# Patient Record
Sex: FEMALE | Race: BLACK OR AFRICAN AMERICAN | NOT HISPANIC OR LATINO | URBAN - METROPOLITAN AREA
[De-identification: names, ages, dates, MRNs, and addresses within clinical notes are randomized per-mention and may not be internally consistent; named-entity substitution may affect disease eponyms.]

---

## 2019-09-14 ENCOUNTER — INPATIENT (INPATIENT)
Facility: HOSPITAL | Age: 59
LOS: 1 days | Discharge: HOME | DRG: 872 | End: 2019-09-16
Attending: HOSPITALIST | Admitting: INTERNAL MEDICINE
Payer: COMMERCIAL

## 2019-09-14 VITALS
WEIGHT: 274.92 LBS | HEIGHT: 71 IN | SYSTOLIC BLOOD PRESSURE: 130 MMHG | DIASTOLIC BLOOD PRESSURE: 77 MMHG | RESPIRATION RATE: 18 BRPM | OXYGEN SATURATION: 97 % | HEART RATE: 110 BPM | TEMPERATURE: 98 F

## 2019-09-14 DIAGNOSIS — L91.8 OTHER HYPERTROPHIC DISORDERS OF THE SKIN: Chronic | ICD-10-CM

## 2019-09-14 DIAGNOSIS — K52.9 NONINFECTIVE GASTROENTERITIS AND COLITIS, UNSPECIFIED: ICD-10-CM

## 2019-09-14 DIAGNOSIS — Z98.891 HISTORY OF UTERINE SCAR FROM PREVIOUS SURGERY: Chronic | ICD-10-CM

## 2019-09-14 LAB
ALBUMIN SERPL ELPH-MCNC: 2.7 G/DL — LOW (ref 3.3–5)
ALP SERPL-CCNC: 93 U/L — SIGNIFICANT CHANGE UP (ref 40–120)
ALT FLD-CCNC: 11 U/L — SIGNIFICANT CHANGE UP (ref 10–45)
ANION GAP SERPL CALC-SCNC: 9 MMOL/L — SIGNIFICANT CHANGE UP (ref 5–17)
APPEARANCE UR: CLEAR — SIGNIFICANT CHANGE UP
APTT BLD: 22.6 SEC — LOW (ref 27.5–36.3)
AST SERPL-CCNC: 12 U/L — SIGNIFICANT CHANGE UP (ref 10–40)
BACTERIA # UR AUTO: ABNORMAL /HPF
BASOPHILS # BLD AUTO: 0.04 K/UL — SIGNIFICANT CHANGE UP (ref 0–0.2)
BASOPHILS NFR BLD AUTO: 0.3 % — SIGNIFICANT CHANGE UP (ref 0–2)
BILIRUB SERPL-MCNC: 0.4 MG/DL — SIGNIFICANT CHANGE UP (ref 0.2–1.2)
BILIRUB UR-MCNC: NEGATIVE — SIGNIFICANT CHANGE UP
BLD GP AB SCN SERPL QL: SIGNIFICANT CHANGE UP
BUN SERPL-MCNC: 13 MG/DL — SIGNIFICANT CHANGE UP (ref 7–23)
CALCIUM SERPL-MCNC: 9 MG/DL — SIGNIFICANT CHANGE UP (ref 8.4–10.5)
CHLORIDE SERPL-SCNC: 99 MMOL/L — SIGNIFICANT CHANGE UP (ref 96–108)
CO2 SERPL-SCNC: 27 MMOL/L — SIGNIFICANT CHANGE UP (ref 22–31)
COLOR SPEC: YELLOW — SIGNIFICANT CHANGE UP
CREAT SERPL-MCNC: 1.42 MG/DL — HIGH (ref 0.5–1.3)
DIFF PNL FLD: ABNORMAL
EOSINOPHIL # BLD AUTO: 0.04 K/UL — SIGNIFICANT CHANGE UP (ref 0–0.5)
EOSINOPHIL NFR BLD AUTO: 0.3 % — SIGNIFICANT CHANGE UP (ref 0–6)
EPI CELLS # UR: ABNORMAL
GLUCOSE BLDC GLUCOMTR-MCNC: 251 MG/DL — HIGH (ref 70–99)
GLUCOSE BLDC GLUCOMTR-MCNC: 318 MG/DL — HIGH (ref 70–99)
GLUCOSE BLDC GLUCOMTR-MCNC: 379 MG/DL — HIGH (ref 70–99)
GLUCOSE SERPL-MCNC: 403 MG/DL — HIGH (ref 70–99)
GLUCOSE UR QL: 1000 MG/DL
GRAN CASTS # UR COMP ASSIST: ABNORMAL
HCT VFR BLD CALC: 35.7 % — SIGNIFICANT CHANGE UP (ref 34.5–45)
HGB BLD-MCNC: 11.5 G/DL — SIGNIFICANT CHANGE UP (ref 11.5–15.5)
HYALINE CASTS # UR AUTO: ABNORMAL (ref 0–7)
IMM GRANULOCYTES NFR BLD AUTO: 0.6 % — SIGNIFICANT CHANGE UP (ref 0–1.5)
INR BLD: 1.04 RATIO — SIGNIFICANT CHANGE UP (ref 0.88–1.16)
KETONES UR-MCNC: NEGATIVE — SIGNIFICANT CHANGE UP
LACTATE SERPL-SCNC: 1.7 MMOL/L — SIGNIFICANT CHANGE UP (ref 0.7–2)
LACTATE SERPL-SCNC: 3.1 MMOL/L — HIGH (ref 0.7–2)
LEUKOCYTE ESTERASE UR-ACNC: ABNORMAL
LIDOCAIN IGE QN: 174 U/L — SIGNIFICANT CHANGE UP (ref 73–393)
LYMPHOCYTES # BLD AUTO: 1.7 K/UL — SIGNIFICANT CHANGE UP (ref 1–3.3)
LYMPHOCYTES # BLD AUTO: 13.8 % — SIGNIFICANT CHANGE UP (ref 13–44)
MCHC RBC-ENTMCNC: 26.1 PG — LOW (ref 27–34)
MCHC RBC-ENTMCNC: 32.2 GM/DL — SIGNIFICANT CHANGE UP (ref 32–36)
MCV RBC AUTO: 81.1 FL — SIGNIFICANT CHANGE UP (ref 80–100)
MONOCYTES # BLD AUTO: 0.47 K/UL — SIGNIFICANT CHANGE UP (ref 0–0.9)
MONOCYTES NFR BLD AUTO: 3.8 % — SIGNIFICANT CHANGE UP (ref 2–14)
NEUTROPHILS # BLD AUTO: 10 K/UL — HIGH (ref 1.8–7.4)
NEUTROPHILS NFR BLD AUTO: 81.2 % — HIGH (ref 43–77)
NITRITE UR-MCNC: NEGATIVE — SIGNIFICANT CHANGE UP
NRBC # BLD: 0 /100 WBCS — SIGNIFICANT CHANGE UP (ref 0–0)
PH UR: 5 — SIGNIFICANT CHANGE UP (ref 5–8)
PLATELET # BLD AUTO: 213 K/UL — SIGNIFICANT CHANGE UP (ref 150–400)
POTASSIUM SERPL-MCNC: 3.7 MMOL/L — SIGNIFICANT CHANGE UP (ref 3.5–5.3)
POTASSIUM SERPL-SCNC: 3.7 MMOL/L — SIGNIFICANT CHANGE UP (ref 3.5–5.3)
PROT SERPL-MCNC: 8.3 G/DL — SIGNIFICANT CHANGE UP (ref 6–8.3)
PROT UR-MCNC: 100
PROTHROM AB SERPL-ACNC: 11.7 SEC — SIGNIFICANT CHANGE UP (ref 10–12.9)
RBC # BLD: 4.4 M/UL — SIGNIFICANT CHANGE UP (ref 3.8–5.2)
RBC # FLD: 13.4 % — SIGNIFICANT CHANGE UP (ref 10.3–14.5)
RBC CASTS # UR COMP ASSIST: ABNORMAL /HPF (ref 0–4)
SODIUM SERPL-SCNC: 135 MMOL/L — SIGNIFICANT CHANGE UP (ref 135–145)
SP GR SPEC: 1.01 — SIGNIFICANT CHANGE UP (ref 1.01–1.02)
UROBILINOGEN FLD QL: NEGATIVE — SIGNIFICANT CHANGE UP
WBC # BLD: 12.33 K/UL — HIGH (ref 3.8–10.5)
WBC # FLD AUTO: 12.33 K/UL — HIGH (ref 3.8–10.5)
WBC UR QL: ABNORMAL /HPF (ref 0–5)

## 2019-09-14 PROCEDURE — 93010 ELECTROCARDIOGRAM REPORT: CPT

## 2019-09-14 PROCEDURE — 99285 EMERGENCY DEPT VISIT HI MDM: CPT

## 2019-09-14 PROCEDURE — 99284 EMERGENCY DEPT VISIT MOD MDM: CPT

## 2019-09-14 PROCEDURE — 99223 1ST HOSP IP/OBS HIGH 75: CPT

## 2019-09-14 RX ORDER — ONDANSETRON 8 MG/1
4 TABLET, FILM COATED ORAL ONCE
Refills: 0 | Status: COMPLETED | OUTPATIENT
Start: 2019-09-14 | End: 2019-09-14

## 2019-09-14 RX ORDER — INSULIN GLARGINE 100 [IU]/ML
30 INJECTION, SOLUTION SUBCUTANEOUS AT BEDTIME
Refills: 0 | Status: DISCONTINUED | OUTPATIENT
Start: 2019-09-14 | End: 2019-09-15

## 2019-09-14 RX ORDER — INSULIN LISPRO 100/ML
VIAL (ML) SUBCUTANEOUS
Refills: 0 | Status: DISCONTINUED | OUTPATIENT
Start: 2019-09-14 | End: 2019-09-16

## 2019-09-14 RX ORDER — ENOXAPARIN SODIUM 100 MG/ML
40 INJECTION SUBCUTANEOUS DAILY
Refills: 0 | Status: DISCONTINUED | OUTPATIENT
Start: 2019-09-14 | End: 2019-09-16

## 2019-09-14 RX ORDER — CIPROFLOXACIN LACTATE 400MG/40ML
400 VIAL (ML) INTRAVENOUS EVERY 12 HOURS
Refills: 0 | Status: DISCONTINUED | OUTPATIENT
Start: 2019-09-14 | End: 2019-09-16

## 2019-09-14 RX ORDER — ONDANSETRON 8 MG/1
4 TABLET, FILM COATED ORAL EVERY 6 HOURS
Refills: 0 | Status: DISCONTINUED | OUTPATIENT
Start: 2019-09-14 | End: 2019-09-16

## 2019-09-14 RX ORDER — LOSARTAN POTASSIUM 100 MG/1
50 TABLET, FILM COATED ORAL DAILY
Refills: 0 | Status: DISCONTINUED | OUTPATIENT
Start: 2019-09-14 | End: 2019-09-16

## 2019-09-14 RX ORDER — MYCOPHENOLATE MOFETIL 250 MG/1
1000 CAPSULE ORAL
Refills: 0 | Status: DISCONTINUED | OUTPATIENT
Start: 2019-09-14 | End: 2019-09-16

## 2019-09-14 RX ORDER — INFLUENZA VIRUS VACCINE 15; 15; 15; 15 UG/.5ML; UG/.5ML; UG/.5ML; UG/.5ML
0.5 SUSPENSION INTRAMUSCULAR ONCE
Refills: 0 | Status: COMPLETED | OUTPATIENT
Start: 2019-09-14 | End: 2019-09-14

## 2019-09-14 RX ORDER — GLUCAGON INJECTION, SOLUTION 0.5 MG/.1ML
1 INJECTION, SOLUTION SUBCUTANEOUS ONCE
Refills: 0 | Status: DISCONTINUED | OUTPATIENT
Start: 2019-09-14 | End: 2019-09-16

## 2019-09-14 RX ORDER — DEXTROSE 50 % IN WATER 50 %
15 SYRINGE (ML) INTRAVENOUS ONCE
Refills: 0 | Status: DISCONTINUED | OUTPATIENT
Start: 2019-09-14 | End: 2019-09-16

## 2019-09-14 RX ORDER — DEXTROSE 50 % IN WATER 50 %
25 SYRINGE (ML) INTRAVENOUS ONCE
Refills: 0 | Status: DISCONTINUED | OUTPATIENT
Start: 2019-09-14 | End: 2019-09-16

## 2019-09-14 RX ORDER — CIPROFLOXACIN LACTATE 400MG/40ML
400 VIAL (ML) INTRAVENOUS ONCE
Refills: 0 | Status: COMPLETED | OUTPATIENT
Start: 2019-09-14 | End: 2019-09-14

## 2019-09-14 RX ORDER — SODIUM CHLORIDE 9 MG/ML
2200 INJECTION INTRAMUSCULAR; INTRAVENOUS; SUBCUTANEOUS ONCE
Refills: 0 | Status: COMPLETED | OUTPATIENT
Start: 2019-09-14 | End: 2019-09-14

## 2019-09-14 RX ORDER — METRONIDAZOLE 500 MG
500 TABLET ORAL ONCE
Refills: 0 | Status: COMPLETED | OUTPATIENT
Start: 2019-09-14 | End: 2019-09-14

## 2019-09-14 RX ORDER — INSULIN LISPRO 100/ML
10 VIAL (ML) SUBCUTANEOUS
Refills: 0 | Status: DISCONTINUED | OUTPATIENT
Start: 2019-09-14 | End: 2019-09-15

## 2019-09-14 RX ORDER — ACETAMINOPHEN 500 MG
650 TABLET ORAL EVERY 6 HOURS
Refills: 0 | Status: DISCONTINUED | OUTPATIENT
Start: 2019-09-14 | End: 2019-09-16

## 2019-09-14 RX ORDER — HYDROXYCHLOROQUINE SULFATE 200 MG
200 TABLET ORAL
Refills: 0 | Status: DISCONTINUED | OUTPATIENT
Start: 2019-09-14 | End: 2019-09-16

## 2019-09-14 RX ORDER — SODIUM CHLORIDE 9 MG/ML
1000 INJECTION, SOLUTION INTRAVENOUS
Refills: 0 | Status: DISCONTINUED | OUTPATIENT
Start: 2019-09-14 | End: 2019-09-16

## 2019-09-14 RX ORDER — IOHEXOL 300 MG/ML
30 INJECTION, SOLUTION INTRAVENOUS ONCE
Refills: 0 | Status: COMPLETED | OUTPATIENT
Start: 2019-09-14 | End: 2019-09-14

## 2019-09-14 RX ORDER — METRONIDAZOLE 500 MG
500 TABLET ORAL EVERY 8 HOURS
Refills: 0 | Status: DISCONTINUED | OUTPATIENT
Start: 2019-09-14 | End: 2019-09-16

## 2019-09-14 RX ORDER — MORPHINE SULFATE 50 MG/1
4 CAPSULE, EXTENDED RELEASE ORAL ONCE
Refills: 0 | Status: DISCONTINUED | OUTPATIENT
Start: 2019-09-14 | End: 2019-09-14

## 2019-09-14 RX ORDER — MYCOPHENOLATE MOFETIL 250 MG/1
2 CAPSULE ORAL
Qty: 0 | Refills: 0 | DISCHARGE

## 2019-09-14 RX ORDER — ACETAMINOPHEN 500 MG
650 TABLET ORAL ONCE
Refills: 0 | Status: COMPLETED | OUTPATIENT
Start: 2019-09-14 | End: 2019-09-14

## 2019-09-14 RX ORDER — DEXTROSE 50 % IN WATER 50 %
12.5 SYRINGE (ML) INTRAVENOUS ONCE
Refills: 0 | Status: DISCONTINUED | OUTPATIENT
Start: 2019-09-14 | End: 2019-09-16

## 2019-09-14 RX ORDER — LOSARTAN POTASSIUM 100 MG/1
0 TABLET, FILM COATED ORAL
Qty: 0 | Refills: 0 | DISCHARGE

## 2019-09-14 RX ORDER — SODIUM CHLORIDE 9 MG/ML
1000 INJECTION INTRAMUSCULAR; INTRAVENOUS; SUBCUTANEOUS ONCE
Refills: 0 | Status: DISCONTINUED | OUTPATIENT
Start: 2019-09-14 | End: 2019-09-14

## 2019-09-14 RX ADMIN — Medication 200 MILLIGRAM(S): at 19:03

## 2019-09-14 RX ADMIN — Medication 200 MILLIGRAM(S): at 19:04

## 2019-09-14 RX ADMIN — MORPHINE SULFATE 4 MILLIGRAM(S): 50 CAPSULE, EXTENDED RELEASE ORAL at 12:12

## 2019-09-14 RX ADMIN — Medication 10: at 17:42

## 2019-09-14 RX ADMIN — Medication 100 MILLIGRAM(S): at 22:14

## 2019-09-14 RX ADMIN — Medication 500 MILLIGRAM(S): at 14:17

## 2019-09-14 RX ADMIN — Medication 100 MILLIGRAM(S): at 13:17

## 2019-09-14 RX ADMIN — INSULIN GLARGINE 30 UNIT(S): 100 INJECTION, SOLUTION SUBCUTANEOUS at 22:14

## 2019-09-14 RX ADMIN — SODIUM CHLORIDE 2200 MILLILITER(S): 9 INJECTION INTRAMUSCULAR; INTRAVENOUS; SUBCUTANEOUS at 11:54

## 2019-09-14 RX ADMIN — MORPHINE SULFATE 4 MILLIGRAM(S): 50 CAPSULE, EXTENDED RELEASE ORAL at 11:42

## 2019-09-14 RX ADMIN — Medication 200 MILLIGRAM(S): at 11:54

## 2019-09-14 RX ADMIN — Medication 650 MILLIGRAM(S): at 11:53

## 2019-09-14 RX ADMIN — SODIUM CHLORIDE 2200 MILLILITER(S): 9 INJECTION INTRAMUSCULAR; INTRAVENOUS; SUBCUTANEOUS at 12:54

## 2019-09-14 RX ADMIN — IOHEXOL 30 MILLILITER(S): 300 INJECTION, SOLUTION INTRAVENOUS at 12:01

## 2019-09-14 RX ADMIN — MYCOPHENOLATE MOFETIL 1000 MILLIGRAM(S): 250 CAPSULE ORAL at 19:03

## 2019-09-14 RX ADMIN — Medication 10 UNIT(S): at 17:42

## 2019-09-14 RX ADMIN — Medication 650 MILLIGRAM(S): at 12:23

## 2019-09-14 RX ADMIN — ONDANSETRON 4 MILLIGRAM(S): 8 TABLET, FILM COATED ORAL at 11:42

## 2019-09-14 RX ADMIN — Medication 400 MILLIGRAM(S): at 12:54

## 2019-09-14 NOTE — ED PROVIDER NOTE - OBJECTIVE STATEMENT
59 yr old female with hx of lupus (on prednisone), HTN, DM presents c/o with abdominal pain. Pt reports right sided pain started on wednesday night, went away, now pain returned at 2 am. Denies any n/v/d, known fever or chills. Denies any cough, chest pain, sob or any other symptoms. Pt was seen at urgent care and sent to ED for further eval.

## 2019-09-14 NOTE — ED ADULT NURSE NOTE - OBJECTIVE STATEMENT
Patient presents complaining of diffuse epigastric pain radiating to her right-sided abdomen. Patient states pain has been occurring for approx. 2-3 days, she denies fevers, denies vomiting however verbalizes nausea. Patient states last BM was this A.M.

## 2019-09-14 NOTE — H&P ADULT - NSHPLABSRESULTS_GEN_ALL_CORE
T(C): 36.6 (19 @ 13:49), Max: 38.2 (19 @ 11:34)  T(F): 97.9 (19 @ 13:49), Max: 100.7 (19 @ 11:34)  HR: 68 (19 @ 16:30) (68 - 110)  BP: 138/69 (19 @ 16:30) (123/60 - 138/69)  RR: 15 (19 @ 16:30) (12 - 18)  SpO2: 97% (19 @ 16:30) (97% - 98%)  Labs:                         11.5   12.33<H>   )-----------(   213      ( 14 Sep 2019 11:44 )              35.7     Neutro%  81.2<H>   Lympho%  13.8    Mono%    3.8     Bands    x            135  |  99  |  13  ----------------------------<  403<H>  3.7   |  27  |  1.42<H>    Ca    9.0      14 Sep 2019 11:44    TPro  8.3  /  Alb  2.7<L>  /  TBili  0.4  /  DBili  x   /  AST  12  /  ALT  11  /  AlkPhos  93      eGFR if Non African American: 41 mL/min/1.73M2 (19 @ 11:44)  eGFR if : 48 mL/min/1.73M2 (19 @ 11:44)      ( 14 Sep 2019 11:44 )   PT: 11.7 sec;   INR: 1.04 ratio;       PTT:22.6 sec                Urinalysis Basic - ( 14 Sep 2019 13:00 )    Color: Yellow / Appearance: Clear / S.015 / pH: x  Gluc: x / Ketone: Negative  / Bili: Negative / Urobili: Negative   Blood: x / Protein: 100 / Nitrite: Negative   Leuk Esterase: Trace / RBC: 11-25 /HPF / WBC 6-10 /HPF   Sq Epi: x / Non Sq Epi: Moderate / Bacteria: Few /HPF              RVP:          Tox:

## 2019-09-14 NOTE — H&P ADULT - NSICDXPASTSURGICALHX_GEN_ALL_CORE_FT
PAST SURGICAL HISTORY:   delivery delivered  section x 3    H/O:      Skin tag Skin tag removal, right shoulder

## 2019-09-14 NOTE — CONSULT NOTE ADULT - ASSESSMENT
fair story for appendicitis and conving rlq tenderness but need CT scan for confirmation. Not a good story for appendicitis although hhas rlq tenderness.      I personally reviewed CT scan images and findings are c/w colitis. Appendix is normal.      I discussed with ER to admit to medicine for IV antibioitics.    Would keep NPO today and start clear liquid diet in am.    Thank you.    Dr. Fer Soriano  cell#[phone]

## 2019-09-14 NOTE — ED PROVIDER NOTE - ATTENDING CONTRIBUTION TO CARE
59 yr old female with hx of lupus (on prednisone), HTN, DM presents c/o with abdominal pain. Pt reports right sided pain started on wednesday night, went away, now pain returned at 2 am. Denies any n/v/d, known fever or chills. Denies any cough, chest pain, sob or any other symptoms. Pt was seen at urgent care and sent to ED for further eval.  abdomen tenderness to RLQ, high suspicion for appendicitis- Dr. Soriano called prior to ct results, sepsis fluids, iv abx done, ct showing colitis, normal appendix, admit to medicine for iv abx  Dr. Adamson:  I have reviewed and discussed with the PA/ resident the case specifics, including the history, physical assessment, evaluation, conclusion, laboratory results, and medical plan. I agree with the contents, and conclusions. I have personally examined, and interviewed the patient.

## 2019-09-14 NOTE — H&P ADULT - HISTORY OF PRESENT ILLNESS
60 yo F with pmhx of 58 yo F with pmhx of lupus, DM, HTN here for worsening RUQ abdominal pain for 3 days, pain acutely worsening since 2am last night. Pt described pain in RUQ, pressure likes, comes and goes, 9/10, radiating across n is associated with at least three episodes of non bloody diarrhea per day. Pt also has nauseas and a few episodes of nbnb vomiting. Pt also admits to fever and chills at home.     In ED, T max of 100.7 HR 94 /60 RR 12 O2 sat: 98% on RA 60 yo F with pmhx of lupus, DM, HTN here for worsening RUQ abdominal pain for 3 days, pain acutely worsening since 2am last night. Pt described pain in RUQ, pressure likes, comes and goes, 9/10, radiating across the abdomen. Pt reports at least three episodes of non bloody diarrhea per day the past three days. Pt also has nauseas and a few episodes of nbnb vomiting. Pt also admits to fever and chills at home.     In ED, T max of 100.7 HR 94 /60 RR 12 O2 sat: 98% on RA 58 yo F with pmhx of lupus, DM, HTN here for worsening RUQ abdominal pain for 3 days, pain acutely worsening since 2am last night. Pt described pain in RUQ, pressure likes, comes and goes, 9/10, radiating across the abdomen. Pt reports at least three episodes of non bloody diarrhea per day the past three days. Pt also has nauseas and a few episodes of nbnb vomiting. Pt also admits to fever and chills at home. Pt denies cough, sob, cp.     In ED, T max of 100.7 HR 94 /60 RR 12 O2 sat: 98% on RA    CT A/P: Bibasilar patchy lung airspace opacities. Cecum is markedly thickened with inflammatory changes in surrounding fat consistent with colitis.

## 2019-09-14 NOTE — ED PROVIDER NOTE - CLINICAL SUMMARY MEDICAL DECISION MAKING FREE TEXT BOX
59 yr old female with hx of lupus (on prednisone), HTN, DM presents c/o with abdominal pain. Pt reports right sided pain started on wednesday night, went away, now pain returned at 2 am. Denies any n/v/d, known fever or chills. Denies any cough, chest pain, sob or any other symptoms. Pt was seen at urgent care and sent to ED for further eval.  abdomen tenderness to RLQ, high suspicion for appendicitis- Dr. Soriano called prior to ct results, sepsis fluids, iv abx done, ct showing colitis, normal appendix, admit to medicine for iv abx

## 2019-09-14 NOTE — ED ADULT TRIAGE NOTE - CHIEF COMPLAINT QUOTE
Pt c/o right lower abd pain x2 days with nausea. Pt denies Any fever. Pt states PMH includes lupus, type 2 diabetes, and HTN.

## 2019-09-14 NOTE — CONSULT NOTE ADULT - SUBJECTIVE AND OBJECTIVE BOX
ED provider note:  Pt c/o right lower abd pain x2 days with nausea. Pt denies Any fever. Pt states PMH includes lupus, type 2 diabetes, and HTN.	  abdominal pain	  Extended Triage:   Vital Signs:  · BP Systolic	130 mm Hg	  · BP Diastolic	77 mm Hg	  · Heart Rate	 110 /min	  · Respiration Rate (breaths/min)	18 /min	  · Temp (F)	98 Degrees F	  · Temp (C)	36.7 Degrees C	  · Temp site	oral	  · SpO2 (%)	97 %	  · O2 delivery	room air	      patient interviewed and examined in ER  only past abdominal surgery is "C" section    c/o middle abdominal pain since wed eve now localized to rlq    afebrile, hr 130(recived fluid)      Heent-sclera anicteric    abdomen-lower midline scar, non distended, 2-3+/4+ tenderness in RLQ over McBurney's point    labs:    Complete Blood Count + Automated Diff (09.14.19 @ 11:44)    WBC Count: 12.33 K/uL    RBC Count: 4.40 M/uL    Hemoglobin: 11.5 g/dL    Hematocrit: 35.7 %    Mean Cell Volume: 81.1 fl    Mean Cell Hemoglobin: 26.1 pg    Mean Cell Hemoglobin Conc: 32.2 gm/dL    Red Cell Distrib Width: 13.4 %    Platelet Count - Automated: 213 K/uL    Auto Neutrophil #: 10.00 K/uL    Auto Lymphocyte #: 1.70 K/uL    Auto Monocyte #: 0.47 K/uL    Auto Eosinophil #: 0.04 K/uL    Auto Basophil #: 0.04 K/uL    Auto Neutrophil %: 81.2: Differential percentages must be correlated with absolute numbers for  clinical significance. %    Auto Lymphocyte %: 13.8 %    Auto Monocyte %: 3.8 %    Auto Eosinophil %: 0.3 %    Auto Basophil %: 0.3 %    Auto Immature Granulocyte %: 0.6 %    Nucleated RBC: 0 /100 WBCs        Comprehensive Metabolic Panel (09.14.19 @ 11:44)    Sodium, Serum: 135 mmol/L    Potassium, Serum: 3.7 mmol/L    Chloride, Serum: 99 mmol/L    Carbon Dioxide, Serum: 27 mmol/L    Anion Gap, Serum: 9 mmol/L    Blood Urea Nitrogen, Serum: 13 mg/dL    Creatinine, Serum: 1.42 mg/dL    Glucose, Serum: 403 mg/dL    Calcium, Total Serum: 9.0 mg/dL    Protein Total, Serum: 8.3 g/dL    Albumin, Serum: 2.7 g/dL    Bilirubin Total, Serum: 0.4 mg/dL    Alkaline Phosphatase, Serum: 93 U/L    Aspartate Aminotransferase (AST/SGOT): 12 U/L    Alanine Aminotransferase (ALT/SGPT): 11 U/L       urine pregnnancy(I told PA to get ot)    CT scan(going into scanner now) ED provider note:  Pt c/o right lower abd pain x2 days with nausea. Pt denies Any fever. Pt states PMH includes lupus, type 2 diabetes, and HTN.	  abdominal pain	  Extended Triage:   Vital Signs:  · BP Systolic	130 mm Hg	  · BP Diastolic	77 mm Hg	  · Heart Rate	 110 /min	  · Respiration Rate (breaths/min)	18 /min	  · Temp (F)	98 Degrees F	  · Temp (C)	36.7 Degrees C	  · Temp site	oral	  · SpO2 (%)	97 %	  · O2 delivery	room air	      patient interviewed and examined in ER  only past abdominal surgery is "C" section    c/o middle abdominal pain/lower epigastric since Wednesday. She does not localize her pain symptoms now to RLQ but rather points to mid and lower abdomen.    afebrile, hr 130(recived fluid)      Heent-sclera anicteric    abdomen-lower midline scar, non distended, 2-3+/4+ tenderness in RLQ over McBurney's point    labs:    Complete Blood Count + Automated Diff (09.14.19 @ 11:44)    WBC Count: 12.33 K/uL    RBC Count: 4.40 M/uL    Hemoglobin: 11.5 g/dL    Hematocrit: 35.7 %    Mean Cell Volume: 81.1 fl    Mean Cell Hemoglobin: 26.1 pg    Mean Cell Hemoglobin Conc: 32.2 gm/dL    Red Cell Distrib Width: 13.4 %    Platelet Count - Automated: 213 K/uL    Auto Neutrophil #: 10.00 K/uL    Auto Lymphocyte #: 1.70 K/uL    Auto Monocyte #: 0.47 K/uL    Auto Eosinophil #: 0.04 K/uL    Auto Basophil #: 0.04 K/uL    Auto Neutrophil %: 81.2: Differential percentages must be correlated with absolute numbers for  clinical significance. %    Auto Lymphocyte %: 13.8 %    Auto Monocyte %: 3.8 %    Auto Eosinophil %: 0.3 %    Auto Basophil %: 0.3 %    Auto Immature Granulocyte %: 0.6 %    Nucleated RBC: 0 /100 WBCs        Comprehensive Metabolic Panel (09.14.19 @ 11:44)    Sodium, Serum: 135 mmol/L    Potassium, Serum: 3.7 mmol/L    Chloride, Serum: 99 mmol/L    Carbon Dioxide, Serum: 27 mmol/L    Anion Gap, Serum: 9 mmol/L    Blood Urea Nitrogen, Serum: 13 mg/dL    Creatinine, Serum: 1.42 mg/dL    Glucose, Serum: 403 mg/dL    Calcium, Total Serum: 9.0 mg/dL    Protein Total, Serum: 8.3 g/dL    Albumin, Serum: 2.7 g/dL    Bilirubin Total, Serum: 0.4 mg/dL    Alkaline Phosphatase, Serum: 93 U/L    Aspartate Aminotransferase (AST/SGOT): 12 U/L    Alanine Aminotransferase (ALT/SGPT): 11 U/L       urine pregnnancy(I told PA to get ot)    CT scan:  < from: CT Abdomen and Pelvis w/ Oral Cont and w/ IV Cont (09.14.19 @ 13:03) >  IMPRESSION:     Bibasilar patchy lung airspace opacities suspicious for infection.   Correlate clinically and follow to resolution.    The cecum is markedly thickened and there is inflammatory change in the   surrounding fat consistent with some form of colitis. The ileocecal valve   is normal as is the appendix. The differential for this includes   inflammatory, infectious, or ischemic colitis. Correlate clinically.   Follow to resolution. Consider colonoscopy for further evaluation when   patient is able.    < end of copied text >

## 2019-09-14 NOTE — H&P ADULT - NSICDXPASTMEDICALHX_GEN_ALL_CORE_FT
PAST MEDICAL HISTORY:  Diabetes     Diabetes mellitus     HTN (hypertension)     Hyperlipidemia     Hypertension     Lupus     Lupus     Obesity     Pericarditis

## 2019-09-14 NOTE — H&P ADULT - ASSESSMENT
60 yo F with pmhx of lupus, DM, HTN here for worsening RUQ abdominal pain for 3 days, pain acutely worsening since 2am last night.      # severe sepsis 2/2 cecum colitis  - Cont with Cipro 400 mg q12 and Flagyl 500mg q8  - follow up GI  - advance diet as tolerated  - per pt, had colonoscopy 3 years ago that was negative  - follow up GI outpatient in 6 weeks for repeat colonoscopy    # lactic acidosis   - resolved with IVF    # INDER on CKD2 likely pre-renal  - baseline Cr 0.97  - s/p IVF bolus in ED  - monitor BMP    # Lupus  - c/w prednisone 10mg qd, cellcept 1000mg q12 and Plaquenil 200mg q12    # T2DM on insulin   - cont with lantus 30unit qHS, lispro 10 unit qAC and ISS  - monitor FS    # HTN   - c/w losartan 50mg q24    # DVT ppx  - lovenox subq    IMPROVE VTE Individual Risk Assessment    RISK                                                                Points    [  ] Previous VTE                                                  3    [  ] Thrombophilia                                               2    [  ] Lower limb paralysis                                      2        (unable to hold up >15 seconds)      [  ] Current Cancer                                              2         (within 6 months)    [  ] Immobilization > 24 hrs                                1    [  ] ICU/CCU stay > 24 hours                              1    [  ] Age > 60                                                      1    IMPROVE VTE Score _____0____    IMPROVE Score 0-1: Low Risk, No VTE prophylaxis required for most patients, encourage ambulation.   IMPROVE Score 2-3: At risk, pharmacologic VTE prophylaxis is indicated for most patients (in the absence of a contraindication)  IMPROVE Score > or = 4: High Risk, pharmacologic VTE prophylaxis is indicated for most patients (in the absence of a contraindication) 60 yo F with pmhx of lupus, DM, HTN here for worsening RUQ abdominal pain for 3 days, pain acutely worsening since 2am last night.      # severe sepsis 2/2 cecum colitis  - Cont with Cipro 400 mg q12 and Flagyl 500mg q8  - follow up GI  - advance diet as tolerated  - per pt, had colonoscopy 3 years ago that was negative  - follow up GI outpatient in 6 weeks for repeat colonoscopy    # lactic acidosis   - resolved with IVF    # Incidental bibasilar airspace opacities on CT   - no signs of pna. pt denies coughing or SOB.   - will hold off PNA tx    # INDER on CKD2 likely pre-renal  - baseline Cr 0.97  - s/p IVF bolus in ED  - monitor BMP    # Lupus  - c/w prednisone 10mg qd, cellcept 1000mg q12 and Plaquenil 200mg q12    # T2DM on insulin   - cont with lantus 30unit qHS, lispro 10 unit qAC and ISS  - monitor FS    # HTN   - c/w losartan 50mg q24    # DVT ppx  - lovenox subq    IMPROVE VTE Individual Risk Assessment    RISK                                                                Points    [  ] Previous VTE                                                  3    [  ] Thrombophilia                                               2    [  ] Lower limb paralysis                                      2        (unable to hold up >15 seconds)      [  ] Current Cancer                                              2         (within 6 months)    [  ] Immobilization > 24 hrs                                1    [  ] ICU/CCU stay > 24 hours                              1    [  ] Age > 60                                                      1    IMPROVE VTE Score _____0____    IMPROVE Score 0-1: Low Risk, No VTE prophylaxis required for most patients, encourage ambulation.   IMPROVE Score 2-3: At risk, pharmacologic VTE prophylaxis is indicated for most patients (in the absence of a contraindication)  IMPROVE Score > or = 4: High Risk, pharmacologic VTE prophylaxis is indicated for most patients (in the absence of a contraindication)

## 2019-09-15 ENCOUNTER — TRANSCRIPTION ENCOUNTER (OUTPATIENT)
Age: 59
End: 2019-09-15

## 2019-09-15 LAB
ANION GAP SERPL CALC-SCNC: 6 MMOL/L — SIGNIFICANT CHANGE UP (ref 5–17)
BUN SERPL-MCNC: 6 MG/DL — LOW (ref 7–23)
CALCIUM SERPL-MCNC: 7.9 MG/DL — LOW (ref 8.4–10.5)
CHLORIDE SERPL-SCNC: 102 MMOL/L — SIGNIFICANT CHANGE UP (ref 96–108)
CO2 SERPL-SCNC: 27 MMOL/L — SIGNIFICANT CHANGE UP (ref 22–31)
CREAT SERPL-MCNC: 1.26 MG/DL — SIGNIFICANT CHANGE UP (ref 0.5–1.3)
CULTURE RESULTS: SIGNIFICANT CHANGE UP
GLUCOSE BLDC GLUCOMTR-MCNC: 180 MG/DL — HIGH (ref 70–99)
GLUCOSE BLDC GLUCOMTR-MCNC: 212 MG/DL — HIGH (ref 70–99)
GLUCOSE BLDC GLUCOMTR-MCNC: 315 MG/DL — HIGH (ref 70–99)
GLUCOSE BLDC GLUCOMTR-MCNC: 99 MG/DL — SIGNIFICANT CHANGE UP (ref 70–99)
GLUCOSE SERPL-MCNC: 294 MG/DL — HIGH (ref 70–99)
HBA1C BLD-MCNC: 13.3 % — HIGH (ref 4–5.6)
HCT VFR BLD CALC: 32.3 % — LOW (ref 34.5–45)
HCV AB S/CO SERPL IA: 0.15 S/CO — SIGNIFICANT CHANGE UP (ref 0–0.99)
HCV AB SERPL-IMP: SIGNIFICANT CHANGE UP
HGB BLD-MCNC: 10.3 G/DL — LOW (ref 11.5–15.5)
MCHC RBC-ENTMCNC: 26.1 PG — LOW (ref 27–34)
MCHC RBC-ENTMCNC: 31.9 GM/DL — LOW (ref 32–36)
MCV RBC AUTO: 81.8 FL — SIGNIFICANT CHANGE UP (ref 80–100)
NRBC # BLD: 0 /100 WBCS — SIGNIFICANT CHANGE UP (ref 0–0)
PLATELET # BLD AUTO: 192 K/UL — SIGNIFICANT CHANGE UP (ref 150–400)
POTASSIUM SERPL-MCNC: 3.7 MMOL/L — SIGNIFICANT CHANGE UP (ref 3.5–5.3)
POTASSIUM SERPL-SCNC: 3.7 MMOL/L — SIGNIFICANT CHANGE UP (ref 3.5–5.3)
RBC # BLD: 3.95 M/UL — SIGNIFICANT CHANGE UP (ref 3.8–5.2)
RBC # FLD: 13.5 % — SIGNIFICANT CHANGE UP (ref 10.3–14.5)
SODIUM SERPL-SCNC: 135 MMOL/L — SIGNIFICANT CHANGE UP (ref 135–145)
SPECIMEN SOURCE: SIGNIFICANT CHANGE UP
WBC # BLD: 9.18 K/UL — SIGNIFICANT CHANGE UP (ref 3.8–10.5)
WBC # FLD AUTO: 9.18 K/UL — SIGNIFICANT CHANGE UP (ref 3.8–10.5)

## 2019-09-15 PROCEDURE — 99233 SBSQ HOSP IP/OBS HIGH 50: CPT | Mod: GC

## 2019-09-15 RX ORDER — INSULIN GLARGINE 100 [IU]/ML
33 INJECTION, SOLUTION SUBCUTANEOUS AT BEDTIME
Refills: 0 | Status: DISCONTINUED | OUTPATIENT
Start: 2019-09-15 | End: 2019-09-16

## 2019-09-15 RX ORDER — INSULIN LISPRO 100/ML
12 VIAL (ML) SUBCUTANEOUS
Refills: 0 | Status: DISCONTINUED | OUTPATIENT
Start: 2019-09-15 | End: 2019-09-16

## 2019-09-15 RX ADMIN — ENOXAPARIN SODIUM 40 MILLIGRAM(S): 100 INJECTION SUBCUTANEOUS at 11:47

## 2019-09-15 RX ADMIN — Medication 12 UNIT(S): at 17:19

## 2019-09-15 RX ADMIN — Medication 8: at 11:45

## 2019-09-15 RX ADMIN — Medication 200 MILLIGRAM(S): at 17:23

## 2019-09-15 RX ADMIN — Medication 4: at 07:54

## 2019-09-15 RX ADMIN — LOSARTAN POTASSIUM 50 MILLIGRAM(S): 100 TABLET, FILM COATED ORAL at 15:22

## 2019-09-15 RX ADMIN — Medication 200 MILLIGRAM(S): at 05:45

## 2019-09-15 RX ADMIN — MYCOPHENOLATE MOFETIL 1000 MILLIGRAM(S): 250 CAPSULE ORAL at 05:43

## 2019-09-15 RX ADMIN — Medication 100 MILLIGRAM(S): at 21:57

## 2019-09-15 RX ADMIN — Medication 200 MILLIGRAM(S): at 05:43

## 2019-09-15 RX ADMIN — Medication 10 UNIT(S): at 11:45

## 2019-09-15 RX ADMIN — Medication 100 MILLIGRAM(S): at 05:42

## 2019-09-15 RX ADMIN — MYCOPHENOLATE MOFETIL 1000 MILLIGRAM(S): 250 CAPSULE ORAL at 17:22

## 2019-09-15 RX ADMIN — Medication 2: at 17:19

## 2019-09-15 RX ADMIN — Medication 200 MILLIGRAM(S): at 17:22

## 2019-09-15 RX ADMIN — Medication 10 UNIT(S): at 07:53

## 2019-09-15 RX ADMIN — Medication 100 MILLIGRAM(S): at 15:22

## 2019-09-15 RX ADMIN — Medication 10 MILLIGRAM(S): at 05:43

## 2019-09-15 NOTE — PROGRESS NOTE ADULT - SUBJECTIVE AND OBJECTIVE BOX
HPI  Pt is a 58yo F admitted to [facility] for colitis  Pt was  seen and examined at bedside with daughter. Pt had no abd pain after clear liquid, advanced diet to soft for lunch and lower abd pain restarted with 5 episodes of diarrhea. Denies of any nausea or vomiting.     Vital Signs Last 24 Hrs  T(C): 37.4 (15 Sep 2019 10:00), Max: 37.6 (15 Sep 2019 05:37)  T(F): 99.4 (15 Sep 2019 10:00), Max: 99.7 (15 Sep 2019 05:37)  HR: 92 (15 Sep 2019 10:00) (68 - 94)  BP: 112/59 (15 Sep 2019 10:00) (112/59 - 141/74)  BP(mean): --  RR: 16 (15 Sep 2019 10:00) (15 - 16)  SpO2: 100% (15 Sep 2019 10:00) (95% - 100%)    I&O's Summary    14 Sep 2019 07:01  -  15 Sep 2019 07:00  --------------------------------------------------------  IN: 400 mL / OUT: 0 mL / NET: 400 mL        CAPILLARY BLOOD GLUCOSE      POCT Blood Glucose.: 315 mg/dL (15 Sep 2019 11:42)  POCT Blood Glucose.: 212 mg/dL (15 Sep 2019 07:52)  POCT Blood Glucose.: 251 mg/dL (14 Sep 2019 22:10)  POCT Blood Glucose.: 379 mg/dL (14 Sep 2019 17:27)      PHYSICAL EXAM:    Constitutional: NAD,   HEENT: PERR, EOMI,    Neck: Soft and supple, No LAD, No JVD  Respiratory: Breath sounds are clear bilaterally, No wheezing, rales    Cardiovascular: S1 and S2,    Gastrointestinal: hyperactive bowel Sounds present, soft, nondistended, pain on palpation right upper and lower quadrant   Extremities: No peripheral edema  Vascular: 2+ peripheral pulses  Neurological: A/O x 3, no focal deficits  Musculoskeletal: 5/5 strength b/l upper and lower extremities  Skin: No rashes    MEDICATIONS:  MEDICATIONS  (STANDING):  ciprofloxacin   IVPB 400 milliGRAM(s) IV Intermittent every 12 hours  dextrose 5%. 1000 milliLiter(s) (50 mL/Hr) IV Continuous <Continuous>  dextrose 50% Injectable 12.5 Gram(s) IV Push once  dextrose 50% Injectable 25 Gram(s) IV Push once  dextrose 50% Injectable 25 Gram(s) IV Push once  enoxaparin Injectable 40 milliGRAM(s) SubCutaneous daily  hydroxychloroquine 200 milliGRAM(s) Oral two times a day  influenza   Vaccine 0.5 milliLiter(s) IntraMuscular once  insulin glargine Injectable (LANTUS) 30 Unit(s) SubCutaneous at bedtime  insulin lispro (HumaLOG) corrective regimen sliding scale   SubCutaneous three times a day before meals  insulin lispro Injectable (HumaLOG) 10 Unit(s) SubCutaneous three times a day before meals  losartan 50 milliGRAM(s) Oral daily  metroNIDAZOLE  IVPB 500 milliGRAM(s) IV Intermittent every 8 hours  mycophenolate mofetil 1000 milliGRAM(s) Oral two times a day  predniSONE   Tablet 10 milliGRAM(s) Oral daily      LABS: All Labs Reviewed:                        10.3   9.18  )-----------( 192      ( 15 Sep 2019 08:37 )             32.3     09-15    135  |  102  |  6<L>  ----------------------------<  294<H>  3.7   |  27  |  1.26    Ca    7.9<L>      15 Sep 2019 08:37    TPro  8.3  /  Alb  2.7<L>  /  TBili  0.4  /  DBili  x   /  AST  12  /  ALT  11  /  AlkPhos  93  09-14    PT/INR - ( 14 Sep 2019 11:44 )   PT: 11.7 sec;   INR: 1.04 ratio         PTT - ( 14 Sep 2019 11:44 )  PTT:22.6 sec      Blood Culture:     RADIOLOGY/EKG:    DVT PPX:    ADVANCED DIRECTIVE:    DISPOSITION:

## 2019-09-15 NOTE — PROGRESS NOTE ADULT - ASSESSMENT
Pt is a 60yo F admitted to [facility] for colitis    *colitis   Cont cipro and flagyl   not able to tolerate soft diet   return to full liquid diet  advance diet as tolerated   follow up with GI in 6 w for repeat colposcopy     *DM   Not controlled  changed to DM diet today   Increase lantus to 33u, preameal to 12u   cont ISS   close monitor     *lactic acidosis   liekly due to dehydration  resolved after hydration     *INDER   improving   cont encourage PO fluid     *Lupus   cont prednisone 10mg   cont home meds     *opacities on CT  no coughing, afebrile  no abx for now     *DVT ppx   cont Lovenox

## 2019-09-16 ENCOUNTER — TRANSCRIPTION ENCOUNTER (OUTPATIENT)
Age: 59
End: 2019-09-16

## 2019-09-16 VITALS
RESPIRATION RATE: 15 BRPM | DIASTOLIC BLOOD PRESSURE: 59 MMHG | SYSTOLIC BLOOD PRESSURE: 116 MMHG | TEMPERATURE: 99 F | HEART RATE: 97 BPM | OXYGEN SATURATION: 97 %

## 2019-09-16 DIAGNOSIS — K52.9 NONINFECTIVE GASTROENTERITIS AND COLITIS, UNSPECIFIED: ICD-10-CM

## 2019-09-16 LAB
ANION GAP SERPL CALC-SCNC: 8 MMOL/L — SIGNIFICANT CHANGE UP (ref 5–17)
BUN SERPL-MCNC: 5 MG/DL — LOW (ref 7–23)
CALCIUM SERPL-MCNC: 8.5 MG/DL — SIGNIFICANT CHANGE UP (ref 8.4–10.5)
CHLORIDE SERPL-SCNC: 106 MMOL/L — SIGNIFICANT CHANGE UP (ref 96–108)
CO2 SERPL-SCNC: 25 MMOL/L — SIGNIFICANT CHANGE UP (ref 22–31)
CREAT SERPL-MCNC: 1.02 MG/DL — SIGNIFICANT CHANGE UP (ref 0.5–1.3)
GLUCOSE BLDC GLUCOMTR-MCNC: 181 MG/DL — HIGH (ref 70–99)
GLUCOSE BLDC GLUCOMTR-MCNC: 208 MG/DL — HIGH (ref 70–99)
GLUCOSE SERPL-MCNC: 157 MG/DL — HIGH (ref 70–99)
HCT VFR BLD CALC: 33.8 % — LOW (ref 34.5–45)
HGB BLD-MCNC: 10.7 G/DL — LOW (ref 11.5–15.5)
MAGNESIUM SERPL-MCNC: 1.6 MG/DL — SIGNIFICANT CHANGE UP (ref 1.6–2.6)
MCHC RBC-ENTMCNC: 25.7 PG — LOW (ref 27–34)
MCHC RBC-ENTMCNC: 31.7 GM/DL — LOW (ref 32–36)
MCV RBC AUTO: 81.1 FL — SIGNIFICANT CHANGE UP (ref 80–100)
NRBC # BLD: 0 /100 WBCS — SIGNIFICANT CHANGE UP (ref 0–0)
PHOSPHATE SERPL-MCNC: 3.6 MG/DL — SIGNIFICANT CHANGE UP (ref 2.5–4.5)
PLATELET # BLD AUTO: 163 K/UL — SIGNIFICANT CHANGE UP (ref 150–400)
POTASSIUM SERPL-MCNC: 4.1 MMOL/L — SIGNIFICANT CHANGE UP (ref 3.5–5.3)
POTASSIUM SERPL-SCNC: 4.1 MMOL/L — SIGNIFICANT CHANGE UP (ref 3.5–5.3)
RBC # BLD: 4.17 M/UL — SIGNIFICANT CHANGE UP (ref 3.8–5.2)
RBC # FLD: 13.6 % — SIGNIFICANT CHANGE UP (ref 10.3–14.5)
SODIUM SERPL-SCNC: 139 MMOL/L — SIGNIFICANT CHANGE UP (ref 135–145)
WBC # BLD: 6.02 K/UL — SIGNIFICANT CHANGE UP (ref 3.8–10.5)
WBC # FLD AUTO: 6.02 K/UL — SIGNIFICANT CHANGE UP (ref 3.8–10.5)

## 2019-09-16 PROCEDURE — 83690 ASSAY OF LIPASE: CPT

## 2019-09-16 PROCEDURE — 86803 HEPATITIS C AB TEST: CPT

## 2019-09-16 PROCEDURE — 85610 PROTHROMBIN TIME: CPT

## 2019-09-16 PROCEDURE — G0378: CPT

## 2019-09-16 PROCEDURE — 86850 RBC ANTIBODY SCREEN: CPT

## 2019-09-16 PROCEDURE — 85730 THROMBOPLASTIN TIME PARTIAL: CPT

## 2019-09-16 PROCEDURE — 96375 TX/PRO/DX INJ NEW DRUG ADDON: CPT

## 2019-09-16 PROCEDURE — 81001 URINALYSIS AUTO W/SCOPE: CPT

## 2019-09-16 PROCEDURE — 84100 ASSAY OF PHOSPHORUS: CPT

## 2019-09-16 PROCEDURE — 87086 URINE CULTURE/COLONY COUNT: CPT

## 2019-09-16 PROCEDURE — 74177 CT ABD & PELVIS W/CONTRAST: CPT

## 2019-09-16 PROCEDURE — 83605 ASSAY OF LACTIC ACID: CPT

## 2019-09-16 PROCEDURE — 99239 HOSP IP/OBS DSCHRG MGMT >30: CPT

## 2019-09-16 PROCEDURE — 99285 EMERGENCY DEPT VISIT HI MDM: CPT | Mod: 25

## 2019-09-16 PROCEDURE — 80048 BASIC METABOLIC PNL TOTAL CA: CPT

## 2019-09-16 PROCEDURE — 71045 X-RAY EXAM CHEST 1 VIEW: CPT

## 2019-09-16 PROCEDURE — 83036 HEMOGLOBIN GLYCOSYLATED A1C: CPT

## 2019-09-16 PROCEDURE — 86900 BLOOD TYPING SEROLOGIC ABO: CPT

## 2019-09-16 PROCEDURE — 96365 THER/PROPH/DIAG IV INF INIT: CPT | Mod: XU

## 2019-09-16 PROCEDURE — 36415 COLL VENOUS BLD VENIPUNCTURE: CPT

## 2019-09-16 PROCEDURE — 86901 BLOOD TYPING SEROLOGIC RH(D): CPT

## 2019-09-16 PROCEDURE — 82962 GLUCOSE BLOOD TEST: CPT

## 2019-09-16 PROCEDURE — 85027 COMPLETE CBC AUTOMATED: CPT

## 2019-09-16 PROCEDURE — 87040 BLOOD CULTURE FOR BACTERIA: CPT

## 2019-09-16 PROCEDURE — 80053 COMPREHEN METABOLIC PANEL: CPT

## 2019-09-16 PROCEDURE — 83735 ASSAY OF MAGNESIUM: CPT

## 2019-09-16 PROCEDURE — 93005 ELECTROCARDIOGRAM TRACING: CPT

## 2019-09-16 RX ORDER — INSULIN ASPART 100 [IU]/ML
0 INJECTION, SOLUTION SUBCUTANEOUS
Qty: 0 | Refills: 0 | DISCHARGE

## 2019-09-16 RX ORDER — ACETAMINOPHEN 500 MG
2 TABLET ORAL
Qty: 0 | Refills: 0 | DISCHARGE
Start: 2019-09-16

## 2019-09-16 RX ORDER — MOXIFLOXACIN HYDROCHLORIDE TABLETS, 400 MG 400 MG/1
1 TABLET, FILM COATED ORAL
Qty: 14 | Refills: 0
Start: 2019-09-16 | End: 2019-09-22

## 2019-09-16 RX ORDER — SITAGLIPTIN AND METFORMIN HYDROCHLORIDE 500; 50 MG/1; MG/1
1 TABLET, FILM COATED ORAL
Qty: 0 | Refills: 0 | DISCHARGE

## 2019-09-16 RX ORDER — INSULIN GLARGINE 100 [IU]/ML
28 INJECTION, SOLUTION SUBCUTANEOUS
Qty: 0 | Refills: 0 | DISCHARGE

## 2019-09-16 RX ORDER — METRONIDAZOLE 500 MG
1 TABLET ORAL
Qty: 21 | Refills: 0
Start: 2019-09-16 | End: 2019-09-22

## 2019-09-16 RX ADMIN — LOSARTAN POTASSIUM 50 MILLIGRAM(S): 100 TABLET, FILM COATED ORAL at 06:16

## 2019-09-16 RX ADMIN — Medication 12 UNIT(S): at 08:17

## 2019-09-16 RX ADMIN — Medication 4: at 11:57

## 2019-09-16 RX ADMIN — Medication 200 MILLIGRAM(S): at 06:16

## 2019-09-16 RX ADMIN — Medication 12 UNIT(S): at 11:56

## 2019-09-16 RX ADMIN — MYCOPHENOLATE MOFETIL 1000 MILLIGRAM(S): 250 CAPSULE ORAL at 06:17

## 2019-09-16 RX ADMIN — Medication 10 MILLIGRAM(S): at 06:17

## 2019-09-16 RX ADMIN — Medication 100 MILLIGRAM(S): at 06:17

## 2019-09-16 RX ADMIN — Medication 2: at 08:17

## 2019-09-16 NOTE — DISCHARGE NOTE PROVIDER - HOSPITAL COURSE
59F with DM2 on insulin who presented with abdominal pain and diagnosed with acute colitis, improved with antibiotics, and stable for d/c. Will need outpatient colonoscopy in 6 weeks.        *Please refer to progress note from same date for an extended detailed summary of all of the patient's medical diagnoses during the course of this hospital stay.

## 2019-09-16 NOTE — ADVANCED PRACTICE NURSE CONSULT - ASSESSMENT
Current A1C:  13.3                    Previous A1C: 11 in July 2019 as per pt.  Type and Duration of Diabetes: DM 2 x 4 years  Diabetes Complications: HTN  History of DKA: none  Eye exam within past year: Yes, in July 2019  Current Home Therapy: Lantus 28 units daily via pen, Novalog 7 units w meals via pen & Janumet 50/1000 mg twice daily. Pt has Contour home glucose meter & tests 2x/day    Outpatient Endocrinologist: Yes, pt can't recall his name but she has appt next week with him.  POCT Blood Glucose: 181, 99, 180, 315, 212 past 24 hours  Current Endocrine Medications: Lantus 33 units daily, Lispro 7 units w meals, Lispro correction

## 2019-09-16 NOTE — DISCHARGE NOTE PROVIDER - NSDCCPCAREPLAN_GEN_ALL_CORE_FT
PRINCIPAL DISCHARGE DIAGNOSIS  Diagnosis: Colitis  Assessment and Plan of Treatment: Complete antibiotics  Colonoscopy in 6 weeks

## 2019-09-16 NOTE — PROGRESS NOTE ADULT - SUBJECTIVE AND OBJECTIVE BOX
Patient is a 59y old  Female who presents with a chief complaint of abd pain (15 Sep 2019 16:09)    Patient seen and examined at bedside. No overnight events reported.     ALLERGIES:  No Known Allergies    MEDICATIONS  (STANDING):  ciprofloxacin   IVPB 400 milliGRAM(s) IV Intermittent every 12 hours  dextrose 5%. 1000 milliLiter(s) (50 mL/Hr) IV Continuous <Continuous>  dextrose 50% Injectable 12.5 Gram(s) IV Push once  dextrose 50% Injectable 25 Gram(s) IV Push once  dextrose 50% Injectable 25 Gram(s) IV Push once  enoxaparin Injectable 40 milliGRAM(s) SubCutaneous daily  hydroxychloroquine 200 milliGRAM(s) Oral two times a day  influenza   Vaccine 0.5 milliLiter(s) IntraMuscular once  insulin glargine Injectable (LANTUS) 33 Unit(s) SubCutaneous at bedtime  insulin lispro (HumaLOG) corrective regimen sliding scale   SubCutaneous three times a day before meals  insulin lispro Injectable (HumaLOG) 12 Unit(s) SubCutaneous three times a day before meals  losartan 50 milliGRAM(s) Oral daily  metroNIDAZOLE  IVPB 500 milliGRAM(s) IV Intermittent every 8 hours  mycophenolate mofetil 1000 milliGRAM(s) Oral two times a day  predniSONE   Tablet 10 milliGRAM(s) Oral daily    MEDICATIONS  (PRN):  acetaminophen   Tablet .. 650 milliGRAM(s) Oral every 6 hours PRN Temp greater or equal to 38C (100.4F)  dextrose 40% Gel 15 Gram(s) Oral once PRN Blood Glucose LESS THAN 70 milliGRAM(s)/deciliter  glucagon  Injectable 1 milliGRAM(s) IntraMuscular once PRN Glucose LESS THAN 70 milligrams/deciliter  ondansetron Injectable 4 milliGRAM(s) IV Push every 6 hours PRN Nausea and/or Vomiting    Vital Signs Last 24 Hrs  T(F): 98.4 (16 Sep 2019 05:20), Max: 98.8 (15 Sep 2019 21:54)  HR: 96 (16 Sep 2019 05:20) (73 - 96)  BP: 122/70 (16 Sep 2019 05:20) (122/70 - 141/70)  RR: 15 (16 Sep 2019 05:20) (15 - 15)  SpO2: 98% (16 Sep 2019 05:20) (98% - 100%)  I&O's Summary    15 Sep 2019 07:01  -  16 Sep 2019 07:00  --------------------------------------------------------  IN: 1300 mL / OUT: 0 mL / NET: 1300 mL    16 Sep 2019 07:  -  16 Sep 2019 11:48  --------------------------------------------------------  IN: 700 mL / OUT: 0 mL / NET: 700 mL      PHYSICAL EXAM:  General: NAD, A/O x 3  ENT: MMM, no thrush  Neck: Supple, No JVD  Lungs: Clear to auscultation bilaterally, good air entry, non-labored breathing  Cardio: RRR, S1/S2, No murmur  Abdomen: Soft, Nontender, Nondistended; Bowel sounds present  Extremities: No calf tenderness, No pitting edema    LABS:                        10.7   6.02  )-----------( 163      ( 16 Sep 2019 05:50 )             33.8         139  |  106  |  5   ----------------------------<  157  4.1   |  25  |  1.02    Ca    8.5      16 Sep 2019 05:50  Phos  3.6       Mg     1.6         TPro  8.3  /  Alb  2.7  /  TBili  0.4  /  DBili  x   /  AST  12  /  ALT  11  /  AlkPhos  93        Lipase, Serum: 174 U/L (19 @ 11:44)    eGFR if Non African American: 61 mL/min/1.73M2 (19 @ 05:50)  eGFR if : 71 mL/min/1.73M2 (19 @ 05:50)    PT/INR - ( 14 Sep 2019 11:44 )   PT: 11.7 sec;   INR: 1.04 ratio         PTT - ( 14 Sep 2019 11:44 )  PTT:22.6 sec  Lactate, Blood: 1.7 mmol/L ( @ 13:45)  Lactate, Blood: 3.1 mmol/L ( @ 11:44)        POCT Blood Glucose.: 181 mg/dL (16 Sep 2019 08:15)  POCT Blood Glucose.: 99 mg/dL (15 Sep 2019 21:42)  POCT Blood Glucose.: 180 mg/dL (15 Sep 2019 16:50)    09-15 [id number] 13.3    Urinalysis Basic - ( 14 Sep 2019 13:00 )    Color: Yellow / Appearance: Clear / S.015 / pH: x  Gluc: x / Ketone: Negative  / Bili: Negative / Urobili: Negative   Blood: x / Protein: 100 / Nitrite: Negative   Leuk Esterase: Trace / RBC: 11-25 /HPF / WBC 6-10 /HPF   Sq Epi: x / Non Sq Epi: Moderate / Bacteria: Few /HPF        Culture - Urine (collected 14 Sep 2019 13:00)  Source: .Urine Clean Catch (Midstream)  Final Report (15 Sep 2019 16:26):    <10,000 CFU/mL Normal Urogenital Jeannine    Culture - Blood (collected 14 Sep 2019 11:44)  Source: .Blood Blood-Peripheral  Preliminary Report (15 Sep 2019 21:01):    No growth to date.    Culture - Blood (collected 14 Sep 2019 11:44)  Source: .Blood Blood-Peripheral  Preliminary Report (15 Sep 2019 21:01):    No growth to date.      RADIOLOGY & ADDITIONAL TESTS:  < from: CT Abdomen and Pelvis w/ Oral Cont and w/ IV Cont (19 @ 13:03) >  Bibasilar patchy lung airspace opacities suspicious for infection.   Correlate clinically and follow to resolution.    The cecum is markedly thickened and there is inflammatory change in the   surrounding fat consistent with some form of colitis. The ileocecal valve   is normal as is the appendix. The differential for this includes   inflammatory, infectious, or ischemic colitis. Correlate clinically.   Follow to resolution. Consider colonoscopy for further evaluation when   patient is able.    < end of copied text >

## 2019-09-16 NOTE — PROGRESS NOTE ADULT - ASSESSMENT
Pt is a 60yo F admitted to [facility] for colitis    #Acute colitis  Cont cipro and flagyl   tolerated full liquids - advance to soft today   if tolerates soft diet for lunch, will d/c home  follow up with GI in 6 w for repeat colposcopy     #DM2 with hyperglycemia  09-15 [id number] 13.3  POCT Blood Glucose.: 181 mg/dL (16 Sep 2019 08:15)  POCT Blood Glucose.: 99 mg/dL (15 Sep 2019 21:42)  POCT Blood Glucose.: 180 mg/dL (15 Sep 2019 16:50)  Improved glycemic control with Lantus 33u, preameal to 12u     #lactic acidosis   resolved after hydration     #INDER   Resolved with fluids     #SLE  cont prednisone 10mg   cont home meds     #opacities on CT  no coughing, afebrile  not consistent with PNA    #DVT ppx   cont Lovenox Pt is a 58yo F admitted to [facility] for colitis    #Acute colitis  Cont cipro and flagyl   tolerated full liquids - advance to soft today   if tolerates soft diet for lunch, will d/c home  follow up with GI in 6 w for repeat colposcopy     #DM2 with hyperglycemia  09-15 [id number] 13.3  POCT Blood Glucose.: 181 mg/dL (16 Sep 2019 08:15)  POCT Blood Glucose.: 99 mg/dL (15 Sep 2019 21:42)  POCT Blood Glucose.: 180 mg/dL (15 Sep 2019 16:50)  Improved glycemic control with Lantus 33u, preameal to 12u     #lactic acidosis   resolved after hydration     #INDER   Resolved with fluids     #Morbid obesity  BMI (kg/m2): 38.8 (09-16-19 @ 05:20)  Outpatient exercise regimen    #SLE  cont prednisone 10mg   cont home meds     #opacities on CT  no coughing, afebrile  not consistent with PNA    #DVT ppx   cont Lovenox

## 2019-09-16 NOTE — ADVANCED PRACTICE NURSE CONSULT - REASON FOR CONSULT
Consult Note: Certified Diabetes Educator-Diabetes Education  Reason for Consult: Uncontrolled DM 2, A1C 13.3    HPI:  58 yo female                Admitted from: ED for n/v, abd pain

## 2019-09-16 NOTE — CONSULT NOTE ADULT - PROBLEM SELECTOR RECOMMENDATION 9
Advance to low residue diet, if she tolerates she may be d/c home  Continue PO antibiotics x 8 more days  F/U Dr. Brito in 2 weeks- discussed with patient

## 2019-09-16 NOTE — CONSULT NOTE ADULT - SUBJECTIVE AND OBJECTIVE BOX
INTERVAL HPI/OVERNIGHT EVENTS:  HPI:  58 y/o female pmh DM, lupus, HTN who is admitted with colitis. Patient states she started having abdominal discomfort and vomiting on Wednesday. 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 Her pain got progressively worse so she went to urgent care on Saturday and they sent her to ER. She states she feels much better today. + normal brown BM. No further episodes of diarrhea, nausea, vomiting, fever/chills. She is tolerating liquids. Last colonoscopy was done 3 years ago at a clinic in [place], and she was recommended to have repeat in 10 years. Denies BRBPR, melena, yellow/grey stools.       MEDICATIONS  (STANDING):  ciprofloxacin   IVPB 400 milliGRAM(s) IV Intermittent every 12 hours  dextrose 5%. 1000 milliLiter(s) (50 mL/Hr) IV Continuous <Continuous>  dextrose 50% Injectable 12.5 Gram(s) IV Push once  dextrose 50% Injectable 25 Gram(s) IV Push once  dextrose 50% Injectable 25 Gram(s) IV Push once  enoxaparin Injectable 40 milliGRAM(s) SubCutaneous daily  hydroxychloroquine 200 milliGRAM(s) Oral two times a day  influenza   Vaccine 0.5 milliLiter(s) IntraMuscular once  insulin glargine Injectable (LANTUS) 33 Unit(s) SubCutaneous at bedtime  insulin lispro (HumaLOG) corrective regimen sliding scale   SubCutaneous three times a day before meals  insulin lispro Injectable (HumaLOG) 12 Unit(s) SubCutaneous three times a day before meals  losartan 50 milliGRAM(s) Oral daily  metroNIDAZOLE  IVPB 500 milliGRAM(s) IV Intermittent every 8 hours  mycophenolate mofetil 1000 milliGRAM(s) Oral two times a day  predniSONE   Tablet 10 milliGRAM(s) Oral daily    MEDICATIONS  (PRN):  acetaminophen   Tablet .. 650 milliGRAM(s) Oral every 6 hours PRN Temp greater or equal to 38C (100.4F)  dextrose 40% Gel 15 Gram(s) Oral once PRN Blood Glucose LESS THAN 70 milliGRAM(s)/deciliter  glucagon  Injectable 1 milliGRAM(s) IntraMuscular once PRN Glucose LESS THAN 70 milligrams/deciliter  ondansetron Injectable 4 milliGRAM(s) IV Push every 6 hours PRN Nausea and/or Vomiting      Allergies    No Known Allergies    Intolerances        PHYSICAL EXAM:   Vital Signs:  Vital Signs Last 24 Hrs  T(C): 36.9 (16 Sep 2019 05:20), Max: 37.1 (15 Sep 2019 17:53)  T(F): 98.4 (16 Sep 2019 05:20), Max: 98.8 (15 Sep 2019 21:54)  HR: 96 (16 Sep 2019 05:20) (73 - 96)  BP: 122/70 (16 Sep 2019 05:20) (122/70 - 141/70)  BP(mean): --  RR: 15 (16 Sep 2019 05:20) (15 - 15)  SpO2: 98% (16 Sep 2019 05:20) (98% - 100%)  Daily     Daily I&O's Summary    15 Sep 2019 07:01  -  16 Sep 2019 07:00  --------------------------------------------------------  IN: 1300 mL / OUT: 0 mL / NET: 1300 mL    16 Sep 2019 07:01  -  16 Sep 2019 15:38  --------------------------------------------------------  IN: 1300 mL / OUT: 0 mL / NET: 1300 mL        GENERAL:  Appears stated age, well-groomed, well-nourished, no distress  HEENT:  NC/AT,  conjunctivae clear and pink  CHEST:  Full & symmetric excursion, no increased effort, breath sounds clear  HEART:  Regular rhythm, S1, S2, no murmur  ABDOMEN:  Soft, non-tender, non-distended, normoactive bowel sounds  EXTEREMITIES:  no edema  SKIN:  No rash, warm/dry  NEURO:  Alert, oriented,      LABS:                        10.7   6.02  )-----------( 163      ( 16 Sep 2019 05:50 )             33.8     09-16    139  |  106  |  5<L>  ----------------------------<  157<H>  4.1   |  25  |  1.02    Ca    8.5      16 Sep 2019 05:50  Phos  3.6     09-16  Mg     1.6     09-16          amylase   lipaseLipase, Serum: 174 U/L (09-14 @ 11:44)    RADIOLOGY & ADDITIONAL TESTS:

## 2019-09-16 NOTE — ADVANCED PRACTICE NURSE CONSULT - RECOMMEDATIONS
Education Provided: Pt given Diabetes Ed booklet & discussed. Discussed A1C 13.3, glucose wands used for teaching. Pt states that A1C was 11 in July. Pt asked about exercise & we discussed walking, increasing distance each week. We discussed eating habits:  pt likes rice & beans at lunch and sometimes with dinner, suggested smaller portions  as well as pair testing w meals. Pt open to trying both. Pt denies needing education regarding monitoring her glucose at home or insulin therapy. All questions answered.     Recommendations: Continue POCT & adjust insulin as needed.    Discharge Recommendations: Possible discharge today. Suggest putting pt back on Lantus/Novalog at current dosage. Pt to follow up w [facility]D Endo next week    Post-discharge, patient can follow up with: [facility] Endo next week.

## 2019-09-19 LAB
CULTURE RESULTS: SIGNIFICANT CHANGE UP
CULTURE RESULTS: SIGNIFICANT CHANGE UP
SPECIMEN SOURCE: SIGNIFICANT CHANGE UP
SPECIMEN SOURCE: SIGNIFICANT CHANGE UP

## 2019-11-15 NOTE — ED PROVIDER NOTE - NS HIV RISK FACTOR YES
----- Message from Lisa King sent at 11/15/2019  3:54 PM CST -----  Contact: [phone]  Patient is returning nurse's phone call.  Please call patient back at [phone].  
duplicate  
Declined

## 2019-11-19 NOTE — DISCHARGE NOTE NURSING/CASE MANAGEMENT/SOCIAL WORK - PATIENT PORTAL LINK FT
Strong peripheral pulses
You can access the FollowMyHealth Patient Portal offered by [facility] by registering at the following website: [email or web address]. By joining [facility] [facility] [employer]’s FollowMyHealth portal, you will also be able to view your health information using other applications (apps) compatible with our system.

## 2021-02-01 NOTE — ED PROVIDER NOTE - RESPIRATORY, MLM
[FreeTextEntry1] : 71 yo female \par \par # Stage 1 breast cancer 8 mm tumor, invasive ductal carcinoma, ER/ MN positive, HER2 not amplified with Oncotype Dx 11. diagnosed November 2018\par -continue with Anastrazole\par - left breast pain - c/w fat necrosis on mammogram \par \par #Adenocarcinoma of lung- stage 4 diagnosed  April 2019\par -Repeated CEA -plateau at 11.5 (started out at 26.6)\par -CT scan showed mass 1.8 x 1.4 x 1.7 cm lesion.\par -Biopsy of lung nodule c/w adeno ca of the lung, EGFR mutated, T190 mutated, started on Tagrisso.  Side effects, -toxicities and benefits reviewed with pt. L4 vertebral body biopsy confirmed met adenoca of the lung.\par - PETCT  2/3/2020- decreased FDG uptake in primary lesion, sclerotic lesions in bones, thyroid nodule activity- under surveillance, duodenal uptake \par - plan for PETCT JUly- August 2020- stable, uptake in right breast - c/w fat necrosis \par - repeat CEA\par \par #benign thyroid nodule - followed by Dr. Park. plan for biopsy\par # constipation- daily miralax\par \par Plan:\par - continue Tagrisso 11 pm, nausea in am \par - Nausea is controlled with  Sancuso and Dronabinol 2.5m bid for nausea- refill today Istop checked\par - continue Xgeva monthly (last 7/15/2020, 8/12/2020, 9/12/, 10/7/2020, 11/4/2020, 12/2, 12/30/2020, 1/27/2020\par -neoplasm pain - continue with Vicodin prn rarely taking \par -s/p cryoablation on 11/12 to left femur for osseous mets/pain mgmt with Dr. Carvajal, decreased pain\par -mammo Dec 2020- stable- no evidence of disease \par -due for PET CT- will do full body due to femur involvement \par -CBC stable \par . \par - CBC,Chem,CEA 2 1m, CA27-29  @ 6 months at next visit
Breath sounds clear and equal bilaterally.

## 2021-08-15 ENCOUNTER — EMERGENCY (EMERGENCY)
Facility: HOSPITAL | Age: 61
LOS: 0 days | Discharge: HOME | End: 2021-08-15
Attending: EMERGENCY MEDICINE
Payer: COMMERCIAL

## 2021-08-15 VITALS
HEART RATE: 89 BPM | WEIGHT: 274.92 LBS | SYSTOLIC BLOOD PRESSURE: 130 MMHG | TEMPERATURE: 98 F | OXYGEN SATURATION: 97 % | RESPIRATION RATE: 17 BRPM | HEIGHT: 71 IN | DIASTOLIC BLOOD PRESSURE: 84 MMHG

## 2021-08-15 VITALS
SYSTOLIC BLOOD PRESSURE: 141 MMHG | TEMPERATURE: 99 F | RESPIRATION RATE: 17 BRPM | HEART RATE: 79 BPM | DIASTOLIC BLOOD PRESSURE: 85 MMHG | OXYGEN SATURATION: 96 %

## 2021-08-15 DIAGNOSIS — L91.8 OTHER HYPERTROPHIC DISORDERS OF THE SKIN: Chronic | ICD-10-CM

## 2021-08-15 DIAGNOSIS — Z98.891 HISTORY OF UTERINE SCAR FROM PREVIOUS SURGERY: Chronic | ICD-10-CM

## 2021-08-15 DIAGNOSIS — E11.9 TYPE 2 DIABETES MELLITUS WITHOUT COMPLICATIONS: ICD-10-CM

## 2021-08-15 DIAGNOSIS — R06.02 SHORTNESS OF BREATH: ICD-10-CM

## 2021-08-15 DIAGNOSIS — R42 DIZZINESS AND GIDDINESS: ICD-10-CM

## 2021-08-15 DIAGNOSIS — M32.9 SYSTEMIC LUPUS ERYTHEMATOSUS, UNSPECIFIED: ICD-10-CM

## 2021-08-15 DIAGNOSIS — I10 ESSENTIAL (PRIMARY) HYPERTENSION: ICD-10-CM

## 2021-08-15 LAB
ALBUMIN SERPL ELPH-MCNC: 2.7 G/DL — LOW (ref 3.3–5)
ALP SERPL-CCNC: 101 U/L — SIGNIFICANT CHANGE UP (ref 40–120)
ALT FLD-CCNC: 19 U/L — SIGNIFICANT CHANGE UP (ref 12–78)
ANION GAP SERPL CALC-SCNC: 5 MMOL/L — SIGNIFICANT CHANGE UP (ref 5–17)
APTT BLD: 26.8 SEC — LOW (ref 27.5–35.5)
AST SERPL-CCNC: 8 U/L — LOW (ref 15–37)
BASOPHILS # BLD AUTO: 0.02 K/UL — SIGNIFICANT CHANGE UP (ref 0–0.2)
BASOPHILS NFR BLD AUTO: 0.3 % — SIGNIFICANT CHANGE UP (ref 0–2)
BILIRUB SERPL-MCNC: 0.3 MG/DL — SIGNIFICANT CHANGE UP (ref 0.2–1.2)
BUN SERPL-MCNC: 13 MG/DL — SIGNIFICANT CHANGE UP (ref 7–23)
CALCIUM SERPL-MCNC: 8.9 MG/DL — SIGNIFICANT CHANGE UP (ref 8.5–10.1)
CHLORIDE SERPL-SCNC: 109 MMOL/L — HIGH (ref 96–108)
CO2 SERPL-SCNC: 29 MMOL/L — SIGNIFICANT CHANGE UP (ref 22–31)
CREAT SERPL-MCNC: 1.07 MG/DL — SIGNIFICANT CHANGE UP (ref 0.5–1.3)
EOSINOPHIL # BLD AUTO: 0.04 K/UL — SIGNIFICANT CHANGE UP (ref 0–0.5)
EOSINOPHIL NFR BLD AUTO: 0.6 % — SIGNIFICANT CHANGE UP (ref 0–6)
GLUCOSE SERPL-MCNC: 183 MG/DL — HIGH (ref 70–99)
HCT VFR BLD CALC: 35.7 % — SIGNIFICANT CHANGE UP (ref 34.5–45)
HGB BLD-MCNC: 11.6 G/DL — SIGNIFICANT CHANGE UP (ref 11.5–15.5)
IMM GRANULOCYTES NFR BLD AUTO: 1 % — SIGNIFICANT CHANGE UP (ref 0–1.5)
INR BLD: 0.91 RATIO — SIGNIFICANT CHANGE UP (ref 0.88–1.16)
LYMPHOCYTES # BLD AUTO: 1.8 K/UL — SIGNIFICANT CHANGE UP (ref 1–3.3)
LYMPHOCYTES # BLD AUTO: 24.9 % — SIGNIFICANT CHANGE UP (ref 13–44)
MAGNESIUM SERPL-MCNC: 1.8 MG/DL — SIGNIFICANT CHANGE UP (ref 1.6–2.6)
MCHC RBC-ENTMCNC: 26.2 PG — LOW (ref 27–34)
MCHC RBC-ENTMCNC: 32.5 GM/DL — SIGNIFICANT CHANGE UP (ref 32–36)
MCV RBC AUTO: 80.6 FL — SIGNIFICANT CHANGE UP (ref 80–100)
MONOCYTES # BLD AUTO: 0.42 K/UL — SIGNIFICANT CHANGE UP (ref 0–0.9)
MONOCYTES NFR BLD AUTO: 5.8 % — SIGNIFICANT CHANGE UP (ref 2–14)
NEUTROPHILS # BLD AUTO: 4.89 K/UL — SIGNIFICANT CHANGE UP (ref 1.8–7.4)
NEUTROPHILS NFR BLD AUTO: 67.4 % — SIGNIFICANT CHANGE UP (ref 43–77)
NRBC # BLD: 0 /100 WBCS — SIGNIFICANT CHANGE UP (ref 0–0)
PLATELET # BLD AUTO: 187 K/UL — SIGNIFICANT CHANGE UP (ref 150–400)
POTASSIUM SERPL-MCNC: 4 MMOL/L — SIGNIFICANT CHANGE UP (ref 3.5–5.3)
POTASSIUM SERPL-SCNC: 4 MMOL/L — SIGNIFICANT CHANGE UP (ref 3.5–5.3)
PROT SERPL-MCNC: 7.2 GM/DL — SIGNIFICANT CHANGE UP (ref 6–8.3)
PROTHROM AB SERPL-ACNC: 10.6 SEC — SIGNIFICANT CHANGE UP (ref 10.6–13.6)
RBC # BLD: 4.43 M/UL — SIGNIFICANT CHANGE UP (ref 3.8–5.2)
RBC # FLD: 13.9 % — SIGNIFICANT CHANGE UP (ref 10.3–14.5)
SODIUM SERPL-SCNC: 143 MMOL/L — SIGNIFICANT CHANGE UP (ref 135–145)
TROPONIN I SERPL-MCNC: <.015 NG/ML — SIGNIFICANT CHANGE UP (ref 0.01–0.04)
WBC # BLD: 7.24 K/UL — SIGNIFICANT CHANGE UP (ref 3.8–10.5)
WBC # FLD AUTO: 7.24 K/UL — SIGNIFICANT CHANGE UP (ref 3.8–10.5)

## 2021-08-15 PROCEDURE — 93010 ELECTROCARDIOGRAM REPORT: CPT

## 2021-08-15 PROCEDURE — 71045 X-RAY EXAM CHEST 1 VIEW: CPT | Mod: 26

## 2021-08-15 PROCEDURE — 70496 CT ANGIOGRAPHY HEAD: CPT | Mod: 26,MA

## 2021-08-15 PROCEDURE — 99285 EMERGENCY DEPT VISIT HI MDM: CPT

## 2021-08-15 PROCEDURE — 70498 CT ANGIOGRAPHY NECK: CPT | Mod: 26,MA

## 2021-08-15 RX ORDER — MECLIZINE HCL 12.5 MG
25 TABLET ORAL ONCE
Refills: 0 | Status: COMPLETED | OUTPATIENT
Start: 2021-08-15 | End: 2021-08-15

## 2021-08-15 RX ORDER — MECLIZINE HCL 12.5 MG
1 TABLET ORAL
Qty: 15 | Refills: 0
Start: 2021-08-15 | End: 2021-08-19

## 2021-08-15 RX ORDER — SODIUM CHLORIDE 9 MG/ML
1000 INJECTION INTRAMUSCULAR; INTRAVENOUS; SUBCUTANEOUS ONCE
Refills: 0 | Status: COMPLETED | OUTPATIENT
Start: 2021-08-15 | End: 2021-08-15

## 2021-08-15 RX ADMIN — Medication 25 MILLIGRAM(S): at 01:56

## 2021-08-15 RX ADMIN — SODIUM CHLORIDE 1000 MILLILITER(S): 9 INJECTION INTRAMUSCULAR; INTRAVENOUS; SUBCUTANEOUS at 05:27

## 2021-08-15 RX ADMIN — SODIUM CHLORIDE 1000 MILLILITER(S): 9 INJECTION INTRAMUSCULAR; INTRAVENOUS; SUBCUTANEOUS at 01:56

## 2021-08-15 NOTE — ED PROVIDER NOTE - CLINICAL SUMMARY MEDICAL DECISION MAKING FREE TEXT BOX
Patient p/w dizziness on sitting up, describes symptoms consistent with vertigo.  VSS.  No hypoxia or dyspnea noted in ER.  NIHSS 0, neuro exam normal.  Lab values reviewed, there are no values which require acute intervention.  Patient treated with Epleys Maneuvers and meclizine with improvement and is able to ambulate in ER without support or dizziness.  Feel patient is ok to dc on meclizine, recommend neurology follow up, advised to return if she develops any progression or worsening of symptoms. Discussed results and outcome of today's visit with the patient.  Patient advised to please follow up with another healthcare provider within the next 24 hours and return to the Emergency Department for worsening symptoms or any other concerns.  Patient advised that their doctor may call  to follow up on the specific results of the tests performed today in the emergency department.   Patient appears well on discharge. Patient p/w dizziness on sitting up, describes symptoms consistent with vertigo.  VSS.  No hypoxia or dyspnea noted in ER.  NIHSS 0, neuro exam normal.  Lab values reviewed, there are no values which require acute intervention.  Patient treated with Epleys Maneuvers and meclizine with improvement and is able to ambulate in ER without support or dizziness.  Feel patient is ok to dc on meclizine, recommend neurology follow up, advised to return if she develops any progression or worsening of symptoms.  CT imaging negative for any occlusions or dissections, upper chest findings consistent with prior COVID which patient had, she has since been vaccinated.  Discussed results and outcome of today's visit with the patient.  Patient advised to please follow up with another healthcare provider within the next 24 hours and return to the Emergency Department for worsening symptoms or any other concerns.  Patient advised that their doctor may call  to follow up on the specific results of the tests performed today in the emergency department.   Patient appears well on discharge. Patient p/w dizziness on sitting up, describes symptoms consistent with vertigo.  VSS.  Orthostatics normal.  No hypoxia or dyspnea noted in ER.  NIHSS 0, neuro exam normal.  Lab values reviewed, there are no values which require acute intervention.  Patient treated with Epleys Maneuvers and meclizine with improvement and is able to ambulate in ER without support or dizziness.  Feel patient is ok to dc on meclizine, recommend neurology follow up, advised to return if she develops any progression or worsening of symptoms.  CT imaging negative for any occlusions or dissections, upper chest findings consistent with prior COVID which patient had, she has since been vaccinated.  Discussed results and outcome of today's visit with the patient.  Patient advised to please follow up with another healthcare provider within the next 24 hours and return to the Emergency Department for worsening symptoms or any other concerns.  Patient advised that their doctor may call  to follow up on the specific results of the tests performed today in the emergency department.   Patient appears well on discharge.

## 2021-08-15 NOTE — ED PROVIDER NOTE - PATIENT PORTAL LINK FT
You can access the FollowMyHealth Patient Portal offered by [facility] by registering at the following website: [email or web address]. By joining [employer]’s FollowMyHealth portal, you will also be able to view your health information using other applications (apps) compatible with our system.

## 2021-08-15 NOTE — ED ADULT NURSE NOTE - OBJECTIVE STATEMENT
pt complain of dizziness started yesterday morning, weakness, sob, headache 6/10. denies fever, chest pain.

## 2021-08-15 NOTE — ED PROVIDER NOTE - OBJECTIVE STATEMENT
Pertinent PMH/PSH/FHx/SHx and Review of Systems contained within:  Patient presents to the ED for dizziness, not SOB.  Patient reports that since this morning, she's had multiple episodes of dizziness, predominantly when she sits up from laying down, feels everything spinning and moving around her, says that she feels nauseated and SOB with her symptoms, otherwise not SOB, denies chest pain, palpitations, numbness, or tingling.  Had headache earlier which resolved.  Her chief issue is the dizziness, not SOB as stated in triage note.  She denies ear pain, ringing, or changes in bowel or bladder.      Relevant PMHx/SHx/SOCHx/FAMH:  HTN, HLD, DM, lupus  Patient denies EtOH/tobacco/illicit substance use.    ROS: No fever/chills, No headache/photophobia/eye pain/changes in vision, No ear pain/sore throat/dysphagia, No chest pain/palpitations, no cough/wheeze/stridor, No abdominal pain, No N/V/D/melena, no dysuria/frequency/discharge, No neck/back pain, no rash, no changes in neurological status/function. Pertinent PMH/PSH/FHx/SHx and Review of Systems contained within:  Patient presents to the ED for dizziness, not SOB.  Patient reports that since this morning, she's had multiple episodes of dizziness, predominantly when she sits up from laying down, feels everything spinning and moving around her, says that she feels nauseated and SOB with her symptoms, otherwise not SOB, denies chest pain, palpitations, numbness, or tingling.  Had headache earlier which resolved.  Her chief issue is the dizziness, not SOB as stated in triage note, says that her breathing is at baseline and always has mild khanna.  Patient is saturating 98% on RA.  She denies ear pain, ringing, or changes in bowel or bladder.      Relevant PMHx/SHx/SOCHx/FAMH:  HTN, HLD, DM, lupus  Patient denies EtOH/tobacco/illicit substance use.    ROS: No fever/chills, No headache/photophobia/eye pain/changes in vision, No ear pain/sore throat/dysphagia, No chest pain/palpitations, no cough/wheeze/stridor, No abdominal pain, No N/V/D/melena, no dysuria/frequency/discharge, No neck/back pain, no rash, no changes in neurological status/function.

## 2021-08-15 NOTE — ED PROVIDER NOTE - PHYSICAL EXAMINATION
Gen: Alert, mild distress with movement, well appearing  Head: NC, AT, EOMI, normal lids/conjunctiva  ENT: normal hearing, patent oropharynx without erythema/exudate, uvula midline  Neck: +supple, no tenderness/meningismus/JVD, +Trachea midline  Pulm: Bilateral BS, normal resp effort, no wheeze/stridor/retractions  CV: RRR, no M/R/G, +dist pulses  Abd: soft, NT/ND, Negative [place] signs, +BS, no palpable masses  Mskel: no edema/erythema/cyanosis  Skin: no rash, warm/dry  Neuro: AAOx3, no apparent sensory/motor deficits, coordination intact Gen: Alert, mild distress with movement, well appearing, obese  Head: NC, AT, EOMI, normal lids/conjunctiva, no vertical or horizontal nystagmus  ENT: normal hearing, patent oropharynx without erythema/exudate, uvula midline  Neck: +supple, no tenderness/meningismus/JVD, +Trachea midline  Pulm: Bilateral BS, normal resp effort, no wheeze/stridor/retractions  CV: RRR, no M/R/G, +dist pulses  Abd: soft, NT/ND, Negative [place] signs, +BS, no palpable masses  Mskel: no edema/erythema/cyanosis  Skin: no rash, warm/dry  Neuro: AAOx3, no apparent sensory/motor deficits, coordination intact

## 2021-08-15 NOTE — ED ADULT NURSE REASSESSMENT NOTE - NS ED NURSE REASSESS COMMENT FT1
dr mohr aware of vitals and orthostatics, patient is able to ambulate and get ready with no assistance, feels slightly dizzy upon getting up from bed but when ambulating feels better, dc instructions given, dc meds discussed, verbalized understanding, patient is waiting for uber ride

## 2021-08-15 NOTE — ED ADULT NURSE NOTE - NSIMPLEMENTINTERV_GEN_ALL_ED
Implemented All Universal Safety Interventions:  [place] to call system. Call bell, personal items and telephone within reach. Instruct patient to call for assistance. Room bathroom lighting operational. Non-slip footwear when patient is off stretcher. Physically safe environment: no spills, clutter or unnecessary equipment. Stretcher in lowest position, wheels locked, appropriate side rails in place.

## 2021-10-14 ENCOUNTER — INPATIENT (INPATIENT)
Facility: HOSPITAL | Age: 61
LOS: 9 days | Discharge: HOME HEALTH | End: 2021-10-24
Attending: HOSPITALIST | Admitting: HOSPITALIST
Payer: MEDICAID

## 2021-10-14 VITALS
HEIGHT: 71 IN | TEMPERATURE: 98 F | DIASTOLIC BLOOD PRESSURE: 84 MMHG | HEART RATE: 101 BPM | WEIGHT: 281.09 LBS | OXYGEN SATURATION: 98 % | SYSTOLIC BLOOD PRESSURE: 127 MMHG | RESPIRATION RATE: 19 BRPM

## 2021-10-14 DIAGNOSIS — L91.8 OTHER HYPERTROPHIC DISORDERS OF THE SKIN: Chronic | ICD-10-CM

## 2021-10-14 DIAGNOSIS — E66.01 MORBID (SEVERE) OBESITY DUE TO EXCESS CALORIES: ICD-10-CM

## 2021-10-14 DIAGNOSIS — E11.628 TYPE 2 DIABETES MELLITUS WITH OTHER SKIN COMPLICATIONS: ICD-10-CM

## 2021-10-14 DIAGNOSIS — Z98.891 HISTORY OF UTERINE SCAR FROM PREVIOUS SURGERY: Chronic | ICD-10-CM

## 2021-10-14 DIAGNOSIS — M32.9 SYSTEMIC LUPUS ERYTHEMATOSUS, UNSPECIFIED: ICD-10-CM

## 2021-10-14 DIAGNOSIS — E11.8 TYPE 2 DIABETES MELLITUS WITH UNSPECIFIED COMPLICATIONS: ICD-10-CM

## 2021-10-14 DIAGNOSIS — R79.89 OTHER SPECIFIED ABNORMAL FINDINGS OF BLOOD CHEMISTRY: ICD-10-CM

## 2021-10-14 LAB
ALBUMIN SERPL ELPH-MCNC: 2.6 G/DL — LOW (ref 3.3–5)
ALP SERPL-CCNC: 88 U/L — SIGNIFICANT CHANGE UP (ref 40–120)
ALT FLD-CCNC: 17 U/L — SIGNIFICANT CHANGE UP (ref 12–78)
ANION GAP SERPL CALC-SCNC: 5 MMOL/L — SIGNIFICANT CHANGE UP (ref 5–17)
APPEARANCE UR: CLEAR — SIGNIFICANT CHANGE UP
APTT BLD: 29.4 SEC — SIGNIFICANT CHANGE UP (ref 27.5–35.5)
AST SERPL-CCNC: 13 U/L — LOW (ref 15–37)
BACTERIA # UR AUTO: ABNORMAL
BASOPHILS # BLD AUTO: 0.02 K/UL — SIGNIFICANT CHANGE UP (ref 0–0.2)
BASOPHILS NFR BLD AUTO: 0.3 % — SIGNIFICANT CHANGE UP (ref 0–2)
BILIRUB SERPL-MCNC: 0.3 MG/DL — SIGNIFICANT CHANGE UP (ref 0.2–1.2)
BILIRUB UR-MCNC: NEGATIVE — SIGNIFICANT CHANGE UP
BUN SERPL-MCNC: 15 MG/DL — SIGNIFICANT CHANGE UP (ref 7–23)
CALCIUM SERPL-MCNC: 9.1 MG/DL — SIGNIFICANT CHANGE UP (ref 8.5–10.1)
CHLORIDE SERPL-SCNC: 111 MMOL/L — HIGH (ref 96–108)
CO2 SERPL-SCNC: 27 MMOL/L — SIGNIFICANT CHANGE UP (ref 22–31)
COLOR SPEC: YELLOW — SIGNIFICANT CHANGE UP
CREAT SERPL-MCNC: 1.05 MG/DL — SIGNIFICANT CHANGE UP (ref 0.5–1.3)
CRP SERPL-MCNC: 6 MG/L — HIGH
DIFF PNL FLD: ABNORMAL
EOSINOPHIL # BLD AUTO: 0.11 K/UL — SIGNIFICANT CHANGE UP (ref 0–0.5)
EOSINOPHIL NFR BLD AUTO: 1.7 % — SIGNIFICANT CHANGE UP (ref 0–6)
EPI CELLS # UR: SIGNIFICANT CHANGE UP
ERYTHROCYTE [SEDIMENTATION RATE] IN BLOOD: 72 MM/HR — HIGH (ref 0–20)
FLUAV AG NPH QL: SIGNIFICANT CHANGE UP
FLUBV AG NPH QL: SIGNIFICANT CHANGE UP
GLUCOSE BLDC GLUCOMTR-MCNC: 159 MG/DL — HIGH (ref 70–99)
GLUCOSE BLDC GLUCOMTR-MCNC: 214 MG/DL — HIGH (ref 70–99)
GLUCOSE BLDC GLUCOMTR-MCNC: 215 MG/DL — HIGH (ref 70–99)
GLUCOSE SERPL-MCNC: 219 MG/DL — HIGH (ref 70–99)
GLUCOSE UR QL: NEGATIVE MG/DL — SIGNIFICANT CHANGE UP
HCT VFR BLD CALC: 34.4 % — LOW (ref 34.5–45)
HGB BLD-MCNC: 11 G/DL — LOW (ref 11.5–15.5)
HYALINE CASTS # UR AUTO: ABNORMAL /LPF
IMM GRANULOCYTES NFR BLD AUTO: 0.6 % — SIGNIFICANT CHANGE UP (ref 0–1.5)
INR BLD: 0.95 RATIO — SIGNIFICANT CHANGE UP (ref 0.88–1.16)
KETONES UR-MCNC: NEGATIVE — SIGNIFICANT CHANGE UP
LACTATE SERPL-SCNC: 1.1 MMOL/L — SIGNIFICANT CHANGE UP (ref 0.7–2)
LACTATE SERPL-SCNC: 3.1 MMOL/L — HIGH (ref 0.7–2)
LEUKOCYTE ESTERASE UR-ACNC: ABNORMAL
LYMPHOCYTES # BLD AUTO: 1.73 K/UL — SIGNIFICANT CHANGE UP (ref 1–3.3)
LYMPHOCYTES # BLD AUTO: 26.9 % — SIGNIFICANT CHANGE UP (ref 13–44)
MCHC RBC-ENTMCNC: 25.9 PG — LOW (ref 27–34)
MCHC RBC-ENTMCNC: 32 GM/DL — SIGNIFICANT CHANGE UP (ref 32–36)
MCV RBC AUTO: 80.9 FL — SIGNIFICANT CHANGE UP (ref 80–100)
MONOCYTES # BLD AUTO: 0.46 K/UL — SIGNIFICANT CHANGE UP (ref 0–0.9)
MONOCYTES NFR BLD AUTO: 7.2 % — SIGNIFICANT CHANGE UP (ref 2–14)
NEUTROPHILS # BLD AUTO: 4.07 K/UL — SIGNIFICANT CHANGE UP (ref 1.8–7.4)
NEUTROPHILS NFR BLD AUTO: 63.3 % — SIGNIFICANT CHANGE UP (ref 43–77)
NITRITE UR-MCNC: NEGATIVE — SIGNIFICANT CHANGE UP
NRBC # BLD: 0 /100 WBCS — SIGNIFICANT CHANGE UP (ref 0–0)
PH UR: 5 — SIGNIFICANT CHANGE UP (ref 5–8)
PLATELET # BLD AUTO: 242 K/UL — SIGNIFICANT CHANGE UP (ref 150–400)
POTASSIUM SERPL-MCNC: 3.9 MMOL/L — SIGNIFICANT CHANGE UP (ref 3.5–5.3)
POTASSIUM SERPL-SCNC: 3.9 MMOL/L — SIGNIFICANT CHANGE UP (ref 3.5–5.3)
PROT SERPL-MCNC: 8.1 GM/DL — SIGNIFICANT CHANGE UP (ref 6–8.3)
PROT UR-MCNC: 30 MG/DL
PROTHROM AB SERPL-ACNC: 11.1 SEC — SIGNIFICANT CHANGE UP (ref 10.6–13.6)
RBC # BLD: 4.25 M/UL — SIGNIFICANT CHANGE UP (ref 3.8–5.2)
RBC # FLD: 13.9 % — SIGNIFICANT CHANGE UP (ref 10.3–14.5)
RBC CASTS # UR COMP ASSIST: SIGNIFICANT CHANGE UP /HPF (ref 0–4)
SARS-COV-2 RNA SPEC QL NAA+PROBE: SIGNIFICANT CHANGE UP
SODIUM SERPL-SCNC: 143 MMOL/L — SIGNIFICANT CHANGE UP (ref 135–145)
SP GR SPEC: 1.01 — SIGNIFICANT CHANGE UP (ref 1.01–1.02)
UROBILINOGEN FLD QL: NEGATIVE MG/DL — SIGNIFICANT CHANGE UP
WBC # BLD: 6.43 K/UL — SIGNIFICANT CHANGE UP (ref 3.8–10.5)
WBC # FLD AUTO: 6.43 K/UL — SIGNIFICANT CHANGE UP (ref 3.8–10.5)
WBC UR QL: SIGNIFICANT CHANGE UP

## 2021-10-14 PROCEDURE — 73620 X-RAY EXAM OF FOOT: CPT | Mod: 26,LT

## 2021-10-14 PROCEDURE — 99223 1ST HOSP IP/OBS HIGH 75: CPT

## 2021-10-14 PROCEDURE — 71045 X-RAY EXAM CHEST 1 VIEW: CPT | Mod: 26

## 2021-10-14 PROCEDURE — 99285 EMERGENCY DEPT VISIT HI MDM: CPT

## 2021-10-14 PROCEDURE — 73720 MRI LWR EXTREMITY W/O&W/DYE: CPT | Mod: 26,LT

## 2021-10-14 RX ORDER — DEXTROSE 50 % IN WATER 50 %
25 SYRINGE (ML) INTRAVENOUS ONCE
Refills: 0 | Status: DISCONTINUED | OUTPATIENT
Start: 2021-10-14 | End: 2021-10-18

## 2021-10-14 RX ORDER — DEXTROSE 50 % IN WATER 50 %
15 SYRINGE (ML) INTRAVENOUS ONCE
Refills: 0 | Status: DISCONTINUED | OUTPATIENT
Start: 2021-10-14 | End: 2021-10-18

## 2021-10-14 RX ORDER — INSULIN LISPRO 100/ML
VIAL (ML) SUBCUTANEOUS AT BEDTIME
Refills: 0 | Status: DISCONTINUED | OUTPATIENT
Start: 2021-10-14 | End: 2021-10-18

## 2021-10-14 RX ORDER — ACETAMINOPHEN 500 MG
650 TABLET ORAL EVERY 6 HOURS
Refills: 0 | Status: DISCONTINUED | OUTPATIENT
Start: 2021-10-14 | End: 2021-10-18

## 2021-10-14 RX ORDER — HYDROXYCHLOROQUINE SULFATE 200 MG
200 TABLET ORAL
Refills: 0 | Status: DISCONTINUED | OUTPATIENT
Start: 2021-10-14 | End: 2021-10-18

## 2021-10-14 RX ORDER — DEXTROSE 50 % IN WATER 50 %
12.5 SYRINGE (ML) INTRAVENOUS ONCE
Refills: 0 | Status: DISCONTINUED | OUTPATIENT
Start: 2021-10-14 | End: 2021-10-18

## 2021-10-14 RX ORDER — INSULIN LISPRO 100/ML
VIAL (ML) SUBCUTANEOUS
Refills: 0 | Status: DISCONTINUED | OUTPATIENT
Start: 2021-10-14 | End: 2021-10-18

## 2021-10-14 RX ORDER — VANCOMYCIN HCL 1 G
2000 VIAL (EA) INTRAVENOUS EVERY 12 HOURS
Refills: 0 | Status: DISCONTINUED | OUTPATIENT
Start: 2021-10-14 | End: 2021-10-14

## 2021-10-14 RX ORDER — PIPERACILLIN AND TAZOBACTAM 4; .5 G/20ML; G/20ML
3.38 INJECTION, POWDER, LYOPHILIZED, FOR SOLUTION INTRAVENOUS ONCE
Refills: 0 | Status: COMPLETED | OUTPATIENT
Start: 2021-10-14 | End: 2021-10-14

## 2021-10-14 RX ORDER — INSULIN GLARGINE 100 [IU]/ML
20 INJECTION, SOLUTION SUBCUTANEOUS AT BEDTIME
Refills: 0 | Status: DISCONTINUED | OUTPATIENT
Start: 2021-10-14 | End: 2021-10-18

## 2021-10-14 RX ORDER — ENOXAPARIN SODIUM 100 MG/ML
40 INJECTION SUBCUTANEOUS EVERY 12 HOURS
Refills: 0 | Status: DISCONTINUED | OUTPATIENT
Start: 2021-10-14 | End: 2021-10-18

## 2021-10-14 RX ORDER — GLUCAGON INJECTION, SOLUTION 0.5 MG/.1ML
1 INJECTION, SOLUTION SUBCUTANEOUS ONCE
Refills: 0 | Status: DISCONTINUED | OUTPATIENT
Start: 2021-10-14 | End: 2021-10-18

## 2021-10-14 RX ORDER — VANCOMYCIN HCL 1 G
1000 VIAL (EA) INTRAVENOUS ONCE
Refills: 0 | Status: COMPLETED | OUTPATIENT
Start: 2021-10-14 | End: 2021-10-14

## 2021-10-14 RX ORDER — SODIUM CHLORIDE 9 MG/ML
1000 INJECTION, SOLUTION INTRAVENOUS
Refills: 0 | Status: DISCONTINUED | OUTPATIENT
Start: 2021-10-14 | End: 2021-10-18

## 2021-10-14 RX ORDER — INSULIN LISPRO 100/ML
6 VIAL (ML) SUBCUTANEOUS
Refills: 0 | Status: DISCONTINUED | OUTPATIENT
Start: 2021-10-14 | End: 2021-10-18

## 2021-10-14 RX ORDER — SODIUM CHLORIDE 9 MG/ML
1000 INJECTION INTRAMUSCULAR; INTRAVENOUS; SUBCUTANEOUS ONCE
Refills: 0 | Status: COMPLETED | OUTPATIENT
Start: 2021-10-14 | End: 2021-10-14

## 2021-10-14 RX ORDER — INSULIN GLARGINE 100 [IU]/ML
33 INJECTION, SOLUTION SUBCUTANEOUS
Qty: 0 | Refills: 0 | DISCHARGE

## 2021-10-14 RX ORDER — VANCOMYCIN HCL 1 G
1250 VIAL (EA) INTRAVENOUS EVERY 12 HOURS
Refills: 0 | Status: DISCONTINUED | OUTPATIENT
Start: 2021-10-14 | End: 2021-10-18

## 2021-10-14 RX ORDER — LANOLIN ALCOHOL/MO/W.PET/CERES
3 CREAM (GRAM) TOPICAL AT BEDTIME
Refills: 0 | Status: DISCONTINUED | OUTPATIENT
Start: 2021-10-14 | End: 2021-10-18

## 2021-10-14 RX ORDER — PIPERACILLIN AND TAZOBACTAM 4; .5 G/20ML; G/20ML
3.38 INJECTION, POWDER, LYOPHILIZED, FOR SOLUTION INTRAVENOUS EVERY 8 HOURS
Refills: 0 | Status: DISCONTINUED | OUTPATIENT
Start: 2021-10-14 | End: 2021-10-18

## 2021-10-14 RX ORDER — LOSARTAN POTASSIUM 100 MG/1
50 TABLET, FILM COATED ORAL DAILY
Refills: 0 | Status: DISCONTINUED | OUTPATIENT
Start: 2021-10-15 | End: 2021-10-18

## 2021-10-14 RX ADMIN — INSULIN GLARGINE 20 UNIT(S): 100 INJECTION, SOLUTION SUBCUTANEOUS at 22:11

## 2021-10-14 RX ADMIN — Medication 4: at 17:12

## 2021-10-14 RX ADMIN — Medication 166.67 MILLIGRAM(S): at 22:12

## 2021-10-14 RX ADMIN — SODIUM CHLORIDE 1000 MILLILITER(S): 9 INJECTION INTRAMUSCULAR; INTRAVENOUS; SUBCUTANEOUS at 11:06

## 2021-10-14 RX ADMIN — Medication 200 MILLIGRAM(S): at 18:41

## 2021-10-14 RX ADMIN — PIPERACILLIN AND TAZOBACTAM 200 GRAM(S): 4; .5 INJECTION, POWDER, LYOPHILIZED, FOR SOLUTION INTRAVENOUS at 11:01

## 2021-10-14 RX ADMIN — Medication 3 MILLIGRAM(S): at 22:12

## 2021-10-14 RX ADMIN — Medication 650 MILLIGRAM(S): at 17:14

## 2021-10-14 RX ADMIN — PIPERACILLIN AND TAZOBACTAM 25 GRAM(S): 4; .5 INJECTION, POWDER, LYOPHILIZED, FOR SOLUTION INTRAVENOUS at 18:32

## 2021-10-14 RX ADMIN — PIPERACILLIN AND TAZOBACTAM 3.38 GRAM(S): 4; .5 INJECTION, POWDER, LYOPHILIZED, FOR SOLUTION INTRAVENOUS at 11:48

## 2021-10-14 RX ADMIN — Medication 250 MILLIGRAM(S): at 12:18

## 2021-10-14 RX ADMIN — ENOXAPARIN SODIUM 40 MILLIGRAM(S): 100 INJECTION SUBCUTANEOUS at 18:32

## 2021-10-14 RX ADMIN — Medication 6 UNIT(S): at 17:12

## 2021-10-14 RX ADMIN — Medication 650 MILLIGRAM(S): at 16:05

## 2021-10-14 NOTE — ED ADULT NURSE NOTE - IS THE PATIENT ABLE TO BE SCREENED?
BRIEF OPERATIVE NOTE Date of Procedure: 1/11/2019 Preoperative Diagnosis: Lumbar stenosis with neurogenic claudication [M48.062] Lumbar radiculopathy [M54.16] Postoperative Diagnosis: Lumbar stenosis with neurogenic claudication [M48.062] Lumbar radiculopathy [M54.16] Procedure(s): RIGHT L4 5 LAMINECTOMY AND FACECECTOMY ;REPAIR OF MYELOGRAM RELATED CSF LEAK Surgeon(s) and Role: 
   * Iantha Habermann, MD - Primary Surgical Assistant: NONE Surgical Staff: 
Circ-1: Danika Ceja RN 
Circ-Relief: Lino Nino RN Radiology Technician: Garret Echevarria, RT, R, CT Scrub Tech-1: Charly Gomez Scrub Private/Assistant: Leonardo Faria 
Nurse Practitioner: Shanika Abreu NP Event Time In Time Out Incision Start 8650 Incision Close Anesthesia: General  
Estimated Blood Loss: MINIMAL Specimens: * No specimens in log * Findings: STENOSIS Complications: NONE Implants: * No implants in log * Yes

## 2021-10-14 NOTE — ED PROVIDER NOTE - PHYSICAL EXAMINATION
General: Awake, alert and oriented. No acute distress. Well developed, hydrated and nourished. Appears stated age.   Skin: Skin in warm, dry and intact without rashes or lesions. Appropriate color for ethnicity  HENMT: head normocephalic and atraumatic; bilateral external ears without swelling. no nasal discharge. moist oral mucosa. supple neck, trachea midline  EYES: Conjunctiva clear. nonicteric sclera. EOM intact, Eyelids are normal in appearance without swelling or lesions.  Cardiac: well perfused  Respiratory: breathing comfortably on room air. no audible wheezing or stridor  Abdominal: nondistended  MSK: Neck and back are without deformity, visible external skin changes, or signs of trauma. Curvature of the cervical, thoracic, and lumbar spine are within normal limits. no external signs of trauma. no apparent deficits in ROM of any extremity, left 3rd toe with swelling, malodorous. discharge from bottom. partial necrosis.   Neurological: The patient is awake, alert and oriented to person, place, and time with normal speech. CN 2-12 grossly intact. no apparent deficits. Memory is normal and thought process is intact. No gait abnormalities are appreciated.   Psychiatric: Appropriate mood and affect. Good judgement and insight. No visual or auditory hallucinations.

## 2021-10-14 NOTE — ED ADULT NURSE NOTE - NSIMPLEMENTINTERV_GEN_ALL_ED
Implemented All Fall Risk Interventions:  [place] to call system. Call bell, personal items and telephone within reach. Instruct patient to call for assistance. Room bathroom lighting operational. Non-slip footwear when patient is off stretcher. Physically safe environment: no spills, clutter or unnecessary equipment. Stretcher in lowest position, wheels locked, appropriate side rails in place. Provide visual cue, wrist band, yellow gown, etc. Monitor gait and stability. Monitor for mental status changes and reorient to person, place, and time. Review medications for side effects contributing to fall risk. Reinforce activity limits and safety measures with patient and family.

## 2021-10-14 NOTE — CONSULT NOTE ADULT - SUBJECTIVE AND OBJECTIVE BOX
Patient is a 61y old  Female who presents with a chief complaint of Infection of left 3rd toe in DM patient, concern for osteomyelitis (14 Oct 2021 13:09)      HPI:  61 years old female with h/o HTN, DM2, SLE, obesity (BMI 39.2) present to ED with complain of worsening infection in her left 3rd toe. Patient reported some ulcer which started 1 months ago after getting her nail clipped. She was seen in [facility] 2 weeks ago and was given a course of oral Augmentin but patient only took it for 5 days as she has some itchiness/rash in her private area. Since then, her toe swelling/ulcer progressively worsen. Denied any fever or chills.   No leukocytosis. Hb 11, Plt 242, lactate 3.1, Cr 1.05, glucose 219. Patient was given vanco/zosyn in ED. ED consulted podiatry. CXR image reviewed, no focal consolidation    SH: no toxic habits  FH: mother-DM  PSH:  C section  Allergy: NKDA (14 Oct 2021 13:09)      PAST MEDICAL & SURGICAL HISTORY:  Lupus    Diabetes mellitus    Hyperlipidemia    Pericarditis    Obesity    HTN (hypertension)    Lupus    Diabetes    Hypertension     delivery delivered   section x 3    Skin tag  Skin tag removal, right shoulder    H/O:         MEDICATIONS  (STANDING):  dextrose 40% Gel 15 Gram(s) Oral once  dextrose 5%. 1000 milliLiter(s) (50 mL/Hr) IV Continuous <Continuous>  dextrose 5%. 1000 milliLiter(s) (100 mL/Hr) IV Continuous <Continuous>  dextrose 50% Injectable 25 Gram(s) IV Push once  dextrose 50% Injectable 12.5 Gram(s) IV Push once  dextrose 50% Injectable 25 Gram(s) IV Push once  enoxaparin Injectable 40 milliGRAM(s) SubCutaneous every 12 hours  glucagon  Injectable 1 milliGRAM(s) IntraMuscular once  hydroxychloroquine 200 milliGRAM(s) Oral two times a day  insulin glargine Injectable (LANTUS) 20 Unit(s) SubCutaneous at bedtime  insulin lispro (ADMELOG) corrective regimen sliding scale   SubCutaneous three times a day before meals  insulin lispro (ADMELOG) corrective regimen sliding scale   SubCutaneous at bedtime  insulin lispro Injectable (ADMELOG) 6 Unit(s) SubCutaneous three times a day before meals  piperacillin/tazobactam IVPB.. 3.375 Gram(s) IV Intermittent every 8 hours  vancomycin  IVPB 1250 milliGRAM(s) IV Intermittent every 12 hours    MEDICATIONS  (PRN):  acetaminophen   Tablet .. 650 milliGRAM(s) Oral every 6 hours PRN Temp greater or equal to 38C (100.4F), Mild Pain (1 - 3), Moderate Pain (4 - 6)  melatonin 3 milliGRAM(s) Oral at bedtime PRN Insomnia      Allergies    No Known Allergies    Intolerances        VITALS:    Vital Signs Last 24 Hrs  T(C): 36.7 (14 Oct 2021 17:08), Max: 37.1 (14 Oct 2021 14:15)  T(F): 98 (14 Oct 2021 17:08), Max: 98.8 (14 Oct 2021 14:15)  HR: 74 (14 Oct 2021 17:08) (74 - 101)  BP: 102/66 (14 Oct 2021 17:08) (102/66 - 127/84)  BP(mean): --  RR: 18 (14 Oct 2021 17:08) (18 - 19)  SpO2: 98% (14 Oct 2021 17:08) (98% - 100%)    LABS:                          11.0   6.43  )-----------( 242      ( 14 Oct 2021 11:09 )             34.4       10-14    143  |  111<H>  |  15  ----------------------------<  219<H>  3.9   |  27  |  1.05    Ca    9.1      14 Oct 2021 11:09    TPro  8.1  /  Alb  2.6<L>  /  TBili  0.3  /  DBili  x   /  AST  13<L>  /  ALT  17  /  AlkPhos  88  10-14      CAPILLARY BLOOD GLUCOSE      POCT Blood Glucose.: 214 mg/dL (14 Oct 2021 17:11)  POCT Blood Glucose.: 215 mg/dL (14 Oct 2021 10:15)      PT/INR - ( 14 Oct 2021 11:09 )   PT: 11.1 sec;   INR: 0.95 ratio         PTT - ( 14 Oct 2021 11:09 )  PTT:29.4 sec    LOWER EXTREMITY PHYSICAL EXAM:    Vascular: DP/PT 2/4, B/L, CFT <3 seconds B/L, Temperature gradient warm to warm B/L.   Neuro: Epicritic sensation intact to the level of digits, B/L.  Musculoskeletal/Ortho: unremarkable  Skin: left foot 3rd distal tip necrosis with malodor, serous drainage, probe to bone, no undermining or tracking noted, fibronecrotic wound base.      RADIOLOGY & ADDITIONAL STUDIES:  left foot xray resident read: possible OM of 3rd DP of left foot, no gas

## 2021-10-14 NOTE — ED PROVIDER NOTE - CLINICAL SUMMARY MEDICAL DECISION MAKING FREE TEXT BOX
patinet with diabetic foot infection. lactate elevated. broad spectrum abx ordered. d/w podiatry. will admit for further management.

## 2021-10-14 NOTE — ED PROVIDER NOTE - OBJECTIVE STATEMENT
61f pmhx dm presenting for worening swelling and malodorous discharge from 3rd left toe. started several weeks ago after getting her nails clipped. was started on po amoxicillin but didn't finisher her Rx.

## 2021-10-14 NOTE — CONSULT NOTE ADULT - SUBJECTIVE AND OBJECTIVE BOX
HISTORY OF PRESENT ILLNESS:    Patient is a 61y Female    HPI:  61 years old female with h/o HTN, DM2, SLE, obesity (BMI 39.2) present to ED with complain of worsening infection in her left 3rd toe. Patient reported some ulcer which started 1 months ago after getting her nail clipped. She was seen in [facility] 2 weeks ago and was given a course of oral Augmentin but patient only took it for 5 days as she has some itchiness/rash in her private area. Since then, her toe swelling/ulcer progressively worsen. Denied any fever or chills.   No leukocytosis. Hb 11, Plt 242, lactate 3.1, Cr 1.05, glucose 219. Patient was given vanco/zosyn in ED. ED consulted podiatry. CXR image reviewed, no focal consolidation  Of note, pt w/ hx pf SLE, diagnosed about 8 years ago.  She says that typical flares consist of body aches and joint pains.  She also reports that it caused "lung problems" in the past.  She has been stable recently on Plaquenil, Cellcept, and prednisone 5mg daily.    SH: no toxic habits  FH: mother-DM  PSH:  C section  Allergy: NKDA (14 Oct 2021 13:09)    PAST MEDICAL & SURGICAL HISTORY:  Lupus    Diabetes mellitus    Hyperlipidemia    Pericarditis    Obesity    HTN (hypertension)    Lupus    Diabetes    Hypertension     delivery delivered   section x 3    Skin tag  Skin tag removal, right shoulder    H/O:         ROS:  (+)occasional oral ulcers, (+)dry mouth.  No fever/chills, wt loss, night sweats, chest pain/dyspnea/cough, rashes, current joint pain, alopecia, photosensitivity, dry eye, Raynaud's, dysphagia, focal weakness, or eye symptoms.    MEDICATIONS  (STANDING):  dextrose 40% Gel 15 Gram(s) Oral once  dextrose 5%. 1000 milliLiter(s) (50 mL/Hr) IV Continuous <Continuous>  dextrose 5%. 1000 milliLiter(s) (100 mL/Hr) IV Continuous <Continuous>  dextrose 50% Injectable 25 Gram(s) IV Push once  dextrose 50% Injectable 12.5 Gram(s) IV Push once  dextrose 50% Injectable 25 Gram(s) IV Push once  enoxaparin Injectable 40 milliGRAM(s) SubCutaneous every 12 hours  glucagon  Injectable 1 milliGRAM(s) IntraMuscular once  hydroxychloroquine 200 milliGRAM(s) Oral two times a day  insulin glargine Injectable (LANTUS) 20 Unit(s) SubCutaneous at bedtime  insulin lispro (ADMELOG) corrective regimen sliding scale   SubCutaneous three times a day before meals  insulin lispro (ADMELOG) corrective regimen sliding scale   SubCutaneous at bedtime  insulin lispro Injectable (ADMELOG) 6 Unit(s) SubCutaneous three times a day before meals  piperacillin/tazobactam IVPB.. 3.375 Gram(s) IV Intermittent every 8 hours  vancomycin  IVPB 1250 milliGRAM(s) IV Intermittent every 12 hours    MEDICATIONS  (PRN):  acetaminophen   Tablet .. 650 milliGRAM(s) Oral every 6 hours PRN Temp greater or equal to 38C (100.4F), Mild Pain (1 - 3), Moderate Pain (4 - 6)  melatonin 3 milliGRAM(s) Oral at bedtime PRN Insomnia      Allergies    No Known Allergies    Intolerances        FAMILY HISTORY:  Family history of coronary artery disease        SOCIAL HISTORY:  Tobacco--   none            Vital Signs Last 24 Hrs  T(C): 36.7 (14 Oct 2021 17:08), Max: 37.1 (14 Oct 2021 14:15)  T(F): 98 (14 Oct 2021 17:08), Max: 98.8 (14 Oct 2021 14:15)  HR: 74 (14 Oct 2021 17:08) (74 - 101)  BP: 102/66 (14 Oct 2021 17:08) (102/66 - 127/84)  BP(mean): --  RR: 18 (14 Oct 2021 17:08) (18 - 19)  SpO2: 98% (14 Oct 2021 17:08) (98% - 100%)    PHYSICAL EXAM:  General :  NAD  HEENT--  no oral ulcers  Nodes--   LAD  Lungs--  CTA B/L  Heart--  RRR, nlS1 &S2 normal;   Abdomen--  soft, NT, ND +BS  Skin:  no rashes  Musculoskeletal exam:  No synovitis, normal ROM in all joints;  left foot w/ dressing in place    LABS:                        11.0   6.43  )-----------( 242      ( 14 Oct 2021 11:09 )             34.4     10-14    143  |  111<H>  |  15  ----------------------------<  219<H>  3.9   |  27  |  1.05    Ca    9.1      14 Oct 2021 11:09    TPro  8.1  /  Alb  2.6<L>  /  TBili  0.3  /  DBili  x   /  AST  13<L>  /  ALT  17  /  AlkPhos  88  10-14    PT/INR - ( 14 Oct 2021 11:09 )   PT: 11.1 sec;   INR: 0.95 ratio         PTT - ( 14 Oct 2021 11:09 )  PTT:29.4 sec  Urinalysis Basic - ( 14 Oct 2021 12:19 )    Color: Yellow / Appearance: Clear / S.015 / pH: x  Gluc: x / Ketone: Negative  / Bili: Negative / Urobili: Negative mg/dL   Blood: x / Protein: 30 mg/dL / Nitrite: Negative   Leuk Esterase: Small / RBC: 0-2 /HPF / WBC 3-5   Sq Epi: x / Non Sq Epi: Few / Bacteria: Few    Sedimentation Rate, Erythrocyte (10.21 @ 11:09)    Sedimentation Rate, Erythrocyte: 72 mm/hr        RADIOLOGY & ADDITIONAL STUDIES:

## 2021-10-14 NOTE — ED ADULT NURSE NOTE - CHIEF COMPLAINT QUOTE
Left middle toe infection X1 month.   Toe noted black, malodorous with purulent discharge in triage  Seen at [facility], 2 weeks ago and finished ATB given  PMD sent to urgent care who then sent to ED  HX HTN, DM, Lupus

## 2021-10-14 NOTE — H&P ADULT - NSHPREVIEWOFSYSTEMS_GEN_ALL_CORE
CONSTITUTIONAL: No fever, chills or weight loss.  EYES: No eye pain. No vision changes  ENT:  No hearing changes or pain, No nasal congestion.   Cardiovascular:  No Chest Pain, palpitation, SOB  Respiratory:  No cough, SOB or wheezing. No hemoptysis.   Gastrointestinal:  No nausea, vomiting, diarrhea or abdominal pain.   Genitourinary: No dysuria, frequency or hematuria  Musculoskeletal:  Left third toe infection with ulcer  Skin:  No skin lesion, rash or pruritis  Neuro:  No weakness, numbness, loss of consciousness  Psych:  No hallucination. No recent changes in mood  Heme/Lymph:  No easy bruising or bleeding.  Endocrine:  No heat or cold intolerance  ALLERGIC/IMMUNOlOGIC: No seasonal allergy, hives, hay fever symptoms

## 2021-10-14 NOTE — H&P ADULT - ASSESSMENT
61 years old female with h/o HTN, DM2, SLE, obesity (BMI 39.2) present to ED with complain of worsening infection in her left 3rd toe. Patient reported some ulcer which started 1 months ago after getting her nail clipped. She was seen in [facility] 2 weeks ago and was given a course of oral Augmentin but patient only took it for 5 days as she has some itchiness/rash in her private area. Since then, her toe swelling/ulcer progressively worsen  No leukocytosis. Hb 11, Plt 242, lactate 3.1, Cr 1.05, glucose 219. Patient was given vanco/zosyn in ED. ED consulted podiatry. CXR image reviewed, no focal consolidation.     Admitted with left 3rd toe ulcer/infection-concerning for osteomyelitis

## 2021-10-14 NOTE — H&P ADULT - NSHPPHYSICALEXAM_GEN_ALL_CORE
CONSTITUTIONAL: Well developed, well nourished, alert and cooperative, no acute distress. BP-127/84 , HR-101 , RR- 19  EYES: PERRL, EOMI, no scleral icterus  ENT: Mucosa moist, tongue normal  NECK: Neck supple, trachea midline, non-tender, no masses or thyromegaly.  CARDIAC: Normal S1 and S2. Regular rate and rhythms. No Pedal edema.  LUNGS: Clear to auscultation, equal air entry both lungs. No rales, rhonchi, wheezing. Normal respiratory effort.   ABDOMEN: Soft, nondistended, nontender. No guarding or rebound tenderness. No hepatomegaly or splenomegaly.   MUSCULOSKELETAL: Normocephalic, atraumatic. Spine normal without deformity or tenderness. No clubbing or cyanosis. Right 3rd toe infection with swelling and ulcer noted in toe tip with some purulent discharge.  NEUROLOGICAL: No gross motor or sensory deficits  SKIN: no eruptions. Normal turgor  PSYCHIATRIC: A&O x 3, appropriate mood and affect. Normal insight and judgement.

## 2021-10-14 NOTE — H&P ADULT - PROBLEM SELECTOR PLAN 3
On 70/30 insulin 14 unit tid at home  Will use lantus 20 unit, lispro 6 unit premeal, ISS  Monitor glucose and titrate insulin accordingly  Check A1c  Will need good glycemic control given infection

## 2021-10-14 NOTE — ED ADULT NURSE NOTE - OBJECTIVE STATEMENT
pt 62 y/o female c/c of Left middle toe infection X1 month. PMH DB type 2, HTN, Lupus. L middle toe necrosis noted, malodorous w/ purulent discharge. Seen at [facility], 2 weeks ago and finished abx given. denies sob, pain, nausea, dizziness.

## 2021-10-14 NOTE — H&P ADULT - PROBLEM SELECTOR PLAN 5
On prednisone 5mg daily, Cellcept 500mg tid, Plaquenil 200mg bid at home  Continue prednisone 5mg daily, Plaquenil 200mg bid- discussed with rheumatology  Rheumatology consult- Dr Cleary

## 2021-10-14 NOTE — ED ADULT TRIAGE NOTE - CHIEF COMPLAINT QUOTE
Left middle toe infection X1 month.   Toe noted black, malodorous with purulent discharge in triage  Seen at [facility], 2 weeks ago and finished ATB given  PMD sent to urgent care who then sent to ED  HX HTN, DM, Lupus

## 2021-10-14 NOTE — H&P ADULT - HISTORY OF PRESENT ILLNESS
61 years old female with h/o HTN, DM2, SLE, obesity (BMI 39.2) present to ED with complain of worsening infection in her left 3rd toe. Patient reported some ulcer which started 1 months ago after getting her nail clipped. She was seen in [facility] 2 weeks ago and was given a course of oral Augmentin but patient only took it for 5 days as she has some itchiness/rash in her private area. Since then, her toe swelling/ulcer progressively worsen. Denied any fever or chills.   No leukocytosis. Hb 11, Plt 242, lactate 3.1, Cr 1.05, glucose 219. Patient was given vanco/zosyn in ED. ED consulted podiatry. CXR image reviewed, no focal consolidation    SH: no toxic habits  FH: mother-DM  PSH:  C section  Allergy: NKDA

## 2021-10-14 NOTE — H&P ADULT - PROBLEM SELECTOR PLAN 1
Worsening left 3rd toe infection for 1 month after getting her nail clipped  Was given augmentin 2 weeks ago but did not complete treatment  Afebrile, no leukocytosis  Xray left foot- awaiting official report. Noted some soft tissue swelling   Received Vanco/Zosyn in ED  ED consulted podiatry  MRI left foot to evaluate for osteomyelitis   Follow CRP/ESR  Continue vanco/Zosyn for now  Monitor vanco trough

## 2021-10-15 LAB
A1C WITH ESTIMATED AVERAGE GLUCOSE RESULT: 11.6 % — HIGH (ref 4–5.6)
ALBUMIN SERPL ELPH-MCNC: 2.2 G/DL — LOW (ref 3.3–5)
ALP SERPL-CCNC: 78 U/L — SIGNIFICANT CHANGE UP (ref 40–120)
ALT FLD-CCNC: 10 U/L — LOW (ref 12–78)
ANION GAP SERPL CALC-SCNC: 3 MMOL/L — LOW (ref 5–17)
AST SERPL-CCNC: 17 U/L — SIGNIFICANT CHANGE UP (ref 15–37)
BASOPHILS # BLD AUTO: 0.02 K/UL — SIGNIFICANT CHANGE UP (ref 0–0.2)
BASOPHILS NFR BLD AUTO: 0.3 % — SIGNIFICANT CHANGE UP (ref 0–2)
BILIRUB SERPL-MCNC: 0.4 MG/DL — SIGNIFICANT CHANGE UP (ref 0.2–1.2)
BUN SERPL-MCNC: 13 MG/DL — SIGNIFICANT CHANGE UP (ref 7–23)
CALCIUM SERPL-MCNC: 9 MG/DL — SIGNIFICANT CHANGE UP (ref 8.5–10.1)
CHLORIDE SERPL-SCNC: 110 MMOL/L — HIGH (ref 96–108)
CO2 SERPL-SCNC: 28 MMOL/L — SIGNIFICANT CHANGE UP (ref 22–31)
COVID-19 SPIKE DOMAIN AB INTERP: POSITIVE
COVID-19 SPIKE DOMAIN ANTIBODY RESULT: >250 U/ML — HIGH
CREAT SERPL-MCNC: 1.01 MG/DL — SIGNIFICANT CHANGE UP (ref 0.5–1.3)
CULTURE RESULTS: SIGNIFICANT CHANGE UP
EOSINOPHIL # BLD AUTO: 0.15 K/UL — SIGNIFICANT CHANGE UP (ref 0–0.5)
EOSINOPHIL NFR BLD AUTO: 2.6 % — SIGNIFICANT CHANGE UP (ref 0–6)
ESTIMATED AVERAGE GLUCOSE: 286 MG/DL — HIGH (ref 68–114)
GLUCOSE BLDC GLUCOMTR-MCNC: 141 MG/DL — HIGH (ref 70–99)
GLUCOSE SERPL-MCNC: 123 MG/DL — HIGH (ref 70–99)
HCT VFR BLD CALC: 32.9 % — LOW (ref 34.5–45)
HGB BLD-MCNC: 10.5 G/DL — LOW (ref 11.5–15.5)
IMM GRANULOCYTES NFR BLD AUTO: 0.5 % — SIGNIFICANT CHANGE UP (ref 0–1.5)
LYMPHOCYTES # BLD AUTO: 1.36 K/UL — SIGNIFICANT CHANGE UP (ref 1–3.3)
LYMPHOCYTES # BLD AUTO: 23.5 % — SIGNIFICANT CHANGE UP (ref 13–44)
MAGNESIUM SERPL-MCNC: 2 MG/DL — SIGNIFICANT CHANGE UP (ref 1.6–2.6)
MCHC RBC-ENTMCNC: 25.7 PG — LOW (ref 27–34)
MCHC RBC-ENTMCNC: 31.9 GM/DL — LOW (ref 32–36)
MCV RBC AUTO: 80.4 FL — SIGNIFICANT CHANGE UP (ref 80–100)
MONOCYTES # BLD AUTO: 0.37 K/UL — SIGNIFICANT CHANGE UP (ref 0–0.9)
MONOCYTES NFR BLD AUTO: 6.4 % — SIGNIFICANT CHANGE UP (ref 2–14)
NEUTROPHILS # BLD AUTO: 3.86 K/UL — SIGNIFICANT CHANGE UP (ref 1.8–7.4)
NEUTROPHILS NFR BLD AUTO: 66.7 % — SIGNIFICANT CHANGE UP (ref 43–77)
NRBC # BLD: 0 /100 WBCS — SIGNIFICANT CHANGE UP (ref 0–0)
PHOSPHATE SERPL-MCNC: 4.8 MG/DL — HIGH (ref 2.5–4.5)
PLATELET # BLD AUTO: 219 K/UL — SIGNIFICANT CHANGE UP (ref 150–400)
POTASSIUM SERPL-MCNC: 3.6 MMOL/L — SIGNIFICANT CHANGE UP (ref 3.5–5.3)
POTASSIUM SERPL-SCNC: 3.6 MMOL/L — SIGNIFICANT CHANGE UP (ref 3.5–5.3)
PROT SERPL-MCNC: 7.1 GM/DL — SIGNIFICANT CHANGE UP (ref 6–8.3)
RBC # BLD: 4.09 M/UL — SIGNIFICANT CHANGE UP (ref 3.8–5.2)
RBC # FLD: 13.8 % — SIGNIFICANT CHANGE UP (ref 10.3–14.5)
SARS-COV-2 IGG+IGM SERPL QL IA: >250 U/ML — HIGH
SARS-COV-2 IGG+IGM SERPL QL IA: POSITIVE
SODIUM SERPL-SCNC: 141 MMOL/L — SIGNIFICANT CHANGE UP (ref 135–145)
SPECIMEN SOURCE: SIGNIFICANT CHANGE UP
VANCOMYCIN TROUGH SERPL-MCNC: 16.7 UG/ML — SIGNIFICANT CHANGE UP (ref 10–20)
WBC # BLD: 5.79 K/UL — SIGNIFICANT CHANGE UP (ref 3.8–10.5)
WBC # FLD AUTO: 5.79 K/UL — SIGNIFICANT CHANGE UP (ref 3.8–10.5)

## 2021-10-15 PROCEDURE — 99233 SBSQ HOSP IP/OBS HIGH 50: CPT

## 2021-10-15 PROCEDURE — 93010 ELECTROCARDIOGRAM REPORT: CPT

## 2021-10-15 RX ORDER — NYSTATIN CREAM 100000 [USP'U]/G
1 CREAM TOPICAL
Refills: 0 | Status: DISCONTINUED | OUTPATIENT
Start: 2021-10-15 | End: 2021-10-18

## 2021-10-15 RX ORDER — NYSTATIN CREAM 100000 [USP'U]/G
1 CREAM TOPICAL
Refills: 0 | Status: DISCONTINUED | OUTPATIENT
Start: 2021-10-15 | End: 2021-10-15

## 2021-10-15 RX ADMIN — NYSTATIN CREAM 1 APPLICATION(S): 100000 CREAM TOPICAL at 17:13

## 2021-10-15 RX ADMIN — PIPERACILLIN AND TAZOBACTAM 25 GRAM(S): 4; .5 INJECTION, POWDER, LYOPHILIZED, FOR SOLUTION INTRAVENOUS at 21:32

## 2021-10-15 RX ADMIN — Medication 200 MILLIGRAM(S): at 05:04

## 2021-10-15 RX ADMIN — ENOXAPARIN SODIUM 40 MILLIGRAM(S): 100 INJECTION SUBCUTANEOUS at 17:13

## 2021-10-15 RX ADMIN — PIPERACILLIN AND TAZOBACTAM 25 GRAM(S): 4; .5 INJECTION, POWDER, LYOPHILIZED, FOR SOLUTION INTRAVENOUS at 13:14

## 2021-10-15 RX ADMIN — ENOXAPARIN SODIUM 40 MILLIGRAM(S): 100 INJECTION SUBCUTANEOUS at 05:04

## 2021-10-15 RX ADMIN — Medication 2: at 16:15

## 2021-10-15 RX ADMIN — Medication 166.67 MILLIGRAM(S): at 11:43

## 2021-10-15 RX ADMIN — Medication 4: at 11:35

## 2021-10-15 RX ADMIN — INSULIN GLARGINE 20 UNIT(S): 100 INJECTION, SOLUTION SUBCUTANEOUS at 21:10

## 2021-10-15 RX ADMIN — Medication 5 MILLIGRAM(S): at 05:05

## 2021-10-15 RX ADMIN — LOSARTAN POTASSIUM 50 MILLIGRAM(S): 100 TABLET, FILM COATED ORAL at 05:05

## 2021-10-15 RX ADMIN — Medication 6 UNIT(S): at 11:36

## 2021-10-15 RX ADMIN — Medication 166.67 MILLIGRAM(S): at 21:40

## 2021-10-15 RX ADMIN — PIPERACILLIN AND TAZOBACTAM 25 GRAM(S): 4; .5 INJECTION, POWDER, LYOPHILIZED, FOR SOLUTION INTRAVENOUS at 01:47

## 2021-10-15 RX ADMIN — Medication 3 MILLIGRAM(S): at 21:32

## 2021-10-15 RX ADMIN — Medication 200 MILLIGRAM(S): at 17:13

## 2021-10-15 RX ADMIN — NYSTATIN CREAM 1 APPLICATION(S): 100000 CREAM TOPICAL at 05:05

## 2021-10-15 RX ADMIN — Medication 6 UNIT(S): at 16:15

## 2021-10-15 RX ADMIN — Medication 6 UNIT(S): at 07:54

## 2021-10-15 NOTE — PROGRESS NOTE ADULT - SUBJECTIVE AND OBJECTIVE BOX
Patient is a 61y old  Female who presents with a chief complaint of Infection of left 3rd toe in DM patient, concern for osteomyelitis (15 Oct 2021 09:52)    INTERVAL HPI/OVERNIGHT EVENTS:    MEDICATIONS  (STANDING):  dextrose 40% Gel 15 Gram(s) Oral once  dextrose 5%. 1000 milliLiter(s) (50 mL/Hr) IV Continuous <Continuous>  dextrose 5%. 1000 milliLiter(s) (100 mL/Hr) IV Continuous <Continuous>  dextrose 50% Injectable 25 Gram(s) IV Push once  dextrose 50% Injectable 12.5 Gram(s) IV Push once  dextrose 50% Injectable 25 Gram(s) IV Push once  enoxaparin Injectable 40 milliGRAM(s) SubCutaneous every 12 hours  glucagon  Injectable 1 milliGRAM(s) IntraMuscular once  hydroxychloroquine 200 milliGRAM(s) Oral two times a day  insulin glargine Injectable (LANTUS) 20 Unit(s) SubCutaneous at bedtime  insulin lispro (ADMELOG) corrective regimen sliding scale   SubCutaneous three times a day before meals  insulin lispro (ADMELOG) corrective regimen sliding scale   SubCutaneous at bedtime  insulin lispro Injectable (ADMELOG) 6 Unit(s) SubCutaneous three times a day before meals  losartan 50 milliGRAM(s) Oral daily  nystatin Cream 1 Application(s) Topical two times a day  piperacillin/tazobactam IVPB.. 3.375 Gram(s) IV Intermittent every 8 hours  predniSONE   Tablet 5 milliGRAM(s) Oral daily  vancomycin  IVPB 1250 milliGRAM(s) IV Intermittent every 12 hours    MEDICATIONS  (PRN):  acetaminophen   Tablet .. 650 milliGRAM(s) Oral every 6 hours PRN Temp greater or equal to 38C (100.4F), Mild Pain (1 - 3), Moderate Pain (4 - 6)  melatonin 3 milliGRAM(s) Oral at bedtime PRN Insomnia    Allergies    No Known Allergies    Intolerances      REVIEW OF SYSTEMS:  All other systems reviewed and are negative    Vital Signs Last 24 Hrs  T(C): 36.4 (15 Oct 2021 05:16), Max: 37.1 (14 Oct 2021 14:15)  T(F): 97.5 (15 Oct 2021 05:16), Max: 98.8 (14 Oct 2021 14:15)  HR: 80 (15 Oct 2021 05:16) (71 - 81)  BP: 132/70 (15 Oct 2021 05:16) (102/66 - 132/70)  BP(mean): --  RR: 18 (15 Oct 2021 05:16) (18 - 18)  SpO2: 100% (15 Oct 2021 05:16) (98% - 100%)  Daily     Daily Weight in k.4 (15 Oct 2021 05:16)  I&O's Summary    14 Oct 2021 07:01  -  15 Oct 2021 07:00  --------------------------------------------------------  IN: 850 mL / OUT: 0 mL / NET: 850 mL      CAPILLARY BLOOD GLUCOSE      POCT Blood Glucose.: 141 mg/dL (15 Oct 2021 07:35)  POCT Blood Glucose.: 159 mg/dL (14 Oct 2021 21:40)  POCT Blood Glucose.: 214 mg/dL (14 Oct 2021 17:11)    PHYSICAL EXAM:  GENERAL: NAD,    HEAD:  Atraumatic, Normocephalic  EYES: EOMI, PERRLA, conjunctiva and sclera clear  ENMT: No tonsillar erythema, exudates, or enlargement; Moist mucous membranes, Good dentition, No lesions  NECK: Supple, No JVD, Normal thyroid  NERVOUS SYSTEM:  Alert & Oriented X3, Good concentration; Motor Strength 5/5 B/L upper and lower extremities; DTRs 2+ intact and symmetric  CHEST/LUNG: Clear to percussion bilaterally; No rales, rhonchi, wheezing, or rubs  HEART: Regular rate and rhythm; No murmurs, rubs, or gallops  ABDOMEN: Soft, Nontender, Nondistended; Bowel sounds present  EXTREMITIES:  2+ Peripheral Pulses, No clubbing, cyanosis, or edema, toe dressing cdi  LYMPH: No lymphadenopathy noted  SKIN: No rashes or lesions    Labs                          10.5   5.79  )-----------( 219      ( 15 Oct 2021 06:54 )             32.9     10-15    141  |  110<H>  |  13  ----------------------------<  123<H>  3.6   |  28  |  1.01    Ca    9.0      15 Oct 2021 06:54  Phos  4.8     10-15  Mg     2.0     10-15    TPro  7.1  /  Alb  2.2<L>  /  TBili  0.4  /  DBili  x   /  AST  17  /  ALT  10<L>  /  AlkPhos  78  10-15    PT/INR - ( 14 Oct 2021 11:09 )   PT: 11.1 sec;   INR: 0.95 ratio         PTT - ( 14 Oct 2021 11:09 )  PTT:29.4 sec      Urinalysis Basic - ( 14 Oct 2021 12:19 )    Color: Yellow / Appearance: Clear / S.015 / pH: x  Gluc: x / Ketone: Negative  / Bili: Negative / Urobili: Negative mg/dL   Blood: x / Protein: 30 mg/dL / Nitrite: Negative   Leuk Esterase: Small / RBC: 0-2 /HPF / WBC 3-5   Sq Epi: x / Non Sq Epi: Few / Bacteria: Few                  DVT prophylaxis: > Lovenox 40mg SQ daily  > Heparin   > SCD's

## 2021-10-15 NOTE — PROGRESS NOTE ADULT - ASSESSMENT
61 years old female with h/o HTN, DM2, SLE, obesity (BMI 39.2) present to ED with complain of worsening infection in her left 3rd toe. Patient reported some ulcer which started 1 months ago after getting her nail clipped. She was seen in [facility] 2 weeks ago and was given a course of oral Augmentin but patient only took it for 5 days as she has some itchiness/rash in her private area. Since then, her toe swelling/ulcer progressively worsen  No leukocytosis. Hb 11, Plt 242, lactate 3.1, Cr 1.05, glucose 219. Patient was given vanco/zosyn in ED. ED consulted podiatry. CXR image reviewed, no focal consolidation.     Admitted with left 3rd toe ulcer/infection-concerning for osteomyelitis

## 2021-10-15 NOTE — PROGRESS NOTE ADULT - SUBJECTIVE AND OBJECTIVE BOX
Podiatry pager #: [phone] ([place])/ [phone] ([facility])    Patient is a 61y old  Female who presents with a chief complaint of Infection of left 3rd toe in DM patient, concern for osteomyelitis (14 Oct 2021 21:10)       INTERVAL HPI/OVERNIGHT EVENTS:  Patient seen and evaluated at bedside.  Pt is resting comfortable in NAD. Denies N/V/F/C.     Allergies    No Known Allergies    Intolerances        Vital Signs Last 24 Hrs  T(C): 36.4 (15 Oct 2021 05:16), Max: 37.1 (14 Oct 2021 14:15)  T(F): 97.5 (15 Oct 2021 05:16), Max: 98.8 (14 Oct 2021 14:15)  HR: 80 (15 Oct 2021 05:16) (71 - 101)  BP: 132/70 (15 Oct 2021 05:16) (102/66 - 132/70)  BP(mean): --  RR: 18 (15 Oct 2021 05:16) (18 - 19)  SpO2: 100% (15 Oct 2021 05:16) (98% - 100%)    LABS:                        10.5   5.79  )-----------( 219      ( 15 Oct 2021 06:54 )             32.9     10-15    141  |  110<H>  |  13  ----------------------------<  123<H>  3.6   |  28  |  1.01    Ca    9.0      15 Oct 2021 06:54  Phos  4.8     10-15  Mg     2.0     10-15    TPro  7.1  /  Alb  2.2<L>  /  TBili  0.4  /  DBili  x   /  AST  17  /  ALT  10<L>  /  AlkPhos  78  10-15    PT/INR - ( 14 Oct 2021 11:09 )   PT: 11.1 sec;   INR: 0.95 ratio         PTT - ( 14 Oct 2021 11:09 )  PTT:29.4 sec  Urinalysis Basic - ( 14 Oct 2021 12:19 )    Color: Yellow / Appearance: Clear / S.015 / pH: x  Gluc: x / Ketone: Negative  / Bili: Negative / Urobili: Negative mg/dL   Blood: x / Protein: 30 mg/dL / Nitrite: Negative   Leuk Esterase: Small / RBC: 0-2 /HPF / WBC 3-5   Sq Epi: x / Non Sq Epi: Few / Bacteria: Few      CAPILLARY BLOOD GLUCOSE      POCT Blood Glucose.: 141 mg/dL (15 Oct 2021 07:35)  POCT Blood Glucose.: 159 mg/dL (14 Oct 2021 21:40)  POCT Blood Glucose.: 214 mg/dL (14 Oct 2021 17:11)  POCT Blood Glucose.: 215 mg/dL (14 Oct 2021 10:15)      Lower Extremity Physical Exam:  Vascular: DP/PT 2/4, B/L, CFT <3 seconds B/L, Temperature gradient warm to warm B/L.   Neuro: Epicritic sensation intact to the level of digits, B/L.  Musculoskeletal/Ortho: unremarkable  Skin: left foot 3rd distal tip necrosis with malodor, serous drainage, probe to bone, no undermining or tracking noted, fibronecrotic wound base.    RADIOLOGY & ADDITIONAL TESTS:

## 2021-10-15 NOTE — PATIENT PROFILE ADULT - NS PRO AD PATIENT TYPE
General Question     Subject: NEXT STEP ON R WRIST/ STATED THE INJ DID NOT HELP AS MUCH   Patient and /or Facility Request: PATIENT   Contact Number: [phone] Health Care Proxy (HCP)

## 2021-10-15 NOTE — PROGRESS NOTE ADULT - ASSESSMENT
61 F with left toe 3rd digit necrosis    - afebrile, no leukocytosis  - left foot xray: no gas, possible OM of 3rd DP   L foot MRI showing om distal phalanx 3rd toe  - pod plan for L foot partial 3rd ray resection Monday 10/18 to follow 1 pm case w/ Dr Zuniga  - please document medical clearance for surgery under local anesthesia and sedation  - discussed with attending 61 F with left toe 3rd digit necrosis  - pt seen and evaluated  - afebrile, no leukocytosis  - left foot xray: no gas, possible OM of 3rd DP   L foot MRI showing om distal phalanx 3rd toe  - pod plan for L foot partial 3rd ray resection Monday 10/18 to follow 1 pm case w/ Dr Zuniga  - please document medical clearance for surgery under local anesthesia and sedation  - discussed with attending

## 2021-10-16 DIAGNOSIS — I10 ESSENTIAL (PRIMARY) HYPERTENSION: ICD-10-CM

## 2021-10-16 LAB
GLUCOSE BLDC GLUCOMTR-MCNC: 142 MG/DL — HIGH (ref 70–99)
GLUCOSE BLDC GLUCOMTR-MCNC: 168 MG/DL — HIGH (ref 70–99)
GLUCOSE BLDC GLUCOMTR-MCNC: 241 MG/DL — HIGH (ref 70–99)

## 2021-10-16 PROCEDURE — 99233 SBSQ HOSP IP/OBS HIGH 50: CPT

## 2021-10-16 RX ADMIN — PIPERACILLIN AND TAZOBACTAM 25 GRAM(S): 4; .5 INJECTION, POWDER, LYOPHILIZED, FOR SOLUTION INTRAVENOUS at 05:19

## 2021-10-16 RX ADMIN — Medication 200 MILLIGRAM(S): at 17:26

## 2021-10-16 RX ADMIN — Medication 4: at 11:31

## 2021-10-16 RX ADMIN — Medication 6 UNIT(S): at 08:08

## 2021-10-16 RX ADMIN — Medication 166.67 MILLIGRAM(S): at 09:48

## 2021-10-16 RX ADMIN — NYSTATIN CREAM 1 APPLICATION(S): 100000 CREAM TOPICAL at 17:26

## 2021-10-16 RX ADMIN — ENOXAPARIN SODIUM 40 MILLIGRAM(S): 100 INJECTION SUBCUTANEOUS at 17:26

## 2021-10-16 RX ADMIN — PIPERACILLIN AND TAZOBACTAM 25 GRAM(S): 4; .5 INJECTION, POWDER, LYOPHILIZED, FOR SOLUTION INTRAVENOUS at 21:18

## 2021-10-16 RX ADMIN — PIPERACILLIN AND TAZOBACTAM 25 GRAM(S): 4; .5 INJECTION, POWDER, LYOPHILIZED, FOR SOLUTION INTRAVENOUS at 13:00

## 2021-10-16 RX ADMIN — ENOXAPARIN SODIUM 40 MILLIGRAM(S): 100 INJECTION SUBCUTANEOUS at 05:19

## 2021-10-16 RX ADMIN — Medication 200 MILLIGRAM(S): at 05:19

## 2021-10-16 RX ADMIN — INSULIN GLARGINE 20 UNIT(S): 100 INJECTION, SOLUTION SUBCUTANEOUS at 21:19

## 2021-10-16 RX ADMIN — Medication 6 UNIT(S): at 11:32

## 2021-10-16 RX ADMIN — Medication 2: at 08:08

## 2021-10-16 RX ADMIN — Medication 2: at 16:09

## 2021-10-16 RX ADMIN — Medication 5 MILLIGRAM(S): at 05:19

## 2021-10-16 RX ADMIN — Medication 6 UNIT(S): at 16:09

## 2021-10-16 RX ADMIN — Medication 166.67 MILLIGRAM(S): at 21:19

## 2021-10-16 RX ADMIN — LOSARTAN POTASSIUM 50 MILLIGRAM(S): 100 TABLET, FILM COATED ORAL at 05:19

## 2021-10-16 RX ADMIN — NYSTATIN CREAM 1 APPLICATION(S): 100000 CREAM TOPICAL at 05:21

## 2021-10-16 NOTE — PROGRESS NOTE ADULT - SUBJECTIVE AND OBJECTIVE BOX
Patient is a 61y old  Female who presents with a chief complaint of Infection of left 3rd toe in DM patient, concern for osteomyelitis (15 Oct 2021 11:11)    Today patient denies any foot pain, chest pain, sob or nausea / vomiting.    SUBJECTIVE & OBJECTIVE: Pt seen and examined at bedside.   PHYSICAL EXAM:  Vital Signs Last 24 Hrs  T(C): 36.8 (16 Oct 2021 17:00), Max: 36.8 (15 Oct 2021 18:00)  T(F): 98.2 (16 Oct 2021 17:00), Max: 98.3 (15 Oct 2021 18:00)  HR: 80 (16 Oct 2021 17:00) (68 - 81)  BP: 113/73 (16 Oct 2021 17:00) (110/64 - 151/75)  BP(mean): --  RR: 16 (16 Oct 2021 17:00) (15 - 18)  SpO2: 99% (16 Oct 2021 17:00) (95% - 100%)   Daily     Daily Weight in k.4 (16 Oct 2021 05:27)I&O's Detail    15 Oct 2021 07:01  -  16 Oct 2021 07:00  --------------------------------------------------------  IN:    IV PiggyBack: 350 mL    Oral Fluid: 500 mL  Total IN: 850 mL    OUT:  Total OUT: 0 mL    Total NET: 850 mL      Patient is in bed, calm, comfortable, pleasant, alert and oriented, watching videos on tablets.  GENERAL: NAD, well-groomed, well-developed  HEAD:  Atraumatic, Normocephalic  EYES: EOMI, PERRLA, conjunctiva and sclera clear  ENMT: Moist mucous membranes  NECK: Supple, No JVD  NERVOUS SYSTEM:  Alert & Oriented X3, Motor Strength 5/5 B/L upper and lower extremities; DTRs 2+ intact and symmetric  CHEST/LUNG: Clear to auscultation bilaterally; No rales, rhonchi, wheezing, or rubs  HEART: Regular rate and rhythm; No murmurs, rubs, or gallops  ABDOMEN: Soft, Nontender, Nondistended; Bowel sounds present  EXTREMITIES:  2+ Peripheral Pulses, No clubbing, cyanosis, or edema  LABS:                        10.5   5.79  )-----------( 219      ( 15 Oct 2021 06:54 )             32.9   CAPILLARY BLOOD GLUCOSE      POCT Blood Glucose.: 168 mg/dL (16 Oct 2021 16:04)  POCT Blood Glucose.: 241 mg/dL (16 Oct 2021 11:20)  POCT Blood Glucose.: 191 mg/dL (16 Oct 2021 08:01)  POCT Blood Glucose.: 169 mg/dL (15 Oct 2021 20:44)      Culture - Abscess with Gram Stain (collected 15 Oct 2021 09:18)  Source: .Abscess left foot  Preliminary Report (16 Oct 2021 11:22):    Moderate Escherichia coli    Numerous Morganella morganii    Few Streptococcus agalactiae (Group B) isolated    Group B streptococci are susceptible to ampicillin,    penicillin and cefazolin, but may be resistant to    erythromycin and clindamycin.    Recommendations for intrapartum prophylaxis for Group B    streptococci are penicillin or ampicillin.    Culture - Urine (collected 14 Oct 2021 15:18)  Source: Clean Catch Clean Catch (Midstream)  Final Report (15 Oct 2021 12:05):    <10,000 CFU/mL Normal Urogenital Jeannine    Culture - Blood (collected 14 Oct 2021 14:52)  Source: .Blood Blood-Peripheral  Preliminary Report (15 Oct 2021 15:01):    No growth to date.    Culture - Blood (collected 14 Oct 2021 14:52)  Source: .Blood Blood-Peripheral  Preliminary Report (15 Oct 2021 15:01):    No growth to date.  < from: MR Foot w/wo IV Cont, Left (10.14.21 @ 17:11) >  IMPRESSION:  1.  Osteomyelitis of the third digit distal phalanx with bony destruction and/or pathological fracture.  2.  No soft tissue abscess.    MEDICATIONS  (STANDING):  dextrose 40% Gel 15 Gram(s) Oral once  dextrose 5%. 1000 milliLiter(s) (50 mL/Hr) IV Continuous <Continuous>  dextrose 5%. 1000 milliLiter(s) (100 mL/Hr) IV Continuous <Continuous>  dextrose 50% Injectable 25 Gram(s) IV Push once  dextrose 50% Injectable 12.5 Gram(s) IV Push once  dextrose 50% Injectable 25 Gram(s) IV Push once  enoxaparin Injectable 40 milliGRAM(s) SubCutaneous every 12 hours  glucagon  Injectable 1 milliGRAM(s) IntraMuscular once  hydroxychloroquine 200 milliGRAM(s) Oral two times a day  insulin glargine Injectable (LANTUS) 20 Unit(s) SubCutaneous at bedtime  insulin lispro (ADMELOG) corrective regimen sliding scale   SubCutaneous three times a day before meals  insulin lispro (ADMELOG) corrective regimen sliding scale   SubCutaneous at bedtime  insulin lispro Injectable (ADMELOG) 6 Unit(s) SubCutaneous three times a day before meals  losartan 50 milliGRAM(s) Oral daily  nystatin Powder 1 Application(s) Topical two times a day  piperacillin/tazobactam IVPB.. 3.375 Gram(s) IV Intermittent every 8 hours  predniSONE   Tablet 5 milliGRAM(s) Oral daily  vancomycin  IVPB 1250 milliGRAM(s) IV Intermittent every 12 hours    MEDICATIONS  (PRN):  acetaminophen   Tablet .. 650 milliGRAM(s) Oral every 6 hours PRN Temp greater or equal to 38C (100.4F), Mild Pain (1 - 3), Moderate Pain (4 - 6)  melatonin 3 milliGRAM(s) Oral at bedtime PRN Insomnia

## 2021-10-17 ENCOUNTER — TRANSCRIPTION ENCOUNTER (OUTPATIENT)
Age: 61
End: 2021-10-17

## 2021-10-17 LAB
-  AMIKACIN: SIGNIFICANT CHANGE UP
-  AMIKACIN: SIGNIFICANT CHANGE UP
-  AMOXICILLIN/CLAVULANIC ACID: SIGNIFICANT CHANGE UP
-  AMOXICILLIN/CLAVULANIC ACID: SIGNIFICANT CHANGE UP
-  AMPICILLIN/SULBACTAM: SIGNIFICANT CHANGE UP
-  AMPICILLIN/SULBACTAM: SIGNIFICANT CHANGE UP
-  AMPICILLIN: SIGNIFICANT CHANGE UP
-  AMPICILLIN: SIGNIFICANT CHANGE UP
-  AZTREONAM: SIGNIFICANT CHANGE UP
-  AZTREONAM: SIGNIFICANT CHANGE UP
-  CEFAZOLIN: SIGNIFICANT CHANGE UP
-  CEFAZOLIN: SIGNIFICANT CHANGE UP
-  CEFEPIME: SIGNIFICANT CHANGE UP
-  CEFEPIME: SIGNIFICANT CHANGE UP
-  CEFOXITIN: SIGNIFICANT CHANGE UP
-  CEFOXITIN: SIGNIFICANT CHANGE UP
-  CEFTAZIDIME/AVIBACTAM: SIGNIFICANT CHANGE UP
-  CEFTOLOZANE/TAZOBACTAM: SIGNIFICANT CHANGE UP
-  CEFTRIAXONE: SIGNIFICANT CHANGE UP
-  CEFTRIAXONE: SIGNIFICANT CHANGE UP
-  CIPROFLOXACIN: SIGNIFICANT CHANGE UP
-  CIPROFLOXACIN: SIGNIFICANT CHANGE UP
-  ERTAPENEM: SIGNIFICANT CHANGE UP
-  ERTAPENEM: SIGNIFICANT CHANGE UP
-  GENTAMICIN: SIGNIFICANT CHANGE UP
-  GENTAMICIN: SIGNIFICANT CHANGE UP
-  IMIPENEM: SIGNIFICANT CHANGE UP
-  IMIPENEM: SIGNIFICANT CHANGE UP
-  LEVOFLOXACIN: SIGNIFICANT CHANGE UP
-  LEVOFLOXACIN: SIGNIFICANT CHANGE UP
-  MEROPENEM: SIGNIFICANT CHANGE UP
-  MEROPENEM: SIGNIFICANT CHANGE UP
-  PIPERACILLIN/TAZOBACTAM: SIGNIFICANT CHANGE UP
-  PIPERACILLIN/TAZOBACTAM: SIGNIFICANT CHANGE UP
-  TOBRAMYCIN: SIGNIFICANT CHANGE UP
-  TOBRAMYCIN: SIGNIFICANT CHANGE UP
-  TRIMETHOPRIM/SULFAMETHOXAZOLE: SIGNIFICANT CHANGE UP
-  TRIMETHOPRIM/SULFAMETHOXAZOLE: SIGNIFICANT CHANGE UP
FLUAV AG NPH QL: SIGNIFICANT CHANGE UP
FLUBV AG NPH QL: SIGNIFICANT CHANGE UP
GLUCOSE BLDC GLUCOMTR-MCNC: 100 MG/DL — HIGH (ref 70–99)
GLUCOSE BLDC GLUCOMTR-MCNC: 104 MG/DL — HIGH (ref 70–99)
GLUCOSE BLDC GLUCOMTR-MCNC: 164 MG/DL — HIGH (ref 70–99)
GLUCOSE BLDC GLUCOMTR-MCNC: 319 MG/DL — HIGH (ref 70–99)
INR BLD: 1.04 RATIO — SIGNIFICANT CHANGE UP (ref 0.88–1.16)
METHOD TYPE: SIGNIFICANT CHANGE UP
METHOD TYPE: SIGNIFICANT CHANGE UP
PROTHROM AB SERPL-ACNC: 12 SEC — SIGNIFICANT CHANGE UP (ref 10.6–13.6)
SARS-COV-2 RNA SPEC QL NAA+PROBE: SIGNIFICANT CHANGE UP

## 2021-10-17 PROCEDURE — 99233 SBSQ HOSP IP/OBS HIGH 50: CPT

## 2021-10-17 RX ORDER — SACCHAROMYCES BOULARDII 250 MG
250 POWDER IN PACKET (EA) ORAL
Refills: 0 | Status: DISCONTINUED | OUTPATIENT
Start: 2021-10-17 | End: 2021-10-18

## 2021-10-17 RX ADMIN — Medication 5 MILLIGRAM(S): at 05:22

## 2021-10-17 RX ADMIN — Medication 6 UNIT(S): at 11:37

## 2021-10-17 RX ADMIN — PIPERACILLIN AND TAZOBACTAM 25 GRAM(S): 4; .5 INJECTION, POWDER, LYOPHILIZED, FOR SOLUTION INTRAVENOUS at 05:18

## 2021-10-17 RX ADMIN — Medication 250 MILLIGRAM(S): at 21:34

## 2021-10-17 RX ADMIN — Medication 6 UNIT(S): at 07:33

## 2021-10-17 RX ADMIN — Medication 8: at 11:37

## 2021-10-17 RX ADMIN — Medication 6 UNIT(S): at 16:11

## 2021-10-17 RX ADMIN — Medication 166.67 MILLIGRAM(S): at 10:32

## 2021-10-17 RX ADMIN — PIPERACILLIN AND TAZOBACTAM 25 GRAM(S): 4; .5 INJECTION, POWDER, LYOPHILIZED, FOR SOLUTION INTRAVENOUS at 21:33

## 2021-10-17 RX ADMIN — Medication 200 MILLIGRAM(S): at 17:13

## 2021-10-17 RX ADMIN — NYSTATIN CREAM 1 APPLICATION(S): 100000 CREAM TOPICAL at 17:13

## 2021-10-17 RX ADMIN — PIPERACILLIN AND TAZOBACTAM 25 GRAM(S): 4; .5 INJECTION, POWDER, LYOPHILIZED, FOR SOLUTION INTRAVENOUS at 14:12

## 2021-10-17 RX ADMIN — LOSARTAN POTASSIUM 50 MILLIGRAM(S): 100 TABLET, FILM COATED ORAL at 05:22

## 2021-10-17 RX ADMIN — Medication 166.67 MILLIGRAM(S): at 21:33

## 2021-10-17 RX ADMIN — Medication 2: at 07:32

## 2021-10-17 RX ADMIN — Medication 200 MILLIGRAM(S): at 05:22

## 2021-10-17 RX ADMIN — NYSTATIN CREAM 1 APPLICATION(S): 100000 CREAM TOPICAL at 05:21

## 2021-10-17 RX ADMIN — Medication 250 MILLIGRAM(S): at 11:34

## 2021-10-17 RX ADMIN — ENOXAPARIN SODIUM 40 MILLIGRAM(S): 100 INJECTION SUBCUTANEOUS at 17:13

## 2021-10-17 RX ADMIN — ENOXAPARIN SODIUM 40 MILLIGRAM(S): 100 INJECTION SUBCUTANEOUS at 05:22

## 2021-10-17 NOTE — PROGRESS NOTE ADULT - ASSESSMENT
61 years old female with h/o HTN, DM2, SLE, obesity (BMI 39.2) present to ED with complain of worsening infection in her left 3rd toe, was admitted for osteomyelitis for the left third toe.  Patient states that she had updated her family member of her care and there was no need for me to  update anyone at home.

## 2021-10-17 NOTE — PROGRESS NOTE ADULT - ASSESSMENT
61 F with left toe 3rd digit necrosis  - pt seen and evaluated  - afebrile, no leukocytosis  - left foot xray: no gas, possible OM of 3rd DP   L foot MRI showing om distal phalanx 3rd toe  - pod plan for L foot partial 3rd ray resection Monday 10/18 to follow 1 pm case w/ Dr Zuinga  - medical clearance documented and appreciated  - Please repeat COVID swab prior to procedure   - discussed with attending

## 2021-10-17 NOTE — PROGRESS NOTE ADULT - SUBJECTIVE AND OBJECTIVE BOX
Podiatry pager #: [phone] ([place])/ [phone] ([facility])    Patient is a 61y old  Female who presents with a chief complaint of Infection of left 3rd toe in DM patient, concern for osteomyelitis (16 Oct 2021 17:26)       INTERVAL HPI/OVERNIGHT EVENTS:  Patient seen and evaluated at bedside.  Pt is resting comfortable in NAD. Denies N/V/F/C.     Allergies    No Known Allergies    Intolerances        Vital Signs Last 24 Hrs  T(C): 36.3 (17 Oct 2021 05:07), Max: 36.8 (16 Oct 2021 17:00)  T(F): 97.4 (17 Oct 2021 05:07), Max: 98.2 (16 Oct 2021 17:00)  HR: 87 (17 Oct 2021 05:07) (74 - 87)  BP: 117/76 (17 Oct 2021 05:07) (113/73 - 122/73)  BP(mean): --  RR: 18 (17 Oct 2021 05:07) (15 - 18)  SpO2: 98% (17 Oct 2021 05:07) (98% - 100%)    LABS:              CAPILLARY BLOOD GLUCOSE      POCT Blood Glucose.: 164 mg/dL (17 Oct 2021 07:22)  POCT Blood Glucose.: 142 mg/dL (16 Oct 2021 21:16)  POCT Blood Glucose.: 168 mg/dL (16 Oct 2021 16:04)  POCT Blood Glucose.: 241 mg/dL (16 Oct 2021 11:20)      Lower Extremity Physical Exam:  Vascular: DP/PT 2/4, B/L, CFT <3 seconds B/L, Temperature gradient warm to warm B/L.   Neuro: Epicritic sensation intact to the level of digits, B/L.  Musculoskeletal/Ortho: unremarkable  Skin: left foot 3rd distal tip necrosis with malodor, serous drainage, probe to bone, no undermining or tracking noted, fibronecrotic wound base.    RADIOLOGY & ADDITIONAL TESTS:

## 2021-10-17 NOTE — PROGRESS NOTE ADULT - SUBJECTIVE AND OBJECTIVE BOX
Patient is a 61y old  Female who presents with a chief complaint of Infection of left 3rd toe in DM patient, concern for osteomyelitis (17 Oct 2021 09:02)    Today patient denies any chest pain, sob, abdominal pain, nausea, vomiting, headache, blurry vision or dizziness.  Denies fever, chills, rigors. State that tolerating diet well and had 2 BM this am.     SUBJECTIVE & OBJECTIVE: Pt seen and examined at bedside.   PHYSICAL EXAM:  Vital Signs Last 24 Hrs  T(C): 36.3 (17 Oct 2021 05:07), Max: 36.8 (16 Oct 2021 17:00)  T(F): 97.4 (17 Oct 2021 05:07), Max: 98.2 (16 Oct 2021 17:00)  HR: 87 (17 Oct 2021 05:07) (74 - 87)  BP: 117/76 (17 Oct 2021 05:07) (113/73 - 122/73)  BP(mean): --  RR: 18 (17 Oct 2021 05:07) (15 - 18)  SpO2: 98% (17 Oct 2021 05:07) (98% - 100%)   Daily     Daily Weight in k.3 (17 Oct 2021 05:07)I&O's Detail    17 Oct 2021 07:01  -  17 Oct 2021 10:05  --------------------------------------------------------  IN:    IV PiggyBack: 450 mL    Oral Fluid: 120 mL  Total IN: 570 mL  OUT:  Total OUT: 0 mL  Total NET: 570 mL    Patient is lying in bed, speaking on the phone.  Patient is comfortable, calm and pleasant.  GENERAL: NAD, well-groomed, well-developed  HEAD:  Atraumatic, Normocephalic  EYES: EOMI, PERRLA, conjunctiva and sclera clear  ENMT: Moist mucous membranes  NECK: Supple, No JVD  NERVOUS SYSTEM:  Alert & Oriented X3, Motor Strength 5/5 B/L upper and lower extremities; DTRs 2+ intact and symmetric  CHEST/LUNG: Clear to auscultation bilaterally; No rales, rhonchi, wheezing, or rubs  HEART: Regular rate and rhythm; No murmurs, rubs, or gallops  ABDOMEN: Soft, Nontender, Nondistended; Bowel sounds present  EXTREMITIES:  2+ Peripheral Pulses, No clubbing, cyanosis, or edema  Left toe wrapped in dressing, clean, intact and dry.  LABS:  CAPILLARY BLOOD GLUCOSE  POCT Blood Glucose.: 164 mg/dL (17 Oct 2021 07:22)  POCT Blood Glucose.: 142 mg/dL (16 Oct 2021 21:16)  POCT Blood Glucose.: 168 mg/dL (16 Oct 2021 16:04)  POCT Blood Glucose.: 241 mg/dL (16 Oct 2021 11:20)      Culture - Abscess with Gram Stain (collected 15 Oct 2021 09:18)  Source: .Abscess left foot  Preliminary Report (16 Oct 2021 11:22):    Moderate Escherichia coli    Numerous Morganella morganii    Few Streptococcus agalactiae (Group B) isolated    Group B streptococci are susceptible to ampicillin,    penicillin and cefazolin, but may be resistant to    erythromycin and clindamycin.    Recommendations for intrapartum prophylaxis for Group B    streptococci are penicillin or ampicillin.    Culture - Urine (collected 14 Oct 2021 15:18)  Source: Clean Catch Clean Catch (Midstream)  Final Report (15 Oct 2021 12:05):    <10,000 CFU/mL Normal Urogenital Jeannine    Culture - Blood (collected 14 Oct 2021 14:52)  Source: .Blood Blood-Peripheral  Preliminary Report (15 Oct 2021 15:01):    No growth to date.    Culture - Blood (collected 14 Oct 2021 14:52)  Source: .Blood Blood-Peripheral  Preliminary Report (15 Oct 2021 15:01):    No growth to date.    RADIOLOGY & ADDITIONAL TESTS:  < from: MR Foot w/wo IV Cont, Left (10.14.21 @ 17:11) >  IMPRESSION:  1.  Osteomyelitis of the third digit distal phalanx with bony destruction and/or pathological fracture.  2.  No soft tissue abscess.    MEDICATIONS  (STANDING):  dextrose 40% Gel 15 Gram(s) Oral once  dextrose 5%. 1000 milliLiter(s) (50 mL/Hr) IV Continuous <Continuous>  dextrose 5%. 1000 milliLiter(s) (100 mL/Hr) IV Continuous <Continuous>  dextrose 50% Injectable 25 Gram(s) IV Push once  dextrose 50% Injectable 12.5 Gram(s) IV Push once  dextrose 50% Injectable 25 Gram(s) IV Push once  enoxaparin Injectable 40 milliGRAM(s) SubCutaneous every 12 hours  glucagon  Injectable 1 milliGRAM(s) IntraMuscular once  hydroxychloroquine 200 milliGRAM(s) Oral two times a day  insulin glargine Injectable (LANTUS) 20 Unit(s) SubCutaneous at bedtime  insulin lispro (ADMELOG) corrective regimen sliding scale   SubCutaneous three times a day before meals  insulin lispro (ADMELOG) corrective regimen sliding scale   SubCutaneous at bedtime  insulin lispro Injectable (ADMELOG) 6 Unit(s) SubCutaneous three times a day before meals  losartan 50 milliGRAM(s) Oral daily  nystatin Powder 1 Application(s) Topical two times a day  piperacillin/tazobactam IVPB.. 3.375 Gram(s) IV Intermittent every 8 hours  predniSONE   Tablet 5 milliGRAM(s) Oral daily  vancomycin  IVPB 1250 milliGRAM(s) IV Intermittent every 12 hours    MEDICATIONS  (PRN):  acetaminophen   Tablet .. 650 milliGRAM(s) Oral every 6 hours PRN Temp greater or equal to 38C (100.4F), Mild Pain (1 - 3), Moderate Pain (4 - 6)  melatonin 3 milliGRAM(s) Oral at bedtime PRN Insomnia

## 2021-10-18 ENCOUNTER — TRANSCRIPTION ENCOUNTER (OUTPATIENT)
Age: 61
End: 2021-10-18

## 2021-10-18 LAB
ANION GAP SERPL CALC-SCNC: 5 MMOL/L — SIGNIFICANT CHANGE UP (ref 5–17)
APTT BLD: 32.8 SEC — SIGNIFICANT CHANGE UP (ref 27.5–35.5)
BUN SERPL-MCNC: 12 MG/DL — SIGNIFICANT CHANGE UP (ref 7–23)
CALCIUM SERPL-MCNC: 9.4 MG/DL — SIGNIFICANT CHANGE UP (ref 8.5–10.1)
CHLORIDE SERPL-SCNC: 108 MMOL/L — SIGNIFICANT CHANGE UP (ref 96–108)
CO2 SERPL-SCNC: 28 MMOL/L — SIGNIFICANT CHANGE UP (ref 22–31)
CREAT SERPL-MCNC: 1.17 MG/DL — SIGNIFICANT CHANGE UP (ref 0.5–1.3)
GLUCOSE BLDC GLUCOMTR-MCNC: 185 MG/DL — HIGH (ref 70–99)
GLUCOSE BLDC GLUCOMTR-MCNC: 189 MG/DL — HIGH (ref 70–99)
GLUCOSE BLDC GLUCOMTR-MCNC: 193 MG/DL — HIGH (ref 70–99)
GLUCOSE BLDC GLUCOMTR-MCNC: 194 MG/DL — HIGH (ref 70–99)
GLUCOSE SERPL-MCNC: 185 MG/DL — HIGH (ref 70–99)
HCG SERPL-ACNC: 2 MIU/ML — SIGNIFICANT CHANGE UP
HCT VFR BLD CALC: 32.5 % — LOW (ref 34.5–45)
HGB BLD-MCNC: 10.4 G/DL — LOW (ref 11.5–15.5)
MCHC RBC-ENTMCNC: 25.9 PG — LOW (ref 27–34)
MCHC RBC-ENTMCNC: 32 GM/DL — SIGNIFICANT CHANGE UP (ref 32–36)
MCV RBC AUTO: 81 FL — SIGNIFICANT CHANGE UP (ref 80–100)
NRBC # BLD: 0 /100 WBCS — SIGNIFICANT CHANGE UP (ref 0–0)
PLATELET # BLD AUTO: 240 K/UL — SIGNIFICANT CHANGE UP (ref 150–400)
POTASSIUM SERPL-MCNC: 3.9 MMOL/L — SIGNIFICANT CHANGE UP (ref 3.5–5.3)
POTASSIUM SERPL-SCNC: 3.9 MMOL/L — SIGNIFICANT CHANGE UP (ref 3.5–5.3)
RBC # BLD: 4.01 M/UL — SIGNIFICANT CHANGE UP (ref 3.8–5.2)
RBC # FLD: 14.1 % — SIGNIFICANT CHANGE UP (ref 10.3–14.5)
SODIUM SERPL-SCNC: 141 MMOL/L — SIGNIFICANT CHANGE UP (ref 135–145)
WBC # BLD: 6.55 K/UL — SIGNIFICANT CHANGE UP (ref 3.8–10.5)
WBC # FLD AUTO: 6.55 K/UL — SIGNIFICANT CHANGE UP (ref 3.8–10.5)

## 2021-10-18 PROCEDURE — 99223 1ST HOSP IP/OBS HIGH 75: CPT

## 2021-10-18 PROCEDURE — 99233 SBSQ HOSP IP/OBS HIGH 50: CPT

## 2021-10-18 PROCEDURE — 88309 TISSUE EXAM BY PATHOLOGIST: CPT | Mod: 26

## 2021-10-18 PROCEDURE — 88304 TISSUE EXAM BY PATHOLOGIST: CPT | Mod: 26

## 2021-10-18 PROCEDURE — 88311 DECALCIFY TISSUE: CPT | Mod: 26

## 2021-10-18 RX ORDER — PIPERACILLIN AND TAZOBACTAM 4; .5 G/20ML; G/20ML
3.38 INJECTION, POWDER, LYOPHILIZED, FOR SOLUTION INTRAVENOUS EVERY 8 HOURS
Refills: 0 | Status: DISCONTINUED | OUTPATIENT
Start: 2021-10-18 | End: 2021-10-22

## 2021-10-18 RX ORDER — INSULIN LISPRO 100/ML
VIAL (ML) SUBCUTANEOUS AT BEDTIME
Refills: 0 | Status: DISCONTINUED | OUTPATIENT
Start: 2021-10-18 | End: 2021-10-18

## 2021-10-18 RX ORDER — INSULIN LISPRO 100/ML
6 VIAL (ML) SUBCUTANEOUS
Refills: 0 | Status: DISCONTINUED | OUTPATIENT
Start: 2021-10-18 | End: 2021-10-18

## 2021-10-18 RX ORDER — ENOXAPARIN SODIUM 100 MG/ML
40 INJECTION SUBCUTANEOUS EVERY 12 HOURS
Refills: 0 | Status: DISCONTINUED | OUTPATIENT
Start: 2021-10-18 | End: 2021-10-20

## 2021-10-18 RX ORDER — DEXTROSE 50 % IN WATER 50 %
15 SYRINGE (ML) INTRAVENOUS ONCE
Refills: 0 | Status: DISCONTINUED | OUTPATIENT
Start: 2021-10-18 | End: 2021-10-24

## 2021-10-18 RX ORDER — SACCHAROMYCES BOULARDII 250 MG
250 POWDER IN PACKET (EA) ORAL
Refills: 0 | Status: DISCONTINUED | OUTPATIENT
Start: 2021-10-18 | End: 2021-10-24

## 2021-10-18 RX ORDER — INSULIN GLARGINE 100 [IU]/ML
20 INJECTION, SOLUTION SUBCUTANEOUS AT BEDTIME
Refills: 0 | Status: DISCONTINUED | OUTPATIENT
Start: 2021-10-18 | End: 2021-10-24

## 2021-10-18 RX ORDER — ENOXAPARIN SODIUM 100 MG/ML
40 INJECTION SUBCUTANEOUS EVERY 12 HOURS
Refills: 0 | Status: DISCONTINUED | OUTPATIENT
Start: 2021-10-18 | End: 2021-10-18

## 2021-10-18 RX ORDER — DEXTROSE 50 % IN WATER 50 %
25 SYRINGE (ML) INTRAVENOUS ONCE
Refills: 0 | Status: DISCONTINUED | OUTPATIENT
Start: 2021-10-18 | End: 2021-10-24

## 2021-10-18 RX ORDER — INSULIN LISPRO 100/ML
6 VIAL (ML) SUBCUTANEOUS
Refills: 0 | Status: DISCONTINUED | OUTPATIENT
Start: 2021-10-18 | End: 2021-10-24

## 2021-10-18 RX ORDER — SODIUM CHLORIDE 9 MG/ML
1000 INJECTION, SOLUTION INTRAVENOUS
Refills: 0 | Status: DISCONTINUED | OUTPATIENT
Start: 2021-10-18 | End: 2021-10-18

## 2021-10-18 RX ORDER — GLUCAGON INJECTION, SOLUTION 0.5 MG/.1ML
1 INJECTION, SOLUTION SUBCUTANEOUS ONCE
Refills: 0 | Status: DISCONTINUED | OUTPATIENT
Start: 2021-10-18 | End: 2021-10-24

## 2021-10-18 RX ORDER — INSULIN LISPRO 100/ML
VIAL (ML) SUBCUTANEOUS
Refills: 0 | Status: DISCONTINUED | OUTPATIENT
Start: 2021-10-18 | End: 2021-10-24

## 2021-10-18 RX ORDER — SODIUM CHLORIDE 9 MG/ML
1000 INJECTION, SOLUTION INTRAVENOUS
Refills: 0 | Status: DISCONTINUED | OUTPATIENT
Start: 2021-10-18 | End: 2021-10-24

## 2021-10-18 RX ORDER — DEXTROSE 50 % IN WATER 50 %
12.5 SYRINGE (ML) INTRAVENOUS ONCE
Refills: 0 | Status: DISCONTINUED | OUTPATIENT
Start: 2021-10-18 | End: 2021-10-24

## 2021-10-18 RX ORDER — INSULIN GLARGINE 100 [IU]/ML
20 INJECTION, SOLUTION SUBCUTANEOUS AT BEDTIME
Refills: 0 | Status: DISCONTINUED | OUTPATIENT
Start: 2021-10-18 | End: 2021-10-18

## 2021-10-18 RX ORDER — INSULIN LISPRO 100/ML
VIAL (ML) SUBCUTANEOUS AT BEDTIME
Refills: 0 | Status: DISCONTINUED | OUTPATIENT
Start: 2021-10-18 | End: 2021-10-24

## 2021-10-18 RX ORDER — HYDROMORPHONE HYDROCHLORIDE 2 MG/ML
1 INJECTION INTRAMUSCULAR; INTRAVENOUS; SUBCUTANEOUS ONCE
Refills: 0 | Status: DISCONTINUED | OUTPATIENT
Start: 2021-10-18 | End: 2021-10-18

## 2021-10-18 RX ORDER — INSULIN LISPRO 100/ML
VIAL (ML) SUBCUTANEOUS
Refills: 0 | Status: DISCONTINUED | OUTPATIENT
Start: 2021-10-18 | End: 2021-10-18

## 2021-10-18 RX ORDER — LOSARTAN POTASSIUM 100 MG/1
50 TABLET, FILM COATED ORAL DAILY
Refills: 0 | Status: DISCONTINUED | OUTPATIENT
Start: 2021-10-18 | End: 2021-10-24

## 2021-10-18 RX ORDER — LANOLIN ALCOHOL/MO/W.PET/CERES
3 CREAM (GRAM) TOPICAL AT BEDTIME
Refills: 0 | Status: DISCONTINUED | OUTPATIENT
Start: 2021-10-18 | End: 2021-10-24

## 2021-10-18 RX ORDER — ACETAMINOPHEN 500 MG
1000 TABLET ORAL ONCE
Refills: 0 | Status: DISCONTINUED | OUTPATIENT
Start: 2021-10-18 | End: 2021-10-18

## 2021-10-18 RX ORDER — HYDROXYCHLOROQUINE SULFATE 200 MG
200 TABLET ORAL
Refills: 0 | Status: DISCONTINUED | OUTPATIENT
Start: 2021-10-18 | End: 2021-10-24

## 2021-10-18 RX ADMIN — Medication 5 MILLIGRAM(S): at 05:14

## 2021-10-18 RX ADMIN — ENOXAPARIN SODIUM 40 MILLIGRAM(S): 100 INJECTION SUBCUTANEOUS at 17:29

## 2021-10-18 RX ADMIN — LOSARTAN POTASSIUM 50 MILLIGRAM(S): 100 TABLET, FILM COATED ORAL at 17:29

## 2021-10-18 RX ADMIN — Medication 2: at 08:05

## 2021-10-18 RX ADMIN — Medication 200 MILLIGRAM(S): at 05:14

## 2021-10-18 RX ADMIN — Medication 6 UNIT(S): at 17:29

## 2021-10-18 RX ADMIN — ENOXAPARIN SODIUM 40 MILLIGRAM(S): 100 INJECTION SUBCUTANEOUS at 05:13

## 2021-10-18 RX ADMIN — Medication 2: at 17:29

## 2021-10-18 RX ADMIN — INSULIN GLARGINE 20 UNIT(S): 100 INJECTION, SOLUTION SUBCUTANEOUS at 21:51

## 2021-10-18 RX ADMIN — Medication 250 MILLIGRAM(S): at 05:14

## 2021-10-18 RX ADMIN — Medication 3 MILLIGRAM(S): at 21:52

## 2021-10-18 RX ADMIN — PIPERACILLIN AND TAZOBACTAM 25 GRAM(S): 4; .5 INJECTION, POWDER, LYOPHILIZED, FOR SOLUTION INTRAVENOUS at 05:13

## 2021-10-18 RX ADMIN — NYSTATIN CREAM 1 APPLICATION(S): 100000 CREAM TOPICAL at 05:18

## 2021-10-18 RX ADMIN — PIPERACILLIN AND TAZOBACTAM 25 GRAM(S): 4; .5 INJECTION, POWDER, LYOPHILIZED, FOR SOLUTION INTRAVENOUS at 17:29

## 2021-10-18 RX ADMIN — LOSARTAN POTASSIUM 50 MILLIGRAM(S): 100 TABLET, FILM COATED ORAL at 05:14

## 2021-10-18 RX ADMIN — Medication 166.67 MILLIGRAM(S): at 10:28

## 2021-10-18 RX ADMIN — Medication 2: at 11:16

## 2021-10-18 RX ADMIN — Medication 200 MILLIGRAM(S): at 17:29

## 2021-10-18 RX ADMIN — Medication 250 MILLIGRAM(S): at 17:29

## 2021-10-18 NOTE — DISCHARGE NOTE PROVIDER - CARE PROVIDER_API CALL
Vaughn Nava  INTERNAL MEDICINE  [address] [facility], [address] [place] [id number]  Phone: [phone]  Fax: [phone]  Follow Up Time: 1 week    Laurent Cleary  INTERNAL MEDICINE  [address] [place] [id number]  Phone: [phone]  Fax: [phone]  Follow Up Time: 1 week

## 2021-10-18 NOTE — PHYSICAL THERAPY INITIAL EVALUATION ADULT - BED MOBILITY TRAINING, PT EVAL
Pt will independently perform all aspects of bed mobility to help prevent pressure ulcers, by 1 week

## 2021-10-18 NOTE — PHYSICAL THERAPY INITIAL EVALUATION ADULT - CRITERIA FOR SKILLED THERAPEUTIC INTERVENTIONS
Pending stair negotiation to properly clarify DC plan and recommendation. DME needed at this time: rolling walker/impairments found/functional limitations in following categories/risk reduction/prevention/rehab potential/therapy frequency/predicted duration of therapy intervention/anticipated discharge recommendation

## 2021-10-18 NOTE — BRIEF OPERATIVE NOTE - NSICDXBRIEFPROCEDURE_GEN_ALL_CORE_FT
PROCEDURES:  Partial amputation of third ray of left foot by open approach 18-Oct-2021 16:15:52  Keesha Cobian

## 2021-10-18 NOTE — DIETITIAN INITIAL EVALUATION ADULT. - PERTINENT MEDS FT
MEDICATIONS  (STANDING):  dextrose 40% Gel 15 Gram(s) Oral once  dextrose 5%. 1000 milliLiter(s) (50 mL/Hr) IV Continuous <Continuous>  dextrose 5%. 1000 milliLiter(s) (100 mL/Hr) IV Continuous <Continuous>  dextrose 50% Injectable 25 Gram(s) IV Push once  dextrose 50% Injectable 12.5 Gram(s) IV Push once  dextrose 50% Injectable 25 Gram(s) IV Push once  enoxaparin Injectable 40 milliGRAM(s) SubCutaneous every 12 hours  glucagon  Injectable 1 milliGRAM(s) IntraMuscular once  hydroxychloroquine 200 milliGRAM(s) Oral two times a day  insulin glargine Injectable (LANTUS) 20 Unit(s) SubCutaneous at bedtime  insulin lispro (ADMELOG) corrective regimen sliding scale   SubCutaneous three times a day before meals  insulin lispro (ADMELOG) corrective regimen sliding scale   SubCutaneous at bedtime  insulin lispro Injectable (ADMELOG) 6 Unit(s) SubCutaneous three times a day before meals  losartan 50 milliGRAM(s) Oral daily  nystatin Powder 1 Application(s) Topical two times a day  piperacillin/tazobactam IVPB.. 3.375 Gram(s) IV Intermittent every 8 hours  predniSONE   Tablet 5 milliGRAM(s) Oral daily  saccharomyces boulardii 250 milliGRAM(s) Oral two times a day  vancomycin  IVPB 1250 milliGRAM(s) IV Intermittent every 12 hours    MEDICATIONS  (PRN):  acetaminophen   Tablet .. 650 milliGRAM(s) Oral every 6 hours PRN Temp greater or equal to 38C (100.4F), Mild Pain (1 - 3), Moderate Pain (4 - 6)  melatonin 3 milliGRAM(s) Oral at bedtime PRN Insomnia

## 2021-10-18 NOTE — PHYSICAL THERAPY INITIAL EVALUATION ADULT - ADDITIONAL COMMENTS
as per patient she lives with daughter on a PH with 5 stairs to enter with no HR's to access. has an entrance on the back with 2 stairs and L HR. she is planning to stay on the first floor. bedroom is usually on the second floor after 12 stairs with R HR. full bathroom was also upstairs. 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 pt's sister is planning to assist her if needed.

## 2021-10-18 NOTE — PROGRESS NOTE ADULT - SUBJECTIVE AND OBJECTIVE BOX
Patient is a 61y old  Female who presents with a chief complaint of Infection of left 3rd toe in DM patient, concern for osteomyelitis (17 Oct 2021 10:04)      INTERVAL HPI/OVERNIGHT EVENTS:   Pt is scheduled for let foot partial 3rd ray resection with Dr. Zuniga to follow 1pm case. Patient is aware of procedure and is NPO since midnight.    MEDICATIONS  (STANDING):  dextrose 40% Gel 15 Gram(s) Oral once  dextrose 5%. 1000 milliLiter(s) (50 mL/Hr) IV Continuous <Continuous>  dextrose 5%. 1000 milliLiter(s) (100 mL/Hr) IV Continuous <Continuous>  dextrose 50% Injectable 25 Gram(s) IV Push once  dextrose 50% Injectable 12.5 Gram(s) IV Push once  dextrose 50% Injectable 25 Gram(s) IV Push once  enoxaparin Injectable 40 milliGRAM(s) SubCutaneous every 12 hours  glucagon  Injectable 1 milliGRAM(s) IntraMuscular once  hydroxychloroquine 200 milliGRAM(s) Oral two times a day  insulin glargine Injectable (LANTUS) 20 Unit(s) SubCutaneous at bedtime  insulin lispro (ADMELOG) corrective regimen sliding scale   SubCutaneous three times a day before meals  insulin lispro (ADMELOG) corrective regimen sliding scale   SubCutaneous at bedtime  insulin lispro Injectable (ADMELOG) 6 Unit(s) SubCutaneous three times a day before meals  losartan 50 milliGRAM(s) Oral daily  nystatin Powder 1 Application(s) Topical two times a day  piperacillin/tazobactam IVPB.. 3.375 Gram(s) IV Intermittent every 8 hours  predniSONE   Tablet 5 milliGRAM(s) Oral daily  saccharomyces boulardii 250 milliGRAM(s) Oral two times a day  vancomycin  IVPB 1250 milliGRAM(s) IV Intermittent every 12 hours    MEDICATIONS  (PRN):  acetaminophen   Tablet .. 650 milliGRAM(s) Oral every 6 hours PRN Temp greater or equal to 38C (100.4F), Mild Pain (1 - 3), Moderate Pain (4 - 6)  melatonin 3 milliGRAM(s) Oral at bedtime PRN Insomnia      Allergies    No Known Allergies    Intolerances        Vital Signs Last 24 Hrs  T(C): 36.2 (18 Oct 2021 05:24), Max: 36.9 (17 Oct 2021 17:07)  T(F): 97.2 (18 Oct 2021 05:24), Max: 98.5 (17 Oct 2021 17:07)  HR: 84 (18 Oct 2021 05:24) (67 - 84)  BP: 140/75 (18 Oct 2021 05:24) (130/65 - 144/80)  BP(mean): --  RR: 18 (18 Oct 2021 05:24) (18 - 19)  SpO2: 99% (18 Oct 2021 05:24) (98% - 100%)    LABS:                        10.4   6.55  )-----------( 240      ( 18 Oct 2021 08:06 )             32.5     10-18    141  |  108  |  12  ----------------------------<  185<H>  3.9   |  28  |  1.17    Ca    9.4      18 Oct 2021 08:06      PT/INR - ( 17 Oct 2021 10:43 )   PT: 12.0 sec;   INR: 1.04 ratio         PTT - ( 18 Oct 2021 08:06 )  PTT:32.8 sec    CAPILLARY BLOOD GLUCOSE      POCT Blood Glucose.: 193 mg/dL (18 Oct 2021 08:02)  POCT Blood Glucose.: 104 mg/dL (17 Oct 2021 21:24)  POCT Blood Glucose.: 100 mg/dL (17 Oct 2021 16:09)  POCT Blood Glucose.: 319 mg/dL (17 Oct 2021 11:26)      RADIOLOGY & ADDITIONAL TESTS:

## 2021-10-18 NOTE — PHYSICAL THERAPY INITIAL EVALUATION ADULT - PATIENT/FAMILY AGREES WITH PLAN
Pending stair negotiation  to properly clarify DC plan and recommendation. DME needed at this time: rolling walker/yes

## 2021-10-18 NOTE — BRIEF OPERATIVE NOTE - OPERATION/FINDINGS
Patient is s/p left foot partial third ray resection, closed. Patient did not bleed well intra-op, high concern for viability, no further pus or infection noted, low concern for residual infection. Patient will be stable fore d/c pending 2 days of no growth on the clean bone margin culture, and viability.

## 2021-10-18 NOTE — DISCHARGE NOTE PROVIDER - HOSPITAL COURSE
61 years old female with h/o HTN, DM2, SLE, obesity (BMI 39.2) present to ED with complain of worsening infection in her left 3rd toe, was admitted for osteomyelitis for the left third toe.  Patient is s/p of left third toe partial resection for osteomyelitis on 10/18.     Problem/Plan - 1:  ·  Problem: Diabetic infection of left foot.   ·  Plan: S/P day left third toe partial resection 10/18  continue with vantin and flagyl for 5 days from 10/22  ID input appreciated.  final bone margins clean.     Problem/Plan - 2:  ·  Problem: INDER (acute kidney injury).   ·  Plan: Creatinine 1.1 ---> 1.89 >> 3.0 >> 3.1 >> 3.2  Likely drug induced ATN   Dr. Nava following  LETTY, DsDNA pending to rule out lupus nephritis, c3-4 WNL.  Continue to monitor.     Problem/Plan - 3:  ·  Problem: Elevated lactic acid level.   ·  Plan: lactate 3.1 --> 1.1   resolved.     Problem/Plan - 4:  ·  Problem: Type 2 diabetes mellitus with unspecified complications.   ·  Plan: Controlled  Continue  insulin glargine Injectable (LANTUS) 20 Unit(s) SubCutaneous at bedtime  insulin lispro (ADMELOG) corrective regimen sliding scale   SubCutaneous three times a day before meals  insulin lispro (ADMELOG) corrective regimen sliding scale   SubCutaneous at bedtime  insulin lispro Injectable (ADMELOG) 6 Unit(s) SubCutaneous three times a day before meals.     Problem/Plan - 5:  ·  Problem: Severe obesity (BMI 35.0-39.9) with comorbidity.   ·  Plan: BMI 39.2  Weight loss and lifestyle modification.     Problem/Plan - 6:  ·  Problem: Systemic lupus erythematosus.   ·  Plan: Continue  predniSONE   Tablet 5 milliGRAM(s) Oral daily  hydroxychloroquine 200 milliGRAM(s) Oral two times a day.     Problem/Plan - 7:  ·  Problem: HTN (hypertension).   ·  Plan: Controlled   61 years old female with h/o HTN, DM2, SLE, obesity (BMI 39.2) present to ED with complain of worsening infection in her left 3rd toe, was admitted for osteomyelitis for the left third toe.  Patient is s/p of left third toe partial resection for osteomyelitis on 10/18.     Problem/Plan - 1:  ·  Problem: Diabetic infection of left foot.   ·  Plan: S/P day left third toe partial resection 10/18  continue with vantin and flagyl for 5 days from 10/22  ID input appreciated.  final bone margins clean.     Problem/Plan - 2:  ·  Problem: INDER (acute kidney injury).   ·  Plan: Creatinine 1.1 ---> 1.89 >> 3.0 >> 3.1 >> 3.2  Likely drug induced ATN   Dr. Nava following  LETTY, DsDNA pending to rule out lupus nephritis, c3-4 WNL.  Continue to monitor.     Problem/Plan - 3:  ·  Problem: Elevated lactic acid level.   ·  Plan: lactate 3.1 --> 1.1   resolved.     Problem/Plan - 4:  ·  Problem: Type 2 diabetes mellitus with unspecified complications.   ·  Plan: Controlled  Continue  insulin glargine Injectable (LANTUS) 20 Unit(s) SubCutaneous at bedtime  insulin lispro (ADMELOG) corrective regimen sliding scale   SubCutaneous three times a day before meals  insulin lispro (ADMELOG) corrective regimen sliding scale   SubCutaneous at bedtime  insulin lispro Injectable (ADMELOG) 6 Unit(s) SubCutaneous three times a day before meals.     Problem/Plan - 5:  ·  Problem: Severe obesity (BMI 35.0-39.9) with comorbidity.   ·  Plan: BMI 39.2  Weight loss and lifestyle modification.     Problem/Plan - 6:  ·  Problem: Systemic lupus erythematosus.   ·  Plan: Continue  predniSONE   Tablet 5 milliGRAM(s) Oral daily  hydroxychloroquine 200 milliGRAM(s) Oral two times a day.     Problem/Plan - 7:  ·  Problem: HTN (hypertension).   ·  Plan: Controlled    At this time patient is medically stable for discharge.     Greater than 30 minutes spent face to face encounter on discharge planning including care coordination planning, disposition and medication reconcillation

## 2021-10-18 NOTE — PHYSICAL THERAPY INITIAL EVALUATION ADULT - DISCHARGE DISPOSITION, PT EVAL
Pending stair negotiation to properly clarify DC plan and recommendation. DME needed at this time: rolling walker

## 2021-10-18 NOTE — CONSULT NOTE ADULT - SUBJECTIVE AND OBJECTIVE BOX
MARIO MALDONADO  MRN-[id number]        Patient is a 61y old  Female who presents with a chief complaint of Infection of left 3rd toe in DM patient, concern for osteomyelitis (18 Oct 2021 16:21)      HPI:  61 years old female with h/o HTN, DM2, SLE, obesity (BMI 39.2) present to ED with complain of worsening infection in her left 3rd toe. Patient reported some ulcer which started 1 months ago after getting her nail clipped. She was seen in [facility] 2 weeks ago and was given a course of oral Augmentin but patient only took it for 5 days as she has some itchiness/rash in her private area. Since then, her toe swelling/ulcer progressively worsen. Denied any fever or chills.   No leukocytosis. Hb 11, Plt 242, lactate 3.1, Cr 1.05, glucose 219. Patient was given vanco/zosyn in ED. ED consulted podiatry. CXR image reviewed, no focal consolidation    SH: no toxic habits  FH: mother-DM  PSH:  C section  Allergy: NKDA (14 Oct 2021 13:09)    above reviewed. admitted for osteomyelitis of the 3rd digit osteomyelitis. Going for surgery today   ID consulted for workup and antibiotic management     PAST MEDICAL & SURGICAL HISTORY:  Lupus    Diabetes mellitus    Hyperlipidemia    Pericarditis    Obesity    HTN (hypertension)    Lupus    Diabetes    Hypertension     delivery delivered   section x 3    Skin tag  Skin tag removal, right shoulder    H/O:         Allergies  No Known Allergies        ANTIMICROBIALS:  hydroxychloroquine 200 two times a day  piperacillin/tazobactam IVPB.. 3.375 every 8 hours      MEDICATIONS  (STANDING):  hydroxychloroquine   200 milliGRAM(s) Oral (10-18-21 @ 17:29)    hydroxychloroquine   200 milliGRAM(s) Oral (10-18-21 @ 05:14)   200 milliGRAM(s) Oral (10-17-21 @ 17:13)   200 milliGRAM(s) Oral (10-17-21 @ 05:22)   200 milliGRAM(s) Oral (10-16-21 @ 17:26)   200 milliGRAM(s) Oral (10-16-21 @ 05:19)   200 milliGRAM(s) Oral (10-15-21 @ 17:13)   200 milliGRAM(s) Oral (10-15-21 @ 05:04)   200 milliGRAM(s) Oral (10-14-21 @ 18:41)    piperacillin/tazobactam IVPB.   200 mL/Hr IV Intermittent (10-14-21 @ 11:01)    piperacillin/tazobactam IVPB..   25 mL/Hr IV Intermittent (10-18-21 @ 05:13)   25 mL/Hr IV Intermittent (10-17-21 @ 21:33)   25 mL/Hr IV Intermittent (10-17-21 @ 14:12)   25 mL/Hr IV Intermittent (10-17-21 @ 05:18)   25 mL/Hr IV Intermittent (10-16-21 @ 21:18)   25 mL/Hr IV Intermittent (10-16-21 @ 13:00)   25 mL/Hr IV Intermittent (10-16-21 @ 05:19)   25 mL/Hr IV Intermittent (10-15-21 @ 21:32)   25 mL/Hr IV Intermittent (10-15-21 @ 13:14)   25 mL/Hr IV Intermittent (10-15-21 @ 01:47)   25 mL/Hr IV Intermittent (10-14-21 @ 18:32)    piperacillin/tazobactam IVPB..   25 mL/Hr IV Intermittent (10-18-21 @ 17:29)    vancomycin  IVPB   250 mL/Hr IV Intermittent (10-14-21 @ 12:18)    vancomycin  IVPB   166.67 mL/Hr IV Intermittent (10-18-21 @ 10:28)   166.67 mL/Hr IV Intermittent (10-17-21 @ 21:33)   166.67 mL/Hr IV Intermittent (10-17-21 @ 10:32)   166.67 mL/Hr IV Intermittent (10-16-21 @ 21:19)   166.67 mL/Hr IV Intermittent (10-16-21 @ 09:48)   166.67 mL/Hr IV Intermittent (10-15-21 @ 21:40)   166.67 mL/Hr IV Intermittent (10-15-21 @ 11:43)   166.67 mL/Hr IV Intermittent (10-14-21 @ 22:12)        OTHER MEDS: MEDICATIONS  (STANDING):  dextrose 40% Gel 15 once  dextrose 40% Gel 15 once  dextrose 50% Injectable 25 once  dextrose 50% Injectable 12.5 once  dextrose 50% Injectable 25 once  enoxaparin Injectable 40 every 12 hours  glucagon  Injectable 1 once  HYDROmorphone  Injectable 1 once  insulin glargine Injectable (LANTUS) 20 at bedtime  insulin lispro (ADMELOG) corrective regimen sliding scale  three times a day before meals  insulin lispro (ADMELOG) corrective regimen sliding scale  at bedtime  insulin lispro Injectable (ADMELOG) 6 three times a day before meals  losartan 50 daily  melatonin 3 at bedtime PRN  predniSONE   Tablet 5 daily      SOCIAL HISTORY:     denies smoking etoh or drugs    FAMILY HISTORY:  Family history of coronary artery disease        REVIEW OF SYSTEMS  [  ] ROS unobtainable because:    [ x ] All other systems negative except as noted below:	    Constitutional:  [ ] fever [ ] chills  [ ] weight loss  [ ] weakness  Skin:  [ ] rash [x ] infected ulcer	  Eyes: [ ] icterus [ ] pain  [ ] discharge	  ENMT: [ ] sore throat  [ ] thrush [ ] ulcers [ ] exudates  Respiratory: [ ] dyspnea [ ] hemoptysis [ ] cough [ ] sputum	  Cardiovascular:  [ ] chest pain [ ] palpitations [ ] edema	  Gastrointestinal:  [ ] nausea [ ] vomiting [ ] diarrhea [ ] constipation [ ] pain	  Genitourinary:  [ ] dysuria [ ] frequency [ ] hematuria [ ] discharge [ ] flank pain  [ ] incontinence  Musculoskeletal:  [ ] myalgias [ ] arthralgias [ ] arthritis  [ ] back pain  Neurological:  [ ] headache [ ] seizures  [ ] confusion/altered mental status  Psychiatric:  [ ] anxiety [ ] depression	  Hematology/Lymphatics:  [ ] lymphadenopathy  Endocrine:  [ ] adrenal [ ] thyroid  Allergic/Immunologic:	 [ ] transplant [ ] seasonal    Vital Signs Last 24 Hrs  T(F): 98.2 (10-18-21 @ 20:00), Max: 98.8 (10-14-21 @ 14:15)    Vital Signs Last 24 Hrs  HR: 90 (10-18-21 @ 20:00) (66 - 92)  BP: 105/56 (10-18-21 @ 20:00) (105/56 - 144/80)  RR: 18 (10-18-21 @ 20:00)  SpO2: 98% (10-18-21 @ 20:00) (97% - 100%)  Wt(kg): --    PHYSICAL EXAM:  Constitutional: non-toxic, no distress, obese   HEAD/EYES: anicteric, no conjunctival injection  ENT:  supple, no thrush  Cardiovascular:   normal S1, S2, no murmur, no edema  Respiratory:  clear BS bilaterally, no wheezes, no rales  GI:  soft, non-tender, normal bowel sounds  :  no elliott, no CVA tenderness  Musculoskeletal:  no synovitis, normal ROM, foot wrapped and dressed   Neurologic: awake and alert, normal strength, no focal findings  Skin:  no rash, no erythema, no phlebitis  Heme/Onc: no lymphadenopathy   Psychiatric:  awake, alert, appropriate mood          WBC Count: 6.55 K/uL (10-18 @ 08:06)  WBC Count: 5.79 K/uL (10-15 @ 06:54)  WBC Count: 6.43 K/uL (10-14 @ 11:09)                            10.4   6.55  )-----------( 240      ( 18 Oct 2021 08:06 )             32.5       10-18    141  |  108  |  12  ----------------------------<  185<H>  3.9   |  28  |  1.17    Ca    9.4      18 Oct 2021 08:06        Creatinine Trend: 1.17<--, 1.01<--, 1.05<--        MICROBIOLOGY:  Culture - Abscess with Gram Stain (10.15.21 @ 09:18)    -  Ceftolozane/tazobactam: S <=2    -  Ceftazidime/Avibactam: S <=4    -  Amikacin: S <=16    -  Amikacin: S <=16    -  Amoxicillin/Clavulanic Acid: R >16/8    -  Amoxicillin/Clavulanic Acid: S <=8/4    -  Ampicillin: S <=8 These ampicillin results predict results for amoxicillin    -  Ampicillin: R >16 These ampicillin results predict results for amoxicillin    -  Ampicillin/Sulbactam: S 8/4 Enterobacter, Citrobacter, and Serratia may develop resistance during prolonged therapy (3-4 days)    -  Ampicillin/Sulbactam: S <=4/2 Enterobacter, Citrobacter, and Serratia may develop resistance during prolonged therapy (3-4 days)    -  Aztreonam: S <=4    -  Aztreonam: S <=4    -  Cefazolin: R >16 Enterobacter, Citrobacter, and Serratia may develop resistance during prolonged therapy (3-4 days)    -  Cefazolin: S <=2 Enterobacter, Citrobacter, and Serratia may develop resistance during prolonged therapy (3-4 days)    -  Cefepime: S <=2    -  Cefepime: S <=2    -  Cefoxitin: S <=8    -  Cefoxitin: S <=8    -  Ceftriaxone: S <=1 Enterobacter, Citrobacter, and Serratia may develop resistance during prolonged therapy    -  Ceftriaxone: S <=1 Enterobacter, Citrobacter, and Serratia may develop resistance during prolonged therapy    -  Ciprofloxacin: S <=0.25    -  Ciprofloxacin: S <=0.25    -  Ertapenem: S <=0.5    -  Ertapenem: S <=0.5    -  Gentamicin: S <=2    -  Gentamicin: S <=2    -  Imipenem: I 2    -  Imipenem: S <=1    -  Levofloxacin: S <=0.5    -  Levofloxacin: S <=0.5    -  Meropenem: S <=1    -  Meropenem: S <=1    -  Piperacillin/Tazobactam: S <=8    -  Piperacillin/Tazobactam: S <=8    -  Tobramycin: S <=2    -  Tobramycin: S <=2    -  Trimethoprim/Sulfamethoxazole: S <=0.5/9.5    -  Trimethoprim/Sulfamethoxazole: S <=0.5/9.5    Specimen Source: .Abscess left foot    Culture Results:   Moderate Escherichia coli  Numerous Morganella morganii  Few Streptococcus agalactiae (Group B) isolated  Group B streptococci are susceptible to ampicillin,  penicillin and cefazolin, but may be resistant to  erythromycin and clindamycin.  Recommendations for intrapartum prophylaxis for Group B  streptococci are penicillin or ampicillin.  Rare Enterococcus faecalis Susceptibility to follow.  Moderate Bacteroides fragilis "Susceptibilities not performed"    Organism Identification: Escherichia coli  Morganella morganii    Organism: Morganella morganii    Organism: Escherichia coli    Method Type: KOBE    Method Type: KOBE        C-Reactive Protein, Serum: 6 (10-14)      RADIOLOGY:  < from: MR Foot w/wo IV Cont, Left (10.14.21 @ 17:11) >  IMPRESSION:  1.  Osteomyelitis of the third digit distal phalanx with bony destruction and/or pathological fracture.    2.  No soft tissue abscess.

## 2021-10-18 NOTE — DISCHARGE NOTE PROVIDER - NSDCCPCAREPLAN_GEN_ALL_CORE_FT
PRINCIPAL DISCHARGE DIAGNOSIS  Diagnosis: Diabetic infection of left foot  Assessment and Plan of Treatment: please take vantin 200 bid for an additional 5 weeks      SECONDARY DISCHARGE DIAGNOSES  Diagnosis: INDER (acute kidney injury)  Assessment and Plan of Treatment: Your kidney performance was likely affected due to contrast dye  It is now improving  please followup with Nephrology in one weedk     PRINCIPAL DISCHARGE DIAGNOSIS  Diagnosis: Diabetic infection of left foot  Assessment and Plan of Treatment: please take vantin 400mg once daily for an additional  5days  please take flagyl 500mg BID for an additional 5 days  Infectious Disease Attending: Andrew  Podiatry attending: in micheal summary  Podiatry recs:  - Stable for discharge from podiatric standpoint, instructions for follow up in discharge note provider   - keep dressing clean, dry and intact      SECONDARY DISCHARGE DIAGNOSES  Diagnosis: INDER (acute kidney injury)  Assessment and Plan of Treatment: Your kidney performance was likely affected due to antibiotic allergy  It is now improving  please followup with Nephrology in one week  please resume your cellcept 500mg BID  please check your BMP and send the results to Dr. anthony and your PCP for followup  your last cr is now 2.95. down from 22    Diagnosis: Lupus  Assessment and Plan of Treatment: Please continue your HOME Dose of  Cellcept and Hydrochloroquine  we have provided you with the number for our outpatient Rheumatologist. Please make an appointment to see him within a week  Contact: Dr Cleary

## 2021-10-18 NOTE — PROGRESS NOTE ADULT - ASSESSMENT
61 years old female with h/o HTN, DM2, SLE, obesity (BMI 39.2) present to ED with complain of worsening infection in her left 3rd toe, was admitted for osteomyelitis for the left third toe.  Patient has been optimized for the proposed procedure toe resection. At this time there is no absolute contraindications to the proposed procedure.

## 2021-10-18 NOTE — DIETITIAN INITIAL EVALUATION ADULT. - OTHER INFO
Pt lives at home with daughter; independent for ADLs pta.  Pt tries to follow no concentrated sweets diet at home; avoids candy/cookies/cakes & sugar-sweetened beverages, however regularly eats potatoes/sweet potatoes, rice, and bread without limiting portion sizes.  Pt with DM (A1c 11.6%); checks BG daily, usually >200 mg/dL (pt on chronic steroid for lupus, likely contributing to elevated BG levels/A1c); takes Novolog and Lantus to control BG at home. Provided pt with DM nutrition counseling/educational materials, including food plate method, recommended portion sizes for various CHO foods, etc.; pt receptive to diet information.  Pt reported  lbs. & current wt 290 lbs.; wt gain likely fluid-related. Pt denied chew/swallowing difficulty.

## 2021-10-18 NOTE — DISCHARGE NOTE PROVIDER - NSDCFUADDAPPT_GEN_ALL_CORE_FT
Podiatry Discharge Instructions:  Follow up: Please follow up with Dr. Zuniga within 1 week of discharge from the hospital, please call [phone] for appointment and discuss that you recently were seen in the hospital.  Wound Care: Please leave your dressing clean dry intact until your follow up appointment   Weight bearing: Please weight bear as tolerated in a surgical shoe to the left heel  Antibiotics: Please continue as instructed.

## 2021-10-18 NOTE — PROGRESS NOTE ADULT - SUBJECTIVE AND OBJECTIVE BOX
Patient is a 61y old  Female who presents with a chief complaint of Infection of left 3rd toe in DM patient, concern for osteomyelitis (18 Oct 2021 08:47)  Patient denies any headache, chest pain, sob, abdominal pain or sob. Patient states that she slept well. No overnight events.  Review of systems unremarkable. Denies fever or chills.    SUBJECTIVE & OBJECTIVE: Pt seen and examined at bedside.   PHYSICAL EXAM:  Vital Signs Last 24 Hrs  T(C): 36.2 (18 Oct 2021 05:24), Max: 36.9 (17 Oct 2021 17:07)  T(F): 97.2 (18 Oct 2021 05:24), Max: 98.5 (17 Oct 2021 17:07)  HR: 84 (18 Oct 2021 05:24) (67 - 84)  BP: 140/75 (18 Oct 2021 05:24) (130/65 - 144/80)  BP(mean): --  RR: 18 (18 Oct 2021 05:24) (18 - 19)  SpO2: 99% (18 Oct 2021 05:24) (98% - 100%)   Daily     Daily Weight in k.3 (18 Oct 2021 05:24)I&O's Detail    17 Oct 2021 07:01  -  18 Oct 2021 07:00  --------------------------------------------------------  IN:    IV PiggyBack: 450 mL    Oral Fluid: 120 mL  Total IN: 570 mL    OUT:  Total OUT: 0 mL    Total NET: 570 mL    Patient is sitting up in bed, calm, comfortable and pleasant.  Patient is alert and oriented.    GENERAL: NAD, well-groomed, well-developed  HEAD:  Atraumatic, Normocephalic  EYES: EOMI, PERRLA, conjunctiva and sclera clear  ENMT: Moist mucous membranes  NECK: Supple, No JVD  NERVOUS SYSTEM:  Alert & Oriented X3, Motor Strength 5/5 B/L upper and lower extremities; DTRs 2+ intact and symmetric  CHEST/LUNG: Clear to auscultation bilaterally; No rales, rhonchi, wheezing, or rubs  HEART: Regular rate and rhythm; No murmurs, rubs, or gallops  ABDOMEN: Soft, Nontender, Nondistended; Bowel sounds present  EXTREMITIES:  2+ Peripheral Pulses, No clubbing, cyanosis, or edema  Left foot with dressing, clean and intact.  LABS:                        10.4   6.55  )-----------( 240      ( 18 Oct 2021 08:06 )             32.5  10    141  |  108  |  12  ----------------------------<  185<H>  3.9   |  28  |  1.17    Ca    9.4      18 Oct 2021 08:06      PT/INR - ( 17 Oct 2021 10:43 )   PT: 12.0 sec;   INR: 1.04 ratio    PTT - ( 18 Oct 2021 08:06 )  PTT:32.8 secCAPILLARY BLOOD GLUCOSE    POCT Blood Glucose.: 193 mg/dL (18 Oct 2021 08:02)  POCT Blood Glucose.: 104 mg/dL (17 Oct 2021 21:24)  POCT Blood Glucose.: 100 mg/dL (17 Oct 2021 16:09)  POCT Blood Glucose.: 319 mg/dL (17 Oct 2021 11:26)        RADIOLOGY & ADDITIONAL TESTS:

## 2021-10-18 NOTE — BRIEF OPERATIVE NOTE - SPECIMENS
path 1 - left foot dirty bone, 2 - left foot clean bone; micro 1- left foot dirty bone 2 - left foot clean bone

## 2021-10-18 NOTE — PHYSICAL THERAPY INITIAL EVALUATION ADULT - ACTIVE RANGE OF MOTION EXAMINATION, REHAB EVAL
L foot NT as she was with dressing./bilateral lower extremity Active ROM was WNL (within normal limits)/bilateral upper extremity Active ROM was WFL (within functional limits)/deficits as listed below

## 2021-10-18 NOTE — DISCHARGE NOTE PROVIDER - NSDCMRMEDTOKEN_GEN_ALL_CORE_FT
acetaminophen 325 mg oral tablet: 2 tab(s) orally every 6 hours, As needed, Temp greater or equal to 38C (100.4F)  Augmentin 875 mg-125 mg oral tablet: 1 tab(s) orally every 12 hours  CellCept 500 mg oral tablet: 2 tab(s) orally 2 times a day  losartan 50 mg oral tablet: 1 tab(s) orally once a day  NovoLOG 100 units/mL subcutaneous solution: 12 unit(s) subcutaneous 3 times a day  nystatin 100,000 units/g topical powder (obsolete):   Plaquenil 200 mg oral tablet: orally 2 times a day  predniSONE 5 mg oral tablet: 1 tab(s) orally once a day   acetaminophen 325 mg oral tablet: 2 tab(s) orally every 6 hours, As needed, Temp greater or equal to 38C (100.4F)  cefpodoxime 200 mg oral tablet: 2 tab(s) orally every 24 hours  CellCept 500 mg oral tablet: 2 tab(s) orally 2 times a day  losartan 50 mg oral tablet: 1 tab(s) orally once a day  metroNIDAZOLE 500 mg oral tablet: 1 tab(s) orally every 8 hours  NovoLOG 100 units/mL subcutaneous solution: 12 unit(s) subcutaneous 3 times a day  nystatin 100,000 units/g topical powder (obsolete):   Plaquenil 200 mg oral tablet: orally 2 times a day  predniSONE 5 mg oral tablet: 1 tab(s) orally once a day  Protonix 40 mg oral delayed release tablet: 1 tab(s) orally once a day   saccharomyces boulardii lyo 250 mg oral capsule: 1 cap(s) orally 2 times a day

## 2021-10-18 NOTE — CONSULT NOTE ADULT - ASSESSMENT
61y old  Female who presents with a chief complaint of Infection of left 3rd toe in DM patient, concern for osteomyelitis  MRI consistent with osteomyelitis   cultures with morganella, group B strep, ecoli, enterococcus   Zosyn to cover all organisms and no MRSA so no need for further doses of vancomycin   may not need long course of antibiotics depending on pathology and cultures   diabetes is not well controlled with HbA1c 11.6   needs good glycemic control if she wants a chance at good healing and she is on steroids for her lupus which can also impair healing     left 3rd toe osteomyelitis   poorly controlled diabetes  Lupus     Plan:   continue Zosyn   no need for vancomycin at this time   send clean bone margin pathology and culture  pain control   control diabetes   better glycemic control  good but not too tight glycemic control   try to hold as much immunosuppression as possible in the setting of an active infection     D/W Dr. Santiago Alvarez, DO  Infectious Disease Attending  Pager [phone]  After 5pm/weekends please call [phone] for all inquiries and new consults    
61 year old female with SLE is currently admitted for osteomyelitis of her left 3rd toe.  SLE appears clinically quiescent at this time.    - Continue home doses of Plaquenil and prednisone.    - Hold Cellcept for now given active infection.    - Abx as per primary team, podiatry.    - If pt needs surgery, she is at low risk for complications from a rheumatology standpoint, as SLE currently clinically quiescent.
61 F with left toe 3rd digit necrosis  - pt seen and evaluated  - afebrile, no leukocytosis  - left foot exam: left foot 3rd distal tip necrosis with malodor, serous drainage, probe to bone, no undermining or tracking noted, fibronecrotic wound base.  - s/p left foot 3rd digit incision and drainage to bone and not beyond using sterile 15 blade revealing a fibronecrotic wound base  - left foot xray: no gas, possible OM of 3rd DP  - Wound culture taken  - ordered left foot MRI  - rec admit to medicine  - rec IV vanco, zosyn  - patient to be scheduled for partial 3rd toe amputation pending MRI  - please document medical clearance for surgery under local anesthesia and sedation  - discussed with attending

## 2021-10-18 NOTE — PROGRESS NOTE ADULT - ASSESSMENT
Plan:   To OR today to follow 1pm case with Dr. Zuniga for Left foot partial 3rd ray resection.   CXR on sunrise.  EKG on sunrise.  Medical/Cardiac clearance since 10/15 and documented in chart.  Consent signed and in chart.  Procedure was explained to patient in detail. All alternatives, risks and complications were discussed. All questions answered.

## 2021-10-18 NOTE — DIETITIAN INITIAL EVALUATION ADULT. - PERTINENT LABORATORY DATA
10-18 Na141 mmol/L Glu 185 mg/dL<H> K+ 3.9 mmol/L Cr  1.17 mg/dL BUN 12 mg/dL WBC 6.55 K/uL 10-15 Phos 4.8 mg/dL<H> 10-15 Alb 2.2 g/dL<L>

## 2021-10-19 DIAGNOSIS — N17.9 ACUTE KIDNEY FAILURE, UNSPECIFIED: ICD-10-CM

## 2021-10-19 LAB
-  AMPICILLIN: SIGNIFICANT CHANGE UP
-  TETRACYCLINE: SIGNIFICANT CHANGE UP
-  VANCOMYCIN: SIGNIFICANT CHANGE UP
ANION GAP SERPL CALC-SCNC: 7 MMOL/L — SIGNIFICANT CHANGE UP (ref 5–17)
BUN SERPL-MCNC: 17 MG/DL — SIGNIFICANT CHANGE UP (ref 7–23)
CALCIUM SERPL-MCNC: 8.9 MG/DL — SIGNIFICANT CHANGE UP (ref 8.5–10.1)
CHLORIDE SERPL-SCNC: 106 MMOL/L — SIGNIFICANT CHANGE UP (ref 96–108)
CO2 SERPL-SCNC: 25 MMOL/L — SIGNIFICANT CHANGE UP (ref 22–31)
CREAT SERPL-MCNC: 1.89 MG/DL — HIGH (ref 0.5–1.3)
CULTURE RESULTS: SIGNIFICANT CHANGE UP
GLUCOSE BLDC GLUCOMTR-MCNC: 128 MG/DL — HIGH (ref 70–99)
GLUCOSE BLDC GLUCOMTR-MCNC: 203 MG/DL — HIGH (ref 70–99)
GLUCOSE BLDC GLUCOMTR-MCNC: 249 MG/DL — HIGH (ref 70–99)
GLUCOSE BLDC GLUCOMTR-MCNC: 92 MG/DL — SIGNIFICANT CHANGE UP (ref 70–99)
GLUCOSE SERPL-MCNC: 203 MG/DL — HIGH (ref 70–99)
GRAM STN FLD: SIGNIFICANT CHANGE UP
GRAM STN FLD: SIGNIFICANT CHANGE UP
HCT VFR BLD CALC: 31.2 % — LOW (ref 34.5–45)
HGB BLD-MCNC: 10 G/DL — LOW (ref 11.5–15.5)
MCHC RBC-ENTMCNC: 25.6 PG — LOW (ref 27–34)
MCHC RBC-ENTMCNC: 32.1 GM/DL — SIGNIFICANT CHANGE UP (ref 32–36)
MCV RBC AUTO: 80 FL — SIGNIFICANT CHANGE UP (ref 80–100)
METHOD TYPE: SIGNIFICANT CHANGE UP
NRBC # BLD: 0 /100 WBCS — SIGNIFICANT CHANGE UP (ref 0–0)
ORGANISM # SPEC MICROSCOPIC CNT: SIGNIFICANT CHANGE UP
PLATELET # BLD AUTO: 225 K/UL — SIGNIFICANT CHANGE UP (ref 150–400)
POTASSIUM SERPL-MCNC: 4 MMOL/L — SIGNIFICANT CHANGE UP (ref 3.5–5.3)
POTASSIUM SERPL-SCNC: 4 MMOL/L — SIGNIFICANT CHANGE UP (ref 3.5–5.3)
RBC # BLD: 3.9 M/UL — SIGNIFICANT CHANGE UP (ref 3.8–5.2)
RBC # FLD: 14.2 % — SIGNIFICANT CHANGE UP (ref 10.3–14.5)
SODIUM SERPL-SCNC: 138 MMOL/L — SIGNIFICANT CHANGE UP (ref 135–145)
SPECIMEN SOURCE: SIGNIFICANT CHANGE UP
WBC # BLD: 6.6 K/UL — SIGNIFICANT CHANGE UP (ref 3.8–10.5)
WBC # FLD AUTO: 6.6 K/UL — SIGNIFICANT CHANGE UP (ref 3.8–10.5)

## 2021-10-19 PROCEDURE — 99232 SBSQ HOSP IP/OBS MODERATE 35: CPT

## 2021-10-19 PROCEDURE — ZZZZZ: CPT

## 2021-10-19 PROCEDURE — 93923 UPR/LXTR ART STDY 3+ LVLS: CPT | Mod: 26

## 2021-10-19 PROCEDURE — 99233 SBSQ HOSP IP/OBS HIGH 50: CPT

## 2021-10-19 PROCEDURE — 73630 X-RAY EXAM OF FOOT: CPT | Mod: 26,LT

## 2021-10-19 RX ORDER — OXYCODONE AND ACETAMINOPHEN 5; 325 MG/1; MG/1
1 TABLET ORAL ONCE
Refills: 0 | Status: DISCONTINUED | OUTPATIENT
Start: 2021-10-19 | End: 2021-10-19

## 2021-10-19 RX ORDER — HYDROMORPHONE HYDROCHLORIDE 2 MG/ML
1 INJECTION INTRAMUSCULAR; INTRAVENOUS; SUBCUTANEOUS ONCE
Refills: 0 | Status: DISCONTINUED | OUTPATIENT
Start: 2021-10-19 | End: 2021-10-19

## 2021-10-19 RX ORDER — OXYCODONE AND ACETAMINOPHEN 5; 325 MG/1; MG/1
1 TABLET ORAL EVERY 6 HOURS
Refills: 0 | Status: DISCONTINUED | OUTPATIENT
Start: 2021-10-19 | End: 2021-10-21

## 2021-10-19 RX ORDER — SODIUM CHLORIDE 9 MG/ML
1000 INJECTION INTRAMUSCULAR; INTRAVENOUS; SUBCUTANEOUS
Refills: 0 | Status: DISCONTINUED | OUTPATIENT
Start: 2021-10-19 | End: 2021-10-21

## 2021-10-19 RX ORDER — ACETAMINOPHEN 500 MG
650 TABLET ORAL EVERY 6 HOURS
Refills: 0 | Status: DISCONTINUED | OUTPATIENT
Start: 2021-10-19 | End: 2021-10-24

## 2021-10-19 RX ADMIN — PIPERACILLIN AND TAZOBACTAM 25 GRAM(S): 4; .5 INJECTION, POWDER, LYOPHILIZED, FOR SOLUTION INTRAVENOUS at 22:48

## 2021-10-19 RX ADMIN — Medication 650 MILLIGRAM(S): at 05:04

## 2021-10-19 RX ADMIN — ENOXAPARIN SODIUM 40 MILLIGRAM(S): 100 INJECTION SUBCUTANEOUS at 17:29

## 2021-10-19 RX ADMIN — OXYCODONE AND ACETAMINOPHEN 1 TABLET(S): 5; 325 TABLET ORAL at 19:03

## 2021-10-19 RX ADMIN — Medication 6 UNIT(S): at 11:54

## 2021-10-19 RX ADMIN — Medication 650 MILLIGRAM(S): at 22:47

## 2021-10-19 RX ADMIN — Medication 200 MILLIGRAM(S): at 05:05

## 2021-10-19 RX ADMIN — HYDROMORPHONE HYDROCHLORIDE 1 MILLIGRAM(S): 2 INJECTION INTRAMUSCULAR; INTRAVENOUS; SUBCUTANEOUS at 05:04

## 2021-10-19 RX ADMIN — Medication 6 UNIT(S): at 17:27

## 2021-10-19 RX ADMIN — PIPERACILLIN AND TAZOBACTAM 25 GRAM(S): 4; .5 INJECTION, POWDER, LYOPHILIZED, FOR SOLUTION INTRAVENOUS at 15:15

## 2021-10-19 RX ADMIN — ENOXAPARIN SODIUM 40 MILLIGRAM(S): 100 INJECTION SUBCUTANEOUS at 05:05

## 2021-10-19 RX ADMIN — LOSARTAN POTASSIUM 50 MILLIGRAM(S): 100 TABLET, FILM COATED ORAL at 05:09

## 2021-10-19 RX ADMIN — Medication 5 MILLIGRAM(S): at 05:07

## 2021-10-19 RX ADMIN — Medication 650 MILLIGRAM(S): at 23:47

## 2021-10-19 RX ADMIN — Medication 200 MILLIGRAM(S): at 17:30

## 2021-10-19 RX ADMIN — Medication 3 MILLIGRAM(S): at 22:47

## 2021-10-19 RX ADMIN — PIPERACILLIN AND TAZOBACTAM 25 GRAM(S): 4; .5 INJECTION, POWDER, LYOPHILIZED, FOR SOLUTION INTRAVENOUS at 02:50

## 2021-10-19 RX ADMIN — Medication 250 MILLIGRAM(S): at 18:29

## 2021-10-19 RX ADMIN — Medication 650 MILLIGRAM(S): at 13:45

## 2021-10-19 RX ADMIN — Medication 6 UNIT(S): at 08:06

## 2021-10-19 RX ADMIN — Medication 4: at 08:06

## 2021-10-19 RX ADMIN — SODIUM CHLORIDE 150 MILLILITER(S): 9 INJECTION INTRAMUSCULAR; INTRAVENOUS; SUBCUTANEOUS at 15:15

## 2021-10-19 RX ADMIN — Medication 250 MILLIGRAM(S): at 05:05

## 2021-10-19 RX ADMIN — INSULIN GLARGINE 20 UNIT(S): 100 INJECTION, SOLUTION SUBCUTANEOUS at 22:48

## 2021-10-19 RX ADMIN — Medication 4: at 11:54

## 2021-10-19 RX ADMIN — Medication 650 MILLIGRAM(S): at 13:22

## 2021-10-19 NOTE — PROGRESS NOTE ADULT - SUBJECTIVE AND OBJECTIVE BOX
Patient is a 61y old  Female who presents with a chief complaint of Infection of left 3rd toe in DM patient, concern for osteomyelitis (19 Oct 2021 10:14)    Today patient states that she slept well last night, states that she only has some discomfort on her operated toe but no actual pain, denies sob, chest pain, abdominal pain or headache, blurry vision.  Remaining of ROS unremarkable except per above.    SUBJECTIVE & OBJECTIVE: Pt seen and examined at bedside.   PHYSICAL EXAM:  Vital Signs Last 24 Hrs  T(C): 36.8 (19 Oct 2021 08:02), Max: 36.9 (19 Oct 2021 05:10)  T(F): 98.2 (19 Oct 2021 08:02), Max: 98.4 (19 Oct 2021 05:10)  HR: 80 (19 Oct 2021 08:02) (73 - 94)  BP: 105/69 (19 Oct 2021 08:02) (105/56 - 149/81)  BP(mean): --  RR: 14 (19 Oct 2021 08:02) (14 - 20)  SpO2: 98% (19 Oct 2021 08:02) (97% - 100%)   Daily     Daily Weight in k.5 (19 Oct 2021 05:10)I&O's Detail    18 Oct 2021 07:01  -  19 Oct 2021 07:00  --------------------------------------------------------  IN:    IV PiggyBack: 500 mL  Total IN: 500 mL  OUT:  Total OUT: 0 mL  Total NET: 500 mL    Patient is awake, alert and oriented. Calm and comfortable, pleasant.  GENERAL: NAD, well-groomed, well-developed  HEAD:  Atraumatic, Normocephalic  EYES: EOMI, PERRLA, conjunctiva and sclera clear  ENMT: Moist mucous membranes  NECK: Supple, No JVD  NERVOUS SYSTEM:  Alert & Oriented X3, Motor Strength 5/5 B/L upper and lower extremities; DTRs 2+ intact and symmetric  CHEST/LUNG: Clear to auscultation bilaterally; No rales, rhonchi, wheezing, or rubs  HEART: Regular rate and rhythm; No murmurs, rubs, or gallops  ABDOMEN: Soft, Nontender, Nondistended; Bowel sounds present  EXTREMITIES:  2+ Peripheral Pulses, No clubbing, cyanosis, or edema  Let third toe with dressing in place, clean, dry and intact.    LABS:                        10.0   6.60  )-----------( 225      ( 19 Oct 2021 07:29 )             31.2   10-19    138  |  106  |  17  ----------------------------<  203<H>  4.0   |  25  |  1.89<H>    Ca    8.9      19 Oct 2021 07:29      PTT - ( 18 Oct 2021 08:06 )  PTT:32.8 secCAPILLARY BLOOD GLUCOSE      POCT Blood Glucose.: 249 mg/dL (19 Oct 2021 11:11)  POCT Blood Glucose.: 203 mg/dL (19 Oct 2021 07:55)  POCT Blood Glucose.: 194 mg/dL (18 Oct 2021 21:29)  POCT Blood Glucose.: 189 mg/dL (18 Oct 2021 17:16)      Culture - Tissue with Gram Stain (collected 19 Oct 2021 01:32)  Source: .Tissue  Gram Stain (19 Oct 2021 03:47):    No polymorphonuclear leukocytes seen per low power field    No organisms seen per oil power field    Culture - Tissue with Gram Stain (collected 19 Oct 2021 01:30)  Source: .Tissue  Gram Stain (19 Oct 2021 03:47):    No polymorphonuclear leukocytes seen per low power field    No organisms seen per oil power field    MEDICATIONS  (STANDING):  dextrose 40% Gel 15 Gram(s) Oral once  dextrose 40% Gel 15 Gram(s) Oral once  dextrose 5%. 1000 milliLiter(s) (50 mL/Hr) IV Continuous <Continuous>  dextrose 5%. 1000 milliLiter(s) (50 mL/Hr) IV Continuous <Continuous>  dextrose 5%. 1000 milliLiter(s) (100 mL/Hr) IV Continuous <Continuous>  dextrose 50% Injectable 25 Gram(s) IV Push once  dextrose 50% Injectable 12.5 Gram(s) IV Push once  dextrose 50% Injectable 25 Gram(s) IV Push once  enoxaparin Injectable 40 milliGRAM(s) SubCutaneous every 12 hours  glucagon  Injectable 1 milliGRAM(s) IntraMuscular once  hydroxychloroquine 200 milliGRAM(s) Oral two times a day  insulin glargine Injectable (LANTUS) 20 Unit(s) SubCutaneous at bedtime  insulin lispro (ADMELOG) corrective regimen sliding scale   SubCutaneous three times a day before meals  insulin lispro (ADMELOG) corrective regimen sliding scale   SubCutaneous at bedtime  insulin lispro Injectable (ADMELOG) 6 Unit(s) SubCutaneous three times a day before meals  losartan 50 milliGRAM(s) Oral daily  piperacillin/tazobactam IVPB.. 3.375 Gram(s) IV Intermittent every 8 hours  predniSONE   Tablet 5 milliGRAM(s) Oral daily  saccharomyces boulardii 250 milliGRAM(s) Oral two times a day  sodium chloride 0.9%. 1000 milliLiter(s) (150 mL/Hr) IV Continuous <Continuous>    MEDICATIONS  (PRN):  acetaminophen   Tablet .. 650 milliGRAM(s) Oral every 6 hours PRN Temp greater or equal to 38C (100.4F), Mild Pain (1 - 3)  melatonin 3 milliGRAM(s) Oral at bedtime PRN Insomnia

## 2021-10-19 NOTE — PROGRESS NOTE ADULT - ASSESSMENT
60yo F s/p left foot partial 3rd ray resection closed (DOS 10/18)  - pt seen and evaluated  - afebrile, WBC 6.6   - left foot surgical incision well coapted w/ sutures intact, no hematoma, flaps warm, no acute signs of infection  - low concern for residual infection  - high concern for viability  - discharge pending clean bone culture w/ no growth x 2 days & viability   - keep dressing clean, dry and intact   - discussed w/ attending

## 2021-10-19 NOTE — PROGRESS NOTE ADULT - ASSESSMENT
61y old  Female who presents with a chief complaint of Infection of left 3rd toe in DM patient, concern for osteomyelitis  MRI consistent with osteomyelitis   cultures with morganella, group B strep, ecoli, enterococcus   Zosyn to cover all organisms and no MRSA so no need for further doses of vancomycin   may not need long course of antibiotics depending on pathology and cultures   diabetes is not well controlled with HbA1c 11.6   needs good glycemic control if she wants a chance at good healing and she is on steroids for her lupus which can also impair healing     10/19: s/p Partial amputation of third ray of left foot by open approach yesterday, no fevers, no WBC, Cr is elevated, Vanco was stopped yesterday, Zosyn continued, surgical tissue culture - no organisms seen, pathology pending     left 3rd toe osteomyelitis   poorly controlled diabetes  Lupus     Plan:   continue Zosyn   Vanco stopped yesterday  follow culture data  awaiting for pathology  pain control   control diabetes   good but not too tight glycemic control   try to hold as much immunosuppression as possible in the setting of an active infection

## 2021-10-19 NOTE — PROGRESS NOTE ADULT - ASSESSMENT
61 years old female with h/o HTN, DM2, SLE, obesity (BMI 39.2) present to ED with complain of worsening infection in her left 3rd toe, was admitted for osteomyelitis for the left third toe.  Patient is s/p day # 1 of left third toe partial resection for osteomyelitis.

## 2021-10-19 NOTE — PROGRESS NOTE ADULT - SUBJECTIVE AND OBJECTIVE BOX
Podiatry pager #: [phone] ([place])/ [phone] ([facility])    Patient is a 61y old  Female who presents with a chief complaint of Infection of left 3rd toe in DM patient, concern for osteomyelitis (18 Oct 2021 21:39)       INTERVAL HPI/OVERNIGHT EVENTS:  Patient seen and evaluated at bedside.  Pt is resting comfortable in NAD. Denies N/V/F/C.     Allergies    No Known Allergies    Intolerances        Vital Signs Last 24 Hrs  T(C): 36.8 (19 Oct 2021 08:02), Max: 36.9 (19 Oct 2021 05:10)  T(F): 98.2 (19 Oct 2021 08:02), Max: 98.4 (19 Oct 2021 05:10)  HR: 80 (19 Oct 2021 08:02) (66 - 94)  BP: 105/69 (19 Oct 2021 08:02) (105/56 - 149/81)  BP(mean): --  RR: 14 (19 Oct 2021 08:02) (14 - 20)  SpO2: 98% (19 Oct 2021 08:02) (97% - 100%)    LABS:                        10.0   6.60  )-----------( 225      ( 19 Oct 2021 07:29 )             31.2     10-19    138  |  106  |  17  ----------------------------<  203<H>  4.0   |  25  |  1.89<H>    Ca    8.9      19 Oct 2021 07:29      PT/INR - ( 17 Oct 2021 10:43 )   PT: 12.0 sec;   INR: 1.04 ratio         PTT - ( 18 Oct 2021 08:06 )  PTT:32.8 sec    CAPILLARY BLOOD GLUCOSE      POCT Blood Glucose.: 203 mg/dL (19 Oct 2021 07:55)  POCT Blood Glucose.: 194 mg/dL (18 Oct 2021 21:29)  POCT Blood Glucose.: 189 mg/dL (18 Oct 2021 17:16)  POCT Blood Glucose.: 185 mg/dL (18 Oct 2021 11:03)      Lower Extremity Physical Exam:  Vascular: DP/PT 2/4, B/L, CFT <3 seconds B/L, Temperature gradient warm to warm B/L.   Neuro: Epicritic sensation intact to the level of digits, B/L.  Musculoskeletal/Ortho: unremarkable  Skin: s/p left foot partial 3rd ray resection closed (DOS 10/18): sutures intact, skin well coapted, flaps warm, no hematoma, no acute signs of infection     RADIOLOGY & ADDITIONAL TESTS:

## 2021-10-19 NOTE — PROGRESS NOTE ADULT - SUBJECTIVE AND OBJECTIVE BOX
MARIO MALDONADO  MRN-[id number]    Interval History: the pt was seen and examined earlier, no distress, sitting in her bed, no complains, has mild pain in her foot at surgical site. The pt is afebrile, on RA, no leukocytosis, Cr is elevated.     PAST MEDICAL & SURGICAL HISTORY:  Lupus    Diabetes mellitus    Hyperlipidemia    Pericarditis    Obesity    HTN (hypertension)    Lupus    Diabetes    Hypertension     delivery delivered   section x 3    Skin tag  Skin tag removal, right shoulder    H/O:         ROS:    [ ] Unobtainable because:  [x ] All other systems negative    Constitutional: no fever, no chills  Head: no trauma  Eyes: no vision changes, no eye pain  ENT:  no sore throat, no rhinorrhea  Cardiovascular:  no chest pain, no palpitation  Respiratory:  no SOB, no cough  GI: no abd pain, no vomiting, no diarrhea  urinary: no dysuria, no hematuria, no flank pain  musculoskeletal:  pain at surgical site is controlled   skin:  no rash  neurology:  no headache, no seizure, no change in mental status  psych: no anxiety, no depression       Allergies  No Known Allergies        ANTIMICROBIALS:  hydroxychloroquine 200 two times a day  piperacillin/tazobactam IVPB.. 3.375 every 8 hours      OTHER MEDS:  acetaminophen   Tablet .. 650 milliGRAM(s) Oral every 6 hours PRN  dextrose 40% Gel 15 Gram(s) Oral once  dextrose 40% Gel 15 Gram(s) Oral once  dextrose 5%. 1000 milliLiter(s) IV Continuous <Continuous>  dextrose 5%. 1000 milliLiter(s) IV Continuous <Continuous>  dextrose 5%. 1000 milliLiter(s) IV Continuous <Continuous>  dextrose 50% Injectable 25 Gram(s) IV Push once  dextrose 50% Injectable 12.5 Gram(s) IV Push once  dextrose 50% Injectable 25 Gram(s) IV Push once  enoxaparin Injectable 40 milliGRAM(s) SubCutaneous every 12 hours  glucagon  Injectable 1 milliGRAM(s) IntraMuscular once  insulin glargine Injectable (LANTUS) 20 Unit(s) SubCutaneous at bedtime  insulin lispro (ADMELOG) corrective regimen sliding scale   SubCutaneous three times a day before meals  insulin lispro (ADMELOG) corrective regimen sliding scale   SubCutaneous at bedtime  insulin lispro Injectable (ADMELOG) 6 Unit(s) SubCutaneous three times a day before meals  losartan 50 milliGRAM(s) Oral daily  melatonin 3 milliGRAM(s) Oral at bedtime PRN  predniSONE   Tablet 5 milliGRAM(s) Oral daily  saccharomyces boulardii 250 milliGRAM(s) Oral two times a day  sodium chloride 0.9%. 1000 milliLiter(s) IV Continuous <Continuous>      Vital Signs Last 24 Hrs  T(C): 37.1 (19 Oct 2021 11:10), Max: 37.1 (19 Oct 2021 11:10)  T(F): 98.7 (19 Oct 2021 11:10), Max: 98.7 (19 Oct 2021 11:10)  HR: 89 (19 Oct 2021 11:10) (73 - 94)  BP: 124/75 (19 Oct 2021 11:10) (105/56 - 149/81)  BP(mean): --  RR: 17 (19 Oct 2021 11:10) (14 - 20)  SpO2: 99% (19 Oct 2021 11:10) (97% - 100%)    Physical Exam:  Constitutional: non-toxic, no distress, obese   HEAD/EYES: anicteric, no conjunctival injection  ENT:  supple, no thrush  Cardiovascular:   normal S1, S2, no murmur, no edema  Respiratory:  clear BS bilaterally, no wheezes, no rales  GI:  soft, non-tender, normal bowel sounds  :  no elliott, no CVA tenderness  Musculoskeletal:  no synovitis, normal ROM, foot wrapped and dressed s/p Partial amputation of third ray of left foot by open approach 10/18  Neurologic: awake and alert, normal strength, no focal findings  Skin:  no rash, no erythema, no phlebitis  Heme/Onc: no lymphadenopathy   Psychiatric:  awake, alert, appropriate mood    WBC Count: 6.60 K/uL (10-19 @ 07:29)  WBC Count: 6.55 K/uL (10-18 @ 08:06)  WBC Count: 5.79 K/uL (10-15 @ 06:54)  WBC Count: 6.43 K/uL (10-14 @ 11:09)                            10.0   6.60  )-----------( 225      ( 19 Oct 2021 07:29 )             31.2       10-19    138  |  106  |  17  ----------------------------<  203<H>  4.0   |  25  |  1.89<H>    Ca    8.9      19 Oct 2021 07:29            Creatinine Trend: 1.89<--, 1.17<--, 1.01<--, 1.05<--      MICROBIOLOGY:  v  .Tissue  10-19-21 --  --    No polymorphonuclear leukocytes seen per low power field  No organisms seen per oil power field      .Tissue  10-19-21 --  --    No polymorphonuclear leukocytes seen per low power field  No organisms seen per oil power field      .Abscess left foot  10-15-21   Moderate Escherichia coli  Numerous Morganella morganii  Few Streptococcus agalactiae (Group B) isolated  Group B streptococci are susceptible to ampicillin,  penicillin and cefazolin, but may be resistant to  erythromycin and clindamycin.  Recommendations for intrapartum prophylaxis for Group B  streptococci are penicillin or ampicillin.  Rare Enterococcus faecalis  Moderate Bacteroides fragilis "Susceptibilities not performed"  --  Escherichia coli  Morganella morganii  Enterococcus faecalis      Clean Catch Clean Catch (Midstream)  10-14-21   <10,000 CFU/mL Normal Urogenital Jeannine  --  --      .Blood Blood-Peripheral  10-14-21   No growth to date.  --  --                C-Reactive Protein, Serum: 6 (10-14)              RADIOLOGY:

## 2021-10-20 LAB
-  AMIKACIN: SIGNIFICANT CHANGE UP
-  AMOXICILLIN/CLAVULANIC ACID: SIGNIFICANT CHANGE UP
-  AMPICILLIN/SULBACTAM: SIGNIFICANT CHANGE UP
-  AMPICILLIN: SIGNIFICANT CHANGE UP
-  AZTREONAM: SIGNIFICANT CHANGE UP
-  CEFAZOLIN: SIGNIFICANT CHANGE UP
-  CEFEPIME: SIGNIFICANT CHANGE UP
-  CEFOXITIN: SIGNIFICANT CHANGE UP
-  CEFTAZIDIME/AVIBACTAM: SIGNIFICANT CHANGE UP
-  CEFTOLOZANE/TAZOBACTAM: SIGNIFICANT CHANGE UP
-  CEFTRIAXONE: SIGNIFICANT CHANGE UP
-  CIPROFLOXACIN: SIGNIFICANT CHANGE UP
-  ERTAPENEM: SIGNIFICANT CHANGE UP
-  GENTAMICIN: SIGNIFICANT CHANGE UP
-  IMIPENEM: SIGNIFICANT CHANGE UP
-  LEVOFLOXACIN: SIGNIFICANT CHANGE UP
-  MEROPENEM: SIGNIFICANT CHANGE UP
-  PIPERACILLIN/TAZOBACTAM: SIGNIFICANT CHANGE UP
-  TOBRAMYCIN: SIGNIFICANT CHANGE UP
-  TRIMETHOPRIM/SULFAMETHOXAZOLE: SIGNIFICANT CHANGE UP
ANION GAP SERPL CALC-SCNC: 5 MMOL/L — SIGNIFICANT CHANGE UP (ref 5–17)
APPEARANCE UR: CLEAR — SIGNIFICANT CHANGE UP
BACTERIA # UR AUTO: ABNORMAL
BILIRUB UR-MCNC: NEGATIVE — SIGNIFICANT CHANGE UP
BUN SERPL-MCNC: 23 MG/DL — SIGNIFICANT CHANGE UP (ref 7–23)
CALCIUM SERPL-MCNC: 8.8 MG/DL — SIGNIFICANT CHANGE UP (ref 8.5–10.1)
CHLORIDE SERPL-SCNC: 108 MMOL/L — SIGNIFICANT CHANGE UP (ref 96–108)
CO2 SERPL-SCNC: 26 MMOL/L — SIGNIFICANT CHANGE UP (ref 22–31)
COLOR SPEC: YELLOW — SIGNIFICANT CHANGE UP
CREAT SERPL-MCNC: 3.07 MG/DL — HIGH (ref 0.5–1.3)
DIFF PNL FLD: NEGATIVE — SIGNIFICANT CHANGE UP
EPI CELLS # UR: SIGNIFICANT CHANGE UP
GLUCOSE BLDC GLUCOMTR-MCNC: 132 MG/DL — HIGH (ref 70–99)
GLUCOSE BLDC GLUCOMTR-MCNC: 164 MG/DL — HIGH (ref 70–99)
GLUCOSE BLDC GLUCOMTR-MCNC: 173 MG/DL — HIGH (ref 70–99)
GLUCOSE BLDC GLUCOMTR-MCNC: 73 MG/DL — SIGNIFICANT CHANGE UP (ref 70–99)
GLUCOSE BLDC GLUCOMTR-MCNC: 75 MG/DL — SIGNIFICANT CHANGE UP (ref 70–99)
GLUCOSE SERPL-MCNC: 214 MG/DL — HIGH (ref 70–99)
GLUCOSE UR QL: NEGATIVE MG/DL — SIGNIFICANT CHANGE UP
KETONES UR-MCNC: NEGATIVE — SIGNIFICANT CHANGE UP
LEUKOCYTE ESTERASE UR-ACNC: ABNORMAL
METHOD TYPE: SIGNIFICANT CHANGE UP
NITRITE UR-MCNC: NEGATIVE — SIGNIFICANT CHANGE UP
PH UR: 6 — SIGNIFICANT CHANGE UP (ref 5–8)
POTASSIUM SERPL-MCNC: 4.4 MMOL/L — SIGNIFICANT CHANGE UP (ref 3.5–5.3)
POTASSIUM SERPL-SCNC: 4.4 MMOL/L — SIGNIFICANT CHANGE UP (ref 3.5–5.3)
PROT UR-MCNC: NEGATIVE MG/DL — SIGNIFICANT CHANGE UP
SODIUM SERPL-SCNC: 139 MMOL/L — SIGNIFICANT CHANGE UP (ref 135–145)
SP GR SPEC: 1 — LOW (ref 1.01–1.02)
UROBILINOGEN FLD QL: NEGATIVE MG/DL — SIGNIFICANT CHANGE UP
VANCOMYCIN FLD-MCNC: 12.9 UG/ML — SIGNIFICANT CHANGE UP
WBC UR QL: SIGNIFICANT CHANGE UP

## 2021-10-20 PROCEDURE — 99223 1ST HOSP IP/OBS HIGH 75: CPT

## 2021-10-20 PROCEDURE — 76775 US EXAM ABDO BACK WALL LIM: CPT | Mod: 26

## 2021-10-20 PROCEDURE — 99232 SBSQ HOSP IP/OBS MODERATE 35: CPT

## 2021-10-20 PROCEDURE — 99233 SBSQ HOSP IP/OBS HIGH 50: CPT

## 2021-10-20 RX ORDER — HEPARIN SODIUM 5000 [USP'U]/ML
7500 INJECTION INTRAVENOUS; SUBCUTANEOUS EVERY 12 HOURS
Refills: 0 | Status: DISCONTINUED | OUTPATIENT
Start: 2021-10-20 | End: 2021-10-24

## 2021-10-20 RX ADMIN — INSULIN GLARGINE 20 UNIT(S): 100 INJECTION, SOLUTION SUBCUTANEOUS at 21:55

## 2021-10-20 RX ADMIN — Medication 6 UNIT(S): at 11:48

## 2021-10-20 RX ADMIN — Medication 250 MILLIGRAM(S): at 05:41

## 2021-10-20 RX ADMIN — SODIUM CHLORIDE 150 MILLILITER(S): 9 INJECTION INTRAMUSCULAR; INTRAVENOUS; SUBCUTANEOUS at 05:41

## 2021-10-20 RX ADMIN — Medication 6 UNIT(S): at 08:46

## 2021-10-20 RX ADMIN — Medication 5 MILLIGRAM(S): at 05:41

## 2021-10-20 RX ADMIN — Medication 200 MILLIGRAM(S): at 18:28

## 2021-10-20 RX ADMIN — Medication 200 MILLIGRAM(S): at 05:41

## 2021-10-20 RX ADMIN — Medication 250 MILLIGRAM(S): at 18:28

## 2021-10-20 RX ADMIN — OXYCODONE AND ACETAMINOPHEN 1 TABLET(S): 5; 325 TABLET ORAL at 01:15

## 2021-10-20 RX ADMIN — PIPERACILLIN AND TAZOBACTAM 25 GRAM(S): 4; .5 INJECTION, POWDER, LYOPHILIZED, FOR SOLUTION INTRAVENOUS at 05:41

## 2021-10-20 RX ADMIN — Medication 2: at 08:45

## 2021-10-20 RX ADMIN — PIPERACILLIN AND TAZOBACTAM 25 GRAM(S): 4; .5 INJECTION, POWDER, LYOPHILIZED, FOR SOLUTION INTRAVENOUS at 13:22

## 2021-10-20 RX ADMIN — HEPARIN SODIUM 7500 UNIT(S): 5000 INJECTION INTRAVENOUS; SUBCUTANEOUS at 18:28

## 2021-10-20 RX ADMIN — OXYCODONE AND ACETAMINOPHEN 1 TABLET(S): 5; 325 TABLET ORAL at 00:12

## 2021-10-20 RX ADMIN — ENOXAPARIN SODIUM 40 MILLIGRAM(S): 100 INJECTION SUBCUTANEOUS at 05:41

## 2021-10-20 RX ADMIN — Medication 2: at 11:48

## 2021-10-20 RX ADMIN — PIPERACILLIN AND TAZOBACTAM 25 GRAM(S): 4; .5 INJECTION, POWDER, LYOPHILIZED, FOR SOLUTION INTRAVENOUS at 21:43

## 2021-10-20 NOTE — PROGRESS NOTE ADULT - ASSESSMENT
61 years old female with h/o HTN, DM2, SLE, obesity (BMI 39.2) present to ED with complain of worsening infection in her left 3rd toe, was admitted for osteomyelitis for the left third toe.  Patient is s/p day # 2 of left third toe partial resection for osteomyelitis.

## 2021-10-20 NOTE — CONSULT NOTE ADULT - NSCONSULTADDITIONALINFOA_GEN_ALL_CORE
Pt does not have significant PVR of urine. Renal sonogram shows 375 ml Pre Void urine in the bladder. Post void cannot be more than that. No hydronephrosis. BUN ?Creatinine ratio indicates renal etiology. Acute renal failure is not related to obstruction or urinary retention. Agree with Nephrologist impression of ATN. Would prefer not to insert Freitas which may introduce bacteria and cause UTI, which may be difficult to eliminate in a diabetic patient.   Thanks.

## 2021-10-20 NOTE — CONSULT NOTE ADULT - REASON FOR ADMISSION
Infection of left 3rd toe in DM patient, concern for osteomyelitis

## 2021-10-20 NOTE — PROGRESS NOTE ADULT - SUBJECTIVE AND OBJECTIVE BOX
Patient is a 61y old  Female who presents with a chief complaint of Infection of left 3rd toe in DM patient, concern for osteomyelitis (19 Oct 2021 13:47)       INTERVAL HPI/OVERNIGHT EVENTS:  Patient seen and evaluated at bedside.  Pt is resting comfortable in NAD. Denies N/V/F/C.      Allergies    No Known Allergies    Intolerances        Vital Signs Last 24 Hrs  T(C): 36.7 (20 Oct 2021 05:11), Max: 37.1 (19 Oct 2021 11:10)  T(F): 98 (20 Oct 2021 05:11), Max: 98.7 (19 Oct 2021 11:10)  HR: 82 (20 Oct 2021 05:11) (73 - 89)  BP: 107/66 (20 Oct 2021 05:11) (107/66 - 130/65)  BP(mean): --  RR: 16 (20 Oct 2021 05:11) (16 - 18)  SpO2: 99% (20 Oct 2021 05:11) (98% - 99%)    LABS:                        10.0   6.60  )-----------( 225      ( 19 Oct 2021 07:29 )             31.2     10-19    138  |  106  |  17  ----------------------------<  203<H>  4.0   |  25  |  1.89<H>    Ca    8.9      19 Oct 2021 07:29          CAPILLARY BLOOD GLUCOSE      POCT Blood Glucose.: 164 mg/dL (20 Oct 2021 08:25)  POCT Blood Glucose.: 92 mg/dL (19 Oct 2021 22:00)  POCT Blood Glucose.: 128 mg/dL (19 Oct 2021 15:47)  POCT Blood Glucose.: 249 mg/dL (19 Oct 2021 11:11)      Lower Extremity Physical Exam:  Vascular: DP/PT 2/4, B/L, CFT <3 seconds B/L, Temperature gradient warm to warm B/L.   Neuro: Epicritic sensation intact to the level of digits, B/L.  Musculoskeletal/Ortho: unremarkable  Skin: s/p left foot partial 3rd ray resection closed (DOS 10/18): sutures intact, skin well coapted, flaps warm, no hematoma, no acute signs of infection     RADIOLOGY & ADDITIONAL TESTS:

## 2021-10-20 NOTE — PROGRESS NOTE ADULT - ASSESSMENT
60yo F s/p left foot partial 3rd ray resection closed (DOS 10/18)  - pt seen and evaluated  - afebrile, WBC 6.6   - left foot surgical incision well coapted w/ sutures intact, no hematoma, flaps warm, no acute signs of infection  - low concern for residual infection  - high concern for viability  - Clean bone margin no growth to date  - Discharge pending final ID recs  - keep dressing clean, dry and intact   - discussed w/ attending

## 2021-10-20 NOTE — CONSULT NOTE ADULT - SUBJECTIVE AND OBJECTIVE BOX
HPI:  61 years old female with h/o HTN, DM2, SLE, obesity (BMI 39.2) present to ED with complain of worsening infection in her left 3rd toe. Patient reported some ulcer which started 1 months ago after getting her nail clipped. She was seen in [facility] 2 weeks ago and was given a course of oral Augmentin but patient only took it for 5 days as she has some itchiness/rash in her private area. Since then, her toe swelling/ulcer progressively worsen. Denied any fever or chills.   No leukocytosis. Hb 11, Plt 242, lactate 3.1, Cr 1.05, glucose 219. Patient was given vanco/zosyn in ED. ED consulted podiatry. CXR image reviewed, no focal consolidation. (14 Oct 2021 13:09) Patient with urinary frequency and found with PVR of 400. Elliott catheter insertion attempted multiple times by RNs and medical team. Urology consulted for difficult elliott insertion.     Patient seen and examined at bedside with Dr. Peterson. Patient states she has no difficulty voiding and does not feel uncomfortable. Admits to urinary frequency for about 3 years that wakes her up at night, but denies any quality of life issues. Denies fever, chills, N/V/D, CP, SOB, hematuria, dysuria.     PAST MEDICAL & SURGICAL HISTORY:  Lupus  Diabetes mellitus  Hyperlipidemia  Pericarditis  Obesity  HTN (hypertension)  Lupus  Diabetes  Hypertension   section x 3  Skin tag removal, right shoulder    H/O:     Review of Systems:  contained within HPI    MEDICATIONS  (STANDING):  dextrose 40% Gel 15 Gram(s) Oral once  dextrose 40% Gel 15 Gram(s) Oral once  dextrose 5%. 1000 milliLiter(s) (50 mL/Hr) IV Continuous <Continuous>  dextrose 5%. 1000 milliLiter(s) (50 mL/Hr) IV Continuous <Continuous>  dextrose 5%. 1000 milliLiter(s) (100 mL/Hr) IV Continuous <Continuous>  dextrose 50% Injectable 25 Gram(s) IV Push once  dextrose 50% Injectable 12.5 Gram(s) IV Push once  dextrose 50% Injectable 25 Gram(s) IV Push once  glucagon  Injectable 1 milliGRAM(s) IntraMuscular once  heparin   Injectable 7500 Unit(s) SubCutaneous every 12 hours  hydroxychloroquine 200 milliGRAM(s) Oral two times a day  insulin glargine Injectable (LANTUS) 20 Unit(s) SubCutaneous at bedtime  insulin lispro (ADMELOG) corrective regimen sliding scale   SubCutaneous three times a day before meals  insulin lispro (ADMELOG) corrective regimen sliding scale   SubCutaneous at bedtime  insulin lispro Injectable (ADMELOG) 6 Unit(s) SubCutaneous three times a day before meals  losartan 50 milliGRAM(s) Oral daily  piperacillin/tazobactam IVPB.. 3.375 Gram(s) IV Intermittent every 8 hours  predniSONE   Tablet 5 milliGRAM(s) Oral daily  saccharomyces boulardii 250 milliGRAM(s) Oral two times a day  sodium chloride 0.9%. 1000 milliLiter(s) (150 mL/Hr) IV Continuous <Continuous>    MEDICATIONS  (PRN):  acetaminophen   Tablet .. 650 milliGRAM(s) Oral every 6 hours PRN Temp greater or equal to 38C (100.4F), Mild Pain (1 - 3)  melatonin 3 milliGRAM(s) Oral at bedtime PRN Insomnia  oxycodone    5 mG/acetaminophen 325 mG 1 Tablet(s) Oral every 6 hours PRN Severe Pain (7 - 10)      Allergies    No Known Allergies    Intolerances    SOCIAL HISTORY          Smoking: Yes [ ]  No [X]   ______pk yrs          ETOH  Yes [ ]  No [X]  Social [ ]          DRUGS:  Yes [ ]  No [X]  if so what______________    FAMILY HISTORY:  Family history of coronary artery disease    Vital Signs Last 24 Hrs  T(C): 36.8 (20 Oct 2021 18:12), Max: 36.9 (19 Oct 2021 23:25)  T(F): 98.2 (20 Oct 2021 18:12), Max: 98.5 (20 Oct 2021 12:20)  HR: 94 (20 Oct 2021 18:12) (73 - 94)  BP: 149/71 (20 Oct 2021 18:12) (107/66 - 149/71)  BP(mean): --  RR: 18 (20 Oct 2021 18:12) (16 - 18)  SpO2: 98% (20 Oct 2021 18:12) (98% - 99%)    Physical Exam:  General:  Appears stated age, well-groomed, well-nourished, no distress  Eyes: DIONNA  HENT: WNL, no JVD  Chest: clear breath sounds  Cardiovascular: Regular rate & rhythm  Abdomen: soft, non tender, non distended  Extremities: non edematous extremities, LLE dressing c/d/i  Skin: no rashes, or lesions present  Neuro/Psych:  Alert, oriented to time, place and person     LABS:                        10.0   6.60  )-----------( 225      ( 19 Oct 2021 07:29 )             31.2     10-20    139  |  108  |  23  ----------------------------<  214<H>  4.4   |  26  |  3.07<H>    Ca    8.8      20 Oct 2021 10:18    Urinalysis Basic - ( 20 Oct 2021 15:37 )    Color: Yellow / Appearance: Clear / S.005 / pH: x  Gluc: x / Ketone: Negative  / Bili: Negative / Urobili: Negative mg/dL   Blood: x / Protein: Negative mg/dL / Nitrite: Negative   Leuk Esterase: Small / RBC: x / WBC 3-5   Sq Epi: x / Non Sq Epi: Few / Bacteria: Few      RADIOLOGY & ADDITIONAL STUDIES:  < from: US Renal (10.20.21 @ 17:35) >  EXAM:  US KIDNEY(S)                            PROCEDURE DATE:  10/20/2021          INTERPRETATION:  CLINICAL INFORMATION: Acute kidney injury    COMPARISON: None available.    TECHNIQUE: Sonography of the kidneys and bladder.    FINDINGS:    Rightkidney: 11.7 cm. No renal mass, hydronephrosis or calculi. Tiny mid pole cyst measures 10 x 9 x 9 mm    Left kidney: 11.0 cm. No renal mass, hydronephrosis or calculi.    Urinary bladder: Within normal limits. Bilateral ureteral jets are seen bladdervolume is 357 cc. Patient did not void.    IMPRESSION:    No evidence of hydronephrosis.    A/P  61 years old female with h/o HTN, DM2, SLE, obesity (BMI 39.2), admitted with osteomyelitis, now with urinary frequency and difficult elliott insertion    - per Dr. Peterson, patient without any discomfort and able to void on her own despite high PVRs. Elliott not indicated at this time   - INDER 2/2 ?drug induced ATN  - continue medical management and supportive care  - will sign off, please reconsult prn  - d/w Dr. Peterson   HPI:  61 years old female with h/o HTN, DM2, SLE, obesity (BMI 39.2) present to ED with complain of worsening infection in her left 3rd toe. Patient reported some ulcer which started 1 months ago after getting her nail clipped. She was seen in [facility] 2 weeks ago and was given a course of oral Augmentin but patient only took it for 5 days as she has some itchiness/rash in her private area. Since then, her toe swelling/ulcer progressively worsen. Denied any fever or chills.   No leukocytosis. Hb 11, Plt 242, lactate 3.1, Cr 1.05, glucose 219. Patient was given vanco/zosyn in ED. ED consulted podiatry. CXR image reviewed, no focal consolidation. (14 Oct 2021 13:09) Patient with urinary frequency and found with PVR of 400. Elliott catheter insertion attempted multiple times by RNs and medical team. Urology consulted for difficult elliott insertion.     Patient seen and examined at bedside with Dr. Peterson. Patient states she has no difficulty voiding and does not feel uncomfortable. Admits to urinary frequency for about 3 years that wakes her up at night, but denies any quality of life issues. Denies fever, chills, N/V/D, CP, SOB, hematuria, dysuria.     PAST MEDICAL & SURGICAL HISTORY:  Lupus  Diabetes mellitus  Hyperlipidemia  Pericarditis  Obesity  HTN (hypertension)  Lupus  Diabetes  Hypertension   section x 3  Skin tag removal, right shoulder    H/O:     Review of Systems:  contained within HPI    MEDICATIONS  (STANDING):  dextrose 40% Gel 15 Gram(s) Oral once  dextrose 40% Gel 15 Gram(s) Oral once  dextrose 5%. 1000 milliLiter(s) (50 mL/Hr) IV Continuous <Continuous>  dextrose 5%. 1000 milliLiter(s) (50 mL/Hr) IV Continuous <Continuous>  dextrose 5%. 1000 milliLiter(s) (100 mL/Hr) IV Continuous <Continuous>  dextrose 50% Injectable 25 Gram(s) IV Push once  dextrose 50% Injectable 12.5 Gram(s) IV Push once  dextrose 50% Injectable 25 Gram(s) IV Push once  glucagon  Injectable 1 milliGRAM(s) IntraMuscular once  heparin   Injectable 7500 Unit(s) SubCutaneous every 12 hours  hydroxychloroquine 200 milliGRAM(s) Oral two times a day  insulin glargine Injectable (LANTUS) 20 Unit(s) SubCutaneous at bedtime  insulin lispro (ADMELOG) corrective regimen sliding scale   SubCutaneous three times a day before meals  insulin lispro (ADMELOG) corrective regimen sliding scale   SubCutaneous at bedtime  insulin lispro Injectable (ADMELOG) 6 Unit(s) SubCutaneous three times a day before meals  losartan 50 milliGRAM(s) Oral daily  piperacillin/tazobactam IVPB.. 3.375 Gram(s) IV Intermittent every 8 hours  predniSONE   Tablet 5 milliGRAM(s) Oral daily  saccharomyces boulardii 250 milliGRAM(s) Oral two times a day  sodium chloride 0.9%. 1000 milliLiter(s) (150 mL/Hr) IV Continuous <Continuous>    MEDICATIONS  (PRN):  acetaminophen   Tablet .. 650 milliGRAM(s) Oral every 6 hours PRN Temp greater or equal to 38C (100.4F), Mild Pain (1 - 3)  melatonin 3 milliGRAM(s) Oral at bedtime PRN Insomnia  oxycodone    5 mG/acetaminophen 325 mG 1 Tablet(s) Oral every 6 hours PRN Severe Pain (7 - 10)      Allergies    No Known Allergies    Intolerances    SOCIAL HISTORY          Smoking: Yes [ ]  No [X]   ______pk yrs          ETOH  Yes [ ]  No [X]  Social [ ]          DRUGS:  Yes [ ]  No [X]  if so what______________    FAMILY HISTORY:  Family history of coronary artery disease    Vital Signs Last 24 Hrs  T(C): 36.8 (20 Oct 2021 18:12), Max: 36.9 (19 Oct 2021 23:25)  T(F): 98.2 (20 Oct 2021 18:12), Max: 98.5 (20 Oct 2021 12:20)  HR: 94 (20 Oct 2021 18:12) (73 - 94)  BP: 149/71 (20 Oct 2021 18:12) (107/66 - 149/71)  BP(mean): --  RR: 18 (20 Oct 2021 18:12) (16 - 18)  SpO2: 98% (20 Oct 2021 18:12) (98% - 99%)    Physical Exam:  General:  Appears stated age, well-groomed, well-nourished, no distress  Eyes: DIONNA  HENT: WNL, no JVD  Chest: clear breath sounds  Cardiovascular: Regular rate & rhythm  Abdomen: soft, non tender, non distended  Extremities: non edematous extremities, LLE dressing c/d/i  Skin: no rashes, or lesions present  Neuro/Psych:  Alert, oriented to time, place and person     LABS:                        10.0   6.60  )-----------( 225      ( 19 Oct 2021 07:29 )             31.2     10-20    139  |  108  |  23  ----------------------------<  214<H>  4.4   |  26  |  3.07<H>    Ca    8.8      20 Oct 2021 10:18    Urinalysis Basic - ( 20 Oct 2021 15:37 )    Color: Yellow / Appearance: Clear / S.005 / pH: x  Gluc: x / Ketone: Negative  / Bili: Negative / Urobili: Negative mg/dL   Blood: x / Protein: Negative mg/dL / Nitrite: Negative   Leuk Esterase: Small / RBC: x / WBC 3-5   Sq Epi: x / Non Sq Epi: Few / Bacteria: Few      RADIOLOGY & ADDITIONAL STUDIES:  < from: US Renal (10.20.21 @ 17:35) >  EXAM:  US KIDNEY(S)                            PROCEDURE DATE:  10/20/2021          INTERPRETATION:  CLINICAL INFORMATION: Acute kidney injury    COMPARISON: None available.    TECHNIQUE: Sonography of the kidneys and bladder.    FINDINGS:    Rightkidney: 11.7 cm. No renal mass, hydronephrosis or calculi. Tiny mid pole cyst measures 10 x 9 x 9 mm    Left kidney: 11.0 cm. No renal mass, hydronephrosis or calculi.    Urinary bladder: Within normal limits. Bilateral ureteral jets are seen bladdervolume is 357 cc. Patient did not void.    IMPRESSION:    No evidence of hydronephrosis.    A/P  61 years old female with h/o HTN, DM2, SLE, obesity (BMI 39.2), admitted with osteomyelitis, now with urinary frequency and difficult elliott insertion    - per Dr. Peterson, patient without any discomfort and able to void on her own despite high PVRs. No hydronephrosis on renal US. Elliott not indicated at this time   - INDER 2/2 ?drug induced ATN  - continue medical management and supportive care  - will sign off, please reconsult prn  - d/w Dr. Peterson

## 2021-10-20 NOTE — CONSULT NOTE ADULT - SUBJECTIVE AND OBJECTIVE BOX
[facility] NEPHROLOGY SERVICES, [facility]  NEPHROLOGY AND HYPERTENSION  [address]  [address]  [place] [address]  [phone]    MD OSCAR MOSELEY MD ANDREY GONCHARUK, MD MADHU KORRAPATI, MD YELENA ROSENBERG, MD BINNY KOSHY, MD CHRISTOPHER CAPUTO, MD EDWARD BOVER, MD      Information from chart:  "Patient is a 61y old  Female who presents with a chief complaint of Infection of left 3rd toe in DM patient, concern for osteomyelitis (20 Oct 2021 13:39)    HPI:  61 years old female with h/o HTN, DM2, SLE, obesity (BMI 39.2) present to ED with complain of worsening infection in her left 3rd toe. Patient reported some ulcer which started 1 months ago after getting her nail clipped. She was seen in [facility] 2 weeks ago and was given a course of oral Augmentin but patient only took it for 5 days as she has some itchiness/rash in her private area. Since then, her toe swelling/ulcer progressively worsen. Denied any fever or chills.   No leukocytosis. Hb 11, Plt 242, lactate 3.1, Cr 1.05, glucose 219. Patient was given vanco/zosyn in ED. ED consulted podiatry. CXR image reviewed, no focal consolidation      Patient post 3rd toe partial resection 10/18.  Noted Vanco and Zosyn rx;   Last dose Vanco 10/18; level today 12.9;   Hx of SLE on plaquenil and Cellcept  Renal bx five years ago no overt nephritis as per patient     SH: no toxic habits  FH: mother-DM  PSH:  C section  Allergy: NKDA (14 Oct 2021 13:09)   "    PAST MEDICAL & SURGICAL HISTORY:  Lupus    Diabetes mellitus    Hyperlipidemia    Pericarditis    Obesity    HTN (hypertension)    Lupus    Diabetes    Hypertension     delivery delivered   section x 3    Skin tag  Skin tag removal, right shoulder    H/O:       FAMILY HISTORY:  Family history of coronary artery disease      Allergies    No Known Allergies    Intolerances      Home Medications:  acetaminophen 325 mg oral tablet: 2 tab(s) orally every 6 hours, As needed, Temp greater or equal to 38C (100.4F) (14 Oct 2021 10:56)  Augmentin 875 mg-125 mg oral tablet: 1 tab(s) orally every 12 hours (14 Oct 2021 10:56)  CellCept 500 mg oral tablet: 2 tab(s) orally 2 times a day (14 Oct 2021 10:56)  losartan 50 mg oral tablet: 1 tab(s) orally once a day (14 Oct 2021 10:56)  NovoLOG 100 units/mL subcutaneous solution: 12 unit(s) subcutaneous 3 times a day (14 Oct 2021 10:56)  nystatin 100,000 units/g topical powder (obsolete):  (14 Oct 2021 10:57)  Plaquenil 200 mg oral tablet: orally 2 times a day (14 Oct 2021 10:56)  predniSONE 5 mg oral tablet: 1 tab(s) orally once a day (14 Oct 2021 10:56)    MEDICATIONS  (STANDING):  dextrose 40% Gel 15 Gram(s) Oral once  dextrose 40% Gel 15 Gram(s) Oral once  dextrose 5%. 1000 milliLiter(s) (50 mL/Hr) IV Continuous <Continuous>  dextrose 5%. 1000 milliLiter(s) (100 mL/Hr) IV Continuous <Continuous>  dextrose 5%. 1000 milliLiter(s) (50 mL/Hr) IV Continuous <Continuous>  dextrose 50% Injectable 25 Gram(s) IV Push once  dextrose 50% Injectable 12.5 Gram(s) IV Push once  dextrose 50% Injectable 25 Gram(s) IV Push once  glucagon  Injectable 1 milliGRAM(s) IntraMuscular once  heparin   Injectable 7500 Unit(s) SubCutaneous every 12 hours  hydroxychloroquine 200 milliGRAM(s) Oral two times a day  insulin glargine Injectable (LANTUS) 20 Unit(s) SubCutaneous at bedtime  insulin lispro (ADMELOG) corrective regimen sliding scale   SubCutaneous three times a day before meals  insulin lispro (ADMELOG) corrective regimen sliding scale   SubCutaneous at bedtime  insulin lispro Injectable (ADMELOG) 6 Unit(s) SubCutaneous three times a day before meals  losartan 50 milliGRAM(s) Oral daily  piperacillin/tazobactam IVPB.. 3.375 Gram(s) IV Intermittent every 8 hours  predniSONE   Tablet 5 milliGRAM(s) Oral daily  saccharomyces boulardii 250 milliGRAM(s) Oral two times a day  sodium chloride 0.9%. 1000 milliLiter(s) (150 mL/Hr) IV Continuous <Continuous>    MEDICATIONS  (PRN):  acetaminophen   Tablet .. 650 milliGRAM(s) Oral every 6 hours PRN Temp greater or equal to 38C (100.4F), Mild Pain (1 - 3)  melatonin 3 milliGRAM(s) Oral at bedtime PRN Insomnia  oxycodone    5 mG/acetaminophen 325 mG 1 Tablet(s) Oral every 6 hours PRN Severe Pain (7 - 10)    Vital Signs Last 24 Hrs  T(C): 36.9 (20 Oct 2021 12:20), Max: 36.9 (19 Oct 2021 23:25)  T(F): 98.5 (20 Oct 2021 12:20), Max: 98.5 (20 Oct 2021 12:20)  HR: 79 (20 Oct 2021 12:20) (73 - 89)  BP: 143/75 (20 Oct 2021 12:20) (107/66 - 143/75)  BP(mean): --  RR: 17 (20 Oct 2021 12:20) (16 - 18)  SpO2: 98% (20 Oct 2021 12:20) (98% - 99%)    Daily     Daily Weight in k.4 (20 Oct 2021 05:11)    10-20-21 @ 07:01  -  10-20-21 @ 15:26  --------------------------------------------------------  IN: 600 mL / OUT: 60 mL / NET: 540 mL      CAPILLARY BLOOD GLUCOSE      POCT Blood Glucose.: 173 mg/dL (20 Oct 2021 11:44)  POCT Blood Glucose.: 164 mg/dL (20 Oct 2021 08:25)  POCT Blood Glucose.: 92 mg/dL (19 Oct 2021 22:00)  POCT Blood Glucose.: 128 mg/dL (19 Oct 2021 15:47)    PHYSICAL EXAM:      T(C): 36.9 (10-20-21 @ 12:20), Max: 36.9 (10-19-21 @ 23:25)  HR: 79 (10-20-21 @ 12:20) (73 - 89)  BP: 143/75 (10-20-21 @ 12:20) (107/66 - 143/75)  RR: 17 (10-20-21 @ 12:20) (16 - 18)  SpO2: 98% (10-20- @ 12:20) (98% - 99%)  Wt(kg): --  Lungs clear  Heart S1S2  Abd soft NT ND  Extremities:   left LE dressed 1 edema              10-20    139  |  108  |  23  ----------------------------<  214<H>  4.4   |  26  |  3.07<H>    Ca    8.8      20 Oct 2021 10:18                            10.0   6.60  )-----------( 225      ( 19 Oct 2021 07:29 )             31.2     Creatinine Trend: 3.07<--, 1.89<--, 1.17<--, 1.01<--, 1.05<--          Assessment   INDER suspected Vanco/ Zosyn related ATN  Risk for infectious GN  SLE nephritis less likely     Plan  Vanco d/c'd  UA micro eos  LETTY; DsDNA, C3C4  Holding Cellcept    Vaughn Nava MD [facility] NEPHROLOGY SERVICES, [facility]  NEPHROLOGY AND HYPERTENSION  [address]  [address]  [place] [address]  [phone]    MD OSCAR MOSELEY MD ANDREY GONCHARUK, MD MADHU KORRAPATI, MD YELENA ROSENBERG, MD BINNY KOSHY, MD CHRISTOPHER CAPUTO, MD EDWARD BOVER, MD      Information from chart:  "Patient is a 61y old  Female who presents with a chief complaint of Infection of left 3rd toe in DM patient, concern for osteomyelitis (20 Oct 2021 13:39)    HPI:  61 years old female with h/o HTN, DM2, SLE, obesity (BMI 39.2) present to ED with complain of worsening infection in her left 3rd toe. Patient reported some ulcer which started 1 months ago after getting her nail clipped. She was seen in [facility] 2 weeks ago and was given a course of oral Augmentin but patient only took it for 5 days as she has some itchiness/rash in her private area. Since then, her toe swelling/ulcer progressively worsen. Denied any fever or chills.   No leukocytosis. Hb 11, Plt 242, lactate 3.1, Cr 1.05, glucose 219. Patient was given vanco/zosyn in ED. ED consulted podiatry. CXR image reviewed, no focal consolidation      Patient post 3rd toe partial resection 10/18.  Noted Vanco and Zosyn rx;   Last dose Vanco 10/18; level today 12.9;   Hx of SLE on plaquenil and Cellcept  Renal bx five years ago no overt nephritis as per patient     SH: no toxic habits  FH: mother-DM  PSH:  C section  Allergy: NKDA (14 Oct 2021 13:09)   "    PAST MEDICAL & SURGICAL HISTORY:  Lupus    Diabetes mellitus    Hyperlipidemia    Pericarditis    Obesity    HTN (hypertension)    Lupus    Diabetes    Hypertension     delivery delivered   section x 3    Skin tag  Skin tag removal, right shoulder    H/O:       FAMILY HISTORY:  Family history of coronary artery disease      Allergies    No Known Allergies    Intolerances      Home Medications:  acetaminophen 325 mg oral tablet: 2 tab(s) orally every 6 hours, As needed, Temp greater or equal to 38C (100.4F) (14 Oct 2021 10:56)  Augmentin 875 mg-125 mg oral tablet: 1 tab(s) orally every 12 hours (14 Oct 2021 10:56)  CellCept 500 mg oral tablet: 2 tab(s) orally 2 times a day (14 Oct 2021 10:56)  losartan 50 mg oral tablet: 1 tab(s) orally once a day (14 Oct 2021 10:56)  NovoLOG 100 units/mL subcutaneous solution: 12 unit(s) subcutaneous 3 times a day (14 Oct 2021 10:56)  nystatin 100,000 units/g topical powder (obsolete):  (14 Oct 2021 10:57)  Plaquenil 200 mg oral tablet: orally 2 times a day (14 Oct 2021 10:56)  predniSONE 5 mg oral tablet: 1 tab(s) orally once a day (14 Oct 2021 10:56)    MEDICATIONS  (STANDING):  dextrose 40% Gel 15 Gram(s) Oral once  dextrose 40% Gel 15 Gram(s) Oral once  dextrose 5%. 1000 milliLiter(s) (50 mL/Hr) IV Continuous <Continuous>  dextrose 5%. 1000 milliLiter(s) (100 mL/Hr) IV Continuous <Continuous>  dextrose 5%. 1000 milliLiter(s) (50 mL/Hr) IV Continuous <Continuous>  dextrose 50% Injectable 25 Gram(s) IV Push once  dextrose 50% Injectable 12.5 Gram(s) IV Push once  dextrose 50% Injectable 25 Gram(s) IV Push once  glucagon  Injectable 1 milliGRAM(s) IntraMuscular once  heparin   Injectable 7500 Unit(s) SubCutaneous every 12 hours  hydroxychloroquine 200 milliGRAM(s) Oral two times a day  insulin glargine Injectable (LANTUS) 20 Unit(s) SubCutaneous at bedtime  insulin lispro (ADMELOG) corrective regimen sliding scale   SubCutaneous three times a day before meals  insulin lispro (ADMELOG) corrective regimen sliding scale   SubCutaneous at bedtime  insulin lispro Injectable (ADMELOG) 6 Unit(s) SubCutaneous three times a day before meals  losartan 50 milliGRAM(s) Oral daily  piperacillin/tazobactam IVPB.. 3.375 Gram(s) IV Intermittent every 8 hours  predniSONE   Tablet 5 milliGRAM(s) Oral daily  saccharomyces boulardii 250 milliGRAM(s) Oral two times a day  sodium chloride 0.9%. 1000 milliLiter(s) (150 mL/Hr) IV Continuous <Continuous>    MEDICATIONS  (PRN):  acetaminophen   Tablet .. 650 milliGRAM(s) Oral every 6 hours PRN Temp greater or equal to 38C (100.4F), Mild Pain (1 - 3)  melatonin 3 milliGRAM(s) Oral at bedtime PRN Insomnia  oxycodone    5 mG/acetaminophen 325 mG 1 Tablet(s) Oral every 6 hours PRN Severe Pain (7 - 10)    Vital Signs Last 24 Hrs  T(C): 36.9 (20 Oct 2021 12:20), Max: 36.9 (19 Oct 2021 23:25)  T(F): 98.5 (20 Oct 2021 12:20), Max: 98.5 (20 Oct 2021 12:20)  HR: 79 (20 Oct 2021 12:20) (73 - 89)  BP: 143/75 (20 Oct 2021 12:20) (107/66 - 143/75)  BP(mean): --  RR: 17 (20 Oct 2021 12:20) (16 - 18)  SpO2: 98% (20 Oct 2021 12:20) (98% - 99%)    Daily     Daily Weight in k.4 (20 Oct 2021 05:11)    10-20-21 @ 07:01  -  10-20-21 @ 15:26  --------------------------------------------------------  IN: 600 mL / OUT: 60 mL / NET: 540 mL      CAPILLARY BLOOD GLUCOSE      POCT Blood Glucose.: 173 mg/dL (20 Oct 2021 11:44)  POCT Blood Glucose.: 164 mg/dL (20 Oct 2021 08:25)  POCT Blood Glucose.: 92 mg/dL (19 Oct 2021 22:00)  POCT Blood Glucose.: 128 mg/dL (19 Oct 2021 15:47)    PHYSICAL EXAM:      T(C): 36.9 (10-20-21 @ 12:20), Max: 36.9 (10-19-21 @ 23:25)  HR: 79 (10-20-21 @ 12:20) (73 - 89)  BP: 143/75 (10-20-21 @ 12:20) (107/66 - 143/75)  RR: 17 (10-20-21 @ 12:20) (16 - 18)  SpO2: 98% (10-20- @ 12:20) (98% - 99%)  Wt(kg): --  Lungs clear  Heart S1S2  Abd soft NT ND  Extremities:   left LE dressed 1 edema              10-20    139  |  108  |  23  ----------------------------<  214<H>  4.4   |  26  |  3.07<H>    Ca    8.8      20 Oct 2021 10:18                            10.0   6.60  )-----------( 225      ( 19 Oct 2021 07:29 )             31.2     Creatinine Trend: 3.07<--, 1.89<--, 1.17<--, 1.01<--, 1.05<--          Assessment   INDER suspected Vanco/ Zosyn related ATN  Risk for infectious GN  R/O Post renal factors  SLE nephritis less likely     Plan  Vanco d/c'd  UA micro eos  LETTY; DsDNA, C3C4  Holding Cellcept  Maintenance IVF  Urology evaluation    Vaughn Nava MD

## 2021-10-20 NOTE — PROGRESS NOTE ADULT - ASSESSMENT
61y old  Female who presents with a chief complaint of Infection of left 3rd toe in DM patient, concern for osteomyelitis  MRI consistent with osteomyelitis   cultures with morganella, group B strep, ecoli, enterococcus   Zosyn to cover all organisms and no MRSA so no need for further doses of vancomycin   may not need long course of antibiotics depending on pathology and cultures   diabetes is not well controlled with HbA1c 11.6   needs good glycemic control if she wants a chance at good healing and she is on steroids for her lupus which can also impair healing     10/19: s/p Partial amputation of third ray of left foot by open approach yesterday, no fevers, no WBC, Cr is elevated, Vanco was stopped yesterday, Zosyn continued, surgical tissue culture - no organisms seen, pathology pending   Attending addendum:  tolerated surgery well  back on zosyn   awaiting path and clean bone cultures   other wise doing well and has no complaints    10/20: remains afebrile, no new cbc, cr increased 3.07 - will monitor, possibly secondary to Vanco - discontinued two days ago, surgical cultures - one with no growth and the second one with streptococcus group B, pathology is pending.       left 3rd toe osteomyelitis   poorly controlled diabetes  Lupus   INDER    Plan:   continue Zosyn   follow culture data  awaiting for pathology  pain control   control diabetes   good but not too tight glycemic control   try to hold as much immunosuppression as possible in the setting of an active infection   monitor renal function  consider nephrology consult.   61y old  Female who presents with a chief complaint of Infection of left 3rd toe in DM patient, concern for osteomyelitis  MRI consistent with osteomyelitis   cultures with morganella, group B strep, ecoli, enterococcus   Zosyn to cover all organisms and no MRSA so no need for further doses of vancomycin   may not need long course of antibiotics depending on pathology and cultures   diabetes is not well controlled with HbA1c 11.6   needs good glycemic control if she wants a chance at good healing and she is on steroids for her lupus which can also impair healing     10/19: s/p Partial amputation of third ray of left foot by open approach yesterday, no fevers, no WBC, Cr is elevated, Vanco was stopped yesterday, Zosyn continued, surgical tissue culture - no organisms seen, pathology pending   Attending addendum:  tolerated surgery well  back on zosyn   awaiting path and clean bone cultures   other wise doing well and has no complaints    10/20: remains afebrile, no new cbc, cr increased 3.07 - will monitor, possibly secondary to Vanco/zosyn combination - vancomycin was discontinued two days ago, surgical cultures - one with no growth and the second one with streptococcus group B, pathology is pending.       left 3rd toe osteomyelitis   poorly controlled diabetes  Lupus   INDER    Plan:   continue Zosyn   follow culture data  awaiting for pathology  pain control   control diabetes   good but not too tight glycemic control   try to hold as much immunosuppression as possible in the setting of an active infection   monitor renal function  nephrology consulted and appreciated

## 2021-10-21 ENCOUNTER — TRANSCRIPTION ENCOUNTER (OUTPATIENT)
Age: 61
End: 2021-10-21

## 2021-10-21 LAB
ANION GAP SERPL CALC-SCNC: 6 MMOL/L — SIGNIFICANT CHANGE UP (ref 5–17)
ASO AB SER QL: <20 IU/ML — SIGNIFICANT CHANGE UP (ref 0–199)
BUN SERPL-MCNC: 21 MG/DL — SIGNIFICANT CHANGE UP (ref 7–23)
C3 SERPL-MCNC: 86 MG/DL — SIGNIFICANT CHANGE UP (ref 81–157)
C4 SERPL-MCNC: 20 MG/DL — SIGNIFICANT CHANGE UP (ref 13–39)
CALCIUM SERPL-MCNC: 8.9 MG/DL — SIGNIFICANT CHANGE UP (ref 8.5–10.1)
CHLORIDE SERPL-SCNC: 110 MMOL/L — HIGH (ref 96–108)
CO2 SERPL-SCNC: 23 MMOL/L — SIGNIFICANT CHANGE UP (ref 22–31)
CREAT SERPL-MCNC: 3.11 MG/DL — HIGH (ref 0.5–1.3)
EOSINOPHIL NFR URNS MANUAL: NEGATIVE — SIGNIFICANT CHANGE UP
GLUCOSE BLDC GLUCOMTR-MCNC: 212 MG/DL — HIGH (ref 70–99)
GLUCOSE BLDC GLUCOMTR-MCNC: 257 MG/DL — HIGH (ref 70–99)
GLUCOSE BLDC GLUCOMTR-MCNC: 93 MG/DL — SIGNIFICANT CHANGE UP (ref 70–99)
GLUCOSE BLDC GLUCOMTR-MCNC: 95 MG/DL — SIGNIFICANT CHANGE UP (ref 70–99)
GLUCOSE SERPL-MCNC: 162 MG/DL — HIGH (ref 70–99)
HCT VFR BLD CALC: 31.9 % — LOW (ref 34.5–45)
HGB BLD-MCNC: 9.7 G/DL — LOW (ref 11.5–15.5)
MCHC RBC-ENTMCNC: 25.1 PG — LOW (ref 27–34)
MCHC RBC-ENTMCNC: 30.4 GM/DL — LOW (ref 32–36)
MCV RBC AUTO: 82.6 FL — SIGNIFICANT CHANGE UP (ref 80–100)
NRBC # BLD: 0 /100 WBCS — SIGNIFICANT CHANGE UP (ref 0–0)
PLATELET # BLD AUTO: 204 K/UL — SIGNIFICANT CHANGE UP (ref 150–400)
POTASSIUM SERPL-MCNC: 4.1 MMOL/L — SIGNIFICANT CHANGE UP (ref 3.5–5.3)
POTASSIUM SERPL-SCNC: 4.1 MMOL/L — SIGNIFICANT CHANGE UP (ref 3.5–5.3)
RBC # BLD: 3.86 M/UL — SIGNIFICANT CHANGE UP (ref 3.8–5.2)
RBC # FLD: 14.7 % — HIGH (ref 10.3–14.5)
SODIUM SERPL-SCNC: 139 MMOL/L — SIGNIFICANT CHANGE UP (ref 135–145)
WBC # BLD: 9.05 K/UL — SIGNIFICANT CHANGE UP (ref 3.8–10.5)
WBC # FLD AUTO: 9.05 K/UL — SIGNIFICANT CHANGE UP (ref 3.8–10.5)

## 2021-10-21 PROCEDURE — 99232 SBSQ HOSP IP/OBS MODERATE 35: CPT

## 2021-10-21 RX ORDER — SIMETHICONE 80 MG/1
80 TABLET, CHEWABLE ORAL ONCE
Refills: 0 | Status: COMPLETED | OUTPATIENT
Start: 2021-10-21 | End: 2021-10-21

## 2021-10-21 RX ADMIN — Medication 5 MILLIGRAM(S): at 05:05

## 2021-10-21 RX ADMIN — Medication 4: at 07:48

## 2021-10-21 RX ADMIN — HEPARIN SODIUM 7500 UNIT(S): 5000 INJECTION INTRAVENOUS; SUBCUTANEOUS at 17:16

## 2021-10-21 RX ADMIN — HEPARIN SODIUM 7500 UNIT(S): 5000 INJECTION INTRAVENOUS; SUBCUTANEOUS at 05:06

## 2021-10-21 RX ADMIN — INSULIN GLARGINE 20 UNIT(S): 100 INJECTION, SOLUTION SUBCUTANEOUS at 21:18

## 2021-10-21 RX ADMIN — OXYCODONE AND ACETAMINOPHEN 1 TABLET(S): 5; 325 TABLET ORAL at 01:00

## 2021-10-21 RX ADMIN — OXYCODONE AND ACETAMINOPHEN 1 TABLET(S): 5; 325 TABLET ORAL at 00:48

## 2021-10-21 RX ADMIN — SIMETHICONE 80 MILLIGRAM(S): 80 TABLET, CHEWABLE ORAL at 17:30

## 2021-10-21 RX ADMIN — PIPERACILLIN AND TAZOBACTAM 25 GRAM(S): 4; .5 INJECTION, POWDER, LYOPHILIZED, FOR SOLUTION INTRAVENOUS at 21:08

## 2021-10-21 RX ADMIN — Medication 250 MILLIGRAM(S): at 05:05

## 2021-10-21 RX ADMIN — PIPERACILLIN AND TAZOBACTAM 25 GRAM(S): 4; .5 INJECTION, POWDER, LYOPHILIZED, FOR SOLUTION INTRAVENOUS at 05:05

## 2021-10-21 RX ADMIN — Medication 250 MILLIGRAM(S): at 17:16

## 2021-10-21 RX ADMIN — Medication 6 UNIT(S): at 07:48

## 2021-10-21 RX ADMIN — Medication 6: at 11:28

## 2021-10-21 RX ADMIN — LOSARTAN POTASSIUM 50 MILLIGRAM(S): 100 TABLET, FILM COATED ORAL at 05:05

## 2021-10-21 RX ADMIN — Medication 200 MILLIGRAM(S): at 17:16

## 2021-10-21 RX ADMIN — Medication 6 UNIT(S): at 11:28

## 2021-10-21 RX ADMIN — Medication 200 MILLIGRAM(S): at 05:05

## 2021-10-21 RX ADMIN — PIPERACILLIN AND TAZOBACTAM 25 GRAM(S): 4; .5 INJECTION, POWDER, LYOPHILIZED, FOR SOLUTION INTRAVENOUS at 13:08

## 2021-10-21 NOTE — PROGRESS NOTE ADULT - ASSESSMENT
61y old  Female who presents with a chief complaint of Infection of left 3rd toe in DM patient, concern for osteomyelitis  MRI consistent with osteomyelitis   cultures with morganella, group B strep, ecoli, enterococcus   Zosyn to cover all organisms and no MRSA so no need for further doses of vancomycin   may not need long course of antibiotics depending on pathology and cultures   diabetes is not well controlled with HbA1c 11.6   needs good glycemic control if she wants a chance at good healing and she is on steroids for her lupus which can also impair healing     10/19: s/p Partial amputation of third ray of left foot by open approach yesterday, no fevers, no WBC, Cr is elevated, Vanco was stopped yesterday, Zosyn continued, surgical tissue culture - no organisms seen, pathology pending   Attending addendum:  tolerated surgery well  back on zosyn   awaiting path and clean bone cultures   other wise doing well and has no complaints    10/20: remains afebrile, no new cbc, cr increased 3.07 - will monitor, possibly secondary to Vanco/zosyn combination - vancomycin was discontinued two days ago, surgical cultures - one with no growth and the second one with streptococcus group B, pathology is pending.   Attending addendum:  admitted for osteomyelitis and now s/p amputation  has developed INDER likely ATN perhaps from vanco/zosyn combination   vancomycin has since been stopped  alternative DDx includes lupus nephritis and labs being sent by nephro   continue zosyn to cover her polymicrobial infection   follow up on pathology for final clean bone margin results    10/21: pathology is back with clear margin, no fevers, Cr is trending up - seen by nephrology and urology, US negative for hydronephrosis. Surgical culture grew Streptococcus agalactiae (Group B) isolated and Morganella morganii. No objections to change of abx to po cefalosporin.       left 3rd toe osteomyelitis   poorly controlled diabetes  Lupus   INDER    Plan:   stop Zosyn   ok to change abx to po cefpodoxime   follow culture data  pathology - margin is clean  pain control   control diabetes   good but not too tight glycemic control   try to hold as much immunosuppression as possible in the setting of an active infection   monitor renal function  nephrology following   61y old  Female who presents with a chief complaint of Infection of left 3rd toe in DM patient, concern for osteomyelitis  MRI consistent with osteomyelitis   cultures with morganella, group B strep, ecoli, enterococcus   Zosyn to cover all organisms and no MRSA so no need for further doses of vancomycin   may not need long course of antibiotics depending on pathology and cultures   diabetes is not well controlled with HbA1c 11.6   needs good glycemic control if she wants a chance at good healing and she is on steroids for her lupus which can also impair healing     10/19: s/p Partial amputation of third ray of left foot by open approach yesterday, no fevers, no WBC, Cr is elevated, Vanco was stopped yesterday, Zosyn continued, surgical tissue culture - no organisms seen, pathology pending   Attending addendum:  tolerated surgery well  back on zosyn   awaiting path and clean bone cultures   other wise doing well and has no complaints    10/20: remains afebrile, no new cbc, cr increased 3.07 - will monitor, possibly secondary to Vanco/zosyn combination - vancomycin was discontinued two days ago, surgical cultures - one with no growth and the second one with streptococcus group B, pathology is pending.   Attending addendum:  admitted for osteomyelitis and now s/p amputation  has developed INDER likely ATN perhaps from vanco/zosyn combination   vancomycin has since been stopped  alternative DDx includes lupus nephritis and labs being sent by nephro   continue zosyn to cover her polymicrobial infection   follow up on pathology for final clean bone margin results    10/21: pathology is back with clear margin, no fevers, Cr is trending up - seen by nephrology and urology, US negative for hydronephrosis. Surgical culture grew Streptococcus agalactiae (Group B) isolated and Morganella morganii. No objections to change of abx to po cefalosporin.       left 3rd toe osteomyelitis   poorly controlled diabetes  Lupus   INDER    Plan:   stop Zosyn   ok to change abx to po cefpodoxime and PO flagyl   follow culture data  pathology - margin is clean  pain control   control diabetes   good but not too tight glycemic control   try to hold as much immunosuppression as possible in the setting of an active infection   monitor renal function which is slightly worse, management and workup per nephrology  nephrology following

## 2021-10-21 NOTE — PROGRESS NOTE ADULT - SUBJECTIVE AND OBJECTIVE BOX
Patient is a 61y old  Female who presents with a chief complaint of Infection of left 3rd toe in DM patient, concern for osteomyelitis (19 Oct 2021 10:14)    Today patient states that she slept well last night, states that she only has some discomfort on her operated toe but no actual pain, denies sob, chest pain, abdominal pain or headache, blurry vision.  Remaining of ROS unremarkable except per above.    SUBJECTIVE & OBJECTIVE: Pt seen and examined at bedside.   PHYSICAL EXAM:  Vital Signs Last 24 Hrs  T(C): 36.4 (21 Oct 2021 10:50), Max: 37.1 (21 Oct 2021 00:13)  T(F): 97.6 (21 Oct 2021 10:50), Max: 98.7 (21 Oct 2021 00:13)  HR: 88 (21 Oct 2021 10:50) (79 - 94)  BP: 143/75 (21 Oct 2021 10:50) (118/78 - 151/78)  BP(mean): --  RR: 18 (21 Oct 2021 10:50) (16 - 18)  SpO2: 98% (21 Oct 2021 10:50) (98% - 99%)    Patient is awake, alert and oriented. Calm and comfortable, pleasant.  GENERAL: NAD, well-groomed, well-developed  HEAD:  Atraumatic, Normocephalic  EYES: EOMI, PERRLA, conjunctiva and sclera clear  ENMT: Moist mucous membranes  NECK: Supple, No JVD  NERVOUS SYSTEM:  Alert & Oriented X3, Motor Strength 5/5 B/L upper and lower extremities; DTRs 2+ intact and symmetric  CHEST/LUNG: Clear to auscultation bilaterally; No rales, rhonchi, wheezing, or rubs  HEART: Regular rate and rhythm; No murmurs, rubs, or gallops  ABDOMEN: Soft, Nontender, Nondistended; Bowel sounds present  EXTREMITIES:  2+ Peripheral Pulses, No clubbing, cyanosis, or edema  Let third toe with dressing in place, clean, dry and intact.        CBC Full  -  ( 21 Oct 2021 08:32 )  WBC Count : 9.05 K/uL  RBC Count : 3.86 M/uL  Hemoglobin : 9.7 g/dL  Hematocrit : 31.9 %  Platelet Count - Automated : 204 K/uL  Mean Cell Volume : 82.6 fl  Mean Cell Hemoglobin : 25.1 pg  Mean Cell Hemoglobin Concentration : 30.4 gm/dL  Auto Neutrophil # : x  Auto Lymphocyte # : x  Auto Monocyte # : x  Auto Eosinophil # : x  Auto Basophil # : x  Auto Neutrophil % : x  Auto Lymphocyte % : x  Auto Monocyte % : x  Auto Eosinophil % : x  Auto Basophil % : x    10-21    139  |  110<H>  |  21  ----------------------------<  162<H>  4.1   |  23  |  3.11<H>    Ca    8.9      21 Oct 2021 08:32      BMI: BMI (kg/m2): 39.2 (10-14-21 @ 10:00)  HbA1c: A1C with Estimated Average Glucose Result: 11.6 % (10-15-21 @ 09:26)    Glucose: POCT Blood Glucose.: 257 mg/dL (10-21-21 @ 11:00)    BP: 143/75 (10-21-21 @ 10:50) (105/56 - 151/78)  Lipid Panel:             CAPILLARY BLOOD GLUCOSE      POCT Blood Glucose.: 257 mg/dL (21 Oct 2021 11:00)  POCT Blood Glucose.: 212 mg/dL (21 Oct 2021 07:44)  POCT Blood Glucose.: 132 mg/dL (20 Oct 2021 21:53)  POCT Blood Glucose.: 73 mg/dL (20 Oct 2021 18:27)  POCT Blood Glucose.: 75 mg/dL (20 Oct 2021 16:10)  POCT Blood Glucose.: 173 mg/dL (20 Oct 2021 11:44)      Urinalysis Basic - ( 20 Oct 2021 15:37 )    Color: Yellow / Appearance: Clear / S.005 / pH: x  Gluc: x / Ketone: Negative  / Bili: Negative / Urobili: Negative mg/dL   Blood: x / Protein: Negative mg/dL / Nitrite: Negative   Leuk Esterase: Small / RBC: x / WBC 3-5   Sq Epi: x / Non Sq Epi: Few / Bacteria: Few        Gram Stain (prelim) (20 Oct 2021 00:23):    No polymorphonuclear leukocytes seen per low power field    No organisms seen per oil power field  Preliminary Report (20 Oct 2021 00:23):    Rare Streptococcus agalactiae (Group B) isolated    Group B streptococci are susceptible to ampicillin,    penicillin and cefazolin, but may be resistant to    erythromycin and clindamycin.    Recommendations for intrapartum prophylaxis for Group B    streptococci are penicillin or ampicillin.    Rare Morganella morganii  Organism: Morganella morganii (20 Oct 2021 21:47)  Organism: Morganella morganii (20 Oct 2021 21:47)          Culture - Tissue with Gram Stain (collected 19 Oct 2021 01:32)  Source: .Tissue  Gram Stain (19 Oct 2021 03:47):    No polymorphonuclear leukocytes seen per low power field    No organisms seen per oil power field    Culture - Tissue with Gram Stain (collected 19 Oct 2021 01:30)  Source: .Tissue  Gram Stain (19 Oct 2021 03:47):    No polymorphonuclear leukocytes seen per low power field    No organisms seen per oil power field    MEDICATIONS  (STANDING):  dextrose 40% Gel 15 Gram(s) Oral once  dextrose 40% Gel 15 Gram(s) Oral once  dextrose 5%. 1000 milliLiter(s) (50 mL/Hr) IV Continuous <Continuous>  dextrose 5%. 1000 milliLiter(s) (50 mL/Hr) IV Continuous <Continuous>  dextrose 5%. 1000 milliLiter(s) (100 mL/Hr) IV Continuous <Continuous>  dextrose 50% Injectable 25 Gram(s) IV Push once  dextrose 50% Injectable 12.5 Gram(s) IV Push once  dextrose 50% Injectable 25 Gram(s) IV Push once  enoxaparin Injectable 40 milliGRAM(s) SubCutaneous every 12 hours  glucagon  Injectable 1 milliGRAM(s) IntraMuscular once  hydroxychloroquine 200 milliGRAM(s) Oral two times a day  insulin glargine Injectable (LANTUS) 20 Unit(s) SubCutaneous at bedtime  insulin lispro (ADMELOG) corrective regimen sliding scale   SubCutaneous three times a day before meals  insulin lispro (ADMELOG) corrective regimen sliding scale   SubCutaneous at bedtime  insulin lispro Injectable (ADMELOG) 6 Unit(s) SubCutaneous three times a day before meals  losartan 50 milliGRAM(s) Oral daily  piperacillin/tazobactam IVPB.. 3.375 Gram(s) IV Intermittent every 8 hours  predniSONE   Tablet 5 milliGRAM(s) Oral daily  saccharomyces boulardii 250 milliGRAM(s) Oral two times a day  sodium chloride 0.9%. 1000 milliLiter(s) (150 mL/Hr) IV Continuous <Continuous>    MEDICATIONS  (PRN):  acetaminophen   Tablet .. 650 milliGRAM(s) Oral every 6 hours PRN Temp greater or equal to 38C (100.4F), Mild Pain (1 - 3)  melatonin 3 milliGRAM(s) Oral at bedtime PRN Insomnia

## 2021-10-21 NOTE — DISCHARGE NOTE NURSING/CASE MANAGEMENT/SOCIAL WORK - NSDCVIVACCINE_GEN_ALL_CORE_FT
influenza, injectable, quadrivalent, preservative free; 08-Apr-2015 14:48; Thuy Mares (RN); [employer]; ; IntraMuscular; Deltoid Right.; 0.5 milliLiter(s); VIS (VIS Published: 19-Aug-2014, VIS Presented: 08-Apr-2015);

## 2021-10-21 NOTE — PROGRESS NOTE ADULT - SUBJECTIVE AND OBJECTIVE BOX
NEPHROLOGY PROGRESS NOTE    CHIEF COMPLAINT:  INDER    HPI:  Abrupt decline in renal function, now plateaued, and non oliguric    ROS:  denies SOB    EXAM:  T(F): 97.6 (10-21-21 @ 10:50)  HR: 88 (10-21-21 @ 10:50)  BP: 143/75 (10-21-21 @ 10:50)  RR: 18 (10-21-21 @ 10:50)  SpO2: 98% (10-21-21 @ 10:50)    Conversant, in no apparent distress  Normal respiratory effort, lungs clear bilaterally  Heart RRR with no murmur, no peripheral edema         LABS                             9.7    9.05  )-----------( 204      ( 21 Oct 2021 08:32 )             31.9          10-21    139  |  110<H>  |  21  ----------------------------<  162<H>  4.1   |  23  |  3.11<H>    Ca    8.9      21 Oct 2021 08:32      Urinalysis Basic - ( 20 Oct 2021 15:37 )  Color: Yellow / Appearance: Clear / S.005 / pH: x  Gluc: x / Ketone: Negative  / Bili: Negative / Urobili: Negative mg/dL   Blood: x / Protein: Negative mg/dL / Nitrite: Negative   Leuk Esterase: Small / RBC: x / WBC 3-5   Sq Epi: x / Non Sq Epi: Few / Bacteria: Few    Urine eosinophils negative      Assessment   INDER suspected Vanco related ATN  Risk for infectious GN  SLE nephritis less likely     Plan  LETTY; DsDNA, C3C4  Holding Cellcept  Discontinue IVF  Patient is voiding so elliott not necessary

## 2021-10-21 NOTE — PROGRESS NOTE ADULT - SUBJECTIVE AND OBJECTIVE BOX
MARIO MALDONADO  MRN-[id number]      Interval History: The pt was seen and examined earlier, no distress, no new complains. The pt is afebrile, Cr is trending up.     PAST MEDICAL & SURGICAL HISTORY:  Lupus    Diabetes mellitus    Hyperlipidemia    Pericarditis    Obesity    HTN (hypertension)    Lupus    Diabetes    Hypertension     delivery delivered   section x 3    Skin tag  Skin tag removal, right shoulder    H/O:         ROS:    [ ] Unobtainable because:  [x ] All other systems negative    Constitutional: no fever, no chills  Head: no trauma  Eyes: no vision changes, no eye pain  ENT:  no sore throat, no rhinorrhea  Cardiovascular:  no chest pain, no palpitation  Respiratory:  no SOB, no cough  GI:  no abd pain, no vomiting, no diarrhea  urinary: no dysuria, no hematuria, no flank pain  musculoskeletal:  no joint pain, no joint swelling  skin:  no rash  neurology:  no headache, no seizure, no change in mental status  psych: no anxiety, no depression         Allergies  No Known Allergies        ANTIMICROBIALS:  hydroxychloroquine 200 two times a day  piperacillin/tazobactam IVPB.. 3.375 every 8 hours      OTHER MEDS:  acetaminophen   Tablet .. 650 milliGRAM(s) Oral every 6 hours PRN  dextrose 40% Gel 15 Gram(s) Oral once  dextrose 40% Gel 15 Gram(s) Oral once  dextrose 5%. 1000 milliLiter(s) IV Continuous <Continuous>  dextrose 5%. 1000 milliLiter(s) IV Continuous <Continuous>  dextrose 5%. 1000 milliLiter(s) IV Continuous <Continuous>  dextrose 50% Injectable 25 Gram(s) IV Push once  dextrose 50% Injectable 12.5 Gram(s) IV Push once  dextrose 50% Injectable 25 Gram(s) IV Push once  glucagon  Injectable 1 milliGRAM(s) IntraMuscular once  heparin   Injectable 7500 Unit(s) SubCutaneous every 12 hours  insulin glargine Injectable (LANTUS) 20 Unit(s) SubCutaneous at bedtime  insulin lispro (ADMELOG) corrective regimen sliding scale   SubCutaneous three times a day before meals  insulin lispro (ADMELOG) corrective regimen sliding scale   SubCutaneous at bedtime  insulin lispro Injectable (ADMELOG) 6 Unit(s) SubCutaneous three times a day before meals  losartan 50 milliGRAM(s) Oral daily  melatonin 3 milliGRAM(s) Oral at bedtime PRN  predniSONE   Tablet 5 milliGRAM(s) Oral daily  saccharomyces boulardii 250 milliGRAM(s) Oral two times a day      Vital Signs Last 24 Hrs  T(C): 36.4 (21 Oct 2021 10:50), Max: 37.1 (21 Oct 2021 00:13)  T(F): 97.6 (21 Oct 2021 10:50), Max: 98.7 (21 Oct 2021 00:13)  HR: 88 (21 Oct 2021 10:50) (87 - 94)  BP: 143/75 (21 Oct 2021 10:50) (118/78 - 151/78)  BP(mean): --  RR: 18 (21 Oct 2021 10:50) (16 - 18)  SpO2: 98% (21 Oct 2021 10:50) (98% - 99%)    Physical Exam:  Constitutional: non-toxic, no distress, obese   HEAD/EYES: anicteric, no conjunctival injection  ENT:  supple, no thrush  Cardiovascular:   normal S1, S2, no murmur, no edema  Respiratory:  clear BS bilaterally, no wheezes, no rales  GI:  soft, non-tender, normal bowel sounds  :  no elliott, no CVA tenderness  Musculoskeletal:  no synovitis, normal ROM, foot wrapped and dressed s/p Partial amputation of third ray of left foot by open approach 10/18  Neurologic: awake and alert, normal strength, no focal findings  Skin:  no rash, no erythema, no phlebitis, PIV ok  Heme/Onc: no lymphadenopathy   Psychiatric:  awake, alert, appropriate mood    WBC Count: 9.05 K/uL (10-21 @ 08:32)  WBC Count: 6.60 K/uL (10-19 @ 07:29)  WBC Count: 6.55 K/uL (10-18 @ 08:06)  WBC Count: 5.79 K/uL (10-15 @ 06:54)                            9.7    9.05  )-----------( 204      ( 21 Oct 2021 08:32 )             31.9       10-21    139  |  110<H>  |  21  ----------------------------<  162<H>  4.1   |  23  |  3.11<H>    Ca    8.9      21 Oct 2021 08:32        Urinalysis Basic - ( 20 Oct 2021 15:37 )    Color: Yellow / Appearance: Clear / S.005 / pH: x  Gluc: x / Ketone: Negative  / Bili: Negative / Urobili: Negative mg/dL   Blood: x / Protein: Negative mg/dL / Nitrite: Negative   Leuk Esterase: Small / RBC: x / WBC 3-5   Sq Epi: x / Non Sq Epi: Few / Bacteria: Few        Creatinine Trend: 3.11<--, 3.07<--, 1.89<--, 1.17<--, 1.01<--, 1.05<--      MICROBIOLOGY:  v  .Tissue  10-19-21   No growth  --    No polymorphonuclear leukocytes seen per low power field  No organisms seen per oil power field      .Tissue  10-19-21   Rare Streptococcus agalactiae (Group B) isolated  Group B streptococci are susceptible to ampicillin,  penicillin and cefazolin, but may be resistant to  erythromycin and clindamycin.  Recommendations for intrapartum prophylaxis for Group B  streptococci are penicillin or ampicillin.  Rare Morganella morganii  --  Morganella morganii      .Abscess left foot  10-15-21   Moderate Escherichia coli  Numerous Morganella morganii  Few Streptococcus agalactiae (Group B) isolated  Group B streptococci are susceptible to ampicillin,  penicillin and cefazolin, but may be resistant to  erythromycin and clindamycin.  Recommendations for intrapartum prophylaxis for Group B  streptococci are penicillin or ampicillin.  Rare Enterococcus faecalis  Moderate Bacteroides fragilis "Susceptibilities not performed"  --  Escherichia coli  Morganella morganii  Enterococcus faecalis      Clean Catch Clean Catch (Midstream)  10-14-21   <10,000 CFU/mL Normal Urogenital Jeannine  --  --      .Blood Blood-Peripheral  10-14-21   No Growth Final  --  --                C-Reactive Protein, Serum: 6 (10-14)              RADIOLOGY:     MARIO MALDONADO  MRN-[id number]      Interval History: The pt was seen and examined earlier, no distress, no new complains. The pt is afebrile, Cr is trending up.     PAST MEDICAL & SURGICAL HISTORY:  Lupus    Diabetes mellitus    Hyperlipidemia    Pericarditis    Obesity    HTN (hypertension)    Lupus    Diabetes    Hypertension     delivery delivered   section x 3    Skin tag  Skin tag removal, right shoulder    H/O:         ROS:    [ ] Unobtainable because:  [x ] All other systems negative    Constitutional: no fever, no chills  Head: no trauma  Eyes: no vision changes, no eye pain  ENT:  no sore throat, no rhinorrhea  Cardiovascular:  no chest pain, no palpitation  Respiratory:  no SOB, no cough  GI:  no abd pain, no vomiting, no diarrhea  urinary: no dysuria, no hematuria, no flank pain  musculoskeletal:  no joint pain, no joint swelling  skin:  no rash  neurology:  no headache, no seizure, no change in mental status  psych: no anxiety, no depression         Allergies  No Known Allergies        ANTIMICROBIALS:  hydroxychloroquine 200 two times a day  piperacillin/tazobactam IVPB.. 3.375 every 8 hours      OTHER MEDS:  acetaminophen   Tablet .. 650 milliGRAM(s) Oral every 6 hours PRN  dextrose 40% Gel 15 Gram(s) Oral once  dextrose 40% Gel 15 Gram(s) Oral once  dextrose 5%. 1000 milliLiter(s) IV Continuous <Continuous>  dextrose 5%. 1000 milliLiter(s) IV Continuous <Continuous>  dextrose 5%. 1000 milliLiter(s) IV Continuous <Continuous>  dextrose 50% Injectable 25 Gram(s) IV Push once  dextrose 50% Injectable 12.5 Gram(s) IV Push once  dextrose 50% Injectable 25 Gram(s) IV Push once  glucagon  Injectable 1 milliGRAM(s) IntraMuscular once  heparin   Injectable 7500 Unit(s) SubCutaneous every 12 hours  insulin glargine Injectable (LANTUS) 20 Unit(s) SubCutaneous at bedtime  insulin lispro (ADMELOG) corrective regimen sliding scale   SubCutaneous three times a day before meals  insulin lispro (ADMELOG) corrective regimen sliding scale   SubCutaneous at bedtime  insulin lispro Injectable (ADMELOG) 6 Unit(s) SubCutaneous three times a day before meals  losartan 50 milliGRAM(s) Oral daily  melatonin 3 milliGRAM(s) Oral at bedtime PRN  predniSONE   Tablet 5 milliGRAM(s) Oral daily  saccharomyces boulardii 250 milliGRAM(s) Oral two times a day      Vital Signs Last 24 Hrs  T(C): 36.4 (21 Oct 2021 10:50), Max: 37.1 (21 Oct 2021 00:13)  T(F): 97.6 (21 Oct 2021 10:50), Max: 98.7 (21 Oct 2021 00:13)  HR: 88 (21 Oct 2021 10:50) (87 - 94)  BP: 143/75 (21 Oct 2021 10:50) (118/78 - 151/78)  BP(mean): --  RR: 18 (21 Oct 2021 10:50) (16 - 18)  SpO2: 98% (21 Oct 2021 10:50) (98% - 99%)    Physical Exam:  Constitutional: non-toxic, no distress, obese   HEAD/EYES: anicteric, no conjunctival injection  ENT:  supple, no thrush  Cardiovascular:   normal S1, S2, no murmur, no edema  Respiratory:  clear BS bilaterally, no wheezes, no rales  GI:  soft, non-tender, normal bowel sounds  :  no elliott, no CVA tenderness  Musculoskeletal:  no synovitis, normal ROM, foot wrapped and dressed s/p Partial amputation of third ray of left foot by open approach 10/18  Neurologic: awake and alert, normal strength, no focal findings  Skin:  no rash, no erythema, no phlebitis, PIV ok  Psychiatric:  awake, alert, appropriate mood    WBC Count: 9.05 K/uL (10-21 @ 08:32)  WBC Count: 6.60 K/uL (10-19 @ 07:29)  WBC Count: 6.55 K/uL (10-18 @ 08:06)  WBC Count: 5.79 K/uL (10-15 @ 06:54)                            9.7    9.05  )-----------( 204      ( 21 Oct 2021 08:32 )             31.9       10-21    139  |  110<H>  |  21  ----------------------------<  162<H>  4.1   |  23  |  3.11<H>    Ca    8.9      21 Oct 2021 08:32        Urinalysis Basic - ( 20 Oct 2021 15:37 )    Color: Yellow / Appearance: Clear / S.005 / pH: x  Gluc: x / Ketone: Negative  / Bili: Negative / Urobili: Negative mg/dL   Blood: x / Protein: Negative mg/dL / Nitrite: Negative   Leuk Esterase: Small / RBC: x / WBC 3-5   Sq Epi: x / Non Sq Epi: Few / Bacteria: Few        Creatinine Trend: 3.11<--, 3.07<--, 1.89<--, 1.17<--, 1.01<--, 1.05<--      MICROBIOLOGY:  v  .Tissue  10-19-21   No growth  --    No polymorphonuclear leukocytes seen per low power field  No organisms seen per oil power field      .Tissue  10-19-21   Rare Streptococcus agalactiae (Group B) isolated  Group B streptococci are susceptible to ampicillin,  penicillin and cefazolin, but may be resistant to  erythromycin and clindamycin.  Recommendations for intrapartum prophylaxis for Group B  streptococci are penicillin or ampicillin.  Rare Morganella morganii  --  Morganella morganii      .Abscess left foot  10-15-21   Moderate Escherichia coli  Numerous Morganella morganii  Few Streptococcus agalactiae (Group B) isolated  Group B streptococci are susceptible to ampicillin,  penicillin and cefazolin, but may be resistant to  erythromycin and clindamycin.  Recommendations for intrapartum prophylaxis for Group B  streptococci are penicillin or ampicillin.  Rare Enterococcus faecalis  Moderate Bacteroides fragilis "Susceptibilities not performed"  --  Escherichia coli  Morganella morganii  Enterococcus faecalis      Clean Catch Clean Catch (Midstream)  10-14-21   <10,000 CFU/mL Normal Urogenital Jeannine  --  --      .Blood Blood-Peripheral  10-14-21   No Growth Final  --  --                C-Reactive Protein, Serum: 6 (10-14)              RADIOLOGY:

## 2021-10-21 NOTE — DISCHARGE NOTE NURSING/CASE MANAGEMENT/SOCIAL WORK - NSDCFUADDAPPT_GEN_ALL_CORE_FT
Podiatry Discharge Instructions:  Follow up: Please follow up with Dr. Zuniga within 1 week of discharge from the hospital, please call [phone] for appointment and discuss that you recently were seen in the hospital.  Wound Care: Please leave your dressing clean dry intact until your follow up appointment   Weight bearing: Please weight bear as tolerated in a surgical shoe to the left heel  Antibiotics: Please continue as instructed.

## 2021-10-21 NOTE — DISCHARGE NOTE NURSING/CASE MANAGEMENT/SOCIAL WORK - PATIENT PORTAL LINK FT
You can access the FollowMyHealth Patient Portal offered by [facility] by registering at the following website: [email or web address]. By joining [facility]’s FollowMyHealth portal, you will also be able to view your health information using other applications (apps) compatible with our system.

## 2021-10-22 LAB
ANION GAP SERPL CALC-SCNC: 8 MMOL/L — SIGNIFICANT CHANGE UP (ref 5–17)
BUN SERPL-MCNC: 21 MG/DL — SIGNIFICANT CHANGE UP (ref 7–23)
CALCIUM SERPL-MCNC: 8.5 MG/DL — SIGNIFICANT CHANGE UP (ref 8.5–10.1)
CHLORIDE SERPL-SCNC: 112 MMOL/L — HIGH (ref 96–108)
CO2 SERPL-SCNC: 24 MMOL/L — SIGNIFICANT CHANGE UP (ref 22–31)
CREAT SERPL-MCNC: 3.21 MG/DL — HIGH (ref 0.5–1.3)
GLUCOSE BLDC GLUCOMTR-MCNC: 116 MG/DL — HIGH (ref 70–99)
GLUCOSE BLDC GLUCOMTR-MCNC: 119 MG/DL — HIGH (ref 70–99)
GLUCOSE BLDC GLUCOMTR-MCNC: 181 MG/DL — HIGH (ref 70–99)
GLUCOSE BLDC GLUCOMTR-MCNC: 241 MG/DL — HIGH (ref 70–99)
GLUCOSE SERPL-MCNC: 160 MG/DL — HIGH (ref 70–99)
POTASSIUM SERPL-MCNC: 4 MMOL/L — SIGNIFICANT CHANGE UP (ref 3.5–5.3)
POTASSIUM SERPL-SCNC: 4 MMOL/L — SIGNIFICANT CHANGE UP (ref 3.5–5.3)
SODIUM SERPL-SCNC: 144 MMOL/L — SIGNIFICANT CHANGE UP (ref 135–145)

## 2021-10-22 PROCEDURE — 99232 SBSQ HOSP IP/OBS MODERATE 35: CPT

## 2021-10-22 PROCEDURE — 36000 PLACE NEEDLE IN VEIN: CPT

## 2021-10-22 PROCEDURE — 76937 US GUIDE VASCULAR ACCESS: CPT | Mod: 26

## 2021-10-22 RX ORDER — CEFPODOXIME PROXETIL 100 MG
400 TABLET ORAL EVERY 24 HOURS
Refills: 0 | Status: DISCONTINUED | OUTPATIENT
Start: 2021-10-22 | End: 2021-10-24

## 2021-10-22 RX ORDER — METRONIDAZOLE 500 MG
500 TABLET ORAL EVERY 8 HOURS
Refills: 0 | Status: DISCONTINUED | OUTPATIENT
Start: 2021-10-22 | End: 2021-10-24

## 2021-10-22 RX ADMIN — Medication 650 MILLIGRAM(S): at 06:56

## 2021-10-22 RX ADMIN — Medication 650 MILLIGRAM(S): at 07:26

## 2021-10-22 RX ADMIN — PIPERACILLIN AND TAZOBACTAM 25 GRAM(S): 4; .5 INJECTION, POWDER, LYOPHILIZED, FOR SOLUTION INTRAVENOUS at 05:56

## 2021-10-22 RX ADMIN — Medication 500 MILLIGRAM(S): at 13:03

## 2021-10-22 RX ADMIN — Medication 200 MILLIGRAM(S): at 17:21

## 2021-10-22 RX ADMIN — Medication 500 MILLIGRAM(S): at 21:49

## 2021-10-22 RX ADMIN — Medication 6 UNIT(S): at 16:26

## 2021-10-22 RX ADMIN — Medication 200 MILLIGRAM(S): at 05:56

## 2021-10-22 RX ADMIN — Medication 400 MILLIGRAM(S): at 11:27

## 2021-10-22 RX ADMIN — LOSARTAN POTASSIUM 50 MILLIGRAM(S): 100 TABLET, FILM COATED ORAL at 05:56

## 2021-10-22 RX ADMIN — Medication 250 MILLIGRAM(S): at 05:56

## 2021-10-22 RX ADMIN — INSULIN GLARGINE 20 UNIT(S): 100 INJECTION, SOLUTION SUBCUTANEOUS at 21:49

## 2021-10-22 RX ADMIN — HEPARIN SODIUM 7500 UNIT(S): 5000 INJECTION INTRAVENOUS; SUBCUTANEOUS at 17:21

## 2021-10-22 RX ADMIN — HEPARIN SODIUM 7500 UNIT(S): 5000 INJECTION INTRAVENOUS; SUBCUTANEOUS at 05:56

## 2021-10-22 RX ADMIN — Medication 6 UNIT(S): at 07:31

## 2021-10-22 RX ADMIN — Medication 5 MILLIGRAM(S): at 05:56

## 2021-10-22 RX ADMIN — Medication 2: at 07:30

## 2021-10-22 RX ADMIN — Medication 250 MILLIGRAM(S): at 17:21

## 2021-10-22 RX ADMIN — Medication 6 UNIT(S): at 11:26

## 2021-10-22 RX ADMIN — Medication 4: at 11:26

## 2021-10-22 NOTE — PROGRESS NOTE ADULT - ASSESSMENT
61 years old female with h/o HTN, DM2, SLE, obesity (BMI 39.2) present to ED with complain of worsening infection in her left 3rd toe, was admitted for osteomyelitis for the left third toe.  Patient is s/p of left third toe partial resection for osteomyelitis on 10/18.

## 2021-10-22 NOTE — PROGRESS NOTE ADULT - SUBJECTIVE AND OBJECTIVE BOX
MARIO MALDONADO  MRN-[id number]    Interval History: The pt was seen and examined earlier, no distress, no new complains, got a new IV in her right arm earlier as left arm PIV infiltrated. The pt is afebrile, on RA, Cr continues to increase.     PAST MEDICAL & SURGICAL HISTORY:  Lupus    Diabetes mellitus    Hyperlipidemia    Pericarditis    Obesity    HTN (hypertension)    Lupus    Diabetes    Hypertension     delivery delivered   section x 3    Skin tag  Skin tag removal, right shoulder    H/O:         ROS:    [ ] Unobtainable because:  [x] All other systems negative    Constitutional: no fever, no chills  Head: no trauma  Eyes: no vision changes, no eye pain  ENT:  no sore throat, no rhinorrhea  Cardiovascular:  no chest pain, no palpitation  Respiratory:  no SOB, no cough  GI:  no abd pain, no vomiting, no diarrhea  urinary: no dysuria, no hematuria, no flank pain  musculoskeletal:  no joint pain, no joint swelling  skin:  no rash  neurology:  no headache, no seizure, no change in mental status  psych: no anxiety, no depression         Allergies  No Known Allergies        ANTIMICROBIALS:  cefpodoxime 400 every 24 hours  hydroxychloroquine 200 two times a day  metroNIDAZOLE    Tablet 500 every 8 hours      OTHER MEDS:  acetaminophen   Tablet .. 650 milliGRAM(s) Oral every 6 hours PRN  dextrose 40% Gel 15 Gram(s) Oral once  dextrose 40% Gel 15 Gram(s) Oral once  dextrose 5%. 1000 milliLiter(s) IV Continuous <Continuous>  dextrose 5%. 1000 milliLiter(s) IV Continuous <Continuous>  dextrose 5%. 1000 milliLiter(s) IV Continuous <Continuous>  dextrose 50% Injectable 25 Gram(s) IV Push once  dextrose 50% Injectable 12.5 Gram(s) IV Push once  dextrose 50% Injectable 25 Gram(s) IV Push once  glucagon  Injectable 1 milliGRAM(s) IntraMuscular once  heparin   Injectable 7500 Unit(s) SubCutaneous every 12 hours  insulin glargine Injectable (LANTUS) 20 Unit(s) SubCutaneous at bedtime  insulin lispro (ADMELOG) corrective regimen sliding scale   SubCutaneous three times a day before meals  insulin lispro (ADMELOG) corrective regimen sliding scale   SubCutaneous at bedtime  insulin lispro Injectable (ADMELOG) 6 Unit(s) SubCutaneous three times a day before meals  losartan 50 milliGRAM(s) Oral daily  melatonin 3 milliGRAM(s) Oral at bedtime PRN  predniSONE   Tablet 5 milliGRAM(s) Oral daily  saccharomyces boulardii 250 milliGRAM(s) Oral two times a day      Vital Signs Last 24 Hrs  T(C): 36.3 (22 Oct 2021 11:25), Max: 36.9 (22 Oct 2021 05:42)  T(F): 97.4 (22 Oct 2021 11:25), Max: 98.4 (22 Oct 2021 05:42)  HR: 76 (22 Oct 2021 11:25) (76 - 90)  BP: 120/79 (22 Oct 2021 11:25) (120/73 - 152/76)  BP(mean): --  RR: 18 (22 Oct 2021 11:25) (16 - 19)  SpO2: 99% (22 Oct 2021 11:25) (97% - 100%)    Physical Exam:  Constitutional: non-toxic, no distress, obese   HEAD/EYES: anicteric, no conjunctival injection  ENT:  supple, no thrush  Cardiovascular:   normal S1, S2, no murmur, no edema  Respiratory:  clear BS bilaterally, no wheezes, no rales  GI:  soft, non-tender, normal bowel sounds  :  no elliott, no CVA tenderness  Musculoskeletal:  no synovitis, normal ROM, foot wrapped and dressed s/p Partial amputation of third ray of left foot by open approach 10/18  Neurologic: awake and alert, normal strength, no focal findings  Skin:  no rash, no erythema, Left PIV ok, right arm IV removed - no erythema or significant swelling, not tender   Psychiatric:  awake, alert, appropriate mood    WBC Count: 9.05 K/uL (10-21 @ 08:32)  WBC Count: 6.60 K/uL (10-19 @ 07:29)  WBC Count: 6.55 K/uL (10-18 @ 08:06)                            9.7    9.05  )-----------( 204      ( 21 Oct 2021 08:32 )             31.9       10-22    144  |  112<H>  |  21  ----------------------------<  160<H>  4.0   |  24  |  3.21<H>    Ca    8.5      22 Oct 2021 07:01        Urinalysis Basic - ( 20 Oct 2021 15:37 )    Color: Yellow / Appearance: Clear / S.005 / pH: x  Gluc: x / Ketone: Negative  / Bili: Negative / Urobili: Negative mg/dL   Blood: x / Protein: Negative mg/dL / Nitrite: Negative   Leuk Esterase: Small / RBC: x / WBC 3-5   Sq Epi: x / Non Sq Epi: Few / Bacteria: Few        Creatinine Trend: 3.21<--, 3.11<--, 3.07<--, 1.89<--, 1.17<--, 1.01<--      MICROBIOLOGY:  v  .Tissue  10-19-21   No growth  --    No polymorphonuclear leukocytes seen per low power field  No organisms seen per oil power field      .Tissue  10-19-21   Rare Streptococcus agalactiae (Group B) isolated  Group B streptococci are susceptible to ampicillin,  penicillin and cefazolin, but may be resistant to  erythromycin and clindamycin.  Recommendations for intrapartum prophylaxis for Group B  streptococci are penicillin or ampicillin.  Rare Morganella morganii  --  Morganella morganii      .Abscess left foot  10-15-21   Moderate Escherichia coli  Numerous Morganella morganii  Few Streptococcus agalactiae (Group B) isolated  Group B streptococci are susceptible to ampicillin,  penicillin and cefazolin, but may be resistant to  erythromycin and clindamycin.  Recommendations for intrapartum prophylaxis for Group B  streptococci are penicillin or ampicillin.  Rare Enterococcus faecalis  Moderate Bacteroides fragilis "Susceptibilities not performed"  --  Escherichia coli  Morganella morganii  Enterococcus faecalis      Clean Catch Clean Catch (Midstream)  10-14-21   <10,000 CFU/mL Normal Urogenital Jeannine  --  --      .Blood Blood-Peripheral  10-14-21   No Growth Final  --  --    C-Reactive Protein, Serum: 6 (10-14)      RADIOLOGY:     MARIO MALDONADO  MRN-[id number]    Interval History: The pt was seen and examined earlier, no distress, no new complains, got a new IV in her right arm earlier as left arm PIV infiltrated. The pt is afebrile, on RA, Cr continues to increase.     PAST MEDICAL & SURGICAL HISTORY:  Lupus    Diabetes mellitus    Hyperlipidemia    Pericarditis    Obesity    HTN (hypertension)    Lupus    Diabetes    Hypertension     delivery delivered   section x 3    Skin tag  Skin tag removal, right shoulder    H/O:         ROS:    [ ] Unobtainable because:  [x] All other systems negative    Constitutional: no fever, no chills  Head: no trauma  Eyes: no vision changes, no eye pain  ENT:  no sore throat, no rhinorrhea  Cardiovascular:  no chest pain, no palpitation  Respiratory:  no SOB, no cough  GI:  no abd pain, no vomiting, no diarrhea  urinary: no dysuria, no hematuria, no flank pain  musculoskeletal:  no joint pain, no joint swelling  skin:  no rash  neurology:  no headache, no seizure, no change in mental status  psych: no anxiety, no depression         Allergies  No Known Allergies        ANTIMICROBIALS:  cefpodoxime 400 every 24 hours  hydroxychloroquine 200 two times a day  metroNIDAZOLE    Tablet 500 every 8 hours      OTHER MEDS:  acetaminophen   Tablet .. 650 milliGRAM(s) Oral every 6 hours PRN  dextrose 40% Gel 15 Gram(s) Oral once  dextrose 40% Gel 15 Gram(s) Oral once  dextrose 5%. 1000 milliLiter(s) IV Continuous <Continuous>  dextrose 5%. 1000 milliLiter(s) IV Continuous <Continuous>  dextrose 5%. 1000 milliLiter(s) IV Continuous <Continuous>  dextrose 50% Injectable 25 Gram(s) IV Push once  dextrose 50% Injectable 12.5 Gram(s) IV Push once  dextrose 50% Injectable 25 Gram(s) IV Push once  glucagon  Injectable 1 milliGRAM(s) IntraMuscular once  heparin   Injectable 7500 Unit(s) SubCutaneous every 12 hours  insulin glargine Injectable (LANTUS) 20 Unit(s) SubCutaneous at bedtime  insulin lispro (ADMELOG) corrective regimen sliding scale   SubCutaneous three times a day before meals  insulin lispro (ADMELOG) corrective regimen sliding scale   SubCutaneous at bedtime  insulin lispro Injectable (ADMELOG) 6 Unit(s) SubCutaneous three times a day before meals  losartan 50 milliGRAM(s) Oral daily  melatonin 3 milliGRAM(s) Oral at bedtime PRN  predniSONE   Tablet 5 milliGRAM(s) Oral daily  saccharomyces boulardii 250 milliGRAM(s) Oral two times a day      Vital Signs Last 24 Hrs  T(C): 36.3 (22 Oct 2021 11:25), Max: 36.9 (22 Oct 2021 05:42)  T(F): 97.4 (22 Oct 2021 11:25), Max: 98.4 (22 Oct 2021 05:42)  HR: 76 (22 Oct 2021 11:25) (76 - 90)  BP: 120/79 (22 Oct 2021 11:25) (120/73 - 152/76)  BP(mean): --  RR: 18 (22 Oct 2021 11:25) (16 - 19)  SpO2: 99% (22 Oct 2021 11:25) (97% - 100%)    Physical Exam:  Constitutional: non-toxic, no distress, obese   HEAD/EYES: anicteric, no conjunctival injection  ENT:  supple, no thrush  Cardiovascular:   normal S1, S2, no murmur, no edema  Respiratory:  clear BS bilaterally, no wheezes, no rales  GI:  soft, non-tender, normal bowel sounds  :  no elliott, no CVA tenderness  Musculoskeletal:  no synovitis, normal ROM, foot wrapped and dressed s/p Partial amputation of third ray of left foot by open approach 10/18  Neurologic: awake and alert, normal strength, no focal findings  Skin:  no rash, no erythema, Left PIV ok, right arm IV removed - no erythema or significant swelling, not tender   Psychiatric:  awake, alert, appropriate mood    WBC Count: 9.05 K/uL (10-21 @ 08:32)  WBC Count: 6.60 K/uL (10-19 @ 07:29)  WBC Count: 6.55 K/uL (10-18 @ 08:06)                            9.7    9.05  )-----------( 204      ( 21 Oct 2021 08:32 )             31.9       10-22    144  |  112<H>  |  21  ----------------------------<  160<H>  4.0   |  24  |  3.21<H>    Ca    8.5      22 Oct 2021 07:01        Urinalysis Basic - ( 20 Oct 2021 15:37 )    Color: Yellow / Appearance: Clear / S.005 / pH: x  Gluc: x / Ketone: Negative  / Bili: Negative / Urobili: Negative mg/dL   Blood: x / Protein: Negative mg/dL / Nitrite: Negative   Leuk Esterase: Small / RBC: x / WBC 3-5   Sq Epi: x / Non Sq Epi: Few / Bacteria: Few        Creatinine Trend: 3.21<--, 3.11<--, 3.07<--, 1.89<--, 1.17<--, 1.01<--      MICROBIOLOGY:  v  .Tissue  10-19-21   No growth  --    No polymorphonuclear leukocytes seen per low power field  No organisms seen per oil power field      .Tissue  10-19-21   Rare Streptococcus agalactiae (Group B) isolated  Group B streptococci are susceptible to ampicillin,  penicillin and cefazolin, but may be resistant to  erythromycin and clindamycin.  Recommendations for intrapartum prophylaxis for Group B  streptococci are penicillin or ampicillin.  Rare Morganella morganii  --  Morganella morganii      .Abscess left foot  10-15-21   Moderate Escherichia coli  Numerous Morganella morganii  Few Streptococcus agalactiae (Group B) isolated  Group B streptococci are susceptible to ampicillin,  penicillin and cefazolin, but may be resistant to  erythromycin and clindamycin.  Recommendations for intrapartum prophylaxis for Group B  streptococci are penicillin or ampicillin.  Rare Enterococcus faecalis  Moderate Bacteroides fragilis "Susceptibilities not performed"  --  Escherichia coli  Morganella morganii  Enterococcus faecalis      Clean Catch Clean Catch (Midstream)  10-14-21   <10,000 CFU/mL Normal Urogenital Jeannine  --  --      .Blood Blood-Peripheral  10-14-21   No Growth Final  --  --    C-Reactive Protein, Serum: 6 (10-14)      RADIOLOGY:

## 2021-10-22 NOTE — PROGRESS NOTE ADULT - ATTENDING COMMENTS
admitted for osteomyelitis and now s/p amputation  has developed INDER likely ATN perhaps from vanco/zosyn combination   vancomycin has since been stopped  alternative DDx includes lupus nephritis and labs being sent by nephro   continue zosyn to cover her polymicrobial infection   follow up on pathology for final clean bone margin results    D/W Dr. Nava and Dr. Javi Alvarez,   Infectious Disease Attending  Pager [phone]  After 5pm/weekends please call [phone] for all inquiries and new consults
tolerated surgery well  back on zosyn   awaiting path and clean bone cultures   other wise doing well and has no complaints    Cleveland Alvarez DO  Infectious Disease Attending  Pager [phone]  After 5pm/weekends please call [phone] for all inquiries and new consults
creatinine still climbing but may have hit rashaad and heading to improvement  she should stay another day to watch renal function   if stable or improved, d/c home on a few more days of antibiotics PO     D/W Dr. Nava and Josiah  will sign off.      Cleveland Alvarez DO  Infectious Disease Attending  Pager [phone]  After 5pm/weekends please call [phone] for all inquiries and new consults
doing well post surgery but has INDER   she is making urine and has no urinary symptoms  creatinine today 3.11   still with polymicrobiral swab cultures but her pathology margins are clean   will d/c home once her creatinine starts to head in the right direction (downwards) and continues to improve  ok to give PO vantin and haley Alvarez DO  Infectious Disease Attending  Pager [phone]  After 5pm/weekends please call [phone] for all inquiries and new consults

## 2021-10-22 NOTE — PROGRESS NOTE ADULT - NSPROGADDITIONALINFOA_GEN_ALL_CORE
PT eval >> home with outpatient PT.   Heparin SQ for DVT ppx.    DC home once kidney function improves.
PT eval >> home with outpatient PT.   Heparin SQ for DVT ppx.
PT eval pending stairs.

## 2021-10-22 NOTE — PROGRESS NOTE ADULT - SUBJECTIVE AND OBJECTIVE BOX
[facility] NEPHR[facility], [facility]  NEPHROLOGY AND HYPERTENSION  [address]  [address]  [place] [address]  [phone]    MD OSCAR MOSELEY MD ANDREY GONCHARUK, MD MADHU KORRAPATI, MD YELENA ROSENBERG, MD BINNY KOSHY, MD CHRISTOPHER CAPUTO, MD EDWARD BOVER, MD          Patient events noted  No distress    MEDICATIONS  (STANDING):  cefpodoxime 400 milliGRAM(s) Oral every 24 hours  dextrose 40% Gel 15 Gram(s) Oral once  dextrose 40% Gel 15 Gram(s) Oral once  dextrose 5%. 1000 milliLiter(s) (50 mL/Hr) IV Continuous <Continuous>  dextrose 5%. 1000 milliLiter(s) (100 mL/Hr) IV Continuous <Continuous>  dextrose 5%. 1000 milliLiter(s) (50 mL/Hr) IV Continuous <Continuous>  dextrose 50% Injectable 25 Gram(s) IV Push once  dextrose 50% Injectable 12.5 Gram(s) IV Push once  dextrose 50% Injectable 25 Gram(s) IV Push once  glucagon  Injectable 1 milliGRAM(s) IntraMuscular once  heparin   Injectable 7500 Unit(s) SubCutaneous every 12 hours  hydroxychloroquine 200 milliGRAM(s) Oral two times a day  insulin glargine Injectable (LANTUS) 20 Unit(s) SubCutaneous at bedtime  insulin lispro (ADMELOG) corrective regimen sliding scale   SubCutaneous three times a day before meals  insulin lispro (ADMELOG) corrective regimen sliding scale   SubCutaneous at bedtime  insulin lispro Injectable (ADMELOG) 6 Unit(s) SubCutaneous three times a day before meals  losartan 50 milliGRAM(s) Oral daily  metroNIDAZOLE    Tablet 500 milliGRAM(s) Oral every 8 hours  predniSONE   Tablet 5 milliGRAM(s) Oral daily  saccharomyces boulardii 250 milliGRAM(s) Oral two times a day    MEDICATIONS  (PRN):  acetaminophen   Tablet .. 650 milliGRAM(s) Oral every 6 hours PRN Temp greater or equal to 38C (100.4F), Mild Pain (1 - 3)  melatonin 3 milliGRAM(s) Oral at bedtime PRN Insomnia      PHYSICAL EXAM:      T(C): 36.8 (10-22-21 @ 17:49), Max: 36.9 (10-22-21 @ 05:42)  HR: 85 (10-22-21 @ 17:49) (76 - 90)  BP: 142/85 (10-22-21 @ 17:49) (120/73 - 142/85)  RR: 19 (10-22-21 @ 17:49) (16 - 19)  SpO2: 96% (10-22-21 @ 17:49) (96% - 99%)  Wt(kg): --  Lungs clear  Heart S1S2  Abd soft NT ND  Extremities:   tr edema                                    9.7    9.05  )-----------( 204      ( 21 Oct 2021 08:32 )             31.9     10-22    144  |  112<H>  |  21  ----------------------------<  160<H>  4.0   |  24  |  3.21<H>    Ca    8.5      22 Oct 2021 07:01          Creatinine Trend: 3.21<--, 3.11<--, 3.07<--, 1.89<--, 1.17<--, 1.01<--        Assessment   INDER suspected Vanco related ATN  Risk for infectious GN  SLE nephritis less likely     Plan  LETTY; DsDNA, C3C4  Holding Cellcept        Vaughn Nava MD

## 2021-10-22 NOTE — PROGRESS NOTE ADULT - SUBJECTIVE AND OBJECTIVE BOX
Patient is a 61y old  Female who presents with a chief complaint of Infection of left 3rd toe in DM patient, concern for osteomyelitis (19 Oct 2021 10:14)    Today patient states that she slept well last night, no complaints.  Remaining of ROS unremarkable except per above.    SUBJECTIVE & OBJECTIVE: Pt seen and examined at bedside.   PHYSICAL EXAM:  Vital Signs Last 24 Hrs  Vital Signs Last 24 Hrs  T(C): 36.3 (22 Oct 2021 11:25), Max: 36.9 (22 Oct 2021 05:42)  T(F): 97.4 (22 Oct 2021 11:25), Max: 98.4 (22 Oct 2021 05:42)  HR: 76 (22 Oct 2021 11:25) (76 - 90)  BP: 120/79 (22 Oct 2021 11:25) (120/73 - 152/76)  BP(mean): --  RR: 18 (22 Oct 2021 11:25) (16 - 19)  SpO2: 99% (22 Oct 2021 11:25) (97% - 100%)    Patient is awake, alert and oriented. Calm and comfortable, pleasant.  GENERAL: NAD, well-groomed, well-developed  HEAD:  Atraumatic, Normocephalic  EYES: EOMI, PERRLA, conjunctiva and sclera clear  ENMT: Moist mucous membranes  NECK: Supple, No JVD  NERVOUS SYSTEM:  Alert & Oriented X3, Motor Strength 5/5 B/L upper and lower extremities; DTRs 2+ intact and symmetric  CHEST/LUNG: Clear to auscultation bilaterally; No rales, rhonchi, wheezing, or rubs  HEART: Regular rate and rhythm; No murmurs, rubs, or gallops  ABDOMEN: Soft, Nontender, Nondistended; Bowel sounds present  EXTREMITIES:  2+ Peripheral Pulses, No clubbing, cyanosis, or edema  Let third toe with dressing in place, clean, dry and intact.        CBC Full  -  ( 21 Oct 2021 08:32 )  WBC Count : 9.05 K/uL  RBC Count : 3.86 M/uL  Hemoglobin : 9.7 g/dL  Hematocrit : 31.9 %  Platelet Count - Automated : 204 K/uL  Mean Cell Volume : 82.6 fl  Mean Cell Hemoglobin : 25.1 pg  Mean Cell Hemoglobin Concentration : 30.4 gm/dL  Auto Neutrophil # : x  Auto Lymphocyte # : x  Auto Monocyte # : x  Auto Eosinophil # : x  Auto Basophil # : x  Auto Neutrophil % : x  Auto Lymphocyte % : x  Auto Monocyte % : x  Auto Eosinophil % : x  Auto Basophil % : x    10-21    139  |  110<H>  |  21  ----------------------------<  162<H>  4.1   |  23  |  3.11<H>    Ca    8.9      21 Oct 2021 08:32      BMI: BMI (kg/m2): 39.2 (10-14-21 @ 10:00)  HbA1c: A1C with Estimated Average Glucose Result: 11.6 % (10-15-21 @ 09:26)    Glucose: POCT Blood Glucose.: 257 mg/dL (10-21-21 @ 11:00)    BP: 143/75 (10-21-21 @ 10:50) (105/56 - 151/78)  Lipid Panel:             CAPILLARY BLOOD GLUCOSE      POCT Blood Glucose.: 257 mg/dL (21 Oct 2021 11:00)  POCT Blood Glucose.: 212 mg/dL (21 Oct 2021 07:44)  POCT Blood Glucose.: 132 mg/dL (20 Oct 2021 21:53)  POCT Blood Glucose.: 73 mg/dL (20 Oct 2021 18:27)  POCT Blood Glucose.: 75 mg/dL (20 Oct 2021 16:10)  POCT Blood Glucose.: 173 mg/dL (20 Oct 2021 11:44)      Urinalysis Basic - ( 20 Oct 2021 15:37 )    Color: Yellow / Appearance: Clear / S.005 / pH: x  Gluc: x / Ketone: Negative  / Bili: Negative / Urobili: Negative mg/dL   Blood: x / Protein: Negative mg/dL / Nitrite: Negative   Leuk Esterase: Small / RBC: x / WBC 3-5   Sq Epi: x / Non Sq Epi: Few / Bacteria: Few        Gram Stain (prelim) (20 Oct 2021 00:23):    No polymorphonuclear leukocytes seen per low power field    No organisms seen per oil power field  Preliminary Report (20 Oct 2021 00:23):    Rare Streptococcus agalactiae (Group B) isolated    Group B streptococci are susceptible to ampicillin,    penicillin and cefazolin, but may be resistant to    erythromycin and clindamycin.    Recommendations for intrapartum prophylaxis for Group B    streptococci are penicillin or ampicillin.    Rare Misbahella morganii  Organism: Morganella morganii (20 Oct 2021 21:47)  Organism: Morganella morganii (20 Oct 2021 21:47)          Culture - Tissue with Gram Stain (collected 19 Oct 2021 01:32)  Source: .Tissue  Gram Stain (19 Oct 2021 03:47):    No polymorphonuclear leukocytes seen per low power field    No organisms seen per oil power field    Culture - Tissue with Gram Stain (collected 19 Oct 2021 01:30)  Source: .Tissue  Gram Stain (19 Oct 2021 03:47):    No polymorphonuclear leukocytes seen per low power field    No organisms seen per oil power field    MEDICATIONS  (STANDING):  dextrose 40% Gel 15 Gram(s) Oral once  dextrose 40% Gel 15 Gram(s) Oral once  dextrose 5%. 1000 milliLiter(s) (50 mL/Hr) IV Continuous <Continuous>  dextrose 5%. 1000 milliLiter(s) (50 mL/Hr) IV Continuous <Continuous>  dextrose 5%. 1000 milliLiter(s) (100 mL/Hr) IV Continuous <Continuous>  dextrose 50% Injectable 25 Gram(s) IV Push once  dextrose 50% Injectable 12.5 Gram(s) IV Push once  dextrose 50% Injectable 25 Gram(s) IV Push once  enoxaparin Injectable 40 milliGRAM(s) SubCutaneous every 12 hours  glucagon  Injectable 1 milliGRAM(s) IntraMuscular once  hydroxychloroquine 200 milliGRAM(s) Oral two times a day  insulin glargine Injectable (LANTUS) 20 Unit(s) SubCutaneous at bedtime  insulin lispro (ADMELOG) corrective regimen sliding scale   SubCutaneous three times a day before meals  insulin lispro (ADMELOG) corrective regimen sliding scale   SubCutaneous at bedtime  insulin lispro Injectable (ADMELOG) 6 Unit(s) SubCutaneous three times a day before meals  losartan 50 milliGRAM(s) Oral daily  piperacillin/tazobactam IVPB.. 3.375 Gram(s) IV Intermittent every 8 hours  predniSONE   Tablet 5 milliGRAM(s) Oral daily  saccharomyces boulardii 250 milliGRAM(s) Oral two times a day  sodium chloride 0.9%. 1000 milliLiter(s) (150 mL/Hr) IV Continuous <Continuous>    MEDICATIONS  (PRN):  acetaminophen   Tablet .. 650 milliGRAM(s) Oral every 6 hours PRN Temp greater or equal to 38C (100.4F), Mild Pain (1 - 3)  melatonin 3 milliGRAM(s) Oral at bedtime PRN Insomnia

## 2021-10-22 NOTE — PROGRESS NOTE ADULT - SUBJECTIVE AND OBJECTIVE BOX
Podiatry pager #: [phone] ([place])/ [phone] ([facility])    Patient is a 61y old  Female who presents with a chief complaint of Infection of left 3rd toe in DM patient, concern for osteomyelitis (21 Oct 2021 15:27)       INTERVAL HPI/OVERNIGHT EVENTS:  Patient seen and evaluated at bedside.  Pt is resting comfortable in NAD. Denies N/V/F/C.     Allergies    No Known Allergies    Intolerances        Vital Signs Last 24 Hrs  T(C): 36.9 (22 Oct 2021 05:42), Max: 36.9 (22 Oct 2021 05:42)  T(F): 98.4 (22 Oct 2021 05:42), Max: 98.4 (22 Oct 2021 05:42)  HR: 90 (22 Oct 2021 05:42) (77 - 90)  BP: 131/68 (22 Oct 2021 05:42) (120/73 - 152/76)  BP(mean): --  RR: 18 (22 Oct 2021 05:42) (16 - 19)  SpO2: 99% (22 Oct 2021 05:42) (97% - 100%)    LABS:                        9.7    9.05  )-----------( 204      ( 21 Oct 2021 08:32 )             31.9     10-22    144  |  112<H>  |  21  ----------------------------<  160<H>  4.0   |  24  |  3.21<H>    Ca    8.5      22 Oct 2021 07:01        Urinalysis Basic - ( 20 Oct 2021 15:37 )    Color: Yellow / Appearance: Clear / S.005 / pH: x  Gluc: x / Ketone: Negative  / Bili: Negative / Urobili: Negative mg/dL   Blood: x / Protein: Negative mg/dL / Nitrite: Negative   Leuk Esterase: Small / RBC: x / WBC 3-5   Sq Epi: x / Non Sq Epi: Few / Bacteria: Few      CAPILLARY BLOOD GLUCOSE      POCT Blood Glucose.: 181 mg/dL (22 Oct 2021 07:27)  POCT Blood Glucose.: 95 mg/dL (21 Oct 2021 21:15)  POCT Blood Glucose.: 93 mg/dL (21 Oct 2021 16:10)  POCT Blood Glucose.: 257 mg/dL (21 Oct 2021 11:00)      Lower Extremity Physical Exam:  Vascular: DP/PT 2/4, B/L, CFT <3 seconds B/L, Temperature gradient warm to warm B/L.   Neuro: Epicritic sensation intact to the level of digits, B/L.  Musculoskeletal/Ortho: unremarkable  Skin: s/p left foot partial 3rd ray resection closed (DOS 10/18): sutures intact, skin well coapted, flaps warm, no hematoma, no acute signs of infection     RADIOLOGY & ADDITIONAL TESTS:

## 2021-10-22 NOTE — PROGRESS NOTE ADULT - ASSESSMENT
61y old  Female who presents with a chief complaint of Infection of left 3rd toe in DM patient, concern for osteomyelitis  MRI consistent with osteomyelitis   cultures with morganella, group B strep, ecoli, enterococcus   Zosyn to cover all organisms and no MRSA so no need for further doses of vancomycin   may not need long course of antibiotics depending on pathology and cultures   diabetes is not well controlled with HbA1c 11.6   needs good glycemic control if she wants a chance at good healing and she is on steroids for her lupus which can also impair healing     10/19: s/p Partial amputation of third ray of left foot by open approach yesterday, no fevers, no WBC, Cr is elevated, Vanco was stopped yesterday, Zosyn continued, surgical tissue culture - no organisms seen, pathology pending   Attending addendum:  tolerated surgery well  back on zosyn   awaiting path and clean bone cultures   other wise doing well and has no complaints    10/20: remains afebrile, no new cbc, cr increased 3.07 - will monitor, possibly secondary to Vanco/zosyn combination - vancomycin was discontinued two days ago, surgical cultures - one with no growth and the second one with streptococcus group B, pathology is pending.   Attending addendum:  admitted for osteomyelitis and now s/p amputation  has developed INDER likely ATN perhaps from vanco/zosyn combination   vancomycin has since been stopped  alternative DDx includes lupus nephritis and labs being sent by nephro   continue zosyn to cover her polymicrobial infection   follow up on pathology for final clean bone margin results    10/21: pathology is back with clear margin, no fevers, Cr is trending up - seen by nephrology and urology, US negative for hydronephrosis. Surgical culture grew Streptococcus agalactiae (Group B) isolated and Morganella morganii. No objections to change of abx to po cefalosporin.   Attending addendum:  doing well post surgery but has INDER   she is making urine and has no urinary symptoms  creatinine today 3.11   still with polymicrobiral swab cultures but her pathology margins are clean   will d/c home once her creatinine starts to head in the right direction (downwards) and continues to improve  ok to give PO vantin and flagyl     10/22: no fevers, no new cbc, Cr is trending up, IV abx were changed yesterday to po vantin and flagyl.       left 3rd toe osteomyelitis   poorly controlled diabetes  Lupus   INDER    Plan:   continue po cefpodoxime and PO flagyl   follow culture data  pathology - margin is clean  pain control   control diabetes   good but not too tight glycemic control   try to hold as much immunosuppression as possible in the setting of an active infection   monitor renal function which is slightly worse, management and workup per nephrology  nephrology following

## 2021-10-22 NOTE — PROGRESS NOTE ADULT - ASSESSMENT
62yo F s/p left foot partial 3rd ray resection closed (DOS 10/18)  - pt seen and evaluated  - afebrile, no leukocytosis  - left foot surgical incision well coapted w/ sutures intact, no hematoma, flaps warm, no acute signs of infection  - low concern for residual infection  - high concern for viability  - Clean bone margin no growth to date  - Surgical pathology report showing no OM of clean bone margin   - ID recs PO Vantin & Flagyl, appreciated   - Stable for discharge from podiatric standpoint, instructions for follow up in discharge note provider   - keep dressing clean, dry and intact   - discussed w/ attending

## 2021-10-23 LAB
ANION GAP SERPL CALC-SCNC: 7 MMOL/L — SIGNIFICANT CHANGE UP (ref 5–17)
BUN SERPL-MCNC: 24 MG/DL — HIGH (ref 7–23)
CALCIUM SERPL-MCNC: 9.2 MG/DL — SIGNIFICANT CHANGE UP (ref 8.5–10.1)
CHLORIDE SERPL-SCNC: 109 MMOL/L — HIGH (ref 96–108)
CO2 SERPL-SCNC: 22 MMOL/L — SIGNIFICANT CHANGE UP (ref 22–31)
CREAT SERPL-MCNC: 3.22 MG/DL — HIGH (ref 0.5–1.3)
CULTURE RESULTS: SIGNIFICANT CHANGE UP
CULTURE RESULTS: SIGNIFICANT CHANGE UP
DSDNA AB SER-ACNC: >1000 IU/ML — HIGH
GLUCOSE BLDC GLUCOMTR-MCNC: 218 MG/DL — HIGH (ref 70–99)
GLUCOSE BLDC GLUCOMTR-MCNC: 267 MG/DL — HIGH (ref 70–99)
GLUCOSE BLDC GLUCOMTR-MCNC: 72 MG/DL — SIGNIFICANT CHANGE UP (ref 70–99)
GLUCOSE BLDC GLUCOMTR-MCNC: 91 MG/DL — SIGNIFICANT CHANGE UP (ref 70–99)
GLUCOSE SERPL-MCNC: 219 MG/DL — HIGH (ref 70–99)
GRAM STN FLD: SIGNIFICANT CHANGE UP
GRAM STN FLD: SIGNIFICANT CHANGE UP
ORGANISM # SPEC MICROSCOPIC CNT: SIGNIFICANT CHANGE UP
ORGANISM # SPEC MICROSCOPIC CNT: SIGNIFICANT CHANGE UP
POTASSIUM SERPL-MCNC: 4.4 MMOL/L — SIGNIFICANT CHANGE UP (ref 3.5–5.3)
POTASSIUM SERPL-SCNC: 4.4 MMOL/L — SIGNIFICANT CHANGE UP (ref 3.5–5.3)
SODIUM SERPL-SCNC: 138 MMOL/L — SIGNIFICANT CHANGE UP (ref 135–145)
SPECIMEN SOURCE: SIGNIFICANT CHANGE UP
SPECIMEN SOURCE: SIGNIFICANT CHANGE UP

## 2021-10-23 PROCEDURE — 99232 SBSQ HOSP IP/OBS MODERATE 35: CPT

## 2021-10-23 RX ADMIN — Medication 500 MILLIGRAM(S): at 13:26

## 2021-10-23 RX ADMIN — Medication 400 MILLIGRAM(S): at 11:38

## 2021-10-23 RX ADMIN — LOSARTAN POTASSIUM 50 MILLIGRAM(S): 100 TABLET, FILM COATED ORAL at 05:42

## 2021-10-23 RX ADMIN — Medication 4: at 07:37

## 2021-10-23 RX ADMIN — Medication 250 MILLIGRAM(S): at 05:42

## 2021-10-23 RX ADMIN — HEPARIN SODIUM 7500 UNIT(S): 5000 INJECTION INTRAVENOUS; SUBCUTANEOUS at 17:32

## 2021-10-23 RX ADMIN — Medication 6 UNIT(S): at 07:37

## 2021-10-23 RX ADMIN — Medication 250 MILLIGRAM(S): at 17:25

## 2021-10-23 RX ADMIN — HEPARIN SODIUM 7500 UNIT(S): 5000 INJECTION INTRAVENOUS; SUBCUTANEOUS at 05:43

## 2021-10-23 RX ADMIN — Medication 6 UNIT(S): at 11:35

## 2021-10-23 RX ADMIN — Medication 500 MILLIGRAM(S): at 05:42

## 2021-10-23 RX ADMIN — Medication 5 MILLIGRAM(S): at 05:42

## 2021-10-23 RX ADMIN — Medication 500 MILLIGRAM(S): at 22:43

## 2021-10-23 RX ADMIN — Medication 200 MILLIGRAM(S): at 17:26

## 2021-10-23 RX ADMIN — Medication 6: at 11:34

## 2021-10-23 RX ADMIN — Medication 200 MILLIGRAM(S): at 05:42

## 2021-10-23 NOTE — PROGRESS NOTE ADULT - PROBLEM SELECTOR PLAN 3
lactate 3.1 --> 1.1   resolved
Continue  insulin glargine Injectable (LANTUS) 20 Unit(s) SubCutaneous at bedtime  insulin lispro (ADMELOG) corrective regimen sliding scale   SubCutaneous three times a day before meals  insulin lispro (ADMELOG) corrective regimen sliding scale   SubCutaneous at bedtime  insulin lispro Injectable (ADMELOG) 6 Unit(s) SubCutaneous three times a day before meals
lactate 3.1 --> 1.1   resolved
lactate 3.1 --> 1.1   resolved
On 70/30 insulin 14 unit tid at home  Will use lantus 20 unit, lispro 6 unit premeal, ISS  Monitor glucose and titrate insulin accordingly  Check A1c  Will need good glycemic control given infection
lactate 3.1 --> 1.1   resolved
insulin glargine Injectable (LANTUS) 20 Unit(s) SubCutaneous at bedtime  insulin lispro (ADMELOG) corrective regimen sliding scale   SubCutaneous three times a day before meals  insulin lispro (ADMELOG) corrective regimen sliding scale   SubCutaneous at bedtime  insulin lispro Injectable (ADMELOG) 6 Unit(s) SubCutaneous three times a day before meals
Controlled  Continue  insulin glargine Injectable (LANTUS) 20 Unit(s) SubCutaneous at bedtime  insulin lispro (ADMELOG) corrective regimen sliding scale   SubCutaneous three times a day before meals  insulin lispro (ADMELOG) corrective regimen sliding scale   SubCutaneous at bedtime  insulin lispro Injectable (ADMELOG) 6 Unit(s) SubCutaneous three times a day before meals
lactate 3.1 --> 1.1   resolved

## 2021-10-23 NOTE — PROGRESS NOTE ADULT - PROBLEM SELECTOR PROBLEM 2
Elevated lactic acid level
INDER (acute kidney injury)
Elevated lactic acid level
INDER (acute kidney injury)

## 2021-10-23 NOTE — PROGRESS NOTE ADULT - PROBLEM SELECTOR PLAN 1
podiatry likely for OR procedure on Monday  MRI w OM  Monitor vanco trough  continue piperacillin/tazobactam IVPB.. 3.375 Gram(s) IV Intermittent every 8 hours  vancomycin  IVPB 1250 milliGRAM(s) IV Intermittent every 12 hours
S/P day # 2 left third toe partial resection.  continue piperacillin/tazobactam IVPB.. 3.375 Gram(s) IV Intermittent every 8 hours  ID input appreciated.  Awaiting final bone margins culture results for disposition
S/P day left third toe partial resection 10/18  continue with vantin and flagyl for 5 days from 10/22  ID input appreciated.  final bone margins clean
S/P day # 1 left third toe partial resection.  continue piperacillin/tazobactam IVPB.. 3.375 Gram(s) IV Intermittent every 8 hours  ID input appreciated.  Awaiting final bone margins culture results for disposition  if negative patient many be discharged home tomorrow.
podiatry likely for OR procedure today.  MRI w OM  Monitor vanco trough  continue piperacillin/tazobactam IVPB.. 3.375 Gram(s) IV Intermittent every 8 hours  vancomycin  IVPB 1250 milliGRAM(s) IV Intermittent every 12 hours
Worsening left 3rd toe infection for 1 month after getting her nail clipped  Was given augmentin 2 weeks ago but did not complete treatment  Afebrile, no leukocytosis   podiatry  MRI w OM  Follow CRP/ESR  Continue vanco/Zosyn for now  Monitor vanco trough    Patient is medically stable for   L foot partial 3rd ray resection
S/P day left third toe partial resection 10/18  continue with vantin and flagyl for 5 days from 10/22  ID input appreciated.  final bone margins clean
S/P day # 2 left third toe partial resection.  continue piperacillin/tazobactam IVPB.. 3.375 Gram(s) IV Intermittent every 8 hours  ID input appreciated.  Awaiting final bone margins culture results for disposition
podiatry likely for OR procedure on Monday  MRI w OM  Monitor vanco trough  continue piperacillin/tazobactam IVPB.. 3.375 Gram(s) IV Intermittent every 8 hours  vancomycin  IVPB 1250 milliGRAM(s) IV Intermittent every 12 hours

## 2021-10-23 NOTE — PROGRESS NOTE ADULT - SUBJECTIVE AND OBJECTIVE BOX
[facility] NEPHROLOGY SERVICES, [facility]  NEPHROLOGY AND HYPERTENSION  [address]  [address]  [place] [address]  [phone]    MD OSCAR MOSELEY MD ANDREY GONCHARUK, MD MADHU KORRAPATI, MD YELENA ROSENBERG, MD BINNY KOSHY, MD CHRISTOPHER CAPUTO, MD EDWARD BOVER, MD          Patient events noted    MEDICATIONS  (STANDING):  cefpodoxime 400 milliGRAM(s) Oral every 24 hours  dextrose 40% Gel 15 Gram(s) Oral once  dextrose 40% Gel 15 Gram(s) Oral once  dextrose 5%. 1000 milliLiter(s) (50 mL/Hr) IV Continuous <Continuous>  dextrose 5%. 1000 milliLiter(s) (100 mL/Hr) IV Continuous <Continuous>  dextrose 5%. 1000 milliLiter(s) (50 mL/Hr) IV Continuous <Continuous>  dextrose 50% Injectable 25 Gram(s) IV Push once  dextrose 50% Injectable 25 Gram(s) IV Push once  dextrose 50% Injectable 12.5 Gram(s) IV Push once  glucagon  Injectable 1 milliGRAM(s) IntraMuscular once  heparin   Injectable 7500 Unit(s) SubCutaneous every 12 hours  hydroxychloroquine 200 milliGRAM(s) Oral two times a day  insulin glargine Injectable (LANTUS) 20 Unit(s) SubCutaneous at bedtime  insulin lispro (ADMELOG) corrective regimen sliding scale   SubCutaneous three times a day before meals  insulin lispro (ADMELOG) corrective regimen sliding scale   SubCutaneous at bedtime  insulin lispro Injectable (ADMELOG) 6 Unit(s) SubCutaneous three times a day before meals  losartan 50 milliGRAM(s) Oral daily  metroNIDAZOLE    Tablet 500 milliGRAM(s) Oral every 8 hours  predniSONE   Tablet 5 milliGRAM(s) Oral daily  saccharomyces boulardii 250 milliGRAM(s) Oral two times a day    MEDICATIONS  (PRN):  acetaminophen   Tablet .. 650 milliGRAM(s) Oral every 6 hours PRN Temp greater or equal to 38C (100.4F), Mild Pain (1 - 3)  melatonin 3 milliGRAM(s) Oral at bedtime PRN Insomnia      PHYSICAL EXAM:      T(C): 36.7 (10-23-21 @ 11:00), Max: 37.6 (10-22-21 @ 23:28)  HR: 87 (10-23-21 @ 14:00) (83 - 90)  BP: 140/86 (10-23-21 @ 14:00) (117/76 - 140/86)  RR: 20 (10-23-21 @ 11:00) (19 - 20)  SpO2: 99% (10-23-21 @ 14:00) (99% - 100%)  Wt(kg): --  Lungs clear  Heart S1S2  Abd soft NT ND  Extremities:   tr edema                10-23    138  |  109<H>  |  24<H>  ----------------------------<  219<H>  4.4   |  22  |  3.22<H>    Ca    9.2      23 Oct 2021 08:42          Creatinine Trend: 3.22<--, 3.21<--, 3.11<--, 3.07<--, 1.89<--, 1.17<--        Assessment   INDER suspected Vanco related ATN  SLE nephritis less likely but elevated DsDNA noted, C3 normal    Plan    Will need close outpatient follow up, labs twice weekly  Resume Cellcept  Rheum follow up    Vaughn Nava MD

## 2021-10-23 NOTE — PROGRESS NOTE ADULT - PROBLEM SELECTOR PLAN 5
BMI 39.2  Weight loss and lifestyle modification
predniSONE   Tablet 5 milliGRAM(s) Oral daily  hydroxychloroquine 200 milliGRAM(s) Oral two times a day
On prednisone 5mg daily, Cellcept 500mg tid, Plaquenil 200mg bid at home  Continue prednisone 5mg daily, Plaquenil 200mg bid- discussed with rheumatology  Rheumatology consult- Dr Cleary
BMI 39.2  Weight loss and lifestyle modification
Continue  predniSONE   Tablet 5 milliGRAM(s) Oral daily  hydroxychloroquine 200 milliGRAM(s) Oral two times a day
Continue  predniSONE   Tablet 5 milliGRAM(s) Oral daily  hydroxychloroquine 200 milliGRAM(s) Oral two times a day
BMI 39.2  Weight loss and lifestyle modification

## 2021-10-23 NOTE — PROGRESS NOTE ADULT - PROBLEM SELECTOR PLAN 6
losartan 50 milliGRAM(s) Oral daily
Continue  losartan 50 milliGRAM(s) Oral daily
Continue  predniSONE   Tablet 5 milliGRAM(s) Oral daily  hydroxychloroquine 200 milliGRAM(s) Oral two times a day
Controlled  Continue  losartan 50 milliGRAM(s) Oral daily
Continue  predniSONE   Tablet 5 milliGRAM(s) Oral daily  hydroxychloroquine 200 milliGRAM(s) Oral two times a day

## 2021-10-23 NOTE — PROGRESS NOTE ADULT - PROBLEM SELECTOR PROBLEM 3
Type 2 diabetes mellitus with unspecified complications
Type 2 diabetes mellitus with unspecified complications
Elevated lactic acid level
Type 2 diabetes mellitus with unspecified complications
Elevated lactic acid level
Type 2 diabetes mellitus with unspecified complications
Elevated lactic acid level

## 2021-10-23 NOTE — PROGRESS NOTE ADULT - PROBLEM SELECTOR PROBLEM 6
HTN (hypertension)
Systemic lupus erythematosus
Systemic lupus erythematosus
HTN (hypertension)
HTN (hypertension)
Systemic lupus erythematosus

## 2021-10-23 NOTE — PROGRESS NOTE ADULT - SUBJECTIVE AND OBJECTIVE BOX
patient seen and examined   feels well  anxious on dc  VSS  cr at 3.22 plateued    Review of Systems:  General:denies fever chills, headache, weakness  HEENT: denies blurry vision,diffculty swallowing, difficulty hearing, tinnitus  Cardiovascular: denies chest pain  ,palpitations  Pulmonary:denies shortness of breath, cough, wheezing, hemoptysis  Gastrointestinal: denies abdominal pain, constipation, diarrhea,nausea , vomiting, hematochezia  : denies hematuria, dysuria, or incontinence  Neurological: denies weakness, numbness , tingling, dizziness, tremors  MSK: denies muscle pain, difficulty ambulating, swelling, back pain  skin: denies skin rash, itching, burning, or  skin lesions  Psychiatrical: denies mood disturbances, anxierty, feeling depressed, depression , or difficulty sleeping    Objective:  Vitals  T(C): 36.7 (10-23-21 @ 11:00), Max: 37.6 (10-22-21 @ 23:28)  HR: 90 (10-23-21 @ 11:00) (81 - 90)  BP: 117/76 (10-23-21 @ 11:00) (117/76 - 142/85)  RR: 20 (10-23-21 @ 11:00) (19 - 20)  SpO2: 99% (10-23-21 @ 11:00) (96% - 100%)    Physical Exam:  General: comfortable, no acute distress, well nourished  HEENT: Atraumatic, no LAD, trachea midline, PERRLA  Cardiovascular: normal s1s2, no murmurs, gallops or fricition rubs  Pulmonary: clear to ausculation Bilaterally, no wheezing , rhonchi  Gastrointestinal: soft non tender non distended, no masses felt, no organomegally  Muscloskeletal: no lower extremity edema, intact bilateral lower extremity pulses  Neurological: CN II-12 intact. No focal weakness  Psychiatrical: normal mood, cooperative  SKIN: no rash, lesions or ulcers    Labs:      10-23    138  |  109<H>  |  24<H>  ----------------------------<  219<H>  4.4   |  22  |  3.22<H>    Ca    9.2      23 Oct 2021 08:42              Active Medications  MEDICATIONS  (STANDING):  cefpodoxime 400 milliGRAM(s) Oral every 24 hours  dextrose 40% Gel 15 Gram(s) Oral once  dextrose 40% Gel 15 Gram(s) Oral once  dextrose 5%. 1000 milliLiter(s) (100 mL/Hr) IV Continuous <Continuous>  dextrose 5%. 1000 milliLiter(s) (50 mL/Hr) IV Continuous <Continuous>  dextrose 5%. 1000 milliLiter(s) (50 mL/Hr) IV Continuous <Continuous>  dextrose 50% Injectable 25 Gram(s) IV Push once  dextrose 50% Injectable 12.5 Gram(s) IV Push once  dextrose 50% Injectable 25 Gram(s) IV Push once  glucagon  Injectable 1 milliGRAM(s) IntraMuscular once  heparin   Injectable 7500 Unit(s) SubCutaneous every 12 hours  hydroxychloroquine 200 milliGRAM(s) Oral two times a day  insulin glargine Injectable (LANTUS) 20 Unit(s) SubCutaneous at bedtime  insulin lispro (ADMELOG) corrective regimen sliding scale   SubCutaneous three times a day before meals  insulin lispro (ADMELOG) corrective regimen sliding scale   SubCutaneous at bedtime  insulin lispro Injectable (ADMELOG) 6 Unit(s) SubCutaneous three times a day before meals  losartan 50 milliGRAM(s) Oral daily  metroNIDAZOLE    Tablet 500 milliGRAM(s) Oral every 8 hours  predniSONE   Tablet 5 milliGRAM(s) Oral daily  saccharomyces boulardii 250 milliGRAM(s) Oral two times a day    MEDICATIONS  (PRN):  acetaminophen   Tablet .. 650 milliGRAM(s) Oral every 6 hours PRN Temp greater or equal to 38C (100.4F), Mild Pain (1 - 3)  melatonin 3 milliGRAM(s) Oral at bedtime PRN Insomnia

## 2021-10-23 NOTE — PROGRESS NOTE ADULT - PROVIDER SPECIALTY LIST ADULT
Nephrology
Podiatry
Infectious Disease
Nephrology
Podiatry
Infectious Disease
Nephrology
Hospitalist
Podiatry
Podiatry
Hospitalist
Internal Medicine

## 2021-10-23 NOTE — PROGRESS NOTE ADULT - PROBLEM SELECTOR PLAN 4
BMI 39.2  Weight loss and lifestyle modification
Controlled  Continue  insulin glargine Injectable (LANTUS) 20 Unit(s) SubCutaneous at bedtime  insulin lispro (ADMELOG) corrective regimen sliding scale   SubCutaneous three times a day before meals  insulin lispro (ADMELOG) corrective regimen sliding scale   SubCutaneous at bedtime  insulin lispro Injectable (ADMELOG) 6 Unit(s) SubCutaneous three times a day before meals
BMI 39.2  Weight loss and lifestyle modification
Controlled  Continue  insulin glargine Injectable (LANTUS) 20 Unit(s) SubCutaneous at bedtime  insulin lispro (ADMELOG) corrective regimen sliding scale   SubCutaneous three times a day before meals  insulin lispro (ADMELOG) corrective regimen sliding scale   SubCutaneous at bedtime  insulin lispro Injectable (ADMELOG) 6 Unit(s) SubCutaneous three times a day before meals

## 2021-10-23 NOTE — PROGRESS NOTE ADULT - PROBLEM SELECTOR PLAN 7
Controlled  hold losartan 50 milliGRAM(s) Oral daily
Controlled  hold losartan 50 milliGRAM(s) Oral daily
Controlled  Continue  losartan 50 milliGRAM(s) Oral daily
Controlled  hold losartan 50 milliGRAM(s) Oral daily
Controlled  hold losartan 50 milliGRAM(s) Oral daily

## 2021-10-23 NOTE — PROGRESS NOTE ADULT - PROBLEM SELECTOR PLAN 2
Creatinine 1.1 ---> 1.89 >> 3.0 >> 3.1  Likely drug induced ATN   Dr. Nava following  LETTY, DsDNA pending to rule out lupus nephritis  Continue to monitor
Creatinine 1.1 ---> 1.89 >> 3.0 >> 3.1 >> 3.2  Likely drug induced ATN   Dr. Nava following  LETTY, DsDNA pending to rule out lupus nephritis, c3-4 WNL.  Continue to monitor
Creatinine 1.1 ---> 1.89 >> 3.0 >> 3.1 >> 3.2  Likely drug induced ATN   Dr. Nava following  LETTY, DsDNA pending to rule out lupus nephritis, c3-4 WNL.  Continue to monitor
lactate 3.1 at admission and now 1.1   resolved
lactate 3.1, received 1L NS in ED  Recheck lactate
lactate 3.1 --> 1.1   resolved
lactate 3.1 --> 1.1   resolved
Creatinine 1.1 ---> 1.89 >> 3.0  on NS  Consulted Dr. Nava  Switch lovenox to heparin SQ  Continue to monitor
Creatinine 1.1 ---> 1.89  Start IV fluid NS and follow up on am levels.

## 2021-10-23 NOTE — PROGRESS NOTE ADULT - REASON FOR ADMISSION
Infection of left 3rd toe in DM patient, concern for osteomyelitis

## 2021-10-23 NOTE — PROGRESS NOTE ADULT - PROBLEM SELECTOR PROBLEM 4
Type 2 diabetes mellitus with unspecified complications
Severe obesity (BMI 35.0-39.9) with comorbidity
Type 2 diabetes mellitus with unspecified complications
Severe obesity (BMI 35.0-39.9) with comorbidity
Type 2 diabetes mellitus with unspecified complications

## 2021-10-23 NOTE — PROGRESS NOTE ADULT - PROBLEM SELECTOR PROBLEM 1
Diabetic infection of left foot

## 2021-10-23 NOTE — PROGRESS NOTE ADULT - PROBLEM SELECTOR PROBLEM 5
Name band;
Severe obesity (BMI 35.0-39.9) with comorbidity
Systemic lupus erythematosus
Severe obesity (BMI 35.0-39.9) with comorbidity
Severe obesity (BMI 35.0-39.9) with comorbidity
Systemic lupus erythematosus
Severe obesity (BMI 35.0-39.9) with comorbidity
Severe obesity (BMI 35.0-39.9) with comorbidity

## 2021-10-24 VITALS
RESPIRATION RATE: 18 BRPM | DIASTOLIC BLOOD PRESSURE: 74 MMHG | TEMPERATURE: 98 F | SYSTOLIC BLOOD PRESSURE: 123 MMHG | OXYGEN SATURATION: 98 % | HEART RATE: 84 BPM

## 2021-10-24 LAB
ANA PAT FLD IF-IMP: ABNORMAL
ANA TITR SER: ABNORMAL
ANION GAP SERPL CALC-SCNC: 5 MMOL/L — SIGNIFICANT CHANGE UP (ref 5–17)
BUN SERPL-MCNC: 25 MG/DL — HIGH (ref 7–23)
CALCIUM SERPL-MCNC: 8.5 MG/DL — SIGNIFICANT CHANGE UP (ref 8.5–10.1)
CHLORIDE SERPL-SCNC: 113 MMOL/L — HIGH (ref 96–108)
CO2 SERPL-SCNC: 22 MMOL/L — SIGNIFICANT CHANGE UP (ref 22–31)
CREAT SERPL-MCNC: 2.95 MG/DL — HIGH (ref 0.5–1.3)
GLUCOSE BLDC GLUCOMTR-MCNC: 179 MG/DL — HIGH (ref 70–99)
GLUCOSE BLDC GLUCOMTR-MCNC: 250 MG/DL — HIGH (ref 70–99)
GLUCOSE SERPL-MCNC: 189 MG/DL — HIGH (ref 70–99)
POTASSIUM SERPL-MCNC: 4.7 MMOL/L — SIGNIFICANT CHANGE UP (ref 3.5–5.3)
POTASSIUM SERPL-SCNC: 4.7 MMOL/L — SIGNIFICANT CHANGE UP (ref 3.5–5.3)
SODIUM SERPL-SCNC: 140 MMOL/L — SIGNIFICANT CHANGE UP (ref 135–145)

## 2021-10-24 PROCEDURE — 99239 HOSP IP/OBS DSCHRG MGMT >30: CPT

## 2021-10-24 RX ORDER — MYCOPHENOLATE MOFETIL 250 MG/1
500 CAPSULE ORAL
Refills: 0 | Status: DISCONTINUED | OUTPATIENT
Start: 2021-10-24 | End: 2021-10-24

## 2021-10-24 RX ORDER — MYCOPHENOLATE MOFETIL 250 MG/1
2 CAPSULE ORAL
Qty: 0 | Refills: 0 | DISCHARGE

## 2021-10-24 RX ORDER — METRONIDAZOLE 500 MG
1 TABLET ORAL
Qty: 15 | Refills: 0
Start: 2021-10-24 | End: 2021-10-28

## 2021-10-24 RX ORDER — SACCHAROMYCES BOULARDII 250 MG
1 POWDER IN PACKET (EA) ORAL
Qty: 10 | Refills: 0
Start: 2021-10-24 | End: 2021-10-28

## 2021-10-24 RX ORDER — MYCOPHENOLATE MOFETIL 250 MG/1
3 CAPSULE ORAL
Qty: 84 | Refills: 0
Start: 2021-10-24 | End: 2021-11-06

## 2021-10-24 RX ORDER — PANTOPRAZOLE SODIUM 20 MG/1
1 TABLET, DELAYED RELEASE ORAL
Qty: 14 | Refills: 0
Start: 2021-10-24 | End: 2021-11-06

## 2021-10-24 RX ORDER — CEFPODOXIME PROXETIL 100 MG
2 TABLET ORAL
Qty: 10 | Refills: 0
Start: 2021-10-24 | End: 2021-10-28

## 2021-10-24 RX ORDER — CEFPODOXIME PROXETIL 100 MG
2 TABLET ORAL
Qty: 70 | Refills: 0
Start: 2021-10-24 | End: 2021-11-27

## 2021-10-24 RX ORDER — METRONIDAZOLE 500 MG
1 TABLET ORAL
Qty: 105 | Refills: 0
Start: 2021-10-24 | End: 2021-11-27

## 2021-10-24 RX ORDER — MYCOPHENOLATE MOFETIL 250 MG/1
2 CAPSULE ORAL
Qty: 56 | Refills: 0
Start: 2021-10-24 | End: 2021-11-06

## 2021-10-24 RX ADMIN — Medication 500 MILLIGRAM(S): at 13:34

## 2021-10-24 RX ADMIN — Medication 400 MILLIGRAM(S): at 11:21

## 2021-10-24 RX ADMIN — Medication 2: at 07:54

## 2021-10-24 RX ADMIN — Medication 5 MILLIGRAM(S): at 06:13

## 2021-10-24 RX ADMIN — Medication 4: at 11:17

## 2021-10-24 RX ADMIN — Medication 200 MILLIGRAM(S): at 06:13

## 2021-10-24 RX ADMIN — HEPARIN SODIUM 7500 UNIT(S): 5000 INJECTION INTRAVENOUS; SUBCUTANEOUS at 06:14

## 2021-10-24 RX ADMIN — Medication 6 UNIT(S): at 07:55

## 2021-10-24 RX ADMIN — Medication 250 MILLIGRAM(S): at 06:13

## 2021-10-24 RX ADMIN — Medication 500 MILLIGRAM(S): at 06:13

## 2021-10-24 RX ADMIN — Medication 6 UNIT(S): at 11:18

## 2021-10-24 NOTE — CHART NOTE - NSCHARTNOTEFT_GEN_A_CORE
Called by RN to insert IV, RN unsuccessful.  #20 angio inserted R. arm.  Good blood return, easily flushed, secured with Tegaderm.  Patient tolerated procedure well.  RN aware.     Vital Signs Last 24 Hrs  T(C): 36.9 (22 Oct 2021 05:42), Max: 36.9 (22 Oct 2021 05:42)  T(F): 98.4 (22 Oct 2021 05:42), Max: 98.4 (22 Oct 2021 05:42)  HR: 90 (22 Oct 2021 05:42) (77 - 90)  BP: 131/68 (22 Oct 2021 05:42) (120/73 - 152/76)  RR: 18 (22 Oct 2021 05:42) (16 - 19)  SpO2: 99% (22 Oct 2021 05:42) (97% - 100%)
House- Medicine NP:    Asked by Cary RN for IV insertion. Multiple RNs unsuccesful after several attempts.   Patient is a 61y old  Female who presents with a chief complaint of Infection of left 3rd toe in DM patient, concern for osteomyelitis (19 Oct 2021 13:47)      T(F): 98.7 (10-19-21 @ 11:10)  HR: 89 (10-19-21 @ 11:10)  BP: 124/75 (10-19-21 @ 11:10)  RR: 17 (10-19-21 @ 11:10)  SpO2: 99% (10-19-21 @ 11:10)  Wt(kg): --    IV inserted to L upper arm guage #18 via [employer] guidance. + blood return, flushes well. Pt tolerated procedure well.
Pathology for clean bone margin of left foot partial third ray is negative, recommend discharge on PO antibiotics per ID
To whom it may concern:  Ms. Allred has been hospitalized for an acute medical condition from to October 14th through October 24th.  If you have any questions please do not hesitate to contact us.    Signed,  Rick Crawford M.D.

## 2021-10-29 DIAGNOSIS — N17.0 ACUTE KIDNEY FAILURE WITH TUBULAR NECROSIS: ICD-10-CM

## 2021-10-29 DIAGNOSIS — B96.89 OTHER SPECIFIED BACTERIAL AGENTS AS THE CAUSE OF DISEASES CLASSIFIED ELSEWHERE: ICD-10-CM

## 2021-10-29 DIAGNOSIS — E66.01 MORBID (SEVERE) OBESITY DUE TO EXCESS CALORIES: ICD-10-CM

## 2021-10-29 DIAGNOSIS — E11.621 TYPE 2 DIABETES MELLITUS WITH FOOT ULCER: ICD-10-CM

## 2021-10-29 DIAGNOSIS — I10 ESSENTIAL (PRIMARY) HYPERTENSION: ICD-10-CM

## 2021-10-29 DIAGNOSIS — B95.1 STREPTOCOCCUS, GROUP B, AS THE CAUSE OF DISEASES CLASSIFIED ELSEWHERE: ICD-10-CM

## 2021-10-29 DIAGNOSIS — E11.65 TYPE 2 DIABETES MELLITUS WITH HYPERGLYCEMIA: ICD-10-CM
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
## 2021-10-29 DIAGNOSIS — E78.5 HYPERLIPIDEMIA, UNSPECIFIED: ICD-10-CM

## 2021-10-29 DIAGNOSIS — M86.9 OSTEOMYELITIS, UNSPECIFIED: ICD-10-CM

## 2021-10-29 DIAGNOSIS — M32.9 SYSTEMIC LUPUS ERYTHEMATOSUS, UNSPECIFIED: ICD-10-CM

## 2021-10-29 DIAGNOSIS — Z79.4 LONG TERM (CURRENT) USE OF INSULIN: ICD-10-CM

## 2021-10-29 DIAGNOSIS — T36.0X5A ADVERSE EFFECT OF PENICILLINS, INITIAL ENCOUNTER: ICD-10-CM

## 2021-10-29 DIAGNOSIS — E11.69 TYPE 2 DIABETES MELLITUS WITH OTHER SPECIFIED COMPLICATION: ICD-10-CM

## 2021-10-29 DIAGNOSIS — B96.20 UNSPECIFIED ESCHERICHIA COLI [E. COLI] AS THE CAUSE OF DISEASES CLASSIFIED ELSEWHERE: ICD-10-CM

## 2021-10-29 DIAGNOSIS — B95.2 ENTEROCOCCUS AS THE CAUSE OF DISEASES CLASSIFIED ELSEWHERE: ICD-10-CM

## 2021-10-29 DIAGNOSIS — T36.8X5A ADVERSE EFFECT OF OTHER SYSTEMIC ANTIBIOTICS, INITIAL ENCOUNTER: ICD-10-CM

## 2021-10-29 DIAGNOSIS — Z79.52 LONG TERM (CURRENT) USE OF SYSTEMIC STEROIDS: ICD-10-CM

## 2021-10-29 DIAGNOSIS — L97.524 NON-PRESSURE CHRONIC ULCER OF OTHER PART OF LEFT FOOT WITH NECROSIS OF BONE: ICD-10-CM

## 2021-11-04 ENCOUNTER — EMERGENCY (EMERGENCY)
Facility: HOSPITAL | Age: 61
LOS: 0 days | Discharge: HOME | End: 2021-11-04
Attending: EMERGENCY MEDICINE
Payer: MEDICAID

## 2021-11-04 VITALS
HEIGHT: 71 IN | OXYGEN SATURATION: 98 % | RESPIRATION RATE: 19 BRPM | TEMPERATURE: 98 F | DIASTOLIC BLOOD PRESSURE: 91 MMHG | SYSTOLIC BLOOD PRESSURE: 156 MMHG | HEART RATE: 105 BPM | WEIGHT: 279.99 LBS

## 2021-11-04 DIAGNOSIS — Z98.891 HISTORY OF UTERINE SCAR FROM PREVIOUS SURGERY: Chronic | ICD-10-CM

## 2021-11-04 DIAGNOSIS — I10 ESSENTIAL (PRIMARY) HYPERTENSION: ICD-10-CM

## 2021-11-04 DIAGNOSIS — Z89.432 ACQUIRED ABSENCE OF LEFT FOOT: ICD-10-CM

## 2021-11-04 DIAGNOSIS — E11.9 TYPE 2 DIABETES MELLITUS WITHOUT COMPLICATIONS: ICD-10-CM

## 2021-11-04 DIAGNOSIS — Z48.02 ENCOUNTER FOR REMOVAL OF SUTURES: ICD-10-CM

## 2021-11-04 DIAGNOSIS — M32.9 SYSTEMIC LUPUS ERYTHEMATOSUS, UNSPECIFIED: ICD-10-CM

## 2021-11-04 DIAGNOSIS — E66.9 OBESITY, UNSPECIFIED: ICD-10-CM

## 2021-11-04 DIAGNOSIS — L91.8 OTHER HYPERTROPHIC DISORDERS OF THE SKIN: Chronic | ICD-10-CM

## 2021-11-04 DIAGNOSIS — E78.5 HYPERLIPIDEMIA, UNSPECIFIED: ICD-10-CM

## 2021-11-04 PROCEDURE — L9995: CPT

## 2021-11-04 NOTE — ED PROVIDER NOTE - PATIENT PORTAL LINK FT
You can access the FollowMyHealth Patient Portal offered by [facility] by registering at the following website: [email or web address]. By joining [employer]’s FollowMyHealth portal, you will also be able to view your health information using other applications (apps) compatible with our system.

## 2021-11-04 NOTE — ED ADULT NURSE NOTE - CAS DISCH CONDITION
"Chief Complaint   Patient presents with     Ear Problem     Initial BP 96/60 (BP Location: Right arm, Patient Position: Chair, Cuff Size: Adult Regular)  Pulse 140  Temp 100  F (37.8  C) (Oral)  Resp 20  Ht 4' 8.75\" (1.441 m)  Wt 70 lb 11.2 oz (32.1 kg)  SpO2 99%  BMI 15.43 kg/m2 Estimated body mass index is 15.43 kg/(m^2) as calculated from the following:    Height as of this encounter: 4' 8.75\" (1.441 m).    Weight as of this encounter: 70 lb 11.2 oz (32.1 kg).  BP completed using cuff size regular right arm.    Lisa Magill, CMA    "
Stable

## 2021-11-04 NOTE — ED ADULT NURSE NOTE - OBJECTIVE STATEMENT
Patient is alert and oriented x4. came in for suture removal. Denies any pain or discomfort at this time.

## 2021-11-04 NOTE — ED PROVIDER NOTE - IV ALTEPLASE INCLUSION HIDDEN
Care Coordination Assessment    Documented/Reviewed By:  Eliane Ortiz RN Date/time:  6/23/2020  2:59 PM   Assessment completed with:  patient  Enrolled in care management program:  No  Living arrangement:  alone  Support system:  family  Type of residence:  private residence  Home care services:  Yes  Equipment used at home:  walker, oxygen/respiratory treatment (Comment: BP cuff)  Communication device:  Yes  Bed or wheelchair confined:  No  Inadequate nutrition:  No  Medication adherence problem:  No  Experiencing side effects from current medications:  No  History of fall(s) in last 6 months:  No  Difficulty keeping appointments:  No  Family aware of the patient's advance care planning wishes:   (Comment: refused advance directive information)        show

## 2021-11-04 NOTE — ED PROVIDER NOTE - PROGRESS NOTE DETAILS
Podiatrist is paged. podiatrist was notified and sts he spoke to Dr. Zuniga pt's podiatrist and sutures are too early to be removed and Pt can call [phone] for his other office to follow up. He sts he take pt's insurance at this office.

## 2021-11-04 NOTE — ED PROVIDER NOTE - OBJECTIVE STATEMENT
61 years old female walked in c/o unable to get hold of Dr. Zuniga podiatrist who performed amputation of left 3rd toe on 10/18/21 and now she is requesting for sutures removal. Pt denies headache, dizziness, blurred visions, light sensitivities, focal/distal weakness or numbness, neck/back/calfs pain, cough, sob, chest pain, nausea, vomiting, fever, chills, abd pain, dysuria or irregular bowel movements.

## 2021-11-04 NOTE — ED ADULT NURSE NOTE - CHIEF COMPLAINT QUOTE
S/p left foot amputation 10/18 at [facility] with Dr. Zuniga but unable to get in touch with office for suture removal

## 2021-11-04 NOTE — ED ADULT TRIAGE NOTE - CHIEF COMPLAINT QUOTE
S/p left foot amputation at [facility] with Dr. Zuniga but unable to get in touch with office for suture removal S/p left foot amputation 10/18 at [facility] with Dr. Zuniga but unable to get in touch with office for suture removal

## 2022-11-16 NOTE — PATIENT PROFILE ADULT - NSPROGENPREVTRANSF_GEN_A_NUR
Quality 226: Preventive Care And Screening: Tobacco Use: Screening And Cessation Intervention: Patient screened for tobacco use and is an ex/non-smoker Detail Level: Simple Quality 431: Preventive Care And Screening: Unhealthy Alcohol Use - Screening: Patient not identified as an unhealthy alcohol user when screened for unhealthy alcohol use using a systematic screening method no history of blood product transfusion

## 2023-03-14 NOTE — ED ADULT NURSE NOTE - CCCP TRG CHIEF CMPLNT
foot pain/injury Hydroxychloroquine Pregnancy And Lactation Text: This medication has been shown to cause fetal harm but it isn't assigned a Pregnancy Risk Category. There are small amounts excreted in breast milk.

## 2023-04-22 ENCOUNTER — INPATIENT (INPATIENT)
Facility: HOSPITAL | Age: 63
LOS: 9 days | Discharge: NURSING, REHAB OR LONG-TERM CARE | End: 2023-05-02
Attending: INTERNAL MEDICINE | Admitting: INTERNAL MEDICINE
Payer: MEDICAID

## 2023-04-22 VITALS
TEMPERATURE: 100 F | HEART RATE: 74 BPM | WEIGHT: 279.99 LBS | OXYGEN SATURATION: 98 % | DIASTOLIC BLOOD PRESSURE: 56 MMHG | SYSTOLIC BLOOD PRESSURE: 94 MMHG | HEIGHT: 71 IN | RESPIRATION RATE: 16 BRPM

## 2023-04-22 DIAGNOSIS — L91.8 OTHER HYPERTROPHIC DISORDERS OF THE SKIN: Chronic | ICD-10-CM

## 2023-04-22 DIAGNOSIS — Z98.891 HISTORY OF UTERINE SCAR FROM PREVIOUS SURGERY: Chronic | ICD-10-CM

## 2023-04-22 LAB
ALBUMIN SERPL ELPH-MCNC: 2.8 G/DL — LOW (ref 3.3–5)
ALP SERPL-CCNC: 58 U/L — SIGNIFICANT CHANGE UP (ref 40–120)
ALT FLD-CCNC: 38 U/L — SIGNIFICANT CHANGE UP (ref 12–78)
ANION GAP SERPL CALC-SCNC: 8 MMOL/L — SIGNIFICANT CHANGE UP (ref 5–17)
ANISOCYTOSIS BLD QL: SLIGHT — SIGNIFICANT CHANGE UP
APPEARANCE UR: ABNORMAL
AST SERPL-CCNC: 27 U/L — SIGNIFICANT CHANGE UP (ref 15–37)
BACTERIA # UR AUTO: ABNORMAL
BASOPHILS # BLD AUTO: 0 K/UL — SIGNIFICANT CHANGE UP (ref 0–0.2)
BASOPHILS NFR BLD AUTO: 0 % — SIGNIFICANT CHANGE UP (ref 0–2)
BILIRUB SERPL-MCNC: 0.7 MG/DL — SIGNIFICANT CHANGE UP (ref 0.2–1.2)
BILIRUB UR-MCNC: NEGATIVE — SIGNIFICANT CHANGE UP
BUN SERPL-MCNC: 36 MG/DL — HIGH (ref 7–23)
CALCIUM SERPL-MCNC: 8.5 MG/DL — SIGNIFICANT CHANGE UP (ref 8.5–10.1)
CHLORIDE SERPL-SCNC: 102 MMOL/L — SIGNIFICANT CHANGE UP (ref 96–108)
CO2 SERPL-SCNC: 24 MMOL/L — SIGNIFICANT CHANGE UP (ref 22–31)
COLOR SPEC: YELLOW — SIGNIFICANT CHANGE UP
CREAT SERPL-MCNC: 2.66 MG/DL — HIGH (ref 0.5–1.3)
DIFF PNL FLD: ABNORMAL
EGFR: 20 ML/MIN/1.73M2 — LOW
EOSINOPHIL # BLD AUTO: 0 K/UL — SIGNIFICANT CHANGE UP (ref 0–0.5)
EOSINOPHIL NFR BLD AUTO: 0 % — SIGNIFICANT CHANGE UP (ref 0–6)
GLUCOSE BLDC GLUCOMTR-MCNC: >600 MG/DL — CRITICAL HIGH (ref 70–99)
GLUCOSE BLDC GLUCOMTR-MCNC: >600 MG/DL — CRITICAL HIGH (ref 70–99)
GLUCOSE SERPL-MCNC: 390 MG/DL — HIGH (ref 70–99)
GLUCOSE UR QL: 50 MG/DL
HCT VFR BLD CALC: 32.4 % — LOW (ref 34.5–45)
HGB BLD-MCNC: 10.6 G/DL — LOW (ref 11.5–15.5)
HYPOCHROMIA BLD QL: SLIGHT — SIGNIFICANT CHANGE UP
KETONES UR-MCNC: NEGATIVE — SIGNIFICANT CHANGE UP
LEUKOCYTE ESTERASE UR-ACNC: ABNORMAL
LG PLATELETS BLD QL AUTO: SLIGHT — SIGNIFICANT CHANGE UP
LYMPHOCYTES # BLD AUTO: 0.91 K/UL — LOW (ref 1–3.3)
LYMPHOCYTES # BLD AUTO: 8 % — LOW (ref 13–44)
MAGNESIUM SERPL-MCNC: 1.8 MG/DL — SIGNIFICANT CHANGE UP (ref 1.6–2.6)
MANUAL SMEAR VERIFICATION: SIGNIFICANT CHANGE UP
MCHC RBC-ENTMCNC: 26.5 PG — LOW (ref 27–34)
MCHC RBC-ENTMCNC: 32.7 G/DL — SIGNIFICANT CHANGE UP (ref 32–36)
MCV RBC AUTO: 81 FL — SIGNIFICANT CHANGE UP (ref 80–100)
MICROCYTES BLD QL: SLIGHT — SIGNIFICANT CHANGE UP
MONOCYTES # BLD AUTO: 0.34 K/UL — SIGNIFICANT CHANGE UP (ref 0–0.9)
MONOCYTES NFR BLD AUTO: 3 % — SIGNIFICANT CHANGE UP (ref 2–14)
NEUTROPHILS # BLD AUTO: 10.15 K/UL — HIGH (ref 1.8–7.4)
NEUTROPHILS NFR BLD AUTO: 84 % — HIGH (ref 43–77)
NEUTS BAND # BLD: 5 % — SIGNIFICANT CHANGE UP (ref 0–8)
NITRITE UR-MCNC: NEGATIVE — SIGNIFICANT CHANGE UP
NRBC # BLD: 0 /100 — SIGNIFICANT CHANGE UP (ref 0–0)
NRBC # BLD: SIGNIFICANT CHANGE UP /100 WBCS (ref 0–0)
OVALOCYTES BLD QL SMEAR: SLIGHT — SIGNIFICANT CHANGE UP
PH UR: 5 — SIGNIFICANT CHANGE UP (ref 5–8)
PLAT MORPH BLD: NORMAL — SIGNIFICANT CHANGE UP
PLATELET # BLD AUTO: 173 K/UL — SIGNIFICANT CHANGE UP (ref 150–400)
POLYCHROMASIA BLD QL SMEAR: SLIGHT — SIGNIFICANT CHANGE UP
POTASSIUM SERPL-MCNC: 4.1 MMOL/L — SIGNIFICANT CHANGE UP (ref 3.5–5.3)
POTASSIUM SERPL-SCNC: 4.1 MMOL/L — SIGNIFICANT CHANGE UP (ref 3.5–5.3)
PROT SERPL-MCNC: 7.5 GM/DL — SIGNIFICANT CHANGE UP (ref 6–8.3)
PROT UR-MCNC: 100 MG/DL
RAPID RVP RESULT: SIGNIFICANT CHANGE UP
RBC # BLD: 4 M/UL — SIGNIFICANT CHANGE UP (ref 3.8–5.2)
RBC # FLD: 13.6 % — SIGNIFICANT CHANGE UP (ref 10.3–14.5)
RBC BLD AUTO: ABNORMAL
RBC CASTS # UR COMP ASSIST: ABNORMAL /HPF (ref 0–4)
SARS-COV-2 RNA SPEC QL NAA+PROBE: SIGNIFICANT CHANGE UP
SODIUM SERPL-SCNC: 134 MMOL/L — LOW (ref 135–145)
SP GR SPEC: 1.02 — SIGNIFICANT CHANGE UP (ref 1.01–1.02)
TROPONIN I, HIGH SENSITIVITY RESULT: 21.7 NG/L — SIGNIFICANT CHANGE UP
UROBILINOGEN FLD QL: NEGATIVE MG/DL — SIGNIFICANT CHANGE UP
WBC # BLD: 11.4 K/UL — HIGH (ref 3.8–10.5)
WBC # FLD AUTO: 11.4 K/UL — HIGH (ref 3.8–10.5)
WBC UR QL: >50

## 2023-04-22 PROCEDURE — 73600 X-RAY EXAM OF ANKLE: CPT | Mod: 26,LT

## 2023-04-22 PROCEDURE — 99285 EMERGENCY DEPT VISIT HI MDM: CPT

## 2023-04-22 PROCEDURE — 99223 1ST HOSP IP/OBS HIGH 75: CPT

## 2023-04-22 PROCEDURE — 70450 CT HEAD/BRAIN W/O DYE: CPT | Mod: 26,MG

## 2023-04-22 PROCEDURE — 93971 EXTREMITY STUDY: CPT | Mod: 26

## 2023-04-22 PROCEDURE — G1004: CPT

## 2023-04-22 RX ORDER — CEFTRIAXONE 500 MG/1
1000 INJECTION, POWDER, FOR SOLUTION INTRAMUSCULAR; INTRAVENOUS EVERY 24 HOURS
Refills: 0 | Status: DISCONTINUED | OUTPATIENT
Start: 2023-04-22 | End: 2023-04-22

## 2023-04-22 RX ORDER — DIPHENHYDRAMINE HCL 50 MG
25 CAPSULE ORAL ONCE
Refills: 0 | Status: COMPLETED | OUTPATIENT
Start: 2023-04-22 | End: 2023-04-22

## 2023-04-22 RX ORDER — SODIUM CHLORIDE 9 MG/ML
1000 INJECTION INTRAMUSCULAR; INTRAVENOUS; SUBCUTANEOUS ONCE
Refills: 0 | Status: COMPLETED | OUTPATIENT
Start: 2023-04-22 | End: 2023-04-22

## 2023-04-22 RX ORDER — PANTOPRAZOLE SODIUM 20 MG/1
40 TABLET, DELAYED RELEASE ORAL
Refills: 0 | Status: DISCONTINUED | OUTPATIENT
Start: 2023-04-22 | End: 2023-04-27

## 2023-04-22 RX ORDER — HYDROXYCHLOROQUINE SULFATE 200 MG
200 TABLET ORAL
Refills: 0 | Status: DISCONTINUED | OUTPATIENT
Start: 2023-04-22 | End: 2023-04-27

## 2023-04-22 RX ORDER — LOSARTAN POTASSIUM 100 MG/1
50 TABLET, FILM COATED ORAL DAILY
Refills: 0 | Status: DISCONTINUED | OUTPATIENT
Start: 2023-04-22 | End: 2023-04-27

## 2023-04-22 RX ORDER — DEXTROSE 50 % IN WATER 50 %
25 SYRINGE (ML) INTRAVENOUS ONCE
Refills: 0 | Status: DISCONTINUED | OUTPATIENT
Start: 2023-04-22 | End: 2023-04-27

## 2023-04-22 RX ORDER — KETOROLAC TROMETHAMINE 30 MG/ML
15 SYRINGE (ML) INJECTION ONCE
Refills: 0 | Status: DISCONTINUED | OUTPATIENT
Start: 2023-04-22 | End: 2023-04-22

## 2023-04-22 RX ORDER — INSULIN LISPRO 100/ML
12 VIAL (ML) SUBCUTANEOUS ONCE
Refills: 0 | Status: COMPLETED | OUTPATIENT
Start: 2023-04-22 | End: 2023-04-22

## 2023-04-22 RX ORDER — DEXTROSE 50 % IN WATER 50 %
15 SYRINGE (ML) INTRAVENOUS ONCE
Refills: 0 | Status: DISCONTINUED | OUTPATIENT
Start: 2023-04-22 | End: 2023-04-27

## 2023-04-22 RX ORDER — HEPARIN SODIUM 5000 [USP'U]/ML
5000 INJECTION INTRAVENOUS; SUBCUTANEOUS EVERY 8 HOURS
Refills: 0 | Status: DISCONTINUED | OUTPATIENT
Start: 2023-04-22 | End: 2023-04-27

## 2023-04-22 RX ORDER — GLUCAGON INJECTION, SOLUTION 0.5 MG/.1ML
1 INJECTION, SOLUTION SUBCUTANEOUS ONCE
Refills: 0 | Status: DISCONTINUED | OUTPATIENT
Start: 2023-04-22 | End: 2023-04-27

## 2023-04-22 RX ORDER — DEXTROSE 50 % IN WATER 50 %
12.5 SYRINGE (ML) INTRAVENOUS ONCE
Refills: 0 | Status: DISCONTINUED | OUTPATIENT
Start: 2023-04-22 | End: 2023-04-27

## 2023-04-22 RX ORDER — INSULIN LISPRO 100/ML
12 VIAL (ML) SUBCUTANEOUS
Refills: 0 | Status: DISCONTINUED | OUTPATIENT
Start: 2023-04-22 | End: 2023-04-24

## 2023-04-22 RX ORDER — LORATADINE 10 MG/1
10 TABLET ORAL ONCE
Refills: 0 | Status: COMPLETED | OUTPATIENT
Start: 2023-04-22 | End: 2023-04-22

## 2023-04-22 RX ORDER — MYCOPHENOLATE MOFETIL 250 MG/1
1500 CAPSULE ORAL
Refills: 0 | Status: DISCONTINUED | OUTPATIENT
Start: 2023-04-22 | End: 2023-04-27

## 2023-04-22 RX ORDER — SODIUM CHLORIDE 9 MG/ML
1000 INJECTION, SOLUTION INTRAVENOUS
Refills: 0 | Status: DISCONTINUED | OUTPATIENT
Start: 2023-04-22 | End: 2023-04-27

## 2023-04-22 RX ORDER — INSULIN LISPRO 100/ML
10 VIAL (ML) SUBCUTANEOUS ONCE
Refills: 0 | Status: COMPLETED | OUTPATIENT
Start: 2023-04-22 | End: 2023-04-22

## 2023-04-22 RX ORDER — INSULIN GLARGINE 100 [IU]/ML
45 INJECTION, SOLUTION SUBCUTANEOUS AT BEDTIME
Refills: 0 | Status: DISCONTINUED | OUTPATIENT
Start: 2023-04-22 | End: 2023-04-23

## 2023-04-22 RX ORDER — INSULIN LISPRO 100/ML
VIAL (ML) SUBCUTANEOUS
Refills: 0 | Status: DISCONTINUED | OUTPATIENT
Start: 2023-04-22 | End: 2023-04-27

## 2023-04-22 RX ADMIN — Medication 15 MILLIGRAM(S): at 17:38

## 2023-04-22 RX ADMIN — SODIUM CHLORIDE 1000 MILLILITER(S): 9 INJECTION INTRAMUSCULAR; INTRAVENOUS; SUBCUTANEOUS at 12:22

## 2023-04-22 RX ADMIN — Medication 125 MILLIGRAM(S): at 12:21

## 2023-04-22 RX ADMIN — Medication 12 UNIT(S): at 23:45

## 2023-04-22 RX ADMIN — SODIUM CHLORIDE 1000 MILLILITER(S): 9 INJECTION INTRAMUSCULAR; INTRAVENOUS; SUBCUTANEOUS at 15:31

## 2023-04-22 RX ADMIN — LORATADINE 10 MILLIGRAM(S): 10 TABLET ORAL at 17:44

## 2023-04-22 RX ADMIN — Medication 15 MILLIGRAM(S): at 12:21

## 2023-04-22 RX ADMIN — Medication 25 MILLIGRAM(S): at 12:32

## 2023-04-22 NOTE — ED ADULT NURSE NOTE - OBJECTIVE STATEMENT
Patient is a 63 yr old female who c/o weakness since yesterday and hyperglycemia . Pt stated she got out of bed and slid to the floor. pt is also c/o left ankle pain . Was seen by urgent care for allergic reaction yesterday, noted with redness and hives all over body. Admits to vomiting Pmhx of DM, lupus , HLD

## 2023-04-22 NOTE — ED PROVIDER NOTE - CARE PLAN
Was the patient seen in the last year in this department? Yes     Does patient have an active prescription for medications requested? No     Received Request Via: Pharmacy   1 Principal Discharge DX:	Inability to ambulate due to left ankle or foot

## 2023-04-22 NOTE — ED ADULT TRIAGE NOTE - CHIEF COMPLAINT QUOTE
brought by ems for weakness and hyperglycemia . As per daughter she got out of the bed and felt  weak and she slid to the floor .  pt is also c/o left ankle pain . was seen un urgent care for allergic reaction yesterday . h/o DM, lupus , HLD

## 2023-04-22 NOTE — H&P ADULT - NSHPPHYSICALEXAM_GEN_ALL_CORE
PHYSICAL EXAMINATION:  Vital Signs Last 24 Hrs  T(C): 37.2 (22 Apr 2023 10:40), Max: 37.6 (22 Apr 2023 10:07)  T(F): 98.9 (22 Apr 2023 10:40), Max: 99.6 (22 Apr 2023 10:07)  HR: 92 (22 Apr 2023 10:40) (74 - 92)  BP: 102/61 (22 Apr 2023 10:40) (94/56 - 102/61)  BP(mean): --  RR: 18 (22 Apr 2023 10:40) (16 - 18)  SpO2: 98% (22 Apr 2023 10:40) (98% - 98%)    Parameters below as of 22 Apr 2023 10:40  Patient On (Oxygen Delivery Method): room air      CAPILLARY BLOOD GLUCOSE      POCT Blood Glucose.: 284 mg/dL (22 Apr 2023 10:06)      GENERAL: NAD,   ENMT: mucous membranes moist  NECK: supple, No JVD  CHEST/LUNG: clear to auscultation bilaterally; no rales, rhonchi, or wheezing b/l  HEART: normal S1, S2  ABDOMEN: BS+, soft, ND, NT   EXTREMITIES:  pulses palpable; no clubbing, cyanosis, or edema b/l LEs  NEURO: awake, alert, interactive; moves all extremities PHYSICAL EXAMINATION:  Vital Signs Last 24 Hrs  T(C): 37.2 (22 Apr 2023 10:40), Max: 37.6 (22 Apr 2023 10:07)  T(F): 98.9 (22 Apr 2023 10:40), Max: 99.6 (22 Apr 2023 10:07)  HR: 92 (22 Apr 2023 10:40) (74 - 92)  BP: 102/61 (22 Apr 2023 10:40) (94/56 - 102/61)  BP(mean): --  RR: 18 (22 Apr 2023 10:40) (16 - 18)  SpO2: 98% (22 Apr 2023 10:40) (98% - 98%)    Parameters below as of 22 Apr 2023 10:40  Patient On (Oxygen Delivery Method): room air      CAPILLARY BLOOD GLUCOSE      POCT Blood Glucose.: 284 mg/dL (22 Apr 2023 10:06)      GENERAL: NAD, seen in ER, comfortable, mild arm rash, brace on left foot  ENMT: mucous membranes moist  NECK: supple, No JVD  CHEST/LUNG: clear to auscultation bilaterally; no rales, rhonchi, or wheezing b/l  HEART: normal S1, S2  ABDOMEN: BS+, soft, ND, NT   EXTREMITIES:  pulses palpable; no clubbing, cyanosis, or edema b/l LEs  NEURO: awake, alert, interactive; moves all extremities

## 2023-04-22 NOTE — ED PROVIDER NOTE - CLINICAL SUMMARY MEDICAL DECISION MAKING FREE TEXT BOX
Pt here with complaint of fall. As per daughter had fall yesterday and then today went to get out of bed and fell again onto buttocks. No head trauma. Pt states she has been feeling generally weak and also has L ankle pain which is mildly swollen on medial aspect. Pt denies hx dvt. No chest pain or sob. Pt also has hives on exam, states ivelisse tto urgent care yesterday for eval and given prednisone and benadryl, which did not take today. wbc 12 from today likely from steroid use. No airway compromise. Pts labs without marked abnormalities. Sono shows old dvt L calf. ankle xray shows tiny cortical defect of talus. No other major abnormalities.   Aircast splint placed. dp/pt pulses remain intact.   Went to reassess if pt would be able to walk on her own/crutches and f/u outpt podiatry but pt required assistance to pull herself up in bed. Moving all extremities and no focal deficits on exam. Daughter states that in xray and sono pt required assistance to get onto exam tables. Firemen had to extract her from home as daughters unable to lift her.   On reassessment daughter states pt has had multiple falls last week. Pt is fall risk at home. Will need to admit. Pt here with complaint of fall. As per daughter had fall yesterday and then today went to get out of bed and fell again onto buttocks. No head trauma. Pt states she has been feeling generally weak and also has L ankle pain which is mildly swollen on medial aspect. Pt denies hx dvt. No chest pain or sob. Pt also has hives on exam, states ivelisse tto urgent care yesterday for eval and given prednisone and benadryl, which did not take today. wbc 12 from today likely from steroid use. No airway compromise. Pts labs without marked abnormalities. Sono shows old dvt L calf. ankle xray shows tiny cortical defect of talus. No other major abnormalities.   Aircast splint placed. dp/pt pulses remain intact.   Went to reassess if pt would be able to walk on her own/crutches and f/u outpt podiatry but pt required assistance to pull herself up in bed. Moving all extremities and no focal deficits on exam. Daughter states that in xray and sono pt required assistance to get onto exam tables. Firemen had to extract her from home as daughters unable to lift her.   On reassessment daughter states pt has had multiple falls last week. Pt is fall risk at home. Will need to admit.    Spoke with podiatry, who is aware pt being admitted to medicine for inability to ambulate, repeat falls over last week or so and today having L ankle pain with tiny cortical fracture of talus, which is air splinted. Agrees no acute intervention and pt will need outpt podiatry f/u.

## 2023-04-22 NOTE — H&P ADULT - ASSESSMENT
64 yo female PMH DM, HTN, HLD, lupus here for generalized malaise, decreased appetite, and weakness-as per daughter. Could not bring herself fully up off bed to stand and slid down on her butt. Pt states this was due to weakness as well as pain to the L ankle. Mild swelling to ankle. Denies injuries to ankle prior to fall. Denies hx of DVT/PE. Went to UC yesterday for this and was rx prednisone and benadryl which pt took once yesterday. No new products, foods, etc.  UA on arrival consistent with UTI.     Plan:  62 yo female PMH DM, HTN, HLD, lupus here for generalized malaise, decreased appetite, and weakness-as per daughter. Could not bring herself fully up off bed to stand and slid down on her butt. Pt states this was due to weakness as well as pain to the L ankle. Mild swelling to ankle. Denies injuries to ankle prior to fall. Denies hx of DVT/PE. Went to  yesterday for this and was rx prednisone and benadryl which pt took once yesterday. No new products, foods, etc.        Plan:  64 yo female PMH DM, HTN, HLD, lupus here for generalized malaise, decreased appetite, and weakness-as per daughter. Could not bring herself fully up off bed to stand and slid down on her butt. Pt states this was due to weakness as well as pain to the L ankle. Mild swelling to ankle. Denies injuries to ankle prior to fall. Denies hx of DVT/PE. Went to  yesterday for this and was rx prednisone and benadryl which pt took once yesterday. No new products, foods, etc.        Plan:  Admit to medicine for weakness and left Talus fracture. Brace on left heel placed by ER. Will need Podiatry Consult by   day team in AM. CT head no acute findings, leg dopplers negative for DVT.  UA notes pyuria, hold on ABX, appears asymptomatic. .      Glucose 390, missed Humolog doses today. Added Lantus 45 , Admelog 12 units TID. A1C in AM.     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     Continue home meds at home dose: Prednisone 5 mg/day, Cellcept 1,500 mg bid, Cozaar 50 mg/day,   Plaquenil 200 mg bid.  64 yo female PMH DM, HTN, HLD, lupus here for generalized malaise, decreased appetite, and weakness-as per daughter. Could not bring herself fully up off bed to stand and slid down on her butt. Pt states this was due to weakness as well as pain to the L ankle. Mild swelling to ankle. Denies injuries to ankle prior to fall. Denies hx of DVT/PE. Went to  yesterday for this and was rx prednisone and benadryl which pt took once yesterday. No new products, foods, etc.        Plan:  Admit to medicine for weakness and left Talus fracture. Brace on left heel placed by ER. Will need Podiatry Consult by day team in AM.  ER spoke to Podiatry who agreed with outpatient follow-up. CT head no acute findings, leg dopplers negative for DVT.  UA notes pyuria, hold on ABX, appears asymptomatic.       Glucose 390, missed Humolog doses today. Added Lantus 45 , Admelog 12 units TID. A1C in AM.     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  Will order renal sonogram to eval MRD and urine protein/creatinine ratio to see degree of protinuria.     Continue home meds at home dose for SLE: Prednisone 5 mg/day, Cellcept 1,500 mg bid, Cozaar 50 mg/day, Plaquenil 200 mg bid.

## 2023-04-22 NOTE — H&P ADULT - NSHPLABSRESULTS_GEN_ALL_CORE
LABS:                        10.6   11.40 )-----------( 173      ( 2023 11:04 )             32.4         134<L>  |  102  |  36<H>  ----------------------------<  390<H>  4.1   |  24  |  2.66<H>    Ca    8.5      2023 11:04  Mg     1.8         TPro  7.5  /  Alb  2.8<L>  /  TBili  0.7  /  DBili  x   /  AST  27  /  ALT  38  /  AlkPhos  58        Urinalysis Basic - ( 2023 17:26 )    Color: Yellow / Appearance: very cloudy / S.020 / pH: x  Gluc: x / Ketone: Negative  / Bili: Negative / Urobili: Negative mg/dL   Blood: x / Protein: 100 mg/dL / Nitrite: Negative   Leuk Esterase: Moderate / RBC: 6-10 /HPF / WBC >50   Sq Epi: x / Non Sq Epi: x / Bacteria: Moderate          RADIOLOGY & ADDITIONAL TESTS:

## 2023-04-22 NOTE — ED PROVIDER NOTE - OBJECTIVE STATEMENT
62 yo F PMH DM, HTN, HLD, lupus here for generalized malaise, decreased appetite, and weakness-as per daughter could not bring herself fully up off bed to stand and slid down on her butt. Pt states this was due to weakness as well as pain to the L ankle. Mild swelling to ankle. Denies injuries to ankle prior to fall. Denies hx of DVT/PE. Pt also states has itchy skin and noted to have hives. Went to  yesterday for this and was rx prednisone and benadryl which pt took once yesterday. No new products, foods, etc. Pt denies uri/uti sx.

## 2023-04-22 NOTE — H&P ADULT - HISTORY OF PRESENT ILLNESS
62 yo female PMH DM, HTN, HLD, lupus here for generalized malaise, decreased appetite, and weakness-as per daughter. Could not bring herself fully up off bed to stand and slid down on her butt. Pt states this was due to weakness as well as pain to the L ankle. Mild swelling to ankle. Denies injuries to ankle prior to fall. Denies hx of DVT/PE. Went to  yesterday for this and was rx prednisone and benadryl which pt took once yesterday. No new products, foods, etc.  UA on arrival consistent with UTI.  64 yo female PMH DM, HTN, HLD, lupus here for generalized malaise, decreased appetite, and weakness-as per daughter. Could not bring herself fully up off bed to stand and slid down on her butt. Pt states this was due to weakness as well as pain to the L ankle. Mild swelling to ankle. Denies injuries to ankle prior to fall. Denies hx of DVT/PE. Went to  yesterday for this and was rx prednisone and benadryl which pt took once yesterday. No new products, foods, etc.

## 2023-04-23 LAB
A1C WITH ESTIMATED AVERAGE GLUCOSE RESULT: 10.1 % — HIGH (ref 4–5.6)
ANION GAP SERPL CALC-SCNC: 8 MMOL/L — SIGNIFICANT CHANGE UP (ref 5–17)
BUN SERPL-MCNC: 49 MG/DL — HIGH (ref 7–23)
CALCIUM SERPL-MCNC: 8.5 MG/DL — SIGNIFICANT CHANGE UP (ref 8.5–10.1)
CHLORIDE SERPL-SCNC: 104 MMOL/L — SIGNIFICANT CHANGE UP (ref 96–108)
CO2 SERPL-SCNC: 20 MMOL/L — LOW (ref 22–31)
CREAT ?TM UR-MCNC: 103 MG/DL — SIGNIFICANT CHANGE UP
CREAT SERPL-MCNC: 2.2 MG/DL — HIGH (ref 0.5–1.3)
EGFR: 25 ML/MIN/1.73M2 — LOW
ESTIMATED AVERAGE GLUCOSE: 243 MG/DL — HIGH (ref 68–114)
GLUCOSE BLDC GLUCOMTR-MCNC: 228 MG/DL — HIGH (ref 70–99)
GLUCOSE BLDC GLUCOMTR-MCNC: 268 MG/DL — HIGH (ref 70–99)
GLUCOSE BLDC GLUCOMTR-MCNC: 310 MG/DL — HIGH (ref 70–99)
GLUCOSE BLDC GLUCOMTR-MCNC: 351 MG/DL — HIGH (ref 70–99)
GLUCOSE BLDC GLUCOMTR-MCNC: 396 MG/DL — HIGH (ref 70–99)
GLUCOSE BLDC GLUCOMTR-MCNC: 430 MG/DL — HIGH (ref 70–99)
GLUCOSE BLDC GLUCOMTR-MCNC: 553 MG/DL — CRITICAL HIGH (ref 70–99)
GLUCOSE BLDC GLUCOMTR-MCNC: >600 MG/DL — CRITICAL HIGH (ref 70–99)
GLUCOSE BLDC GLUCOMTR-MCNC: >600 MG/DL — CRITICAL HIGH (ref 70–99)
GLUCOSE SERPL-MCNC: 468 MG/DL — CRITICAL HIGH (ref 70–99)
GLUCOSE SERPL-MCNC: 840 MG/DL — CRITICAL HIGH (ref 70–99)
POTASSIUM SERPL-MCNC: 4.3 MMOL/L — SIGNIFICANT CHANGE UP (ref 3.5–5.3)
POTASSIUM SERPL-SCNC: 4.3 MMOL/L — SIGNIFICANT CHANGE UP (ref 3.5–5.3)
PROT ?TM UR-MCNC: 28 MG/DL — HIGH (ref 0–12)
PROT/CREAT UR-RTO: 0.3 RATIO — HIGH (ref 0–0.2)
SODIUM SERPL-SCNC: 132 MMOL/L — LOW (ref 135–145)

## 2023-04-23 PROCEDURE — 99233 SBSQ HOSP IP/OBS HIGH 50: CPT

## 2023-04-23 PROCEDURE — 76775 US EXAM ABDO BACK WALL LIM: CPT | Mod: 26

## 2023-04-23 RX ORDER — LORATADINE 10 MG/1
10 TABLET ORAL DAILY
Refills: 0 | Status: DISCONTINUED | OUTPATIENT
Start: 2023-04-23 | End: 2023-04-27

## 2023-04-23 RX ORDER — INSULIN HUMAN 100 [IU]/ML
10 INJECTION, SOLUTION SUBCUTANEOUS ONCE
Refills: 0 | Status: COMPLETED | OUTPATIENT
Start: 2023-04-23 | End: 2023-04-23

## 2023-04-23 RX ORDER — SODIUM CHLORIDE 9 MG/ML
1000 INJECTION INTRAMUSCULAR; INTRAVENOUS; SUBCUTANEOUS
Refills: 0 | Status: DISCONTINUED | OUTPATIENT
Start: 2023-04-23 | End: 2023-04-25

## 2023-04-23 RX ORDER — PETROLATUM,WHITE
1 JELLY (GRAM) TOPICAL THREE TIMES A DAY
Refills: 0 | Status: DISCONTINUED | OUTPATIENT
Start: 2023-04-23 | End: 2023-04-27

## 2023-04-23 RX ORDER — DIPHENHYDRAMINE HCL 50 MG
50 CAPSULE ORAL ONCE
Refills: 0 | Status: COMPLETED | OUTPATIENT
Start: 2023-04-23 | End: 2023-04-23

## 2023-04-23 RX ORDER — INSULIN GLARGINE 100 [IU]/ML
55 INJECTION, SOLUTION SUBCUTANEOUS AT BEDTIME
Refills: 0 | Status: DISCONTINUED | OUTPATIENT
Start: 2023-04-23 | End: 2023-04-24

## 2023-04-23 RX ORDER — INSULIN LISPRO 100/ML
10 VIAL (ML) SUBCUTANEOUS ONCE
Refills: 0 | Status: COMPLETED | OUTPATIENT
Start: 2023-04-23 | End: 2023-04-23

## 2023-04-23 RX ORDER — DIPHENHYDRAMINE HCL 50 MG
25 CAPSULE ORAL EVERY 6 HOURS
Refills: 0 | Status: DISCONTINUED | OUTPATIENT
Start: 2023-04-23 | End: 2023-04-27

## 2023-04-23 RX ORDER — INSULIN HUMAN 100 [IU]/ML
10 INJECTION, SOLUTION SUBCUTANEOUS ONCE
Refills: 0 | Status: DISCONTINUED | OUTPATIENT
Start: 2023-04-23 | End: 2023-04-23

## 2023-04-23 RX ORDER — INSULIN LISPRO 100/ML
2 VIAL (ML) SUBCUTANEOUS ONCE
Refills: 0 | Status: COMPLETED | OUTPATIENT
Start: 2023-04-23 | End: 2023-04-23

## 2023-04-23 RX ORDER — INSULIN LISPRO 100/ML
10 VIAL (ML) SUBCUTANEOUS ONCE
Refills: 0 | Status: DISCONTINUED | OUTPATIENT
Start: 2023-04-23 | End: 2023-04-23

## 2023-04-23 RX ADMIN — Medication 25 MILLIGRAM(S): at 19:49

## 2023-04-23 RX ADMIN — Medication 12 UNIT(S): at 12:03

## 2023-04-23 RX ADMIN — Medication 10: at 11:54

## 2023-04-23 RX ADMIN — LOSARTAN POTASSIUM 50 MILLIGRAM(S): 100 TABLET, FILM COATED ORAL at 05:15

## 2023-04-23 RX ADMIN — HEPARIN SODIUM 5000 UNIT(S): 5000 INJECTION INTRAVENOUS; SUBCUTANEOUS at 08:20

## 2023-04-23 RX ADMIN — Medication 2 UNIT(S): at 20:31

## 2023-04-23 RX ADMIN — LORATADINE 10 MILLIGRAM(S): 10 TABLET ORAL at 12:05

## 2023-04-23 RX ADMIN — Medication 200 MILLIGRAM(S): at 05:15

## 2023-04-23 RX ADMIN — INSULIN GLARGINE 55 UNIT(S): 100 INJECTION, SOLUTION SUBCUTANEOUS at 23:45

## 2023-04-23 RX ADMIN — Medication 5 MILLIGRAM(S): at 05:16

## 2023-04-23 RX ADMIN — Medication 200 MILLIGRAM(S): at 17:06

## 2023-04-23 RX ADMIN — INSULIN HUMAN 10 UNIT(S): 100 INJECTION, SOLUTION SUBCUTANEOUS at 05:13

## 2023-04-23 RX ADMIN — Medication 12 UNIT(S): at 08:17

## 2023-04-23 RX ADMIN — MYCOPHENOLATE MOFETIL 1500 MILLIGRAM(S): 250 CAPSULE ORAL at 17:07

## 2023-04-23 RX ADMIN — INSULIN GLARGINE 45 UNIT(S): 100 INJECTION, SOLUTION SUBCUTANEOUS at 00:07

## 2023-04-23 RX ADMIN — Medication 1 APPLICATION(S): at 21:16

## 2023-04-23 RX ADMIN — Medication 6: at 15:56

## 2023-04-23 RX ADMIN — INSULIN HUMAN 10 UNIT(S): 100 INJECTION, SOLUTION SUBCUTANEOUS at 03:06

## 2023-04-23 RX ADMIN — Medication 12: at 06:36

## 2023-04-23 RX ADMIN — PANTOPRAZOLE SODIUM 40 MILLIGRAM(S): 20 TABLET, DELAYED RELEASE ORAL at 06:19

## 2023-04-23 RX ADMIN — HEPARIN SODIUM 5000 UNIT(S): 5000 INJECTION INTRAVENOUS; SUBCUTANEOUS at 01:22

## 2023-04-23 RX ADMIN — Medication 10 UNIT(S): at 01:21

## 2023-04-23 RX ADMIN — Medication 25 MILLIGRAM(S): at 09:20

## 2023-04-23 RX ADMIN — SODIUM CHLORIDE 50 MILLILITER(S): 9 INJECTION INTRAMUSCULAR; INTRAVENOUS; SUBCUTANEOUS at 09:21

## 2023-04-23 RX ADMIN — MYCOPHENOLATE MOFETIL 1500 MILLIGRAM(S): 250 CAPSULE ORAL at 05:15

## 2023-04-23 RX ADMIN — HEPARIN SODIUM 5000 UNIT(S): 5000 INJECTION INTRAVENOUS; SUBCUTANEOUS at 14:05

## 2023-04-23 RX ADMIN — Medication 50 MILLIGRAM(S): at 01:21

## 2023-04-23 RX ADMIN — Medication 12 UNIT(S): at 16:57

## 2023-04-23 RX ADMIN — Medication 10 UNIT(S): at 02:40

## 2023-04-23 RX ADMIN — HEPARIN SODIUM 5000 UNIT(S): 5000 INJECTION INTRAVENOUS; SUBCUTANEOUS at 22:09

## 2023-04-23 NOTE — PATIENT PROFILE ADULT - FALL HARM RISK - HARM RISK INTERVENTIONS

## 2023-04-23 NOTE — PROGRESS NOTE ADULT - ASSESSMENT
64 yo female PMH DM, HTN, HLD, lupus here for generalized malaise, decreased appetite, and weakness-as per daughter. Could not bring herself fully up off bed to stand and slid down on her butt. Pt states this was due to weakness as well as pain to the L ankle. Mild swelling to ankle. Denies injuries to ankle prior to fall. Denies hx of DVT/PE. Went to  yesterday for this and was rx prednisone and benadryl which pt took once yesterday. No new products, foods, etc.        Plan:  Admit to medicine for weakness and left Talus fracture. Brace on left heel placed by ER. Will need Podiatry Consult by day team in AM.  ER spoke to Podiatry who agreed with outpatient follow-up. CT head no acute findings, leg dopplers negative for DVT.  UA notes pyuria, hold on ABX, appears asymptomatic.       Left talus fracture  podiatrist consult  PT eval    DM II uncontrolled  Glucose 390, missed Humolog doses today. Added Lantus 45 , Admelog 12 units TID. A1C in AM.   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  Will order renal sonogram to eval MRD and urine protein/creatinine ratio to see degree of proteinuria     Continue home meds at home dose for SLE: Prednisone 5 mg/day, Cellcept 1,500 mg bid, Cozaar 50 mg/day, Plaquenil 200 mg bid.

## 2023-04-23 NOTE — PROGRESS NOTE ADULT - SUBJECTIVE AND OBJECTIVE BOX
Patient is a 63y old  Female who presents with a chief complaint of Frequent falls, cannot walk. (2023 20:23)    INTERVAL HPI/OVERNIGHT EVENTS: Patients seen and examined at bedside this morning. No acute events overnight. Pt reports    MEDICATIONS  (STANDING):  dextrose 5%. 1000 milliLiter(s) (100 mL/Hr) IV Continuous <Continuous>  dextrose 5%. 1000 milliLiter(s) (50 mL/Hr) IV Continuous <Continuous>  dextrose 50% Injectable 25 Gram(s) IV Push once  dextrose 50% Injectable 12.5 Gram(s) IV Push once  dextrose 50% Injectable 25 Gram(s) IV Push once  glucagon  Injectable 1 milliGRAM(s) IntraMuscular once  heparin   Injectable 5000 Unit(s) SubCutaneous every 8 hours  hydroxychloroquine 200 milliGRAM(s) Oral two times a day  insulin glargine Injectable (LANTUS) 45 Unit(s) SubCutaneous at bedtime  insulin lispro (ADMELOG) corrective regimen sliding scale   SubCutaneous three times a day before meals  insulin lispro Injectable (ADMELOG) 12 Unit(s) SubCutaneous three times a day before meals  loratadine 10 milliGRAM(s) Oral daily  losartan 50 milliGRAM(s) Oral daily  mycophenolate mofetil 1500 milliGRAM(s) Oral two times a day  pantoprazole    Tablet 40 milliGRAM(s) Oral before breakfast  predniSONE   Tablet 5 milliGRAM(s) Oral daily  sodium chloride 0.9%. 1000 milliLiter(s) (50 mL/Hr) IV Continuous <Continuous>    MEDICATIONS  (PRN):  dextrose Oral Gel 15 Gram(s) Oral once PRN Blood Glucose LESS THAN 70 milliGRAM(s)/deciliter  diphenhydrAMINE Injectable 25 milliGRAM(s) IV Push every 6 hours PRN Itching    Allergies    No Known Allergies    Intolerances      REVIEW OF SYSTEMS:  All other systems reviewed and are negative    Vital Signs Last 24 Hrs  T(C): 36.9 (2023 05:22), Max: 37.2 (2023 10:40)  T(F): 98.4 (2023 05:22), Max: 98.9 (2023 10:40)  HR: 88 (2023 05:22) (79 - 97)  BP: 128/76 (2023 05:22) (102/61 - 134/79)  BP(mean): --  RR: 18 (2023 05:22) (18 - 18)  SpO2: 100% (2023 05:22) (98% - 100%)    Parameters below as of 2023 05:22  Patient On (Oxygen Delivery Method): room air      Daily     Daily   I&O's Summary    2023 07:01  -  2023 07:00  --------------------------------------------------------  IN: 300 mL / OUT: 0 mL / NET: 300 mL      CAPILLARY BLOOD GLUCOSE      POCT Blood Glucose.: 396 mg/dL (2023 07:52)  POCT Blood Glucose.: 430 mg/dL (2023 06:21)  POCT Blood Glucose.: >600 mg/dL (2023 04:31)  POCT Blood Glucose.: 553 mg/dL (2023 04:27)  POCT Blood Glucose.: >600 mg/dL (2023 02:26)  POCT Blood Glucose.: >600 mg/dL (2023 23:33)  POCT Blood Glucose.: >600 mg/dL (2023 23:29)    PHYSICAL EXAM:  GENERAL: NAD, seen in ER, comfortable, mild arm rash, brace on left foot  ENMT: mucous membranes moist  NECK: supple, No JVD  CHEST/LUNG: clear to auscultation bilaterally; no rales, rhonchi, or wheezing b/l  HEART: normal S1, S2  ABDOMEN: BS+, soft, ND, NT   EXTREMITIES:  pulses palpable; no clubbing, cyanosis, or edema b/l LEs  NEURO: awake, alert, interactive; moves all extremities      Labs                          10.6   11.40 )-----------( 173      ( 2023 11:04 )             32.4     04-23    132<L>  |  104  |  49<H>  ----------------------------<  468<HH>  4.3   |  20<L>  |  2.20<H>    Ca    8.5      2023 06:56  Mg     1.8         TPro  7.5  /  Alb  2.8<L>  /  TBili  0.7  /  DBili  x   /  AST  27  /  ALT  38  /  AlkPhos  58            Urinalysis Basic - ( 2023 17:26 )    Color: Yellow / Appearance: very cloudy / S.020 / pH: x  Gluc: x / Ketone: Negative  / Bili: Negative / Urobili: Negative mg/dL   Blood: x / Protein: 100 mg/dL / Nitrite: Negative   Leuk Esterase: Moderate / RBC: 6-10 /HPF / WBC >50   Sq Epi: x / Non Sq Epi: x / Bacteria: Moderate                      Radiology and Imaging reviewed. Patient is a 63y old  Female who presents with a chief complaint of Frequent falls, cannot walk. (2023 20:23)    INTERVAL HPI/OVERNIGHT EVENTS: Patients seen and examined at bedside this morning. No acute events overnight. Pt reports poor sleep. Left ankle pain improved from yesterday.     MEDICATIONS  (STANDING):  dextrose 5%. 1000 milliLiter(s) (100 mL/Hr) IV Continuous <Continuous>  dextrose 5%. 1000 milliLiter(s) (50 mL/Hr) IV Continuous <Continuous>  dextrose 50% Injectable 25 Gram(s) IV Push once  dextrose 50% Injectable 12.5 Gram(s) IV Push once  dextrose 50% Injectable 25 Gram(s) IV Push once  glucagon  Injectable 1 milliGRAM(s) IntraMuscular once  heparin   Injectable 5000 Unit(s) SubCutaneous every 8 hours  hydroxychloroquine 200 milliGRAM(s) Oral two times a day  insulin glargine Injectable (LANTUS) 45 Unit(s) SubCutaneous at bedtime  insulin lispro (ADMELOG) corrective regimen sliding scale   SubCutaneous three times a day before meals  insulin lispro Injectable (ADMELOG) 12 Unit(s) SubCutaneous three times a day before meals  loratadine 10 milliGRAM(s) Oral daily  losartan 50 milliGRAM(s) Oral daily  mycophenolate mofetil 1500 milliGRAM(s) Oral two times a day  pantoprazole    Tablet 40 milliGRAM(s) Oral before breakfast  predniSONE   Tablet 5 milliGRAM(s) Oral daily  sodium chloride 0.9%. 1000 milliLiter(s) (50 mL/Hr) IV Continuous <Continuous>    MEDICATIONS  (PRN):  dextrose Oral Gel 15 Gram(s) Oral once PRN Blood Glucose LESS THAN 70 milliGRAM(s)/deciliter  diphenhydrAMINE Injectable 25 milliGRAM(s) IV Push every 6 hours PRN Itching    Allergies    No Known Allergies    Intolerances      REVIEW OF SYSTEMS:  All other systems reviewed and are negative    Vital Signs Last 24 Hrs  T(C): 36.9 (2023 05:22), Max: 37.2 (2023 10:40)  T(F): 98.4 (2023 05:22), Max: 98.9 (2023 10:40)  HR: 88 (2023 05:22) (79 - 97)  BP: 128/76 (2023 05:22) (102/61 - 134/79)  BP(mean): --  RR: 18 (2023 05:22) (18 - 18)  SpO2: 100% (2023 05:22) (98% - 100%)    Parameters below as of 2023 05:22  Patient On (Oxygen Delivery Method): room air      Daily     Daily   I&O's Summary    2023 07:01  -  2023 07:00  --------------------------------------------------------  IN: 300 mL / OUT: 0 mL / NET: 300 mL      CAPILLARY BLOOD GLUCOSE      POCT Blood Glucose.: 396 mg/dL (2023 07:52)  POCT Blood Glucose.: 430 mg/dL (2023 06:21)  POCT Blood Glucose.: >600 mg/dL (2023 04:31)  POCT Blood Glucose.: 553 mg/dL (2023 04:27)  POCT Blood Glucose.: >600 mg/dL (2023 02:26)  POCT Blood Glucose.: >600 mg/dL (2023 23:33)  POCT Blood Glucose.: >600 mg/dL (2023 23:29)    PHYSICAL EXAM:  GENERAL: NAD, seen in ER, comfortable, mild arm rash, brace on left foot  ENMT: mucous membranes moist  NECK: supple, No JVD  CHEST/LUNG: clear to auscultation bilaterally; no rales, rhonchi, or wheezing b/l  HEART: normal S1, S2  ABDOMEN: BS+, soft, ND, NT   EXTREMITIES:  pulses palpable; no clubbing, cyanosis, or edema b/l LEs  NEURO: awake, alert, interactive; moves all extremities      Labs                          10.6   11.40 )-----------( 173      ( 2023 11:04 )             32.4     04-23    132<L>  |  104  |  49<H>  ----------------------------<  468<HH>  4.3   |  20<L>  |  2.20<H>    Ca    8.5      2023 06:56  Mg     1.8         TPro  7.5  /  Alb  2.8<L>  /  TBili  0.7  /  DBili  x   /  AST  27  /  ALT  38  /  AlkPhos  58            Urinalysis Basic - ( 2023 17:26 )    Color: Yellow / Appearance: very cloudy / S.020 / pH: x  Gluc: x / Ketone: Negative  / Bili: Negative / Urobili: Negative mg/dL   Blood: x / Protein: 100 mg/dL / Nitrite: Negative   Leuk Esterase: Moderate / RBC: 6-10 /HPF / WBC >50   Sq Epi: x / Non Sq Epi: x / Bacteria: Moderate                      Radiology and Imaging reviewed.

## 2023-04-23 NOTE — CHART NOTE - NSCHARTNOTEFT_GEN_A_CORE
Attempted placement of elliott catheter with RN at bedside. RN and medicine PA unsuccessful during previous attempts.  Urethral opening located, 14Fr catheter inserted at opening, but unable to be advanced. Patient in discomfort and elliott placement aborted at this time.   Patient continues to void on own (210cc on last void) but with 900cc residual on latest bladder scan.     Dr. Murdock made aware of patient. Will see tomorrow as long as patient continues to void on own/remains stable.

## 2023-04-23 NOTE — PROVIDER CONTACT NOTE (OTHER) - ACTION/TREATMENT ORDERED:
Advised to cover as per the sliding scale. Admelog 12 units sq given.
Ordered for Humulin R 10 units ivp.
As per the order humulin R 10 units iv given.
Pt received 12 units of admelog sq and lantus 45 units.

## 2023-04-23 NOTE — PATIENT PROFILE ADULT - CENTRAL VENOUS CATHETER/PICC LINE
no aim for rehab if Na improves, more alert aim for rehab if Na improves, more alert  d/w  re overall care

## 2023-04-24 LAB
ALBUMIN SERPL ELPH-MCNC: 2.7 G/DL — LOW (ref 3.3–5)
ALP SERPL-CCNC: 55 U/L — SIGNIFICANT CHANGE UP (ref 40–120)
ALT FLD-CCNC: 40 U/L — SIGNIFICANT CHANGE UP (ref 12–78)
ANION GAP SERPL CALC-SCNC: 7 MMOL/L — SIGNIFICANT CHANGE UP (ref 5–17)
AST SERPL-CCNC: 39 U/L — HIGH (ref 15–37)
BILIRUB SERPL-MCNC: 0.3 MG/DL — SIGNIFICANT CHANGE UP (ref 0.2–1.2)
BUN SERPL-MCNC: 55 MG/DL — HIGH (ref 7–23)
CALCIUM SERPL-MCNC: 8.6 MG/DL — SIGNIFICANT CHANGE UP (ref 8.5–10.1)
CHLORIDE SERPL-SCNC: 106 MMOL/L — SIGNIFICANT CHANGE UP (ref 96–108)
CO2 SERPL-SCNC: 20 MMOL/L — LOW (ref 22–31)
CREAT SERPL-MCNC: 2 MG/DL — HIGH (ref 0.5–1.3)
EGFR: 28 ML/MIN/1.73M2 — LOW
GLUCOSE BLDC GLUCOMTR-MCNC: 115 MG/DL — HIGH (ref 70–99)
GLUCOSE BLDC GLUCOMTR-MCNC: 180 MG/DL — HIGH (ref 70–99)
GLUCOSE BLDC GLUCOMTR-MCNC: 234 MG/DL — HIGH (ref 70–99)
GLUCOSE BLDC GLUCOMTR-MCNC: 279 MG/DL — HIGH (ref 70–99)
GLUCOSE BLDC GLUCOMTR-MCNC: 280 MG/DL — HIGH (ref 70–99)
GLUCOSE BLDC GLUCOMTR-MCNC: 299 MG/DL — HIGH (ref 70–99)
GLUCOSE BLDC GLUCOMTR-MCNC: 373 MG/DL — HIGH (ref 70–99)
GLUCOSE SERPL-MCNC: 298 MG/DL — HIGH (ref 70–99)
HCT VFR BLD CALC: 31.5 % — LOW (ref 34.5–45)
HGB BLD-MCNC: 10.3 G/DL — LOW (ref 11.5–15.5)
MAGNESIUM SERPL-MCNC: 2.5 MG/DL — SIGNIFICANT CHANGE UP (ref 1.6–2.6)
MCHC RBC-ENTMCNC: 26.7 PG — LOW (ref 27–34)
MCHC RBC-ENTMCNC: 32.7 G/DL — SIGNIFICANT CHANGE UP (ref 32–36)
MCV RBC AUTO: 81.6 FL — SIGNIFICANT CHANGE UP (ref 80–100)
NRBC # BLD: 0 /100 WBCS — SIGNIFICANT CHANGE UP (ref 0–0)
PHOSPHATE SERPL-MCNC: 3.4 MG/DL — SIGNIFICANT CHANGE UP (ref 2.5–4.5)
PLATELET # BLD AUTO: 157 K/UL — SIGNIFICANT CHANGE UP (ref 150–400)
POTASSIUM SERPL-MCNC: 4.4 MMOL/L — SIGNIFICANT CHANGE UP (ref 3.5–5.3)
POTASSIUM SERPL-SCNC: 4.4 MMOL/L — SIGNIFICANT CHANGE UP (ref 3.5–5.3)
PROT SERPL-MCNC: 7.5 GM/DL — SIGNIFICANT CHANGE UP (ref 6–8.3)
RBC # BLD: 3.86 M/UL — SIGNIFICANT CHANGE UP (ref 3.8–5.2)
RBC # FLD: 13.7 % — SIGNIFICANT CHANGE UP (ref 10.3–14.5)
SODIUM SERPL-SCNC: 133 MMOL/L — LOW (ref 135–145)
WBC # BLD: 16.12 K/UL — HIGH (ref 3.8–10.5)
WBC # FLD AUTO: 16.12 K/UL — HIGH (ref 3.8–10.5)

## 2023-04-24 PROCEDURE — 99233 SBSQ HOSP IP/OBS HIGH 50: CPT

## 2023-04-24 RX ORDER — INSULIN GLARGINE 100 [IU]/ML
65 INJECTION, SOLUTION SUBCUTANEOUS AT BEDTIME
Refills: 0 | Status: DISCONTINUED | OUTPATIENT
Start: 2023-04-24 | End: 2023-04-25

## 2023-04-24 RX ORDER — HYDROXYZINE HCL 10 MG
25 TABLET ORAL
Refills: 0 | Status: DISCONTINUED | OUTPATIENT
Start: 2023-04-24 | End: 2023-04-27

## 2023-04-24 RX ORDER — INSULIN LISPRO 100/ML
15 VIAL (ML) SUBCUTANEOUS
Refills: 0 | Status: DISCONTINUED | OUTPATIENT
Start: 2023-04-24 | End: 2023-04-25

## 2023-04-24 RX ORDER — INSULIN GLARGINE 100 [IU]/ML
30 INJECTION, SOLUTION SUBCUTANEOUS ONCE
Refills: 0 | Status: COMPLETED | OUTPATIENT
Start: 2023-04-24 | End: 2023-04-24

## 2023-04-24 RX ORDER — TAMSULOSIN HYDROCHLORIDE 0.4 MG/1
0.4 CAPSULE ORAL AT BEDTIME
Refills: 0 | Status: DISCONTINUED | OUTPATIENT
Start: 2023-04-24 | End: 2023-04-27

## 2023-04-24 RX ADMIN — Medication 200 MILLIGRAM(S): at 17:13

## 2023-04-24 RX ADMIN — Medication 15 UNIT(S): at 17:15

## 2023-04-24 RX ADMIN — LORATADINE 10 MILLIGRAM(S): 10 TABLET ORAL at 11:52

## 2023-04-24 RX ADMIN — MYCOPHENOLATE MOFETIL 1500 MILLIGRAM(S): 250 CAPSULE ORAL at 17:13

## 2023-04-24 RX ADMIN — Medication 25 MILLIGRAM(S): at 23:57

## 2023-04-24 RX ADMIN — LOSARTAN POTASSIUM 50 MILLIGRAM(S): 100 TABLET, FILM COATED ORAL at 05:12

## 2023-04-24 RX ADMIN — HEPARIN SODIUM 5000 UNIT(S): 5000 INJECTION INTRAVENOUS; SUBCUTANEOUS at 14:03

## 2023-04-24 RX ADMIN — MYCOPHENOLATE MOFETIL 1500 MILLIGRAM(S): 250 CAPSULE ORAL at 05:11

## 2023-04-24 RX ADMIN — Medication 6: at 17:14

## 2023-04-24 RX ADMIN — Medication 25 MILLIGRAM(S): at 02:01

## 2023-04-24 RX ADMIN — Medication 12 UNIT(S): at 11:57

## 2023-04-24 RX ADMIN — HEPARIN SODIUM 5000 UNIT(S): 5000 INJECTION INTRAVENOUS; SUBCUTANEOUS at 22:52

## 2023-04-24 RX ADMIN — SODIUM CHLORIDE 50 MILLILITER(S): 9 INJECTION INTRAMUSCULAR; INTRAVENOUS; SUBCUTANEOUS at 20:01

## 2023-04-24 RX ADMIN — PANTOPRAZOLE SODIUM 40 MILLIGRAM(S): 20 TABLET, DELAYED RELEASE ORAL at 08:20

## 2023-04-24 RX ADMIN — TAMSULOSIN HYDROCHLORIDE 0.4 MILLIGRAM(S): 0.4 CAPSULE ORAL at 10:41

## 2023-04-24 RX ADMIN — Medication 25 MILLIGRAM(S): at 19:56

## 2023-04-24 RX ADMIN — Medication 200 MILLIGRAM(S): at 05:12

## 2023-04-24 RX ADMIN — Medication 10: at 11:55

## 2023-04-24 RX ADMIN — Medication 6: at 06:59

## 2023-04-24 RX ADMIN — SODIUM CHLORIDE 50 MILLILITER(S): 9 INJECTION INTRAMUSCULAR; INTRAVENOUS; SUBCUTANEOUS at 08:18

## 2023-04-24 RX ADMIN — TAMSULOSIN HYDROCHLORIDE 0.4 MILLIGRAM(S): 0.4 CAPSULE ORAL at 22:52

## 2023-04-24 RX ADMIN — HEPARIN SODIUM 5000 UNIT(S): 5000 INJECTION INTRAVENOUS; SUBCUTANEOUS at 05:11

## 2023-04-24 RX ADMIN — Medication 12 UNIT(S): at 08:21

## 2023-04-24 RX ADMIN — Medication 5 MILLIGRAM(S): at 05:11

## 2023-04-24 NOTE — PROGRESS NOTE ADULT - ASSESSMENT
62 yo female PMH DM, HTN, HLD, lupus here for generalized malaise, decreased appetite, and weakness-as per daughter. Could not bring herself fully up off bed to stand and slid down on her butt. Pt states this was due to weakness as well as pain to the L ankle. Mild swelling to ankle. Denies injuries to ankle prior to fall. Denies hx of DVT/PE. Went to  yesterday for this and was rx prednisone and benadryl which pt took once yesterday. No new products, foods, etc.        Plan:  Admit to medicine for weakness and left Talus fracture. Brace on left heel placed by ER. Will need Podiatry Consult by day team in AM.  ER spoke to Podiatry who agreed with outpatient follow-up. CT head no acute findings, leg dopplers negative for DVT.  UA notes pyuria, hold on ABX, appears asymptomatic.       Left talus fracture  podiatrist consult  PT eval: MARTIN    DM II uncontrolled  Glucose 390, missed Humolog doses today. Added Lantus 45 , Admelog 12 units TID. A1C in AM.   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  Will order renal sonogram to eval MRD and urine protein/creatinine ratio to see degree of proteinuria   (4/24) Urology consult for elliott placement     Continue home meds at home dose for SLE: Prednisone 5 mg/day, Cellcept 1,500 mg bid, Cozaar 50 mg/day, Plaquenil 200 mg bid.

## 2023-04-24 NOTE — PHYSICAL THERAPY INITIAL EVALUATION ADULT - RANGE OF MOTION EXAMINATION, REHAB EVAL
Quality 431: Preventive Care And Screening: Unhealthy Alcohol Use - Screening: Patient screened for unhealthy alcohol use using a single question and scores less than 2 times per year
Quality 226: Preventive Care And Screening: Tobacco Use: Screening And Cessation Intervention: Patient screened for tobacco use and is an ex/non-smoker
Quality 130: Documentation Of Current Medications In The Medical Record: Current Medications Documented
Detail Level: Detailed
L ankle in splint not tested/bilateral upper extremity ROM was WFL (within functional limits)/bilateral lower extremity ROM was WFL (within functional limits)

## 2023-04-24 NOTE — PHYSICAL THERAPY INITIAL EVALUATION ADULT - ADDITIONAL COMMENTS
Pt lives with daughter in a house  with 3 steps to enter in the front no handrails and in the back entrance 2 steps B HR. Pt was indep with ambulation/ADLS without AD. DME: [facility]

## 2023-04-24 NOTE — PHYSICAL THERAPY INITIAL EVALUATION ADULT - PERTINENT HX OF CURRENT PROBLEM, REHAB EVAL
64 yo female PMH DM, HTN, HLD, lupus here for generalized malaise, decreased appetite, and weakness-as per daughter. Could not bring herself fully up off bed to stand and slid down on her butt. Pt states this was due to weakness as well as pain to the L ankle. Mild swelling to ankle. Denies injuries to ankle prior to fall. Denies hx of DVT/PE. Went to  yesterday for this and was rx prednisone and benadryl which pt took once yesterday.   +left Talus fracture. Brace on left heel placed by ER. ER spoke to Podiatry who agreed with outpatient follow-up.

## 2023-04-24 NOTE — PROGRESS NOTE ADULT - SUBJECTIVE AND OBJECTIVE BOX
Patient is a 63y old  Female who presents with a chief complaint of Frequent falls, cannot walk. (2023 10:20)    INTERVAL HPI/OVERNIGHT EVENTS: Patients seen and examined at bedside this morning. No acute events overnight. Pt still retaining on U/S. [place] not able to be placed.    MEDICATIONS  (STANDING):  dextrose 5%. 1000 milliLiter(s) (50 mL/Hr) IV Continuous <Continuous>  dextrose 5%. 1000 milliLiter(s) (100 mL/Hr) IV Continuous <Continuous>  dextrose 50% Injectable 12.5 Gram(s) IV Push once  dextrose 50% Injectable 25 Gram(s) IV Push once  dextrose 50% Injectable 25 Gram(s) IV Push once  glucagon  Injectable 1 milliGRAM(s) IntraMuscular once  heparin   Injectable 5000 Unit(s) SubCutaneous every 8 hours  hydroxychloroquine 200 milliGRAM(s) Oral two times a day  insulin glargine Injectable (LANTUS) 65 Unit(s) SubCutaneous at bedtime  insulin lispro (ADMELOG) corrective regimen sliding scale   SubCutaneous three times a day before meals  insulin lispro Injectable (ADMELOG) 15 Unit(s) SubCutaneous three times a day before meals  loratadine 10 milliGRAM(s) Oral daily  losartan 50 milliGRAM(s) Oral daily  mycophenolate mofetil 1500 milliGRAM(s) Oral two times a day  pantoprazole    Tablet 40 milliGRAM(s) Oral before breakfast  predniSONE   Tablet 5 milliGRAM(s) Oral daily  sodium chloride 0.9%. 1000 milliLiter(s) (50 mL/Hr) IV Continuous <Continuous>  tamsulosin 0.4 milliGRAM(s) Oral at bedtime    MEDICATIONS  (PRN):  AQUAPHOR (petrolatum Ointment) 1 Application(s) Topical three times a day PRN dry skin/itchy  dextrose Oral Gel 15 Gram(s) Oral once PRN Blood Glucose LESS THAN 70 milliGRAM(s)/deciliter  diphenhydrAMINE Injectable 25 milliGRAM(s) IV Push every 6 hours PRN Itching    Allergies    Allergy Status Unknown    Intolerances      REVIEW OF SYSTEMS:  All other systems reviewed and are negative    Vital Signs Last 24 Hrs  T(C): 36.4 (2023 11:41), Max: 36.8 (2023 17:24)  T(F): 97.6 (2023 11:41), Max: 98.2 (2023 17:24)  HR: 76 (2023 11:41) (69 - 91)  BP: 124/75 (2023 11:41) (100/67 - 124/75)  BP(mean): --  RR: 18 (2023 11:41) (18 - 18)  SpO2: 100% (2023 10:41) (98% - 100%)    Parameters below as of 2023 10:41  Patient On (Oxygen Delivery Method): room air      Daily     Daily   I&O's Summary    2023 07:01  -  2023 07:00  --------------------------------------------------------  IN: 650 mL / OUT: 360 mL / NET: 290 mL    2023 07:01  -  2023 15:00  --------------------------------------------------------  IN: 0 mL / OUT: 480 mL / NET: -480 mL      CAPILLARY BLOOD GLUCOSE      POCT Blood Glucose.: 373 mg/dL (2023 11:29)  POCT Blood Glucose.: 299 mg/dL (2023 08:03)  POCT Blood Glucose.: 280 mg/dL (2023 06:29)  POCT Blood Glucose.: 234 mg/dL (2023 02:00)  POCT Blood Glucose.: 228 mg/dL (2023 23:42)  POCT Blood Glucose.: 310 mg/dL (2023 20:01)  POCT Blood Glucose.: 268 mg/dL (2023 15:55)    PHYSICAL EXAM:  GENERAL: NAD, well-groomed, well-developed  HEAD:  Atraumatic, Normocephalic  EYES: EOMI, PERRLA, conjunctiva and sclera clear  ENMT: No tonsillar erythema, exudates, or enlargement; Moist mucous membranes, Good dentition, No lesions  NECK: Supple, No JVD, Normal thyroid  NERVOUS SYSTEM:  Alert & Oriented X3, Good concentration; Motor Strength 3/5 B/L upper and lower extremities, left foot boot; DTRs 2+ intact and symmetric  CHEST/LUNG: Clear to percussion bilaterally; No rales, rhonchi, wheezing, or rubs  HEART: Regular rate and rhythm; No murmurs, rubs, or gallops  ABDOMEN: Soft, Nontender, Nondistended; Bowel sounds present  EXTREMITIES:  2+ Peripheral Pulses, No clubbing, cyanosis, or edema  LYMPH: No lymphadenopathy noted  SKIN: No rashes or lesions    Labs                          10.3   16.12 )-----------( 157      ( 2023 06:36 )             31.5     04-24    133<L>  |  106  |  55<H>  ----------------------------<  298<H>  4.4   |  20<L>  |  2.00<H>    Ca    8.6      2023 06:36  Phos  3.4     04-24  Mg     2.5     04-24    TPro  7.5  /  Alb  2.7<L>  /  TBili  0.3  /  DBili  x   /  AST  39<H>  /  ALT  40  /  AlkPhos  55  04-24          Urinalysis Basic - ( 2023 17:26 )    Color: Yellow / Appearance: very cloudy / S.020 / pH: x  Gluc: x / Ketone: Negative  / Bili: Negative / Urobili: Negative mg/dL   Blood: x / Protein: 100 mg/dL / Nitrite: Negative   Leuk Esterase: Moderate / RBC: 6-10 /HPF / WBC >50   Sq Epi: x / Non Sq Epi: x / Bacteria: Moderate                      Radiology and Imaging reviewed.

## 2023-04-24 NOTE — PHYSICAL THERAPY INITIAL EVALUATION ADULT - PLANNED THERAPY INTERVENTIONS, PT EVAL
LTG 2 weeks/balance training/bed mobility training/gait training/strengthening/transfer training LTG 4 weeks/balance training/bed mobility training/gait training/strengthening/transfer training

## 2023-04-24 NOTE — PHYSICAL THERAPY INITIAL EVALUATION ADULT - GENERAL OBSERVATIONS, REHAB EVAL
Pt found semisupine in bed + L LE talus fx in air splint with ace wrap intact. L LE NWB. A&Ox4, cooperative. Pt instructed on compliance with L LE NWB.

## 2023-04-24 NOTE — PROGRESS NOTE ADULT - SUBJECTIVE AND OBJECTIVE BOX
63y Female admitted with WEAKNESS    INABILITY TO AMBULATE DUE TO LEFT ANKLE FOOT      Patient seen and examined at bedside, currently sitting on commode, pt states she has multiple voids but her PVRs are >500cc. Pt denies abdominal pain or discomfort at this time.    No complaints offered.   Denies hematuria and dysuria.  Denies nausea and vomiting. Tolerating diet.  Denies chest pain, dyspnea, cough.    T(F): 97.6 (04-24-23 @ 11:41), Max: 98.1 (04-24-23 @ 05:22)  HR: 76 (04-24-23 @ 11:41) (69 - 81)  BP: 124/75 (04-24-23 @ 11:41) (100/67 - 124/75)  RR: 18 (04-24-23 @ 11:41) (18 - 18)  SpO2: 100% (04-24-23 @ 10:41) (100% - 100%)  Wt(kg): --  CAPILLARY BLOOD GLUCOSE      POCT Blood Glucose.: 279 mg/dL (24 Apr 2023 16:22)  POCT Blood Glucose.: 373 mg/dL (24 Apr 2023 11:29)  POCT Blood Glucose.: 299 mg/dL (24 Apr 2023 08:03)  POCT Blood Glucose.: 280 mg/dL (24 Apr 2023 06:29)  POCT Blood Glucose.: 234 mg/dL (24 Apr 2023 02:00)  POCT Blood Glucose.: 228 mg/dL (23 Apr 2023 23:42)  POCT Blood Glucose.: 310 mg/dL (23 Apr 2023 20:01)    PHYSICAL EXAM:  GENERAL: NAD, well-groomed, well-developed  HEAD:  Atraumatic, Normocephalic  EYES: EOMI, PERRLA, conjunctiva and sclera clear  ENMT: No tonsillar erythema, exudates, or enlargement; Moist mucous membranes, Good dentition, No lesions  NECK: Supple, No JVD, Normal thyroid  NERVOUS SYSTEM:  Alert & Oriented X3, Good concentration; Motor Strength 3/5 B/L upper and lower extremities, left foot boot; DTRs 2+ intact and symmetric  CHEST/LUNG: Clear to percussion bilaterally; No rales, rhonchi, wheezing, or rubs  HEART: Regular rate and rhythm; No murmurs, rubs, or gallops  ABDOMEN: Soft, Nontender, Nondistended; Bowel sounds present  : elliott attempted, meatus with small opening however unable to advance catheter due to stricter/obstruction, no bladder ttp, +distension, PVR 565cc  EXTREMITIES:  2+ Peripheral Pulses, No clubbing, cyanosis, or edema  LYMPH: No lymphadenopathy noted  SKIN: No rashes or lesions  LABS:                        10.3   16.12 )-----------( 157      ( 24 Apr 2023 06:36 )             31.5   04-24    133<L>  |  106  |  55<H>  ----------------------------<  298<H>  4.4   |  20<L>  |  2.00<H>    Ca    8.6      24 Apr 2023 06:36  Phos  3.4     04-24  Mg     2.5     04-24    TPro  7.5  /  Alb  2.7<L>  /  TBili  0.3  /  DBili  x   /  AST  39<H>  /  ALT  40  /  AlkPhos  55  04-24    I&O's Detail    23 Apr 2023 07:01  -  24 Apr 2023 07:00  --------------------------------------------------------  IN:    Oral Fluid: 300 mL    sodium chloride 0.9%: 350 mL  Total IN: 650 mL    OUT:    Voided (mL): 360 mL  Total OUT: 360 mL    Total NET: 290 mL      24 Apr 2023 07:01  -  24 Apr 2023 17:36  --------------------------------------------------------  IN:  Total IN: 0 mL    OUT:    Voided (mL): 480 mL  Total OUT: 480 mL    Total NET: -480 mL        64 yo female PMH DM, HTN, HLD, lupus here for generalized malaise, decreased appetite, and weakness-now with UR, unable tp place Elliott cath  - discussed with Dr. Murdock who will perform additional urological intervention at bedside vs OR  - cont medical management

## 2023-04-24 NOTE — PHYSICAL THERAPY INITIAL EVALUATION ADULT - GAIT TRAINING, PT EVAL
Pt will be able to ambulate independently using assistive device up to 100 ft or more, be able to negotiate steps safely observing proper gait, posture and prevent falls.

## 2023-04-24 NOTE — PHYSICAL THERAPY INITIAL EVALUATION ADULT - TRANSFER SAFETY CONCERNS NOTED: BED/CHAIR, REHAB EVAL
decreased balance during turns/decreased weight-shifting ability decreased balance during turns/inability to maintain weight-bearing restrictions w/o assist/decreased weight-shifting ability

## 2023-04-25 LAB
GLUCOSE BLDC GLUCOMTR-MCNC: 107 MG/DL — HIGH (ref 70–99)
GLUCOSE BLDC GLUCOMTR-MCNC: 166 MG/DL — HIGH (ref 70–99)
GLUCOSE BLDC GLUCOMTR-MCNC: 229 MG/DL — HIGH (ref 70–99)
GLUCOSE BLDC GLUCOMTR-MCNC: 91 MG/DL — SIGNIFICANT CHANGE UP (ref 70–99)

## 2023-04-25 PROCEDURE — 99233 SBSQ HOSP IP/OBS HIGH 50: CPT

## 2023-04-25 RX ORDER — INSULIN GLARGINE 100 [IU]/ML
65 INJECTION, SOLUTION SUBCUTANEOUS AT BEDTIME
Refills: 0 | Status: DISCONTINUED | OUTPATIENT
Start: 2023-04-26 | End: 2023-04-27

## 2023-04-25 RX ORDER — LANOLIN ALCOHOL/MO/W.PET/CERES
3 CREAM (GRAM) TOPICAL AT BEDTIME
Refills: 0 | Status: DISCONTINUED | OUTPATIENT
Start: 2023-04-25 | End: 2023-04-27

## 2023-04-25 RX ORDER — INSULIN GLARGINE 100 [IU]/ML
30 INJECTION, SOLUTION SUBCUTANEOUS
Refills: 0 | Status: COMPLETED | OUTPATIENT
Start: 2023-04-25 | End: 2023-04-25

## 2023-04-25 RX ADMIN — Medication 5 MILLIGRAM(S): at 05:25

## 2023-04-25 RX ADMIN — Medication 3 MILLIGRAM(S): at 03:33

## 2023-04-25 RX ADMIN — Medication 200 MILLIGRAM(S): at 05:25

## 2023-04-25 RX ADMIN — Medication 3 MILLIGRAM(S): at 21:39

## 2023-04-25 RX ADMIN — MYCOPHENOLATE MOFETIL 1500 MILLIGRAM(S): 250 CAPSULE ORAL at 17:42

## 2023-04-25 RX ADMIN — INSULIN GLARGINE 30 UNIT(S): 100 INJECTION, SOLUTION SUBCUTANEOUS at 21:43

## 2023-04-25 RX ADMIN — LOSARTAN POTASSIUM 50 MILLIGRAM(S): 100 TABLET, FILM COATED ORAL at 05:25

## 2023-04-25 RX ADMIN — Medication 200 MILLIGRAM(S): at 17:42

## 2023-04-25 RX ADMIN — Medication 15 UNIT(S): at 08:32

## 2023-04-25 RX ADMIN — PANTOPRAZOLE SODIUM 40 MILLIGRAM(S): 20 TABLET, DELAYED RELEASE ORAL at 08:33

## 2023-04-25 RX ADMIN — HEPARIN SODIUM 5000 UNIT(S): 5000 INJECTION INTRAVENOUS; SUBCUTANEOUS at 15:44

## 2023-04-25 RX ADMIN — HEPARIN SODIUM 5000 UNIT(S): 5000 INJECTION INTRAVENOUS; SUBCUTANEOUS at 21:38

## 2023-04-25 RX ADMIN — MYCOPHENOLATE MOFETIL 1500 MILLIGRAM(S): 250 CAPSULE ORAL at 05:25

## 2023-04-25 RX ADMIN — Medication 2: at 08:31

## 2023-04-25 RX ADMIN — TAMSULOSIN HYDROCHLORIDE 0.4 MILLIGRAM(S): 0.4 CAPSULE ORAL at 21:38

## 2023-04-25 RX ADMIN — Medication 25 MILLIGRAM(S): at 21:42

## 2023-04-25 RX ADMIN — Medication 15 UNIT(S): at 12:10

## 2023-04-25 RX ADMIN — Medication 4: at 12:10

## 2023-04-25 RX ADMIN — HEPARIN SODIUM 5000 UNIT(S): 5000 INJECTION INTRAVENOUS; SUBCUTANEOUS at 05:24

## 2023-04-25 RX ADMIN — LORATADINE 10 MILLIGRAM(S): 10 TABLET ORAL at 11:13

## 2023-04-25 NOTE — OCCUPATIONAL THERAPY INITIAL EVALUATION ADULT - LLE MMT, REHAB EVAL
Prematurity, birth weight 750-999 grams, with 27-28 completed weeks of gestation Grossly greater then 3/5 hip and knee flexion; DNT ankle dorsiflexion/plantarflexion due to precautions.

## 2023-04-25 NOTE — OCCUPATIONAL THERAPY INITIAL EVALUATION ADULT - RANGE OF MOTION EXAMINATION, LOWER EXTREMITY
LLE AROM hip and knee flexion grossly WFL; DNT LLE ankle dorsiflexion/plantarflexion due to precautions./Right LE Active ROM was WFL   (within functional limits)

## 2023-04-25 NOTE — CHART NOTE - NSCHARTNOTEFT_GEN_A_CORE
Pt seen with 800cc of urine in bladder.  Comfortable, no complaints, no pain.   Voiding clear, yellow urine.  Discussed with Dr. Murdock who will see pt this evening.

## 2023-04-25 NOTE — PROGRESS NOTE ADULT - SUBJECTIVE AND OBJECTIVE BOX
Patient is a 63y old  Female who presents with a chief complaint of INABILITY TO AMBULATE DUE TO LEFT ANKLE FOOT     (25 Apr 2023 11:08)    INTERVAL HPI/OVERNIGHT EVENTS: Patients seen and examined at bedside this morning. No acute events overnight.    MEDICATIONS  (STANDING):  dextrose 5%. 1000 milliLiter(s) (50 mL/Hr) IV Continuous <Continuous>  dextrose 5%. 1000 milliLiter(s) (100 mL/Hr) IV Continuous <Continuous>  dextrose 50% Injectable 12.5 Gram(s) IV Push once  dextrose 50% Injectable 25 Gram(s) IV Push once  dextrose 50% Injectable 25 Gram(s) IV Push once  glucagon  Injectable 1 milliGRAM(s) IntraMuscular once  heparin   Injectable 5000 Unit(s) SubCutaneous every 8 hours  hydroxychloroquine 200 milliGRAM(s) Oral two times a day  insulin glargine Injectable (LANTUS) 30 Unit(s) SubCutaneous two times a day  insulin lispro (ADMELOG) corrective regimen sliding scale   SubCutaneous three times a day before meals  insulin lispro Injectable (ADMELOG) 15 Unit(s) SubCutaneous three times a day before meals  loratadine 10 milliGRAM(s) Oral daily  losartan 50 milliGRAM(s) Oral daily  melatonin 3 milliGRAM(s) Oral at bedtime  mycophenolate mofetil 1500 milliGRAM(s) Oral two times a day  pantoprazole    Tablet 40 milliGRAM(s) Oral before breakfast  predniSONE   Tablet 5 milliGRAM(s) Oral daily  tamsulosin 0.4 milliGRAM(s) Oral at bedtime    MEDICATIONS  (PRN):  AQUAPHOR (petrolatum Ointment) 1 Application(s) Topical three times a day PRN dry skin/itchy  dextrose Oral Gel 15 Gram(s) Oral once PRN Blood Glucose LESS THAN 70 milliGRAM(s)/deciliter  diphenhydrAMINE Injectable 25 milliGRAM(s) IV Push every 6 hours PRN Itching  hydrOXYzine hydrochloride 25 milliGRAM(s) Oral four times a day PRN Itching    Allergies    No Known Allergies    Intolerances      REVIEW OF SYSTEMS:  All other systems reviewed and are negative    Vital Signs Last 24 Hrs  T(C): 36.7 (25 Apr 2023 11:23), Max: 36.8 (24 Apr 2023 17:35)  T(F): 98 (25 Apr 2023 11:23), Max: 98.3 (24 Apr 2023 17:35)  HR: 91 (25 Apr 2023 11:23) (74 - 91)  BP: 118/70 (25 Apr 2023 11:23) (104/54 - 118/70)  BP(mean): --  RR: 17 (25 Apr 2023 11:23) (17 - 18)  SpO2: 100% (25 Apr 2023 11:23) (97% - 100%)      Daily     Daily   I&O's Summary    24 Apr 2023 07:01  -  25 Apr 2023 07:00  --------------------------------------------------------  IN: 2000 mL / OUT: 480 mL / NET: 1520 mL    25 Apr 2023 07:01  -  25 Apr 2023 14:31  --------------------------------------------------------  IN: 0 mL / OUT: 480 mL / NET: -480 mL      CAPILLARY BLOOD GLUCOSE      POCT Blood Glucose.: 229 mg/dL (25 Apr 2023 11:19)  POCT Blood Glucose.: 166 mg/dL (25 Apr 2023 07:55)  POCT Blood Glucose.: 115 mg/dL (24 Apr 2023 22:45)  POCT Blood Glucose.: 180 mg/dL (24 Apr 2023 21:17)  POCT Blood Glucose.: 279 mg/dL (24 Apr 2023 16:22)    PHYSICAL EXAM:  GENERAL: NAD, well-groomed, well-developed  HEAD:  Atraumatic, Normocephalic  EYES: EOMI, PERRLA, conjunctiva and sclera clear  ENMT: No tonsillar erythema, exudates, or enlargement; Moist mucous membranes, Good dentition, No lesions  NECK: Supple, No JVD, Normal thyroid  NERVOUS SYSTEM:  Alert & Oriented X3, Good concentration; Motor Strength 3/5 B/L upper and lower extremities, left foot boot; DTRs 2+ intact and symmetric  CHEST/LUNG: Clear to percussion bilaterally; No rales, rhonchi, wheezing, or rubs  HEART: Regular rate and rhythm; No murmurs, rubs, or gallops  ABDOMEN: Soft, Nontender, Nondistended; Bowel sounds present  EXTREMITIES:  2+ Peripheral Pulses, No clubbing, cyanosis, or edema  LYMPH: No lymphadenopathy noted  SKIN: No rashes or lesions      Labs                          10.3   16.12 )-----------( 157      ( 24 Apr 2023 06:36 )             31.5     04-24    133<L>  |  106  |  55<H>  ----------------------------<  298<H>  4.4   |  20<L>  |  2.00<H>    Ca    8.6      24 Apr 2023 06:36  Phos  3.4     04-24  Mg     2.5     04-24    TPro  7.5  /  Alb  2.7<L>  /  TBili  0.3  /  DBili  x   /  AST  39<H>  /  ALT  40  /  AlkPhos  55  04-24                            Radiology and Imaging reviewed.

## 2023-04-25 NOTE — DIETITIAN INITIAL EVALUATION ADULT - PERTINENT LABORATORY DATA
04-24    133<L>  |  106  |  55<H>  ----------------------------<  298<H>  4.4   |  20<L>  |  2.00<H>    Ca    8.6      24 Apr 2023 06:36  Phos  3.4     04-24  Mg     2.5     04-24    TPro  7.5  /  Alb  2.7<L>  /  TBili  0.3  /  DBili  x   /  AST  39<H>  /  ALT  40  /  AlkPhos  55  04-24  POCT Blood Glucose.: 166 mg/dL (04-25-23); 04-24 234, 280, 290, 373, 279, 180, 115  A1C with Estimated Average Glucose Result: 10.1 %, 243 (04-23-23)

## 2023-04-25 NOTE — DIETITIAN INITIAL EVALUATION ADULT - OTHER INFO
Pt lives c daughter; is independent c ADLs. Pt takes Novolog & Lantus to control BG levels at home; is on chronic steroids for Lupus which is most likely contributing to elevated glucose levels.  Pt tries to watch CHO intake; reviewed CHO foods & beverages, importance of CHO portion control.  Pt wt has been stable & appetite is good. Denies any N/V/C/D or chew/swallowing difficulty.

## 2023-04-25 NOTE — OCCUPATIONAL THERAPY INITIAL EVALUATION ADULT - PERTINENT HX OF CURRENT PROBLEM, REHAB EVAL
62 yo female PMH DM, HTN, HLD, lupus here for generalized malaise, decreased appetite, and weakness-as per daughter. Could not bring herself fully up off bed to stand and slid down on her butt. Pt states this was due to weakness as well as pain to the L ankle. Mild swelling to ankle. Denies injuries to ankle prior to fall. Denies hx of DVT/PE. Went to  yesterday for this and was rx prednisone and benadryl which pt took once yesterday. No new products, foods, etc. X-ray of L ankle revealed suspect small cortical avulsed fracture of the anterior tallus.

## 2023-04-25 NOTE — PROGRESS NOTE ADULT - ASSESSMENT
64 yo female PMH DM, HTN, HLD, lupus here for generalized malaise, decreased appetite, and weakness-as per daughter. Could not bring herself fully up off bed to stand and slid down on her butt. Pt states this was due to weakness as well as pain to the L ankle. Mild swelling to ankle. Denies injuries to ankle prior to fall. Denies hx of DVT/PE. Went to  yesterday for this and was rx prednisone and benadryl which pt took once yesterday. No new products, foods, etc.        Plan:  Admit to medicine for weakness and left Talus fracture. Brace on left heel placed by ER. Will need Podiatry Consult by day team in AM.  ER spoke to Podiatry who agreed with outpatient follow-up. CT head no acute findings, leg dopplers negative for DVT.  UA notes pyuria, hold on ABX, appears asymptomatic.       Left talus fracture  podiatrist consult  PT eval: MARTIN    DM II uncontrolled  Glucose 390, missed Humolog doses today. Added Lantus 45 , Admelog 12 units TID. A1C in AM.   (4/23) Adjust lantus prn as per FS today  (4/24) Increase lantus 65 units SQ QHS, increase lispro to 15 units TIDAC  (4/25) Pt not given lantus last night. Will give lantus 30 units now and 30 units PM. Then restart at Lantus 65 pm on 4/26. 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  Will order renal sonogram to eval MRD and urine protein/creatinine ratio to see degree of proteinuria   (4/24) Urology consult for elliott placement   (4/25) Currently on flomax. Unable for elliott placement. Urology to follow up regarding cystoscopy. Concerns for stricture.     Continue home meds at home dose for SLE: Prednisone 5 mg/day, Cellcept 1,500 mg bid, Cozaar 50 mg/day, Plaquenil 200 mg bid.

## 2023-04-25 NOTE — OCCUPATIONAL THERAPY INITIAL EVALUATION ADULT - TRANSFER SAFETY CONCERNS NOTED: TOILET, REHAB EVAL
decreased balance during turns/losing balance/decreased sequencing ability/decreased step length/inability to maintain weight-bearing restrictions w/o assist/decreased weight-shifting ability

## 2023-04-25 NOTE — OCCUPATIONAL THERAPY INITIAL EVALUATION ADULT - GENERAL OBSERVATIONS, REHAB EVAL
Pt was encountered supine in bed; NAD, LLE NWB, LLE ankle in brace, LLE ace wrapped clean, dry and intact, alert, verbalized name and , cooperative, followed commands; pt had no c/o pain.

## 2023-04-25 NOTE — OCCUPATIONAL THERAPY INITIAL EVALUATION ADULT - ADDITIONAL COMMENTS
Pt lives with daughter in a private house with 3 steps to enter with no handrails. Pt also has a side entrance with 2 steps with bilateral handrails. Once inside, the pt has 12 steps with a R handrail to reach the main floor where the bedroom and bathroom is. The pts bathroom has a walk in shower, regular toilet seat and no grab bars. The pt ambulates with no device and owns a rolling walker and a straight cane.

## 2023-04-25 NOTE — DIETITIAN INITIAL EVALUATION ADULT - PERTINENT MEDS FT
Heparin, Plaquenil, NS @ 50 ml/hr, Lantus, Admelog, Melatonin, Atarax, Flomax, Benadryl, Cozaar, Cellcept, Protonix, Prednisone

## 2023-04-26 ENCOUNTER — TRANSCRIPTION ENCOUNTER (OUTPATIENT)
Age: 63
End: 2023-04-26

## 2023-04-26 LAB
ANION GAP SERPL CALC-SCNC: 7 MMOL/L — SIGNIFICANT CHANGE UP (ref 5–17)
BUN SERPL-MCNC: 34 MG/DL — HIGH (ref 7–23)
CALCIUM SERPL-MCNC: 8.6 MG/DL — SIGNIFICANT CHANGE UP (ref 8.5–10.1)
CHLORIDE SERPL-SCNC: 113 MMOL/L — HIGH (ref 96–108)
CO2 SERPL-SCNC: 19 MMOL/L — LOW (ref 22–31)
CREAT SERPL-MCNC: 1.45 MG/DL — HIGH (ref 0.5–1.3)
EGFR: 41 ML/MIN/1.73M2 — LOW
GLUCOSE BLDC GLUCOMTR-MCNC: 106 MG/DL — HIGH (ref 70–99)
GLUCOSE BLDC GLUCOMTR-MCNC: 169 MG/DL — HIGH (ref 70–99)
GLUCOSE BLDC GLUCOMTR-MCNC: 251 MG/DL — HIGH (ref 70–99)
GLUCOSE BLDC GLUCOMTR-MCNC: 321 MG/DL — HIGH (ref 70–99)
GLUCOSE SERPL-MCNC: 156 MG/DL — HIGH (ref 70–99)
HCT VFR BLD CALC: 29.7 % — LOW (ref 34.5–45)
HGB BLD-MCNC: 9.6 G/DL — LOW (ref 11.5–15.5)
MCHC RBC-ENTMCNC: 26.2 PG — LOW (ref 27–34)
MCHC RBC-ENTMCNC: 32.3 G/DL — SIGNIFICANT CHANGE UP (ref 32–36)
MCV RBC AUTO: 80.9 FL — SIGNIFICANT CHANGE UP (ref 80–100)
NRBC # BLD: 0 /100 WBCS — SIGNIFICANT CHANGE UP (ref 0–0)
PLATELET # BLD AUTO: 156 K/UL — SIGNIFICANT CHANGE UP (ref 150–400)
POTASSIUM SERPL-MCNC: 3.8 MMOL/L — SIGNIFICANT CHANGE UP (ref 3.5–5.3)
POTASSIUM SERPL-SCNC: 3.8 MMOL/L — SIGNIFICANT CHANGE UP (ref 3.5–5.3)
RBC # BLD: 3.67 M/UL — LOW (ref 3.8–5.2)
RBC # FLD: 13.9 % — SIGNIFICANT CHANGE UP (ref 10.3–14.5)
SODIUM SERPL-SCNC: 139 MMOL/L — SIGNIFICANT CHANGE UP (ref 135–145)
WBC # BLD: 3.99 K/UL — SIGNIFICANT CHANGE UP (ref 3.8–10.5)
WBC # FLD AUTO: 3.99 K/UL — SIGNIFICANT CHANGE UP (ref 3.8–10.5)

## 2023-04-26 PROCEDURE — 99232 SBSQ HOSP IP/OBS MODERATE 35: CPT

## 2023-04-26 RX ADMIN — TAMSULOSIN HYDROCHLORIDE 0.4 MILLIGRAM(S): 0.4 CAPSULE ORAL at 21:32

## 2023-04-26 RX ADMIN — MYCOPHENOLATE MOFETIL 1500 MILLIGRAM(S): 250 CAPSULE ORAL at 05:51

## 2023-04-26 RX ADMIN — HEPARIN SODIUM 5000 UNIT(S): 5000 INJECTION INTRAVENOUS; SUBCUTANEOUS at 21:32

## 2023-04-26 RX ADMIN — LORATADINE 10 MILLIGRAM(S): 10 TABLET ORAL at 11:11

## 2023-04-26 RX ADMIN — Medication 5 MILLIGRAM(S): at 05:51

## 2023-04-26 RX ADMIN — LOSARTAN POTASSIUM 50 MILLIGRAM(S): 100 TABLET, FILM COATED ORAL at 05:50

## 2023-04-26 RX ADMIN — Medication 6: at 16:54

## 2023-04-26 RX ADMIN — Medication 8: at 11:11

## 2023-04-26 RX ADMIN — Medication 2: at 08:31

## 2023-04-26 RX ADMIN — PANTOPRAZOLE SODIUM 40 MILLIGRAM(S): 20 TABLET, DELAYED RELEASE ORAL at 05:51

## 2023-04-26 RX ADMIN — HEPARIN SODIUM 5000 UNIT(S): 5000 INJECTION INTRAVENOUS; SUBCUTANEOUS at 05:51

## 2023-04-26 RX ADMIN — Medication 3 MILLIGRAM(S): at 21:32

## 2023-04-26 RX ADMIN — MYCOPHENOLATE MOFETIL 1500 MILLIGRAM(S): 250 CAPSULE ORAL at 17:09

## 2023-04-26 RX ADMIN — Medication 200 MILLIGRAM(S): at 17:08

## 2023-04-26 RX ADMIN — Medication 200 MILLIGRAM(S): at 05:51

## 2023-04-26 RX ADMIN — HEPARIN SODIUM 5000 UNIT(S): 5000 INJECTION INTRAVENOUS; SUBCUTANEOUS at 16:54

## 2023-04-26 NOTE — DISCHARGE NOTE PROVIDER - CARE PROVIDER_API CALL
Jose Zuniga (DPM)  Surgery  [address], [address]  [place] [address]  Phone: [phone]  Fax: [phone]  Follow Up Time:     follwo up with your rheumatolgist , primary doctor and cardiologist,   Phone: (   )    -  Fax: (   )    -  Follow Up Time:    Jose Zuniga (DPM)  Surgery  [address] [facility], [address]  [place] [address]  Phone: [phone]  Fax: [phone]  Follow Up Time: 1 week    Yuriy Murdock)  Urology  [address] [place] [address]  Phone: [phone]  Fax: [phone]  Follow Up Time: 1 week    follwo up with your rheumatolgist , primary doctor and cardiologist,   Phone: (   )    -  Fax: (   )    -  Follow Up Time:

## 2023-04-26 NOTE — DISCHARGE NOTE PROVIDER - NSDCCPCAREPLAN_GEN_ALL_CORE_FT
PRINCIPAL DISCHARGE DIAGNOSIS  Diagnosis: Fracture of left talus  Assessment and Plan of Treatment:       SECONDARY DISCHARGE DIAGNOSES  Diagnosis: Uncontrolled type 2 diabetes mellitus with hyperglycemia  Assessment and Plan of Treatment:     Diagnosis: Lupus  Assessment and Plan of Treatment:     Diagnosis: Benign essential HTN  Assessment and Plan of Treatment:     Diagnosis: Acute kidney failure  Assessment and Plan of Treatment:     Diagnosis: Urinary retention  Assessment and Plan of Treatment:

## 2023-04-26 NOTE — CONSULT NOTE ADULT - ASSESSMENT
63F presents with left foot talar fracture.  - Pt seen and evaluated.  - Vital signs are stable, WBC 3.99.  - No pain on palpation of the left foot talar head, neck or body. BL feet no open wounds, no acute signs of infection.  - Ordered CAM boot for LLE to be delivered by the end of the week.  - Pt to remain NWB to LLE with CAM boot and may use weight on the left heel for transfers only.  - No further podiatric intervention needed. Please re-consult as necessary.  - Follow up information listed in discharge provider note.  - Discussed with attending. 63F presents with left foot talar fracture.  - Pt seen and evaluated.  - Vital signs are stable, WBC 3.99.  - No pain on palpation of the left foot talar head, neck or body. BL feet no open wounds, no acute signs of infection.  - 4/22 Left Ankle X-Ray: Small cortical avulsed fracture of the anterior talus   - Ordered CAM boot for LLE to be delivered by the end of the week.  - Pt to remain NWB to LLE with CAM boot and may use weight on the left heel for transfers only.  - No further podiatric intervention needed. Please re-consult as necessary.  - Follow up information listed in discharge provider note.  - Discussed with attending.

## 2023-04-26 NOTE — PROGRESS NOTE ADULT - ASSESSMENT
62 yo female PMH DM, HTN, HLD, lupus here for generalized malaise, decreased appetite, and weakness-as per daughter. Could not bring herself fully up off bed to stand and slid down on her butt. Pt states this was due to weakness as well as pain to the L ankle. Mild swelling to ankle. Denies injuries to ankle prior to fall. Denies hx of DVT/PE. Went to  yesterday for this and was rx prednisone and benadryl which pt took once yesterday. No new products, foods, etc.         medicine for weakness and left Talus fracture. Brace on left heel placed by ER. Will need Podiatry Consult by day team in AM.  ER spoke to Podiatry who agreed with outpatient follow-up. CT head no acute findings, leg dopplers negative for DVT.  UA notes pyuria, hold on ABX, appears asymptomatic.       Left talus fracture  podiatrist consulted on today  PT eval: MARTIN    DM II uncontrolled-   a1c at 10   had been controlled last 24 hours except the most recent one at present which is 321 will ensure coverage    continue with regimen      Urinary retention, INDER on CKD- improving inder-   has overflow incontinence - await urology consult   I reached out to dr. gordon on today   per documentation nurse and urology PA unable to pass elliott presumed stricture     Continue home meds at home dose for SLE: Prednisone 5 mg/day, Cellcept 1,500 mg bid, Cozaar 50 mg/day, Plaquenil 200 mg bid.

## 2023-04-26 NOTE — DISCHARGE NOTE PROVIDER - NSDCMRMEDTOKEN_GEN_ALL_CORE_FT
acetaminophen 325 mg oral tablet: 2 tab(s) orally every 6 hours, As needed, Temp greater or equal to 38C (100.4F)  cefpodoxime 200 mg oral tablet: 2 tab(s) orally every 24 hours  CellCept 500 mg oral tablet: 3 tab(s) orally 2 times a day   Left CAM Walker Boot: Please provide left CAM walker boot  Ind: To wear as instructed  Dx: Left talus fracture  Left CAM walker boot: Please provide 1 left CAM walker boot  Ind: To use as directed.  Dx: Left talus fracture  Left CAM walker boot: Please provide 1 left CAM walker boot  Ind: To use as directed.  Dx: Left talus fracture  Left foot CAM walker: Please dispense 1 left foot CAM walker  Ind: Use as directed  Dx: Left foot talus fracture  Left foot CAM walker: Please dispense 1 left foot CAM walker  Ind: Use as directed  Dx: Left foot talus fracture  Left foot CAM walker: Please dispense 1 left foot CAM walker  Ind: Use as directed  Dx: Left foot talar fracture  Left foot CAM walker: Please dispense 1 left foot CAM walker  Ind: Use as directed  Dx: Left foot talar fracture  Left foot CAM walker boot: Please dispense 1 left foot CAM walker boot.  Ind: To be used as directed.  Dx: Left foot talus fracture  Left foot CAM walker boot: Please dispense 1 left foot CAM walker boot.  Ind: To be used as directed.  Dx: Left foot talus fracture  Left foot CAM walker boot: Please dispense 1 left foot CAM walker boot  Ind: Use as directed  Dx: Left foot talus fracture  Left foot CAM walker boot: Please dispense 1 left foot CAM walker boot  Ind: Use as directed  Dx: Left foot talus fracture  losartan 50 mg oral tablet: 1 tab(s) orally once a day  metroNIDAZOLE 500 mg oral tablet: 1 tab(s) orally every 8 hours  NovoLOG 100 units/mL subcutaneous solution: 12 unit(s) subcutaneous 3 times a day  nystatin 100,000 units/g topical powder (obsolete):   Plaquenil 200 mg oral tablet: orally 2 times a day  predniSONE 5 mg oral tablet: 1 tab(s) orally once a day  Protonix 40 mg oral delayed release tablet: 1 tab(s) orally once a day   saccharomyces boulardii lyo 250 mg oral capsule: 1 cap(s) orally 2 times a day   cholecalciferol oral tablet: 1000 unit(s) orally once a day  heparin: 5,000 unit(s) subcutaneous every 8 hours  hydroxychloroquine 200 mg oral tablet: 1 tab(s) orally 2 times a day  insulin glargine 100 units/mL subcutaneous solution: 30 unit(s) subcutaneous once a day (at bedtime)  insulin lispro 100 units/mL injectable solution: 8 unit(s) subcutaneous 3 times a day (before meals)  Left CAM Walker Boot: Please provide left CAM walker boot  Ind: To wear as instructed  Dx: Left talus fracture  loratadine 10 mg oral tablet: 1 tab(s) orally once a day  losartan 50 mg oral tablet: 1 tab(s) orally once a day  mycophenolate mofetil 250 mg oral capsule: 6 cap(s) orally 2 times a day  oxyCODONE 5 mg oral tablet: 1 tab(s) orally every 6 hours as needed for  severe pain max daily dosing 4 tab  predniSONE 5 mg oral tablet: 1 tab(s) orally once a day  Protonix 40 mg oral delayed release tablet: 1 tab(s) orally once a day   tamsulosin 0.4 mg oral capsule: 1 cap(s) orally once a day (at bedtime)

## 2023-04-26 NOTE — DISCHARGE NOTE PROVIDER - PROVIDER TOKENS
PROVIDER:[TOKEN:[[id number]]],FREE:[LAST:[follwo up with your rheumatolgist , primary doctor and cardiologist],PHONE:[(   )    -],FAX:[(   )    -]] PROVIDER:[TOKEN:[[id number]],FOLLOWUP:[1 week]],PROVIDER:[TOKEN:[[id number]],FOLLOWUP:[1 week]],FREE:[LAST:[follwo up with your rheumatolgist , primary doctor and cardiologist],PHONE:[(   )    -],FAX:[(   )    -]]

## 2023-04-26 NOTE — PROGRESS NOTE ADULT - SUBJECTIVE AND OBJECTIVE BOX
Patient is a 63y old  Female who presents with a chief complaint of INABILITY TO AMBULATE DUE TO LEFT ANKLE FOOT     (25 Apr 2023 11:08)    INTERVAL HPI/OVERNIGHT EVENTS: Patients seen and examined at bedside this morning. No acute events overnight.    MEDICATIONS  (STANDING):  dextrose 5%. 1000 milliLiter(s) (100 mL/Hr) IV Continuous <Continuous>  dextrose 5%. 1000 milliLiter(s) (50 mL/Hr) IV Continuous <Continuous>  dextrose 50% Injectable 12.5 Gram(s) IV Push once  dextrose 50% Injectable 25 Gram(s) IV Push once  dextrose 50% Injectable 25 Gram(s) IV Push once  glucagon  Injectable 1 milliGRAM(s) IntraMuscular once  heparin   Injectable 5000 Unit(s) SubCutaneous every 8 hours  hydroxychloroquine 200 milliGRAM(s) Oral two times a day  insulin glargine Injectable (LANTUS) 65 Unit(s) SubCutaneous at bedtime  insulin lispro (ADMELOG) corrective regimen sliding scale   SubCutaneous three times a day before meals  loratadine 10 milliGRAM(s) Oral daily  losartan 50 milliGRAM(s) Oral daily  melatonin 3 milliGRAM(s) Oral at bedtime  mycophenolate mofetil 1500 milliGRAM(s) Oral two times a day  pantoprazole    Tablet 40 milliGRAM(s) Oral before breakfast  predniSONE   Tablet 5 milliGRAM(s) Oral daily  tamsulosin 0.4 milliGRAM(s) Oral at bedtime    MEDICATIONS  (PRN):  AQUAPHOR (petrolatum Ointment) 1 Application(s) Topical three times a day PRN dry skin/itchy  dextrose Oral Gel 15 Gram(s) Oral once PRN Blood Glucose LESS THAN 70 milliGRAM(s)/deciliter  diphenhydrAMINE Injectable 25 milliGRAM(s) IV Push every 6 hours PRN Itching  hydrOXYzine hydrochloride 25 milliGRAM(s) Oral four times a day PRN Itching    Allergies    No Known Allergies    Intolerances      REVIEW OF SYSTEMS:  All other systems reviewed and are negative    Vital Signs Last 24 Hrs  T(C): 36.7 (26 Apr 2023 12:10), Max: 36.9 (25 Apr 2023 17:50)  T(F): 98 (26 Apr 2023 12:10), Max: 98.5 (25 Apr 2023 17:50)  HR: 88 (26 Apr 2023 14:00) (86 - 98)  BP: 122/78 (26 Apr 2023 14:00) (105/70 - 122/78)  BP(mean): --  RR: 18 (26 Apr 2023 12:10) (18 - 18)  SpO2: 99% (26 Apr 2023 14:00) (99% - 99%)    Parameters below as of 26 Apr 2023 14:00  Patient On (Oxygen Delivery Method): room air      PHYSICAL EXAM:  GENERAL: NAD, well-groomed, well-developed  HEAD:  Atraumatic, Normocephalic  EYES: EOMI, PERRLA, conjunctiva and sclera clear  ENMT: No tonsillar erythema, exudates, or enlargement; Moist mucous membranes, Good dentition, No lesions  NECK: Supple, No JVD, Normal thyroid  NERVOUS SYSTEM:  Alert & Oriented X3, Good concentration; Motor Strength 3/5 B/L upper and lower extremities, left foot boot; DTRs 2+ intact and symmetric  CHEST/LUNG: Clear to percussion bilaterally; No rales, rhonchi, wheezing, or rubs  HEART: Regular rate and rhythm; No murmurs, rubs, or gallops  ABDOMEN: Soft, Nontender, Nondistended; Bowel sounds present  EXTREMITIES:  2+ Peripheral Pulses, No clubbing, cyanosis, or edema  SKIN: No rashes or lesions      Labs                        9.6    3.99  )-----------( 156      ( 26 Apr 2023 05:30 )             29.7   04-26    139  |  113<H>  |  34<H>  ----------------------------<  156<H>  3.8   |  19<L>  |  1.45<H>    Ca    8.6      26 Apr 2023 05:30    A1C with Estimated Average Glucose (04.23.23 @ 06:56)    A1C with Estimated Average Glucose Result: 10.1: Method: Immunoassay       Reference Range                4.0-5.6%       High risk (prediabetic)        5.7-6.4%       Diabetic, diagnostic             >=6.5%       ADA diabetic treatment goal       <7.0%  The Hemoglobin A1c testing is NGSP-certified.Reference ranges are based  upon the 2010 recommendations of  the American Diabetes Association.  Interpretation may vary for children  and adolescents. %   Estimated Average Glucose: 243: The Estimated Average Glucose (eAG) or Mean Plasma Glucose (MPG) value is  calculated from the hemoglobin A1c value and covers the same time period.   The American Diabetes Association (ADA) and other professional  organizations recommend reporting the eAG with the HgbA1c. mg/dL      CAPILLARY BLOOD GLUCOSE      POCT Blood Glucose.: 321 mg/dL (26 Apr 2023 10:53)  POCT Blood Glucose.: 169 mg/dL (26 Apr 2023 08:09)  POCT Blood Glucose.: 107 mg/dL (25 Apr 2023 21:40)  POCT Blood Glucose.: 91 mg/dL (25 Apr 2023 16:27)            Radiology and Imaging reviewed.

## 2023-04-26 NOTE — DISCHARGE NOTE PROVIDER - NSDCFUADDAPPT_GEN_ALL_CORE_FT
Podiatry Discharge Instructions:  Follow up: Please follow up with Dr. Jose Zuniga within 1 week of discharge from the hospital, please call [phone] for appointment and discuss that you recently were seen in the hospital.  Wound Care: N/A  Weight bearing: Please remain non-weightbearing to the left foot and use the left heel in a CAM boot for transfers only.   Antibiotics: Please continue as instructed.

## 2023-04-26 NOTE — DISCHARGE NOTE PROVIDER - HOSPITAL COURSE
· Assessment	  64 yo female PMH DM, HTN, HLD, lupus here for generalized malaise, decreased appetite, and weakness-as per daughter. Could not bring herself fully up off bed to stand and slid down on her butt. Pt states this was due to weakness as well as pain to the L ankle. Mild swelling to ankle. Denies injuries to ankle prior to fall. Denies hx of DVT/PE. Went to  yesterday for this and was rx prednisone and benadryl which pt took once yesterday. No new products, foods, etc.         medicine for weakness and left Talus fracture. Brace on left heel placed by ER. Will need Podiatry Consult by day team in AM.  ER spoke to Podiatry who agreed with outpatient follow-up. CT head no acute findings, leg dopplers negative for DVT.  UA notes pyuria, hold on ABX, appears asymptomatic.       Left talus fracture  podiatrist consulted on today  PT eval: MARTIN  4/27/2023 appreciate podiatry consult await cam boot   4/29/2023 cam boot in place  5/2/2023 -f/u with dr. tanner of podiatry in one week    DM II uncontrolled-   a1c at 10   had been controlled last 24 hours except the most recent one at present which is 321 will ensure coverage  4/27/2023 will consult dr. becerra  as need better glucose  control  4/29/2023 dr. becerra  reccs noted   4/30/2023 continue with insulin and adjust as needed  5/2/2023 appreciate endocrine consult    Urinary retention, INDER on CKD- improving inder-   has overflow incontinence - await urology consult   I reached out to dr. gordon on today   per documentation nurse and urology PA unable to pass elliott presumed stricture   4/27/2023 to OR for cysto on today by urology   creatinine continues to improve suggesting post obstructive uropathy  ekg wnl  coags wnl   rcri score 6.0%  4/28/2023 s/p urological procedure and placement of elliott by urology   4/29/2023 urology hasn't determined if d/c with elliott vs tov  4/30/2023 d/c with elliott  5/1/2023 per urology will d/w  elliott  can tov in rehab    Continue home meds at home dose for SLE: Prednisone 5 mg/day, Cellcept 1,500 mg bid, Cozaar 50 mg/day, Plaquenil 200 mg bid.   dispo d/w with sw/cm to start d/c planning to str  as the patient has agreed

## 2023-04-27 ENCOUNTER — TRANSCRIPTION ENCOUNTER (OUTPATIENT)
Age: 63
End: 2023-04-27

## 2023-04-27 LAB
24R-OH-CALCIDIOL SERPL-MCNC: 37.4 NG/ML — SIGNIFICANT CHANGE UP (ref 30–80)
ANION GAP SERPL CALC-SCNC: 5 MMOL/L — SIGNIFICANT CHANGE UP (ref 5–17)
APTT BLD: 34.2 SEC — SIGNIFICANT CHANGE UP (ref 27.5–35.5)
BLD GP AB SCN SERPL QL: SIGNIFICANT CHANGE UP
BUN SERPL-MCNC: 26 MG/DL — HIGH (ref 7–23)
CALCIUM SERPL-MCNC: 8.8 MG/DL — SIGNIFICANT CHANGE UP (ref 8.5–10.1)
CHLORIDE SERPL-SCNC: 112 MMOL/L — HIGH (ref 96–108)
CO2 SERPL-SCNC: 23 MMOL/L — SIGNIFICANT CHANGE UP (ref 22–31)
CREAT SERPL-MCNC: 1.34 MG/DL — HIGH (ref 0.5–1.3)
EGFR: 45 ML/MIN/1.73M2 — LOW
FLUAV AG NPH QL: SIGNIFICANT CHANGE UP
FLUBV AG NPH QL: SIGNIFICANT CHANGE UP
GLUCOSE BLDC GLUCOMTR-MCNC: 108 MG/DL — HIGH (ref 70–99)
GLUCOSE BLDC GLUCOMTR-MCNC: 133 MG/DL — HIGH (ref 70–99)
GLUCOSE BLDC GLUCOMTR-MCNC: 143 MG/DL — HIGH (ref 70–99)
GLUCOSE BLDC GLUCOMTR-MCNC: 165 MG/DL — HIGH (ref 70–99)
GLUCOSE BLDC GLUCOMTR-MCNC: 263 MG/DL — HIGH (ref 70–99)
GLUCOSE SERPL-MCNC: 157 MG/DL — HIGH (ref 70–99)
HCT VFR BLD CALC: 30.2 % — LOW (ref 34.5–45)
HGB BLD-MCNC: 9.6 G/DL — LOW (ref 11.5–15.5)
INR BLD: 0.94 RATIO — SIGNIFICANT CHANGE UP (ref 0.88–1.16)
MAGNESIUM SERPL-MCNC: 2.2 MG/DL — SIGNIFICANT CHANGE UP (ref 1.6–2.6)
MCHC RBC-ENTMCNC: 26.4 PG — LOW (ref 27–34)
MCHC RBC-ENTMCNC: 31.8 G/DL — LOW (ref 32–36)
MCV RBC AUTO: 83 FL — SIGNIFICANT CHANGE UP (ref 80–100)
NRBC # BLD: 0 /100 WBCS — SIGNIFICANT CHANGE UP (ref 0–0)
PHOSPHATE SERPL-MCNC: 3.4 MG/DL — SIGNIFICANT CHANGE UP (ref 2.5–4.5)
PLATELET # BLD AUTO: 151 K/UL — SIGNIFICANT CHANGE UP (ref 150–400)
POTASSIUM SERPL-MCNC: 3.9 MMOL/L — SIGNIFICANT CHANGE UP (ref 3.5–5.3)
POTASSIUM SERPL-SCNC: 3.9 MMOL/L — SIGNIFICANT CHANGE UP (ref 3.5–5.3)
PROTHROM AB SERPL-ACNC: 11.3 SEC — SIGNIFICANT CHANGE UP (ref 10.5–13.4)
RBC # BLD: 3.64 M/UL — LOW (ref 3.8–5.2)
RBC # FLD: 14.1 % — SIGNIFICANT CHANGE UP (ref 10.3–14.5)
SARS-COV-2 RNA SPEC QL NAA+PROBE: SIGNIFICANT CHANGE UP
SODIUM SERPL-SCNC: 140 MMOL/L — SIGNIFICANT CHANGE UP (ref 135–145)
WBC # BLD: 6.11 K/UL — SIGNIFICANT CHANGE UP (ref 3.8–10.5)
WBC # FLD AUTO: 6.11 K/UL — SIGNIFICANT CHANGE UP (ref 3.8–10.5)

## 2023-04-27 PROCEDURE — 71045 X-RAY EXAM CHEST 1 VIEW: CPT | Mod: 26

## 2023-04-27 PROCEDURE — 99232 SBSQ HOSP IP/OBS MODERATE 35: CPT

## 2023-04-27 PROCEDURE — 88112 CYTOPATH CELL ENHANCE TECH: CPT | Mod: 26

## 2023-04-27 PROCEDURE — 93010 ELECTROCARDIOGRAM REPORT: CPT

## 2023-04-27 RX ORDER — PANTOPRAZOLE SODIUM 20 MG/1
40 TABLET, DELAYED RELEASE ORAL
Refills: 0 | Status: DISCONTINUED | OUTPATIENT
Start: 2023-04-27 | End: 2023-05-02

## 2023-04-27 RX ORDER — INSULIN GLARGINE 100 [IU]/ML
65 INJECTION, SOLUTION SUBCUTANEOUS AT BEDTIME
Refills: 0 | Status: DISCONTINUED | OUTPATIENT
Start: 2023-04-27 | End: 2023-04-29

## 2023-04-27 RX ORDER — MORPHINE SULFATE 50 MG/1
2 CAPSULE, EXTENDED RELEASE ORAL EVERY 6 HOURS
Refills: 0 | Status: DISCONTINUED | OUTPATIENT
Start: 2023-04-27 | End: 2023-05-02

## 2023-04-27 RX ORDER — HYDROXYZINE HCL 10 MG
25 TABLET ORAL
Refills: 0 | Status: DISCONTINUED | OUTPATIENT
Start: 2023-04-27 | End: 2023-05-02

## 2023-04-27 RX ORDER — DEXTROSE 50 % IN WATER 50 %
12.5 SYRINGE (ML) INTRAVENOUS ONCE
Refills: 0 | Status: DISCONTINUED | OUTPATIENT
Start: 2023-04-27 | End: 2023-05-02

## 2023-04-27 RX ORDER — ONDANSETRON 8 MG/1
4 TABLET, FILM COATED ORAL ONCE
Refills: 0 | Status: COMPLETED | OUTPATIENT
Start: 2023-04-27 | End: 2023-04-27

## 2023-04-27 RX ORDER — PETROLATUM,WHITE
1 JELLY (GRAM) TOPICAL THREE TIMES A DAY
Refills: 0 | Status: DISCONTINUED | OUTPATIENT
Start: 2023-04-27 | End: 2023-05-02

## 2023-04-27 RX ORDER — TAMSULOSIN HYDROCHLORIDE 0.4 MG/1
0.4 CAPSULE ORAL AT BEDTIME
Refills: 0 | Status: DISCONTINUED | OUTPATIENT
Start: 2023-04-27 | End: 2023-05-02

## 2023-04-27 RX ORDER — LANOLIN ALCOHOL/MO/W.PET/CERES
3 CREAM (GRAM) TOPICAL AT BEDTIME
Refills: 0 | Status: DISCONTINUED | OUTPATIENT
Start: 2023-04-27 | End: 2023-05-02

## 2023-04-27 RX ORDER — SODIUM CHLORIDE 9 MG/ML
1000 INJECTION, SOLUTION INTRAVENOUS
Refills: 0 | Status: DISCONTINUED | OUTPATIENT
Start: 2023-04-27 | End: 2023-05-02

## 2023-04-27 RX ORDER — OXYCODONE HYDROCHLORIDE 5 MG/1
5 TABLET ORAL EVERY 6 HOURS
Refills: 0 | Status: DISCONTINUED | OUTPATIENT
Start: 2023-04-27 | End: 2023-05-02

## 2023-04-27 RX ORDER — INSULIN LISPRO 100/ML
VIAL (ML) SUBCUTANEOUS
Refills: 0 | Status: DISCONTINUED | OUTPATIENT
Start: 2023-04-27 | End: 2023-05-02

## 2023-04-27 RX ORDER — DEXTROSE 50 % IN WATER 50 %
25 SYRINGE (ML) INTRAVENOUS ONCE
Refills: 0 | Status: DISCONTINUED | OUTPATIENT
Start: 2023-04-27 | End: 2023-05-02

## 2023-04-27 RX ORDER — LORATADINE 10 MG/1
10 TABLET ORAL DAILY
Refills: 0 | Status: DISCONTINUED | OUTPATIENT
Start: 2023-04-27 | End: 2023-05-02

## 2023-04-27 RX ORDER — DEXTROSE 50 % IN WATER 50 %
15 SYRINGE (ML) INTRAVENOUS ONCE
Refills: 0 | Status: DISCONTINUED | OUTPATIENT
Start: 2023-04-27 | End: 2023-05-02

## 2023-04-27 RX ORDER — ACETAMINOPHEN 500 MG
1000 TABLET ORAL ONCE
Refills: 0 | Status: COMPLETED | OUTPATIENT
Start: 2023-04-27 | End: 2023-04-27

## 2023-04-27 RX ORDER — HYDROXYCHLOROQUINE SULFATE 200 MG
200 TABLET ORAL
Refills: 0 | Status: DISCONTINUED | OUTPATIENT
Start: 2023-04-27 | End: 2023-05-02

## 2023-04-27 RX ORDER — LOSARTAN POTASSIUM 100 MG/1
50 TABLET, FILM COATED ORAL DAILY
Refills: 0 | Status: DISCONTINUED | OUTPATIENT
Start: 2023-04-27 | End: 2023-05-02

## 2023-04-27 RX ORDER — GLUCAGON INJECTION, SOLUTION 0.5 MG/.1ML
1 INJECTION, SOLUTION SUBCUTANEOUS ONCE
Refills: 0 | Status: DISCONTINUED | OUTPATIENT
Start: 2023-04-27 | End: 2023-05-02

## 2023-04-27 RX ORDER — CEFTRIAXONE 500 MG/1
1000 INJECTION, POWDER, FOR SOLUTION INTRAMUSCULAR; INTRAVENOUS EVERY 24 HOURS
Refills: 0 | Status: COMPLETED | OUTPATIENT
Start: 2023-04-27 | End: 2023-04-29

## 2023-04-27 RX ORDER — HEPARIN SODIUM 5000 [USP'U]/ML
5000 INJECTION INTRAVENOUS; SUBCUTANEOUS EVERY 8 HOURS
Refills: 0 | Status: DISCONTINUED | OUTPATIENT
Start: 2023-04-27 | End: 2023-04-27

## 2023-04-27 RX ORDER — MYCOPHENOLATE MOFETIL 250 MG/1
1500 CAPSULE ORAL
Refills: 0 | Status: DISCONTINUED | OUTPATIENT
Start: 2023-04-27 | End: 2023-05-02

## 2023-04-27 RX ORDER — BENZOCAINE AND MENTHOL 5; 1 G/100ML; G/100ML
1 LIQUID ORAL
Refills: 0 | Status: DISCONTINUED | OUTPATIENT
Start: 2023-04-27 | End: 2023-05-02

## 2023-04-27 RX ORDER — SODIUM CHLORIDE 9 MG/ML
1000 INJECTION, SOLUTION INTRAVENOUS
Refills: 0 | Status: DISCONTINUED | OUTPATIENT
Start: 2023-04-27 | End: 2023-04-27

## 2023-04-27 RX ORDER — HEPARIN SODIUM 5000 [USP'U]/ML
5000 INJECTION INTRAVENOUS; SUBCUTANEOUS EVERY 8 HOURS
Refills: 0 | Status: DISCONTINUED | OUTPATIENT
Start: 2023-04-28 | End: 2023-05-02

## 2023-04-27 RX ORDER — DIPHENHYDRAMINE HCL 50 MG
25 CAPSULE ORAL EVERY 6 HOURS
Refills: 0 | Status: DISCONTINUED | OUTPATIENT
Start: 2023-04-27 | End: 2023-05-02

## 2023-04-27 RX ORDER — HYDROMORPHONE HYDROCHLORIDE 2 MG/ML
0.5 INJECTION INTRAMUSCULAR; INTRAVENOUS; SUBCUTANEOUS
Refills: 0 | Status: DISCONTINUED | OUTPATIENT
Start: 2023-04-27 | End: 2023-04-27

## 2023-04-27 RX ADMIN — Medication 200 MILLIGRAM(S): at 05:31

## 2023-04-27 RX ADMIN — Medication 3 MILLIGRAM(S): at 21:45

## 2023-04-27 RX ADMIN — BENZOCAINE AND MENTHOL 1 LOZENGE: 5; 1 LIQUID ORAL at 23:19

## 2023-04-27 RX ADMIN — Medication 5 MILLIGRAM(S): at 05:31

## 2023-04-27 RX ADMIN — LOSARTAN POTASSIUM 50 MILLIGRAM(S): 100 TABLET, FILM COATED ORAL at 06:09

## 2023-04-27 RX ADMIN — PANTOPRAZOLE SODIUM 40 MILLIGRAM(S): 20 TABLET, DELAYED RELEASE ORAL at 07:43

## 2023-04-27 RX ADMIN — Medication 6: at 11:34

## 2023-04-27 RX ADMIN — ONDANSETRON 4 MILLIGRAM(S): 8 TABLET, FILM COATED ORAL at 20:28

## 2023-04-27 RX ADMIN — Medication 400 MILLIGRAM(S): at 21:45

## 2023-04-27 RX ADMIN — HYDROMORPHONE HYDROCHLORIDE 0.5 MILLIGRAM(S): 2 INJECTION INTRAMUSCULAR; INTRAVENOUS; SUBCUTANEOUS at 20:50

## 2023-04-27 RX ADMIN — CEFTRIAXONE 100 MILLIGRAM(S): 500 INJECTION, POWDER, FOR SOLUTION INTRAMUSCULAR; INTRAVENOUS at 23:19

## 2023-04-27 RX ADMIN — SODIUM CHLORIDE 75 MILLILITER(S): 9 INJECTION, SOLUTION INTRAVENOUS at 20:18

## 2023-04-27 RX ADMIN — HYDROMORPHONE HYDROCHLORIDE 0.5 MILLIGRAM(S): 2 INJECTION INTRAMUSCULAR; INTRAVENOUS; SUBCUTANEOUS at 20:31

## 2023-04-27 RX ADMIN — TAMSULOSIN HYDROCHLORIDE 0.4 MILLIGRAM(S): 0.4 CAPSULE ORAL at 21:45

## 2023-04-27 RX ADMIN — Medication 1000 MILLIGRAM(S): at 22:00

## 2023-04-27 RX ADMIN — MYCOPHENOLATE MOFETIL 1500 MILLIGRAM(S): 250 CAPSULE ORAL at 05:31

## 2023-04-27 NOTE — PROGRESS NOTE ADULT - ASSESSMENT
64 yo female PMH DM, HTN, HLD, lupus here for generalized malaise, decreased appetite, and weakness-as per daughter. Could not bring herself fully up off bed to stand and slid down on her butt. Pt states this was due to weakness as well as pain to the L ankle. Mild swelling to ankle. Denies injuries to ankle prior to fall. Denies hx of DVT/PE. Went to  yesterday for this and was rx prednisone and benadryl which pt took once yesterday. No new products, foods, etc.         medicine for weakness and left Talus fracture. Brace on left heel placed by ER. Will need Podiatry Consult by day team in AM.  ER spoke to Podiatry who agreed with outpatient follow-up. CT head no acute findings, leg dopplers negative for DVT.  UA notes pyuria, hold on ABX, appears asymptomatic.       Left talus fracture  podiatrist consulted on today  PT eval: MARTIN  4/27/2023 appreciate podiatry consult await cam boot     DM II uncontrolled-   a1c at 10   had been controlled last 24 hours except the most recent one at present which is 321 will ensure coverage  4/27/2023 will adjust insulin    continue with regimen      Urinary retention, INDER on CKD- improving inder-   has overflow incontinence - await urology consult   I reached out to dr. gordon on today   per documentation nurse and urology PA unable to pass elliott presumed stricture   4/27/2023 to OR for cysto on today by urology    Continue home meds at home dose for SLE: Prednisone 5 mg/day, Cellcept 1,500 mg bid, Cozaar 50 mg/day, Plaquenil 200 mg bid.        64 yo female PMH DM, HTN, HLD, lupus here for generalized malaise, decreased appetite, and weakness-as per daughter. Could not bring herself fully up off bed to stand and slid down on her butt. Pt states this was due to weakness as well as pain to the L ankle. Mild swelling to ankle. Denies injuries to ankle prior to fall. Denies hx of DVT/PE. Went to  yesterday for this and was rx prednisone and benadryl which pt took once yesterday. No new products, foods, etc.         medicine for weakness and left Talus fracture. Brace on left heel placed by ER. Will need Podiatry Consult by day team in AM.  ER spoke to Podiatry who agreed with outpatient follow-up. CT head no acute findings, leg dopplers negative for DVT.  UA notes pyuria, hold on ABX, appears asymptomatic.       Left talus fracture  podiatrist consulted on today  PT eval: MARTIN  4/27/2023 appreciate podiatry consult await cam boot     DM II uncontrolled-   a1c at 10   had been controlled last 24 hours except the most recent one at present which is 321 will ensure coverage  4/27/2023 will consult dr. becerra  as need better glucose  control    continue with regimen      Urinary retention, INDER on CKD- improving inder-   has overflow incontinence - await urology consult   I reached out to dr. gordon on today   per documentation nurse and urology PA unable to pass elliott presumed stricture   4/27/2023 to OR for cysto on today by urology   creatinine continues to improve suggesting post obstructive uropathy  ekg wnl  coags wnl   rcri score 6.0%    Continue home meds at home dose for SLE: Prednisone 5 mg/day, Cellcept 1,500 mg bid, Cozaar 50 mg/day, Plaquenil 200 mg bid.

## 2023-04-27 NOTE — PROGRESS NOTE ADULT - SUBJECTIVE AND OBJECTIVE BOX
Post-op check    S/P cysto, dilation of urethral stricture, complex elliott insertion POD#0  Pt seen and examined at bedside. C/o throat soreness. Denies chest pain, shortness of breath, nausea/ vomiting, and dizziness.     Vital Signs Last 24 Hrs  T(F): 97.7 (04-27-23 @ 21:15), Max: 98 (04-27-23 @ 17:14)  HR: 83 (04-27-23 @ 21:15)  BP: 136/84 (04-27-23 @ 21:15)  RR: 18 (04-27-23 @ 21:15)  SpO2: 100% (04-27-23 @ 21:15)  POCT Blood Glucose.: 108 mg/dL (27 Apr 2023 20:33)      CONSTITUTIONAL: Alert, NAD  RESPIRATORY: Clear to auscultation bilaterally, respirations nonlabored  CARDIOVASCULAR: S1S2, Regular rate and rhythm  GASTROINTESTINAL: Dressing C/D/I, Nondistended, +bowel sounds, soft, Appropriate incisional tenderness  : Elliott indwelling with clear yellow urine output. No suprapubic tenderness or bladder distention.  MUSCULOSKELETAL: No calf tenderness, No edema      Assessment: 63F S/P cysto, dilation of urethral stricture, complex elliott insertion POD#0    Plan:  - elliott, monitor urine output  - DVT prophylaxis, Incentive Spirometer, OOB, Ambulating, pain control, abx  - Follow up AM labs    Post-op check    S/P cysto, dilation of urethral stricture, complex elliott insertion POD#0  Pt seen and examined at bedside. C/o throat soreness. Denies chest pain, shortness of breath, nausea/ vomiting, and dizziness.     Vital Signs Last 24 Hrs  T(F): 97.7 (04-27-23 @ 21:15), Max: 98 (04-27-23 @ 17:14)  HR: 83 (04-27-23 @ 21:15)  BP: 136/84 (04-27-23 @ 21:15)  RR: 18 (04-27-23 @ 21:15)  SpO2: 100% (04-27-23 @ 21:15)  POCT Blood Glucose.: 108 mg/dL (27 Apr 2023 20:33)      CONSTITUTIONAL: Alert, NAD  RESPIRATORY: Clear to auscultation bilaterally, respirations nonlabored  CARDIOVASCULAR: S1S2, Regular rate and rhythm  GASTROINTESTINAL: Soft NTND  : Elliott indwelling with clear yellow urine output. No suprapubic tenderness or bladder distention.  MUSCULOSKELETAL: No calf tenderness, No edema      Assessment: 63F S/P cysto, dilation of urethral stricture, complex elliott insertion POD#0    Plan:  - elliott, monitor urine output  - DVT prophylaxis, Incentive Spirometer, OOB, Ambulating, pain control, abx  - Follow up AM labs

## 2023-04-27 NOTE — PROGRESS NOTE ADULT - SUBJECTIVE AND OBJECTIVE BOX
Patient is a 63y old  Female who presents with a chief complaint of INABILITY TO AMBULATE DUE TO LEFT ANKLE FOOT     (25 Apr 2023 11:08)    INTERVAL HPI/OVERNIGHT EVENTS: Patients seen and examined at bedside this morning. No acute events overnight.    MEDICATIONS  (STANDING):  dextrose 5%. 1000 milliLiter(s) (100 mL/Hr) IV Continuous <Continuous>  dextrose 5%. 1000 milliLiter(s) (50 mL/Hr) IV Continuous <Continuous>  dextrose 50% Injectable 12.5 Gram(s) IV Push once  dextrose 50% Injectable 25 Gram(s) IV Push once  dextrose 50% Injectable 25 Gram(s) IV Push once  glucagon  Injectable 1 milliGRAM(s) IntraMuscular once  heparin   Injectable 5000 Unit(s) SubCutaneous every 8 hours  hydroxychloroquine 200 milliGRAM(s) Oral two times a day  insulin glargine Injectable (LANTUS) 65 Unit(s) SubCutaneous at bedtime  insulin lispro (ADMELOG) corrective regimen sliding scale   SubCutaneous three times a day before meals  loratadine 10 milliGRAM(s) Oral daily  losartan 50 milliGRAM(s) Oral daily  melatonin 3 milliGRAM(s) Oral at bedtime  mycophenolate mofetil 1500 milliGRAM(s) Oral two times a day  pantoprazole    Tablet 40 milliGRAM(s) Oral before breakfast  predniSONE   Tablet 5 milliGRAM(s) Oral daily  tamsulosin 0.4 milliGRAM(s) Oral at bedtime    MEDICATIONS  (PRN):  AQUAPHOR (petrolatum Ointment) 1 Application(s) Topical three times a day PRN dry skin/itchy  dextrose Oral Gel 15 Gram(s) Oral once PRN Blood Glucose LESS THAN 70 milliGRAM(s)/deciliter  diphenhydrAMINE Injectable 25 milliGRAM(s) IV Push every 6 hours PRN Itching  hydrOXYzine hydrochloride 25 milliGRAM(s) Oral four times a day PRN Itching    Allergies    No Known Allergies    Intolerances      REVIEW OF SYSTEMS:  All other systems reviewed and are negative    Vital Signs Last 24 Hrs  T(C): 36.6 (27 Apr 2023 11:50), Max: 36.7 (26 Apr 2023 12:10)  T(F): 97.8 (27 Apr 2023 11:50), Max: 98 (26 Apr 2023 12:10)  HR: 81 (27 Apr 2023 11:50) (81 - 98)  BP: 114/71 (27 Apr 2023 11:50) (101/63 - 122/78)  BP(mean): --  RR: 18 (27 Apr 2023 11:50) (18 - 18)  SpO2: 100% (27 Apr 2023 11:50) (98% - 100%)    Parameters below as of 26 Apr 2023 22:30  Patient On (Oxygen Delivery Method): room air          PHYSICAL EXAM:  GENERAL: NAD, well-groomed, well-developed  HEAD:  Atraumatic, Normocephalic  EYES: EOMI, PERRLA, conjunctiva and sclera clear  ENMT: No tonsillar erythema, exudates, or enlargement; Moist mucous membranes, Good dentition, No lesions  NECK: Supple, No JVD, Normal thyroid  NERVOUS SYSTEM:  Alert & Oriented X3, Good concentration; Motor Strength 3/5 B/L upper and lower extremities, left foot boot; DTRs 2+ intact and symmetric  CHEST/LUNG: Clear to percussion bilaterally; No rales, rhonchi, wheezing, or rubs  HEART: Regular rate and rhythm; No murmurs, rubs, or gallops  ABDOMEN: Soft, Nontender, Nondistended; Bowel sounds present  EXTREMITIES:  2+ Peripheral Pulses, No clubbing, cyanosis, or edema  SKIN: No rashes or lesions      Labs                                   9.6    6.11  )-----------( 151      ( 27 Apr 2023 06:48 )             30.2   04-27    140  |  112<H>  |  26<H>  ----------------------------<  157<H>  3.9   |  23  |  1.34<H>    Ca    8.8      27 Apr 2023 06:48  Phos  3.4     04-27  Mg     2.2     04-27            A1C with Estimated Average Glucose (04.23.23 @ 06:56)    A1C with Estimated Average Glucose Result: 10.1: Method: Immunoassay       Reference Range                4.0-5.6%       High risk (prediabetic)        5.7-6.4%       Diabetic, diagnostic             >=6.5%       ADA diabetic treatment goal       <7.0%  The Hemoglobin A1c testing is NGSP-certified.Reference ranges are based  upon the 2010 recommendations of  the American Diabetes Association.  Interpretation may vary for children  and adolescents. %   Estimated Average Glucose: 243: The Estimated Average Glucose (eAG) or Mean Plasma Glucose (MPG) value is  calculated from the hemoglobin A1c value and covers the same time period.   The American Diabetes Association (ADA) and other professional  organizations recommend reporting the eAG with the HgbA1c. mg/dL      CAPILLARY BLOOD GLUCOSE  CAPILLARY BLOOD GLUCOSE      POCT Blood Glucose.: 263 mg/dL (27 Apr 2023 11:11)  POCT Blood Glucose.: 143 mg/dL (27 Apr 2023 07:04)  POCT Blood Glucose.: 106 mg/dL (26 Apr 2023 21:21)  POCT Blood Glucose.: 251 mg/dL (26 Apr 2023 16:26)            Radiology and Imaging reviewed. Patient is a 63y old  Female who presents with a chief complaint of INABILITY TO AMBULATE DUE TO LEFT ANKLE FOOT     (25 Apr 2023 11:08)    INTERVAL HPI/OVERNIGHT EVENTS: Patients seen and examined at bedside this morning. No acute events overnight.    MEDICATIONS  (STANDING):  dextrose 5%. 1000 milliLiter(s) (100 mL/Hr) IV Continuous <Continuous>  dextrose 5%. 1000 milliLiter(s) (50 mL/Hr) IV Continuous <Continuous>  dextrose 50% Injectable 12.5 Gram(s) IV Push once  dextrose 50% Injectable 25 Gram(s) IV Push once  dextrose 50% Injectable 25 Gram(s) IV Push once  glucagon  Injectable 1 milliGRAM(s) IntraMuscular once  heparin   Injectable 5000 Unit(s) SubCutaneous every 8 hours  hydroxychloroquine 200 milliGRAM(s) Oral two times a day  insulin glargine Injectable (LANTUS) 65 Unit(s) SubCutaneous at bedtime  insulin lispro (ADMELOG) corrective regimen sliding scale   SubCutaneous three times a day before meals  loratadine 10 milliGRAM(s) Oral daily  losartan 50 milliGRAM(s) Oral daily  melatonin 3 milliGRAM(s) Oral at bedtime  mycophenolate mofetil 1500 milliGRAM(s) Oral two times a day  pantoprazole    Tablet 40 milliGRAM(s) Oral before breakfast  predniSONE   Tablet 5 milliGRAM(s) Oral daily  tamsulosin 0.4 milliGRAM(s) Oral at bedtime    MEDICATIONS  (PRN):  AQUAPHOR (petrolatum Ointment) 1 Application(s) Topical three times a day PRN dry skin/itchy  dextrose Oral Gel 15 Gram(s) Oral once PRN Blood Glucose LESS THAN 70 milliGRAM(s)/deciliter  diphenhydrAMINE Injectable 25 milliGRAM(s) IV Push every 6 hours PRN Itching  hydrOXYzine hydrochloride 25 milliGRAM(s) Oral four times a day PRN Itching    Allergies    No Known Allergies    Intolerances      REVIEW OF SYSTEMS:  All other systems reviewed and are negative    Vital Signs Last 24 Hrs  T(C): 36.6 (27 Apr 2023 11:50), Max: 36.7 (26 Apr 2023 12:10)  T(F): 97.8 (27 Apr 2023 11:50), Max: 98 (26 Apr 2023 12:10)  HR: 81 (27 Apr 2023 11:50) (81 - 98)  BP: 114/71 (27 Apr 2023 11:50) (101/63 - 122/78)  BP(mean): --  RR: 18 (27 Apr 2023 11:50) (18 - 18)  SpO2: 100% (27 Apr 2023 11:50) (98% - 100%)    Parameters below as of 26 Apr 2023 22:30  Patient On (Oxygen Delivery Method): room air          PHYSICAL EXAM:  GENERAL: NAD, well-groomed, well-developed  HEAD:  Atraumatic, Normocephalic  EYES: EOMI, PERRLA, conjunctiva and sclera clear  ENMT: No tonsillar erythema, exudates, or enlargement; Moist mucous membranes, Good dentition, No lesions  NECK: Supple,   NERVOUS SYSTEM:  Alert & Oriented X3, Good concentration; Motor Strength 5/5 B/L upper and  right lower extremity left foot in air splint and bandaged  3rd toe on left foot amputated   CHEST/LUNG: Clear to percussion bilaterally; No rales, rhonchi, wheezing, or rubs  HEART: Regular rate and rhythm; No murmurs, rubs, or gallops  ABDOMEN: Soft, Nontender, Nondistended; Bowel sounds present  EXTREMITIES:  2+ Peripheral Pulses, No clubbing, cyanosis, or edema  SKIN: No rashes or lesions      Labs                                   9.6    6.11  )-----------( 151      ( 27 Apr 2023 06:48 )             30.2   04-27    140  |  112<H>  |  26<H>  ----------------------------<  157<H>  3.9   |  23  |  1.34<H>    Ca    8.8      27 Apr 2023 06:48  Phos  3.4     04-27  Mg     2.2     04-27            A1C with Estimated Average Glucose (04.23.23 @ 06:56)    A1C with Estimated Average Glucose Result: 10.1: Method: Immunoassay       Reference Range                4.0-5.6%       High risk (prediabetic)        5.7-6.4%       Diabetic, diagnostic             >=6.5%       ADA diabetic treatment goal       <7.0%  The Hemoglobin A1c testing is NGSP-certified.Reference ranges are based  upon the 2010 recommendations of  the American Diabetes Association.  Interpretation may vary for children  and adolescents. %   Estimated Average Glucose: 243: The Estimated Average Glucose (eAG) or Mean Plasma Glucose (MPG) value is  calculated from the hemoglobin A1c value and covers the same time period.   The American Diabetes Association (ADA) and other professional  organizations recommend reporting the eAG with the HgbA1c. mg/dL      CAPILLARY BLOOD GLUCOSE  CAPILLARY BLOOD GLUCOSE      POCT Blood Glucose.: 263 mg/dL (27 Apr 2023 11:11)  POCT Blood Glucose.: 143 mg/dL (27 Apr 2023 07:04)  POCT Blood Glucose.: 106 mg/dL (26 Apr 2023 21:21)  POCT Blood Glucose.: 251 mg/dL (26 Apr 2023 16:26)            Radiology and Imaging reviewed.

## 2023-04-28 DIAGNOSIS — E11.65 TYPE 2 DIABETES MELLITUS WITH HYPERGLYCEMIA: ICD-10-CM

## 2023-04-28 LAB
GLUCOSE BLDC GLUCOMTR-MCNC: 177 MG/DL — HIGH (ref 70–99)
GLUCOSE BLDC GLUCOMTR-MCNC: 206 MG/DL — HIGH (ref 70–99)
GLUCOSE BLDC GLUCOMTR-MCNC: 235 MG/DL — HIGH (ref 70–99)
GLUCOSE BLDC GLUCOMTR-MCNC: 309 MG/DL — HIGH (ref 70–99)

## 2023-04-28 PROCEDURE — 99232 SBSQ HOSP IP/OBS MODERATE 35: CPT

## 2023-04-28 RX ORDER — CHOLECALCIFEROL (VITAMIN D3) 125 MCG
1000 CAPSULE ORAL DAILY
Refills: 0 | Status: DISCONTINUED | OUTPATIENT
Start: 2023-04-28 | End: 2023-05-02

## 2023-04-28 RX ORDER — INSULIN GLARGINE 100 [IU]/ML
30 INJECTION, SOLUTION SUBCUTANEOUS ONCE
Refills: 0 | Status: COMPLETED | OUTPATIENT
Start: 2023-04-28 | End: 2023-04-28

## 2023-04-28 RX ADMIN — Medication 200 MILLIGRAM(S): at 05:09

## 2023-04-28 RX ADMIN — Medication 4: at 08:00

## 2023-04-28 RX ADMIN — HEPARIN SODIUM 5000 UNIT(S): 5000 INJECTION INTRAVENOUS; SUBCUTANEOUS at 22:37

## 2023-04-28 RX ADMIN — Medication 200 MILLIGRAM(S): at 17:38

## 2023-04-28 RX ADMIN — LOSARTAN POTASSIUM 50 MILLIGRAM(S): 100 TABLET, FILM COATED ORAL at 05:11

## 2023-04-28 RX ADMIN — LORATADINE 10 MILLIGRAM(S): 10 TABLET ORAL at 12:46

## 2023-04-28 RX ADMIN — OXYCODONE HYDROCHLORIDE 5 MILLIGRAM(S): 5 TABLET ORAL at 19:33

## 2023-04-28 RX ADMIN — Medication 3 MILLIGRAM(S): at 22:37

## 2023-04-28 RX ADMIN — HEPARIN SODIUM 5000 UNIT(S): 5000 INJECTION INTRAVENOUS; SUBCUTANEOUS at 05:09

## 2023-04-28 RX ADMIN — INSULIN GLARGINE 30 UNIT(S): 100 INJECTION, SOLUTION SUBCUTANEOUS at 22:36

## 2023-04-28 RX ADMIN — Medication 8: at 11:59

## 2023-04-28 RX ADMIN — CEFTRIAXONE 100 MILLIGRAM(S): 500 INJECTION, POWDER, FOR SOLUTION INTRAMUSCULAR; INTRAVENOUS at 22:36

## 2023-04-28 RX ADMIN — PANTOPRAZOLE SODIUM 40 MILLIGRAM(S): 20 TABLET, DELAYED RELEASE ORAL at 07:59

## 2023-04-28 RX ADMIN — MYCOPHENOLATE MOFETIL 1500 MILLIGRAM(S): 250 CAPSULE ORAL at 17:38

## 2023-04-28 RX ADMIN — Medication 5 MILLIGRAM(S): at 05:08

## 2023-04-28 RX ADMIN — Medication 1000 UNIT(S): at 16:27

## 2023-04-28 RX ADMIN — HEPARIN SODIUM 5000 UNIT(S): 5000 INJECTION INTRAVENOUS; SUBCUTANEOUS at 14:26

## 2023-04-28 RX ADMIN — MYCOPHENOLATE MOFETIL 1500 MILLIGRAM(S): 250 CAPSULE ORAL at 05:08

## 2023-04-28 RX ADMIN — OXYCODONE HYDROCHLORIDE 5 MILLIGRAM(S): 5 TABLET ORAL at 07:10

## 2023-04-28 RX ADMIN — Medication 4: at 16:26

## 2023-04-28 RX ADMIN — TAMSULOSIN HYDROCHLORIDE 0.4 MILLIGRAM(S): 0.4 CAPSULE ORAL at 22:36

## 2023-04-28 NOTE — CONSULT NOTE ADULT - SUBJECTIVE AND OBJECTIVE BOX
Patient is a 63y old  Female who presents with a chief complaint of Frequent falls, cannot walk. (2023 18:53)      Reason For Consult: hyperglycemia     HPI:  64 yo female PMH DM, HTN, HLD, lupus here for generalized malaise, decreased appetite, and weakness-as per daughter. Could not bring herself fully up off bed to stand and slid down on her butt. Pt states this was due to weakness as well as pain to the L ankle. Mild swelling to ankle. Denies injuries to ankle prior to fall. Denies hx of DVT/PE. Went to  yesterday for this and was rx prednisone and benadryl which pt took once yesterday. No new products, foods, etc.    (2023 20:23)  patient is status post transplant and on Cell Cept . At home on Novolog 10 mg with meals and HbA1C 10.1   unclear how much long acting she is on at home .   inpatient on Lantus 65 units and Lispro sliding scale coverage with meals   finger sticks earlier were 600 but currently < 200   Nutrition fair   PAST MEDICAL & SURGICAL HISTORY:  Lupus      Diabetes mellitus      Hyperlipidemia      Pericarditis      Obesity      HTN (hypertension)      Lupus      Diabetes      Hypertension       delivery delivered   section x 3      Skin tag  Skin tag removal, right shoulder      H/O:           FAMILY HISTORY:  Family history of coronary artery disease          Social History:    MEDICATIONS  (STANDING):  cefTRIAXone   IVPB 1000 milliGRAM(s) IV Intermittent every 24 hours  cholecalciferol 1000 Unit(s) Oral daily  dextrose 5%. 1000 milliLiter(s) (50 mL/Hr) IV Continuous <Continuous>  dextrose 5%. 1000 milliLiter(s) (100 mL/Hr) IV Continuous <Continuous>  dextrose 50% Injectable 25 Gram(s) IV Push once  dextrose 50% Injectable 25 Gram(s) IV Push once  dextrose 50% Injectable 12.5 Gram(s) IV Push once  glucagon  Injectable 1 milliGRAM(s) IntraMuscular once  heparin   Injectable 5000 Unit(s) SubCutaneous every 8 hours  hydroxychloroquine 200 milliGRAM(s) Oral two times a day  insulin glargine Injectable (LANTUS) 65 Unit(s) SubCutaneous at bedtime  insulin lispro (ADMELOG) corrective regimen sliding scale   SubCutaneous three times a day before meals  loratadine 10 milliGRAM(s) Oral daily  losartan 50 milliGRAM(s) Oral daily  melatonin 3 milliGRAM(s) Oral at bedtime  mycophenolate mofetil 1500 milliGRAM(s) Oral two times a day  pantoprazole    Tablet 40 milliGRAM(s) Oral before breakfast  predniSONE   Tablet 5 milliGRAM(s) Oral daily  tamsulosin 0.4 milliGRAM(s) Oral at bedtime    MEDICATIONS  (PRN):  AQUAPHOR (petrolatum Ointment) 1 Application(s) Topical three times a day PRN dry skin/itchy  benzocaine/menthol Lozenge 1 Lozenge Oral every 3 hours PRN Sore Throat  dextrose Oral Gel 15 Gram(s) Oral once PRN Blood Glucose LESS THAN 70 milliGRAM(s)/deciliter  diphenhydrAMINE Injectable 25 milliGRAM(s) IV Push every 6 hours PRN Itching  hydrOXYzine hydrochloride 25 milliGRAM(s) Oral four times a day PRN Itching  morphine  - Injectable 2 milliGRAM(s) IV Push every 6 hours PRN Severe Pain (7 - 10)  oxyCODONE    IR 5 milliGRAM(s) Oral every 6 hours PRN Moderate Pain (4 - 6)      REVIEW OF SYSTEMS:  CONSTITUTIONAL:  as per HPI      T(C): 36.9 (23 @ 17:15), Max: 37.3 (23 @ 08:15)  HR: 89 (23 @ 17:15) (77 - 97)  BP: 108/68 (23 @ 17:15) (101/63 - 138/68)  RR: 18 (23 @ 17:15) (16 - 18)  SpO2: 100% (23 @ 17:15) (96% - 100%)  Wt(kg): --    PHYSICAL EXAM:  GENERAL: NAD, well-groomed, well-developed  HEAD:  Atraumatic, Normocephalic      CAPILLARY BLOOD GLUCOSE      POCT Blood Glucose.: 235 mg/dL (2023 16:21)  POCT Blood Glucose.: 309 mg/dL (2023 11:23)  POCT Blood Glucose.: 206 mg/dL (2023 07:48)  POCT Blood Glucose.: 133 mg/dL (2023 21:27)                            9.6    6.11  )-----------( 151      ( 2023 06:48 )             30.2       CMP:  - @ 06:48  SGPT --  Albumin --   Alk Phos --   Anion Gap 5   SGOT --   Total Bili --   BUN 26   Calcium Total 8.8   CO2 23   Chloride 112   Creatinine 1.34   eGFR if AA --   eGFR if non AA --   Glucose 157   Potassium 3.9   Protein --   Sodium 140      Thyroid Function Tests:      Diabetes Tests:     Parathyroids:     Adrenals:       Radiology:             
Patient is a 63y old  Female who presents with a chief complaint of Frequent falls, cannot walk. (2023 15:22)      HPI:  64 yo female PMH DM, HTN, HLD, lupus here for generalized malaise, decreased appetite, and weakness-as per daughter. Could not bring herself fully up off bed to stand and slid down on her butt. Pt states this was due to weakness as well as pain to the L ankle. Mild swelling to ankle. Denies injuries to ankle prior to fall. Denies hx of DVT/PE. Went to  yesterday for this and was rx prednisone and benadryl which pt took once yesterday. No new products, foods, etc.    (2023 20:23)      PAST MEDICAL & SURGICAL HISTORY:  Lupus      Diabetes mellitus      Hyperlipidemia      Pericarditis      Obesity      HTN (hypertension)      Lupus      Diabetes      Hypertension       delivery delivered   section x 3      Skin tag  Skin tag removal, right shoulder      H/O:           MEDICATIONS  (STANDING):  dextrose 5%. 1000 milliLiter(s) (50 mL/Hr) IV Continuous <Continuous>  dextrose 5%. 1000 milliLiter(s) (100 mL/Hr) IV Continuous <Continuous>  dextrose 50% Injectable 12.5 Gram(s) IV Push once  dextrose 50% Injectable 25 Gram(s) IV Push once  dextrose 50% Injectable 25 Gram(s) IV Push once  glucagon  Injectable 1 milliGRAM(s) IntraMuscular once  heparin   Injectable 5000 Unit(s) SubCutaneous every 8 hours  hydroxychloroquine 200 milliGRAM(s) Oral two times a day  insulin glargine Injectable (LANTUS) 65 Unit(s) SubCutaneous at bedtime  insulin lispro (ADMELOG) corrective regimen sliding scale   SubCutaneous three times a day before meals  loratadine 10 milliGRAM(s) Oral daily  losartan 50 milliGRAM(s) Oral daily  melatonin 3 milliGRAM(s) Oral at bedtime  mycophenolate mofetil 1500 milliGRAM(s) Oral two times a day  pantoprazole    Tablet 40 milliGRAM(s) Oral before breakfast  predniSONE   Tablet 5 milliGRAM(s) Oral daily  tamsulosin 0.4 milliGRAM(s) Oral at bedtime    MEDICATIONS  (PRN):  AQUAPHOR (petrolatum Ointment) 1 Application(s) Topical three times a day PRN dry skin/itchy  dextrose Oral Gel 15 Gram(s) Oral once PRN Blood Glucose LESS THAN 70 milliGRAM(s)/deciliter  diphenhydrAMINE Injectable 25 milliGRAM(s) IV Push every 6 hours PRN Itching  hydrOXYzine hydrochloride 25 milliGRAM(s) Oral four times a day PRN Itching      Allergies    No Known Allergies    Intolerances        VITALS:    Vital Signs Last 24 Hrs  T(C): 36.6 (2023 17:00), Max: 36.8 (2023 05:43)  T(F): 97.8 (2023 17:00), Max: 98.2 (2023 05:43)  HR: 87 (2023 17:00) (86 - 98)  BP: 101/63 (2023 17:00) (101/63 - 122/78)  BP(mean): --  RR: 18 (2023 17:00) (18 - 18)  SpO2: 100% (2023 17:00) (99% - 100%)    Parameters below as of 2023 17:00  Patient On (Oxygen Delivery Method): room air        LABS:                          9.6    3.99  )-----------( 156      ( 2023 05:30 )             29.7       04-26    139  |  113<H>  |  34<H>  ----------------------------<  156<H>  3.8   |  19<L>  |  1.45<H>    Ca    8.6      2023 05:30        CAPILLARY BLOOD GLUCOSE      POCT Blood Glucose.: 251 mg/dL (2023 16:26)  POCT Blood Glucose.: 321 mg/dL (2023 10:53)  POCT Blood Glucose.: 169 mg/dL (2023 08:09)  POCT Blood Glucose.: 107 mg/dL (2023 21:40)          LOWER EXTREMITY PHYSICAL EXAM:    Vascular: DP/PT 2/4, B/L, CFT <3 seconds B/L, Temperature gradient warm to warm B/L.   Neuro: Epicritic sensation intact to the level of digits, B/L.  Musculoskeletal/Ortho: No pain on palpation of the left foot talar head, neck or body.  Skin: BL feet no open wounds, no acute signs of infection.    RADIOLOGY & ADDITIONAL STUDIES:

## 2023-04-28 NOTE — PROGRESS NOTE ADULT - ASSESSMENT
62 yo female PMH DM, HTN, HLD, lupus here for generalized malaise, decreased appetite, and weakness-as per daughter. Could not bring herself fully up off bed to stand and slid down on her butt. Pt states this was due to weakness as well as pain to the L ankle. Mild swelling to ankle. Denies injuries to ankle prior to fall. Denies hx of DVT/PE. Went to  yesterday for this and was rx prednisone and benadryl which pt took once yesterday. No new products, foods, etc.         medicine for weakness and left Talus fracture. Brace on left heel placed by ER. Will need Podiatry Consult by day team in AM.  ER spoke to Podiatry who agreed with outpatient follow-up. CT head no acute findings, leg dopplers negative for DVT.  UA notes pyuria, hold on ABX, appears asymptomatic.       Left talus fracture  podiatrist consulted on today  PT eval: MARTIN  4/27/2023 appreciate podiatry consult await cam boot     DM II uncontrolled-   a1c at 10   had been controlled last 24 hours except the most recent one at present which is 321 will ensure coverage  4/27/2023 will consult dr. becerra  as need better glucose  control    continue with regimen      Urinary retention, INDER on CKD- improving inder-   has overflow incontinence - await urology consult   I reached out to dr. gordon on today   per documentation nurse and urology PA unable to pass elliott presumed stricture   4/27/2023 to OR for cysto on today by urology   creatinine continues to improve suggesting post obstructive uropathy  ekg wnl  coags wnl   rcri score 6.0%  4/28/2023 s/p urological procedure and placement of elliott by urology     Continue home meds at home dose for SLE: Prednisone 5 mg/day, Cellcept 1,500 mg bid, Cozaar 50 mg/day, Plaquenil 200 mg bid.

## 2023-04-28 NOTE — PROGRESS NOTE ADULT - SUBJECTIVE AND OBJECTIVE BOX
pt fitted for cam boot to wear as directed     skin checks needed     any questions call office [phone]

## 2023-04-28 NOTE — CHART NOTE - NSCHARTNOTEFT_GEN_A_CORE
Provider called to report FS of 177. Patient is due for 65 units of Lantus. Will give 50% of lantus dose tonight.

## 2023-04-28 NOTE — CONSULT NOTE ADULT - PROBLEM SELECTOR RECOMMENDATION 9
Continue with the current  regimen while inpatient   better to change to Lantus lower dose and add prandial lispro   e.g. Lantus 24 units and prandial lispro 8 units   Meals to be given/taken as much possible in scheduled timings. This will eliminate any fluctuations in blood glucose and help lower the incidence of hypoglycemia   cause of hyperglycemia unknown   Thank You for the courtesy of this consultation !!!

## 2023-04-28 NOTE — PROGRESS NOTE ADULT - SUBJECTIVE AND OBJECTIVE BOX
Patient seen and  examined at bedside resting comfortably.   Offers no complaints. Elliott catheter in place.   Denies fever, chills, N/V/D, CP, SOB.     Vital Signs Last 24 Hrs  T(F): 98.4 (04-28-23 @ 17:15), Max: 99.1 (04-28-23 @ 08:15)  HR: 89 (04-28-23 @ 17:15)  BP: 108/68 (04-28-23 @ 17:15)  RR: 18 (04-28-23 @ 17:15)  SpO2: 100% (04-28-23 @ 17:15)  POCT Blood Glucose.: 235 mg/dL (28 Apr 2023 16:21)    PHYSICAL EXAM:  GENERAL: Alert, NAD  CHEST/LUNG: Clear to auscultation bilaterally, respirations nonlabored  HEART: Regular rate and rhythm; S1 & S2 appreciated  ABDOMEN: + Bowel sounds, soft, Nontender, Nondistended  : no suprapubic tenderness or distention. elliott catheter with clear, yellow urine   EXTREMITIES:  no calf tenderness, No edema    I&O's Detail    27 Apr 2023 07:01  -  28 Apr 2023 07:00  --------------------------------------------------------  IN:  Total IN: 0 mL    OUT:    Indwelling Catheter - Urethral (mL): 580 mL  Total OUT: 580 mL    Total NET: -580 mL          LABS:                        9.6    6.11  )-----------( 151      ( 27 Apr 2023 06:48 )             30.2     04-27    140  |  112<H>  |  26<H>  ----------------------------<  157<H>  3.9   |  23  |  1.34<H>    Ca    8.8      27 Apr 2023 06:48  Phos  3.4     04-27  Mg     2.2     04-27      PT/INR - ( 27 Apr 2023 06:48 )   PT: 11.3 sec;   INR: 0.94 ratio         PTT - ( 27 Apr 2023 06:48 )  PTT:34.2 sec    A/P  63F S/P cysto, dilation of urethral stricture, complex elliott insertion POD#1    - continue elliott, monitor output  - abx per primary team  - d/c with elliott vs TOV prior to discharge  - continue care per primary team  - will d/w Dr. Murdock

## 2023-04-28 NOTE — PROGRESS NOTE ADULT - SUBJECTIVE AND OBJECTIVE BOX
Patient is a 63y old  Female who presents with a chief complaint of INABILITY TO AMBULATE DUE TO LEFT ANKLE FOOT     (25 Apr 2023 11:08)    INTERVAL HPI/OVERNIGHT EVENTS: Patients seen and examined at bedside this morning. No acute events overnight.    MEDICATIONS  (STANDING):  cefTRIAXone   IVPB 1000 milliGRAM(s) IV Intermittent every 24 hours  dextrose 5%. 1000 milliLiter(s) (100 mL/Hr) IV Continuous <Continuous>  dextrose 5%. 1000 milliLiter(s) (50 mL/Hr) IV Continuous <Continuous>  dextrose 50% Injectable 12.5 Gram(s) IV Push once  dextrose 50% Injectable 25 Gram(s) IV Push once  dextrose 50% Injectable 25 Gram(s) IV Push once  glucagon  Injectable 1 milliGRAM(s) IntraMuscular once  heparin   Injectable 5000 Unit(s) SubCutaneous every 8 hours  hydroxychloroquine 200 milliGRAM(s) Oral two times a day  insulin glargine Injectable (LANTUS) 65 Unit(s) SubCutaneous at bedtime  insulin lispro (ADMELOG) corrective regimen sliding scale   SubCutaneous three times a day before meals  loratadine 10 milliGRAM(s) Oral daily  losartan 50 milliGRAM(s) Oral daily  melatonin 3 milliGRAM(s) Oral at bedtime  mycophenolate mofetil 1500 milliGRAM(s) Oral two times a day  pantoprazole    Tablet 40 milliGRAM(s) Oral before breakfast  predniSONE   Tablet 5 milliGRAM(s) Oral daily  tamsulosin 0.4 milliGRAM(s) Oral at bedtime    MEDICATIONS  (PRN):  AQUAPHOR (petrolatum Ointment) 1 Application(s) Topical three times a day PRN dry skin/itchy  benzocaine/menthol Lozenge 1 Lozenge Oral every 3 hours PRN Sore Throat  dextrose Oral Gel 15 Gram(s) Oral once PRN Blood Glucose LESS THAN 70 milliGRAM(s)/deciliter  diphenhydrAMINE Injectable 25 milliGRAM(s) IV Push every 6 hours PRN Itching  hydrOXYzine hydrochloride 25 milliGRAM(s) Oral four times a day PRN Itching  morphine  - Injectable 2 milliGRAM(s) IV Push every 6 hours PRN Severe Pain (7 - 10)  oxyCODONE    IR 5 milliGRAM(s) Oral every 6 hours PRN Moderate Pain (4 - 6)    Allergies    No Known Allergies    Intolerances      Vital Signs Last 24 Hrs  T(C): 36.8 (28 Apr 2023 12:15), Max: 37.3 (28 Apr 2023 08:15)  T(F): 98.2 (28 Apr 2023 12:15), Max: 99.1 (28 Apr 2023 08:15)  HR: 92 (28 Apr 2023 12:15) (71 - 97)  BP: 102/68 (28 Apr 2023 12:15) (101/63 - 146/72)  BP(mean): --  RR: 18 (28 Apr 2023 12:15) (16 - 20)  SpO2: 98% (28 Apr 2023 12:15) (96% - 100%)    Parameters below as of 27 Apr 2023 22:15  Patient On (Oxygen Delivery Method): room air      PHYSICAL EXAM:  GENERAL: NAD, well-groomed, well-developed  HEAD:  Atraumatic, Normocephalic  EYES: EOMI, PERRLA, conjunctiva and sclera clear  ENMT: No tonsillar erythema, exudates, or enlargement; Moist mucous membranes, Good dentition, No lesions  NECK: Supple,   NERVOUS SYSTEM:  Alert & Oriented X3, Good concentration; Motor Strength 5/5 B/L upper and  right lower extremity left foot in air splint and bandaged  3rd toe on left foot amputated   CHEST/LUNG: Clear to percussion bilaterally; No rales, rhonchi, wheezing, or rubs  HEART: Regular rate and rhythm; No murmurs, rubs, or gallops  ABDOMEN: Soft, Nontender, Nondistended; Bowel sounds present  EXTREMITIES:  2+ Peripheral Pulses, No clubbing, cyanosis, or edema  SKIN: No rashes or lesions      Labs                                                        9.6    6.11  )-----------( 151      ( 27 Apr 2023 06:48 )             30.2   04-27    140  |  112<H>  |  26<H>  ----------------------------<  157<H>  3.9   |  23  |  1.34<H>    Ca    8.8      27 Apr 2023 06:48  Phos  3.4     04-27  Mg     2.2     04-27            A1C with Estimated Average Glucose (04.23.23 @ 06:56)    A1C with Estimated Average Glucose Result: 10.1: Method: Immunoassay       Reference Range                4.0-5.6%       High risk (prediabetic)        5.7-6.4%       Diabetic, diagnostic             >=6.5%       ADA diabetic treatment goal       <7.0%  The Hemoglobin A1c testing is NGSP-certified.Reference ranges are based  upon the 2010 recommendations of  the American Diabetes Association.  Interpretation may vary for children  and adolescents. %   Estimated Average Glucose: 243: The Estimated Average Glucose (eAG) or Mean Plasma Glucose (MPG) value is  calculated from the hemoglobin A1c value and covers the same time period.   The American Diabetes Association (ADA) and other professional  organizations recommend reporting the eAG with the HgbA1c. mg/dL      CAPILLARY BLOOD GLUCOSE      POCT Blood Glucose.: 309 mg/dL (28 Apr 2023 11:23)  POCT Blood Glucose.: 206 mg/dL (28 Apr 2023 07:48)  POCT Blood Glucose.: 133 mg/dL (27 Apr 2023 21:27)  POCT Blood Glucose.: 108 mg/dL (27 Apr 2023 20:33)  POCT Blood Glucose.: 165 mg/dL (27 Apr 2023 16:06)            Radiology and Imaging reviewed.

## 2023-04-29 LAB
ANION GAP SERPL CALC-SCNC: 3 MMOL/L — LOW (ref 5–17)
BUN SERPL-MCNC: 15 MG/DL — SIGNIFICANT CHANGE UP (ref 7–23)
CALCIUM SERPL-MCNC: 8.2 MG/DL — LOW (ref 8.5–10.1)
CHLORIDE SERPL-SCNC: 107 MMOL/L — SIGNIFICANT CHANGE UP (ref 96–108)
CO2 SERPL-SCNC: 25 MMOL/L — SIGNIFICANT CHANGE UP (ref 22–31)
CREAT SERPL-MCNC: 1.3 MG/DL — SIGNIFICANT CHANGE UP (ref 0.5–1.3)
CULTURE RESULTS: SIGNIFICANT CHANGE UP
EGFR: 47 ML/MIN/1.73M2 — LOW
GLUCOSE BLDC GLUCOMTR-MCNC: 123 MG/DL — HIGH (ref 70–99)
GLUCOSE BLDC GLUCOMTR-MCNC: 174 MG/DL — HIGH (ref 70–99)
GLUCOSE BLDC GLUCOMTR-MCNC: 237 MG/DL — HIGH (ref 70–99)
GLUCOSE BLDC GLUCOMTR-MCNC: 265 MG/DL — HIGH (ref 70–99)
GLUCOSE BLDC GLUCOMTR-MCNC: 392 MG/DL — HIGH (ref 70–99)
GLUCOSE SERPL-MCNC: 219 MG/DL — HIGH (ref 70–99)
HCT VFR BLD CALC: 28.6 % — LOW (ref 34.5–45)
HGB BLD-MCNC: 8.9 G/DL — LOW (ref 11.5–15.5)
MAGNESIUM SERPL-MCNC: 1.8 MG/DL — SIGNIFICANT CHANGE UP (ref 1.6–2.6)
MCHC RBC-ENTMCNC: 25.9 PG — LOW (ref 27–34)
MCHC RBC-ENTMCNC: 31.1 G/DL — LOW (ref 32–36)
MCV RBC AUTO: 83.1 FL — SIGNIFICANT CHANGE UP (ref 80–100)
NRBC # BLD: 0 /100 WBCS — SIGNIFICANT CHANGE UP (ref 0–0)
PHOSPHATE SERPL-MCNC: 2.6 MG/DL — SIGNIFICANT CHANGE UP (ref 2.5–4.5)
PLATELET # BLD AUTO: 169 K/UL — SIGNIFICANT CHANGE UP (ref 150–400)
POTASSIUM SERPL-MCNC: 4 MMOL/L — SIGNIFICANT CHANGE UP (ref 3.5–5.3)
POTASSIUM SERPL-SCNC: 4 MMOL/L — SIGNIFICANT CHANGE UP (ref 3.5–5.3)
RBC # BLD: 3.44 M/UL — LOW (ref 3.8–5.2)
RBC # FLD: 14.2 % — SIGNIFICANT CHANGE UP (ref 10.3–14.5)
SODIUM SERPL-SCNC: 135 MMOL/L — SIGNIFICANT CHANGE UP (ref 135–145)
SPECIMEN SOURCE: SIGNIFICANT CHANGE UP
WBC # BLD: 7.05 K/UL — SIGNIFICANT CHANGE UP (ref 3.8–10.5)
WBC # FLD AUTO: 7.05 K/UL — SIGNIFICANT CHANGE UP (ref 3.8–10.5)

## 2023-04-29 PROCEDURE — 99232 SBSQ HOSP IP/OBS MODERATE 35: CPT

## 2023-04-29 PROCEDURE — 76770 US EXAM ABDO BACK WALL COMP: CPT | Mod: 26

## 2023-04-29 RX ORDER — INSULIN LISPRO 100/ML
8 VIAL (ML) SUBCUTANEOUS
Refills: 0 | Status: DISCONTINUED | OUTPATIENT
Start: 2023-04-29 | End: 2023-05-02

## 2023-04-29 RX ORDER — INSULIN GLARGINE 100 [IU]/ML
30 INJECTION, SOLUTION SUBCUTANEOUS AT BEDTIME
Refills: 0 | Status: DISCONTINUED | OUTPATIENT
Start: 2023-04-29 | End: 2023-05-02

## 2023-04-29 RX ADMIN — Medication 200 MILLIGRAM(S): at 05:56

## 2023-04-29 RX ADMIN — Medication 3 MILLIGRAM(S): at 22:50

## 2023-04-29 RX ADMIN — PANTOPRAZOLE SODIUM 40 MILLIGRAM(S): 20 TABLET, DELAYED RELEASE ORAL at 05:56

## 2023-04-29 RX ADMIN — MYCOPHENOLATE MOFETIL 1500 MILLIGRAM(S): 250 CAPSULE ORAL at 05:58

## 2023-04-29 RX ADMIN — Medication 1000 UNIT(S): at 11:49

## 2023-04-29 RX ADMIN — BENZOCAINE AND MENTHOL 1 LOZENGE: 5; 1 LIQUID ORAL at 20:18

## 2023-04-29 RX ADMIN — INSULIN GLARGINE 30 UNIT(S): 100 INJECTION, SOLUTION SUBCUTANEOUS at 22:50

## 2023-04-29 RX ADMIN — MYCOPHENOLATE MOFETIL 1500 MILLIGRAM(S): 250 CAPSULE ORAL at 18:49

## 2023-04-29 RX ADMIN — Medication 200 MILLIGRAM(S): at 18:50

## 2023-04-29 RX ADMIN — CEFTRIAXONE 100 MILLIGRAM(S): 500 INJECTION, POWDER, FOR SOLUTION INTRAMUSCULAR; INTRAVENOUS at 23:41

## 2023-04-29 RX ADMIN — Medication 10: at 11:54

## 2023-04-29 RX ADMIN — HEPARIN SODIUM 5000 UNIT(S): 5000 INJECTION INTRAVENOUS; SUBCUTANEOUS at 14:03

## 2023-04-29 RX ADMIN — HEPARIN SODIUM 5000 UNIT(S): 5000 INJECTION INTRAVENOUS; SUBCUTANEOUS at 05:57

## 2023-04-29 RX ADMIN — Medication 5 MILLIGRAM(S): at 05:56

## 2023-04-29 RX ADMIN — LORATADINE 10 MILLIGRAM(S): 10 TABLET ORAL at 11:49

## 2023-04-29 RX ADMIN — TAMSULOSIN HYDROCHLORIDE 0.4 MILLIGRAM(S): 0.4 CAPSULE ORAL at 22:50

## 2023-04-29 RX ADMIN — Medication 8 UNIT(S): at 11:55

## 2023-04-29 RX ADMIN — Medication 6: at 09:19

## 2023-04-29 RX ADMIN — HEPARIN SODIUM 5000 UNIT(S): 5000 INJECTION INTRAVENOUS; SUBCUTANEOUS at 22:51

## 2023-04-29 NOTE — PROGRESS NOTE ADULT - SUBJECTIVE AND OBJECTIVE BOX
HPI:    Vital Signs Last 24 Hrs  T(C): 36.5 (29 Apr 2023 11:30), Max: 37.3 (29 Apr 2023 00:17)  T(F): 97.7 (29 Apr 2023 11:30), Max: 99.2 (29 Apr 2023 00:17)  HR: 95 (29 Apr 2023 11:30) (91 - 98)  BP: 124/80 (29 Apr 2023 11:30) (102/55 - 124/80)  BP(mean): --  RR: 18 (29 Apr 2023 11:30) (18 - 18)  SpO2: 99% (29 Apr 2023 11:30) (98% - 100%)    Parameters below as of 29 Apr 2023 11:30  Patient On (Oxygen Delivery Method): room air        MEDICATIONS  (STANDING):  cefTRIAXone   IVPB 1000 milliGRAM(s) IV Intermittent every 24 hours  cholecalciferol 1000 Unit(s) Oral daily  dextrose 5%. 1000 milliLiter(s) (100 mL/Hr) IV Continuous <Continuous>  dextrose 5%. 1000 milliLiter(s) (50 mL/Hr) IV Continuous <Continuous>  dextrose 50% Injectable 12.5 Gram(s) IV Push once  dextrose 50% Injectable 25 Gram(s) IV Push once  dextrose 50% Injectable 25 Gram(s) IV Push once  glucagon  Injectable 1 milliGRAM(s) IntraMuscular once  heparin   Injectable 5000 Unit(s) SubCutaneous every 8 hours  hydroxychloroquine 200 milliGRAM(s) Oral two times a day  insulin glargine Injectable (LANTUS) 30 Unit(s) SubCutaneous at bedtime  insulin lispro (ADMELOG) corrective regimen sliding scale   SubCutaneous three times a day before meals  insulin lispro Injectable (ADMELOG) 8 Unit(s) SubCutaneous three times a day before meals  loratadine 10 milliGRAM(s) Oral daily  losartan 50 milliGRAM(s) Oral daily  melatonin 3 milliGRAM(s) Oral at bedtime  mycophenolate mofetil 1500 milliGRAM(s) Oral two times a day  pantoprazole    Tablet 40 milliGRAM(s) Oral before breakfast  predniSONE   Tablet 5 milliGRAM(s) Oral daily  tamsulosin 0.4 milliGRAM(s) Oral at bedtime    MEDICATIONS  (PRN):  AQUAPHOR (petrolatum Ointment) 1 Application(s) Topical three times a day PRN dry skin/itchy  benzocaine/menthol Lozenge 1 Lozenge Oral every 3 hours PRN Sore Throat  dextrose Oral Gel 15 Gram(s) Oral once PRN Blood Glucose LESS THAN 70 milliGRAM(s)/deciliter  diphenhydrAMINE Injectable 25 milliGRAM(s) IV Push every 6 hours PRN Itching  hydrOXYzine hydrochloride 25 milliGRAM(s) Oral four times a day PRN Itching  morphine  - Injectable 2 milliGRAM(s) IV Push every 6 hours PRN Severe Pain (7 - 10)  oxyCODONE    IR 5 milliGRAM(s) Oral every 6 hours PRN Moderate Pain (4 - 6)      PHYSICAL EXAM:      Constitutional: awake and alert.  HEENT: PERRLA, EOMI,   Neck: Supple.  Respiratory: Breath sounds are clear bilaterally  Cardiovascular: S1 and S2, regular   Gastrointestinal: soft, nontender   voiding yellow urine with elliott catheter in place.                 LABS:                         8.9    7.05  )-----------( 169      ( 29 Apr 2023 07:45 )             28.6     04-29    135  |  107  |  15  ----------------------------<  219<H>  4.0   |  25  |  1.30    Ca    8.2<L>      29 Apr 2023 07:45  Phos  2.6     04-29  Mg     1.8     04-29        RADIOLOGY: < from: US Kidney and Bladder (04.29.23 @ 08:31) >    FINDINGS:  Right kidney: 10.9 cm. No hydronephrosis.    Left kidney: 9.9 cm. No hydronephrosis. 3 mm nonobstructing calculus    Urinary bladder: Underdistended around a Elliott catheter.    IMPRESSION:  No hydronephrosis.        < end of copied text >    < from: US Renal (04.23.23 @ 10:44) >  IMPRESSION:  Mild hydronephrosis bilaterally.  Distended bladder.  Repeat ultrasound may be obtained after bladder emptying to evaluate for   resolution of hydronephrosis.    < end of copied text >

## 2023-04-29 NOTE — PROGRESS NOTE ADULT - SUBJECTIVE AND OBJECTIVE BOX
Patient is a 63y old  Female who presents with a chief complaint of Frequent falls, cannot walk. (29 Apr 2023 17:52)      Interval History:  finger stick are trending down   on Lantus 30 units and ls 8 units currently       MEDICATIONS  (STANDIN)  on G):  cefTRIAXone   IVPB 1000 milliGRAM(s) IV Intermittent every 24 hours  cholecalciferol 1000 Unit(s) Oral daily  dextrose 5%. 1000 milliLiter(s) (100 mL/Hr) IV Continuous <Continuous>  dextrose 5%. 1000 milliLiter(s) (50 mL/Hr) IV Continuous <Continuous>  dextrose 50% Injectable 25 Gram(s) IV Push once  dextrose 50% Injectable 25 Gram(s) IV Push once  dextrose 50% Injectable 12.5 Gram(s) IV Push once  glucagon  Injectable 1 milliGRAM(s) IntraMuscular once  heparin   Injectable 5000 Unit(s) SubCutaneous every 8 hours  hydroxychloroquine 200 milliGRAM(s) Oral two times a day  insulin glargine Injectable (LANTUS) 30 Unit(s) SubCutaneous at bedtime  insulin lispro (ADMELOG) corrective regimen sliding scale   SubCutaneous three times a day before meals  insulin lispro Injectable (ADMELOG) 8 Unit(s) SubCutaneous three times a day before meals  loratadine 10 milliGRAM(s) Oral daily  losartan 50 milliGRAM(s) Oral daily  melatonin 3 milliGRAM(s) Oral at bedtime  mycophenolate mofetil 1500 milliGRAM(s) Oral two times a day  pantoprazole    Tablet 40 milliGRAM(s) Oral before breakfast  predniSONE   Tablet 5 milliGRAM(s) Oral daily  tamsulosin 0.4 milliGRAM(s) Oral at bedtime    MEDICATIONS  (PRN):  AQUAPHOR (petrolatum Ointment) 1 Application(s) Topical three times a day PRN dry skin/itchy  benzocaine/menthol Lozenge 1 Lozenge Oral every 3 hours PRN Sore Throat  dextrose Oral Gel 15 Gram(s) Oral once PRN Blood Glucose LESS THAN 70 milliGRAM(s)/deciliter  diphenhydrAMINE Injectable 25 milliGRAM(s) IV Push every 6 hours PRN Itching  hydrOXYzine hydrochloride 25 milliGRAM(s) Oral four times a day PRN Itching  morphine  - Injectable 2 milliGRAM(s) IV Push every 6 hours PRN Severe Pain (7 - 10)  oxyCODONE    IR 5 milliGRAM(s) Oral every 6 hours PRN Moderate Pain (4 - 6)      Allergies    No Known Allergies    Intolerances        REVIEW OF SYSTEMS:  CONSTITUTIONAL: no changes  EYES: No eye pain, visual disturbances, or discharge  ENMT:  No difficulty hearing, No sinus or throat pain  NECK: No pain or stiffness  RESPIRATORY: No cough, wheezing, chills or hemoptysis; No shortness of breath  CARDIOVASCULAR: No chest pain, palpitations or leg swelling  GASTROINTESTINAL: No abdominal or epigastric pain. No nausea, vomiting, or hematemesis; No diarrhea or constipation. No melena or hematochezia.  GENITOURINARY: No dysuria, frequency, hematuria, or incontinence  NEUROLOGICAL: No headaches, memory loss, loss of strength, numbness, or tremors  SKIN: No itching, burning, rashes, or lesions   ENDOCRINE: No heat or cold intolerance; No hair loss  MUSCULOSKELETAL: No joint pain or swelling; No muscle, back, or extremity pain  PSYCHIATRIC: No depression, anxiety, mood swings, or difficulty sleeping  HEME/LYMPH: No easy bruising, or bleeding gums  ALLERY AND IMMUNOLOGIC: No hives or eczema    Vital Signs Last 24 Hrs  T(C): 37.1 (29 Apr 2023 18:30), Max: 37.3 (29 Apr 2023 00:17)  T(F): 98.7 (29 Apr 2023 18:30), Max: 99.2 (29 Apr 2023 00:17)  HR: 82 (29 Apr 2023 18:30) (82 - 98)  BP: 123/68 (29 Apr 2023 18:30) (102/55 - 124/80)  BP(mean): --  RR: 18 (29 Apr 2023 18:30) (18 - 18)  SpO2: 100% (29 Apr 2023 18:30) (98% - 100%)    Parameters below as of 29 Apr 2023 11:30  Patient On (Oxygen Delivery Method): room air        PHYSICAL EXAM:  GENERAL:   HEAD: Atraumatic, Normocephalic  EYES: PERRLA, conjunctiva and sclera clear  ENMT: No  exudates,; Moist mucous membranes,, No lesions  NECK: Supple, No JVD, Normal thyroid  NERVOUS SYSTEM:  Alert & Oriented,   CHEST/LUNG: Clear to auscultation bilaterally; No rales, rhonchi, wheezing, or rubs  HEART: Regular rate and rhythm; No murmurs, rubs, or gallops  ABDOMEN: Soft, Nontender, Nondistended; Bowel sounds present  EXTREMITIES:  2+ Peripheral Pulses, no edema  SKIN: No rashes or lesions    LABS:        CAPILLARY BLOOD GLUCOSE      POCT Blood Glucose.: 123 mg/dL (29 Apr 2023 16:46)  POCT Blood Glucose.: 392 mg/dL (29 Apr 2023 11:10)  POCT Blood Glucose.: 265 mg/dL (29 Apr 2023 08:53)  POCT Blood Glucose.: 237 mg/dL (29 Apr 2023 07:42)    Lipid panel:           Thyroid:  Diabetes Tests:  Parathyroid Panel:  Adrenals:  RADIOLOGY & ADDITIONAL TESTS:    Imaging Personally Reviewed:  [ ] YES  [ ] NO    Consultant(s) Notes Reviewed:  [ ] YES  [ ] NO    Care Discussed with Consultants/Other Providers [ ] YES  [ ] NO

## 2023-04-29 NOTE — PROGRESS NOTE ADULT - SUBJECTIVE AND OBJECTIVE BOX
Patient is a 63y old  Female who presents with a chief complaint of INABILITY TO AMBULATE DUE TO LEFT ANKLE FOOT     (25 Apr 2023 11:08)    INTERVAL HPI/OVERNIGHT EVENTS: Patients seen and examined at bedside this morning. No acute events overnight.    MEDICATIONS  (STANDING):  cefTRIAXone   IVPB 1000 milliGRAM(s) IV Intermittent every 24 hours  cholecalciferol 1000 Unit(s) Oral daily  dextrose 5%. 1000 milliLiter(s) (50 mL/Hr) IV Continuous <Continuous>  dextrose 5%. 1000 milliLiter(s) (100 mL/Hr) IV Continuous <Continuous>  dextrose 50% Injectable 25 Gram(s) IV Push once  dextrose 50% Injectable 25 Gram(s) IV Push once  dextrose 50% Injectable 12.5 Gram(s) IV Push once  glucagon  Injectable 1 milliGRAM(s) IntraMuscular once  heparin   Injectable 5000 Unit(s) SubCutaneous every 8 hours  hydroxychloroquine 200 milliGRAM(s) Oral two times a day  insulin glargine Injectable (LANTUS) 30 Unit(s) SubCutaneous at bedtime  insulin lispro (ADMELOG) corrective regimen sliding scale   SubCutaneous three times a day before meals  insulin lispro Injectable (ADMELOG) 8 Unit(s) SubCutaneous three times a day before meals  loratadine 10 milliGRAM(s) Oral daily  losartan 50 milliGRAM(s) Oral daily  melatonin 3 milliGRAM(s) Oral at bedtime  mycophenolate mofetil 1500 milliGRAM(s) Oral two times a day  pantoprazole    Tablet 40 milliGRAM(s) Oral before breakfast  predniSONE   Tablet 5 milliGRAM(s) Oral daily  tamsulosin 0.4 milliGRAM(s) Oral at bedtime    MEDICATIONS  (PRN):  AQUAPHOR (petrolatum Ointment) 1 Application(s) Topical three times a day PRN dry skin/itchy  benzocaine/menthol Lozenge 1 Lozenge Oral every 3 hours PRN Sore Throat  dextrose Oral Gel 15 Gram(s) Oral once PRN Blood Glucose LESS THAN 70 milliGRAM(s)/deciliter  diphenhydrAMINE Injectable 25 milliGRAM(s) IV Push every 6 hours PRN Itching  hydrOXYzine hydrochloride 25 milliGRAM(s) Oral four times a day PRN Itching  morphine  - Injectable 2 milliGRAM(s) IV Push every 6 hours PRN Severe Pain (7 - 10)  oxyCODONE    IR 5 milliGRAM(s) Oral every 6 hours PRN Moderate Pain (4 - 6)  Allergies    No Known Allergies    Intolerances    Vital Signs Last 24 Hrs  T(C): 36.6 (29 Apr 2023 05:22), Max: 37.3 (29 Apr 2023 00:17)  T(F): 97.8 (29 Apr 2023 05:22), Max: 99.2 (29 Apr 2023 00:17)  HR: 98 (29 Apr 2023 05:22) (89 - 98)  BP: 102/55 (29 Apr 2023 05:22) (102/55 - 108/68)  BP(mean): --  RR: 18 (29 Apr 2023 05:22) (18 - 18)  SpO2: 100% (29 Apr 2023 05:22) (98% - 100%)    Parameters below as of 29 Apr 2023 05:22  Patient On (Oxygen Delivery Method): room air        PHYSICAL EXAM:  GENERAL: NAD, well-groomed, well-developed  HEAD:  Atraumatic, Normocephalic  EYES: EOMI, PERRLA, conjunctiva and sclera clear  ENMT: No tonsillar erythema, exudates, or enlargement; Moist mucous membranes, Good dentition, No lesions  NECK: Supple,   NERVOUS SYSTEM:  Alert & Oriented X3, Good concentration; Motor Strength 5/5 B/L upper and  right lower extremity left foot in air splint and bandaged  3rd toe on left foot amputated   CHEST/LUNG: Clear to percussion bilaterally; No rales, rhonchi, wheezing, or rubs  HEART: Regular rate and rhythm; No murmurs, rubs, or gallops  ABDOMEN: Soft, Nontender, Nondistended; Bowel sounds present  EXTREMITIES:  2+ Peripheral Pulses, No clubbing, cyanosis, or edema  SKIN: No rashes or lesions      Labs                                                       8.9    7.05  )-----------( 169      ( 29 Apr 2023 07:45 )             28.6   04-29    135  |  107  |  15  ----------------------------<  219<H>  4.0   |  25  |  1.30    Ca    8.2<L>      29 Apr 2023 07:45  Phos  2.6     04-29  Mg     1.8     04-29              A1C with Estimated Average Glucose (04.23.23 @ 06:56)    A1C with Estimated Average Glucose Result: 10.1: Method: Immunoassay       Reference Range                4.0-5.6%       High risk (prediabetic)        5.7-6.4%       Diabetic, diagnostic             >=6.5%       ADA diabetic treatment goal       <7.0%  The Hemoglobin A1c testing is NGSP-certified.Reference ranges are based  upon the 2010 recommendations of  the American Diabetes Association.  Interpretation may vary for children  and adolescents. %   Estimated Average Glucose: 243: The Estimated Average Glucose (eAG) or Mean Plasma Glucose (MPG) value is  calculated from the hemoglobin A1c value and covers the same time period.   The American Diabetes Association (ADA) and other professional  organizations recommend reporting the eAG with the HgbA1c. mg/dL      CAPILLARY BLOOD GLUCOSE      POCT Blood Glucose.: 309 mg/dL (28 Apr 2023 11:23)  POCT Blood Glucose.: 206 mg/dL (28 Apr 2023 07:48)  POCT Blood Glucose.: 133 mg/dL (27 Apr 2023 21:27)  POCT Blood Glucose.: 108 mg/dL (27 Apr 2023 20:33)  POCT Blood Glucose.: 165 mg/dL (27 Apr 2023 16:06)            Radiology and Imaging reviewed.

## 2023-04-29 NOTE — PROGRESS NOTE ADULT - ASSESSMENT
63 year old female with urinary retention and INDER with mild hydronephrosis, hx significant for Lupus and ankle fx    patient with complex elliott insertion due to stricture would continue elliott on discharge and have patient follow up with Dr. Murdock as outpatient for TOV.   continue flomax  patient being discharged to [facility] can consider TOV while in rehab   will sign off at this time can discharge from urological perspective pending medical approval  please have patient follow up with Dr. Murdock   recall prn

## 2023-04-29 NOTE — PROGRESS NOTE ADULT - ASSESSMENT
62 yo female PMH DM, HTN, HLD, lupus here for generalized malaise, decreased appetite, and weakness-as per daughter. Could not bring herself fully up off bed to stand and slid down on her butt. Pt states this was due to weakness as well as pain to the L ankle. Mild swelling to ankle. Denies injuries to ankle prior to fall. Denies hx of DVT/PE. Went to  yesterday for this and was rx prednisone and benadryl which pt took once yesterday. No new products, foods, etc.         medicine for weakness and left Talus fracture. Brace on left heel placed by ER. Will need Podiatry Consult by day team in AM.  ER spoke to Podiatry who agreed with outpatient follow-up. CT head no acute findings, leg dopplers negative for DVT.  UA notes pyuria, hold on ABX, appears asymptomatic.       Left talus fracture  podiatrist consulted on today  PT eval: MARTIN  4/27/2023 appreciate podiatry consult await cam boot   4/29/2023 cam boot in place    DM II uncontrolled-   a1c at 10   had been controlled last 24 hours except the most recent one at present which is 321 will ensure coverage  4/27/2023 will consult dr. becerra  as need better glucose  control  4/29/2023 dr. becerra  reccs noted     continue with regimen      Urinary retention, INDER on CKD- improving inder-   has overflow incontinence - await urology consult   I reached out to dr. gordon on today   per documentation nurse and urology PA unable to pass elliott presumed stricture   4/27/2023 to OR for cysto on today by urology   creatinine continues to improve suggesting post obstructive uropathy  ekg wnl  coags wnl   rcri score 6.0%  4/28/2023 s/p urological procedure and placement of elliott by urology   4/29/2023 urology hasn't determined if d/c with elliott vs tov    Continue home meds at home dose for SLE: Prednisone 5 mg/day, Cellcept 1,500 mg bid, Cozaar 50 mg/day, Plaquenil 200 mg bid.   dispo d/w with sw/cm to start d/c planning to str if patient   agrees

## 2023-04-30 LAB
GLUCOSE BLDC GLUCOMTR-MCNC: 136 MG/DL — HIGH (ref 70–99)
GLUCOSE BLDC GLUCOMTR-MCNC: 237 MG/DL — HIGH (ref 70–99)
GLUCOSE BLDC GLUCOMTR-MCNC: 245 MG/DL — HIGH (ref 70–99)
GLUCOSE BLDC GLUCOMTR-MCNC: 281 MG/DL — HIGH (ref 70–99)

## 2023-04-30 PROCEDURE — 99231 SBSQ HOSP IP/OBS SF/LOW 25: CPT

## 2023-04-30 RX ADMIN — Medication 3 MILLIGRAM(S): at 22:24

## 2023-04-30 RX ADMIN — HEPARIN SODIUM 5000 UNIT(S): 5000 INJECTION INTRAVENOUS; SUBCUTANEOUS at 22:24

## 2023-04-30 RX ADMIN — MYCOPHENOLATE MOFETIL 1500 MILLIGRAM(S): 250 CAPSULE ORAL at 05:38

## 2023-04-30 RX ADMIN — HEPARIN SODIUM 5000 UNIT(S): 5000 INJECTION INTRAVENOUS; SUBCUTANEOUS at 13:45

## 2023-04-30 RX ADMIN — Medication 200 MILLIGRAM(S): at 05:39

## 2023-04-30 RX ADMIN — PANTOPRAZOLE SODIUM 40 MILLIGRAM(S): 20 TABLET, DELAYED RELEASE ORAL at 08:46

## 2023-04-30 RX ADMIN — Medication 6: at 12:05

## 2023-04-30 RX ADMIN — Medication 8 UNIT(S): at 12:05

## 2023-04-30 RX ADMIN — TAMSULOSIN HYDROCHLORIDE 0.4 MILLIGRAM(S): 0.4 CAPSULE ORAL at 22:24

## 2023-04-30 RX ADMIN — Medication 8 UNIT(S): at 16:56

## 2023-04-30 RX ADMIN — INSULIN GLARGINE 30 UNIT(S): 100 INJECTION, SOLUTION SUBCUTANEOUS at 22:23

## 2023-04-30 RX ADMIN — Medication 4: at 08:45

## 2023-04-30 RX ADMIN — Medication 1000 UNIT(S): at 12:31

## 2023-04-30 RX ADMIN — MYCOPHENOLATE MOFETIL 1500 MILLIGRAM(S): 250 CAPSULE ORAL at 17:45

## 2023-04-30 RX ADMIN — Medication 8 UNIT(S): at 08:46

## 2023-04-30 RX ADMIN — Medication 200 MILLIGRAM(S): at 17:46

## 2023-04-30 RX ADMIN — Medication 4: at 16:55

## 2023-04-30 RX ADMIN — Medication 5 MILLIGRAM(S): at 05:40

## 2023-04-30 RX ADMIN — HEPARIN SODIUM 5000 UNIT(S): 5000 INJECTION INTRAVENOUS; SUBCUTANEOUS at 05:40

## 2023-04-30 RX ADMIN — LORATADINE 10 MILLIGRAM(S): 10 TABLET ORAL at 12:31

## 2023-04-30 NOTE — PROGRESS NOTE ADULT - SUBJECTIVE AND OBJECTIVE BOX
Patient is a 63y old  Female who presents with a chief complaint of Frequent falls, cannot walk. (30 Apr 2023 11:39)      Interval History: patient for subacute rehab   on Lantus 30 units and prandial lispro 8 units   on no Metformin     MEDICATIONS  (STANDING):  cholecalciferol 1000 Unit(s) Oral daily  dextrose 5%. 1000 milliLiter(s) (50 mL/Hr) IV Continuous <Continuous>  dextrose 5%. 1000 milliLiter(s) (100 mL/Hr) IV Continuous <Continuous>  dextrose 50% Injectable 25 Gram(s) IV Push once  dextrose 50% Injectable 25 Gram(s) IV Push once  dextrose 50% Injectable 12.5 Gram(s) IV Push once  glucagon  Injectable 1 milliGRAM(s) IntraMuscular once  heparin   Injectable 5000 Unit(s) SubCutaneous every 8 hours  hydroxychloroquine 200 milliGRAM(s) Oral two times a day  insulin glargine Injectable (LANTUS) 30 Unit(s) SubCutaneous at bedtime  insulin lispro (ADMELOG) corrective regimen sliding scale   SubCutaneous three times a day before meals  insulin lispro Injectable (ADMELOG) 8 Unit(s) SubCutaneous three times a day before meals  loratadine 10 milliGRAM(s) Oral daily  losartan 50 milliGRAM(s) Oral daily  melatonin 3 milliGRAM(s) Oral at bedtime  mycophenolate mofetil 1500 milliGRAM(s) Oral two times a day  pantoprazole    Tablet 40 milliGRAM(s) Oral before breakfast  predniSONE   Tablet 5 milliGRAM(s) Oral daily  tamsulosin 0.4 milliGRAM(s) Oral at bedtime    MEDICATIONS  (PRN):  AQUAPHOR (petrolatum Ointment) 1 Application(s) Topical three times a day PRN dry skin/itchy  benzocaine/menthol Lozenge 1 Lozenge Oral every 3 hours PRN Sore Throat  dextrose Oral Gel 15 Gram(s) Oral once PRN Blood Glucose LESS THAN 70 milliGRAM(s)/deciliter  diphenhydrAMINE Injectable 25 milliGRAM(s) IV Push every 6 hours PRN Itching  hydrOXYzine hydrochloride 25 milliGRAM(s) Oral four times a day PRN Itching  morphine  - Injectable 2 milliGRAM(s) IV Push every 6 hours PRN Severe Pain (7 - 10)  oxyCODONE    IR 5 milliGRAM(s) Oral every 6 hours PRN Moderate Pain (4 - 6)      Allergies    No Known Allergies    Intolerances        REVIEW OF SYSTEMS:  CONSTITUTIONAL: no changes  EYES: No eye pain, visual disturbances, or discharge  ENMT:  No difficulty hearing, No sinus or throat pain  NECK: No pain or stiffness  RESPIRATORY: No cough, wheezing, chills or hemoptysis; No shortness of breath  CARDIOVASCULAR: No chest pain, palpitations or leg swelling  GASTROINTESTINAL: No abdominal or epigastric pain. No nausea, vomiting, or hematemesis; No diarrhea or constipation. No melena or hematochezia.  GENITOURINARY: No dysuria, frequency, hematuria, or incontinence  NEUROLOGICAL: No headaches, memory loss, loss of strength, numbness, or tremors  SKIN: No itching, burning, rashes, or lesions   ENDOCRINE: No heat or cold intolerance; No hair loss  MUSCULOSKELETAL: No joint pain or swelling; No muscle, back, or extremity pain  PSYCHIATRIC: No depression, anxiety, mood swings, or difficulty sleeping  HEME/LYMPH: No easy bruising, or bleeding gums  ALLERY AND IMMUNOLOGIC: No hives or eczema    Vital Signs Last 24 Hrs  T(C): 37.1 (30 Apr 2023 17:08), Max: 37.7 (30 Apr 2023 11:21)  T(F): 98.8 (30 Apr 2023 17:08), Max: 99.8 (30 Apr 2023 11:21)  HR: 106 (30 Apr 2023 17:08) (97 - 106)  BP: 104/55 (30 Apr 2023 17:08) (104/55 - 122/75)  BP(mean): --  RR: 17 (30 Apr 2023 17:08) (17 - 18)  SpO2: 95% (30 Apr 2023 17:08) (95% - 99%)        PHYSICAL EXAM:  GENERAL:   HEAD: Atraumatic, Normocephalic  EYES: PERRLA, conjunctiva and sclera clear  ENMT: No  exudates,; Moist mucous membranes,, No lesions  NECK: Supple, No JVD, Normal thyroid  NERVOUS SYSTEM:  Alert & Oriented,   CHEST/LUNG: Clear to auscultation bilaterally; No rales, rhonchi, wheezing, or rubs  HEART: Regular rate and rhythm; No murmurs, rubs, or gallops  ABDOMEN: Soft, Nontender, Nondistended; Bowel sounds present  EXTREMITIES:  2+ Peripheral Pulses, no edema  SKIN: No rashes or lesions    LABS:        CAPILLARY BLOOD GLUCOSE      POCT Blood Glucose.: 245 mg/dL (30 Apr 2023 16:38)  POCT Blood Glucose.: 281 mg/dL (30 Apr 2023 11:35)  POCT Blood Glucose.: 237 mg/dL (30 Apr 2023 07:59)  POCT Blood Glucose.: 174 mg/dL (29 Apr 2023 22:22)    Lipid panel:           Thyroid:  Diabetes Tests:  Parathyroid Panel:  Adrenals:  RADIOLOGY & ADDITIONAL TESTS:    Imaging Personally Reviewed:  [ ] YES  [ ] NO    Consultant(s) Notes Reviewed:  [ ] YES  [ ] NO    Care Discussed with Consultants/Other Providers [ ] YES  [ ] NO

## 2023-04-30 NOTE — PROGRESS NOTE ADULT - ASSESSMENT
62 yo female PMH DM, HTN, HLD, lupus here for generalized malaise, decreased appetite, and weakness-as per daughter. Could not bring herself fully up off bed to stand and slid down on her butt. Pt states this was due to weakness as well as pain to the L ankle. Mild swelling to ankle. Denies injuries to ankle prior to fall. Denies hx of DVT/PE. Went to  yesterday for this and was rx prednisone and benadryl which pt took once yesterday. No new products, foods, etc.         medicine for weakness and left Talus fracture. Brace on left heel placed by ER. Will need Podiatry Consult by day team in AM.  ER spoke to Podiatry who agreed with outpatient follow-up. CT head no acute findings, leg dopplers negative for DVT.  UA notes pyuria, hold on ABX, appears asymptomatic.       Left talus fracture  podiatrist consulted on today  PT eval: MARTIN  4/27/2023 appreciate podiatry consult await cam boot   4/29/2023 cam boot in place    DM II uncontrolled-   a1c at 10   had been controlled last 24 hours except the most recent one at present which is 321 will ensure coverage  4/27/2023 will consult dr. becerra  as need better glucose  control  4/29/2023 dr. becerra  reccs noted   4/30/2023 continue with insulin and adjust as needed          Urinary retention, INDER on CKD- improving inder-   has overflow incontinence - await urology consult   I reached out to dr. gordon on today   per documentation nurse and urology PA unable to pass elliott presumed stricture   4/27/2023 to OR for cysto on today by urology   creatinine continues to improve suggesting post obstructive uropathy  ekg wnl  coags wnl   rcri score 6.0%  4/28/2023 s/p urological procedure and placement of elliott by urology   4/29/2023 urology hasn't determined if d/c with elliott vs tov  4/30/2023 d/c with elliott    Continue home meds at home dose for SLE: Prednisone 5 mg/day, Cellcept 1,500 mg bid, Cozaar 50 mg/day, Plaquenil 200 mg bid.   dispo d/w with sw/cm to start d/c planning to str  as [facility] patient has agreed

## 2023-04-30 NOTE — PROGRESS NOTE ADULT - SUBJECTIVE AND OBJECTIVE BOX
Patient is a 63y old  Female who presents with a chief complaint of INABILITY TO AMBULATE DUE TO LEFT ANKLE FOOT     (25 Apr 2023 11:08)    INTERVAL HPI/OVERNIGHT EVENTS: Patients seen and examined at bedside this morning. No acute events overnight.    MEDICATIONS  (STANDING):  cholecalciferol 1000 Unit(s) Oral daily  dextrose 5%. 1000 milliLiter(s) (50 mL/Hr) IV Continuous <Continuous>  dextrose 5%. 1000 milliLiter(s) (100 mL/Hr) IV Continuous <Continuous>  dextrose 50% Injectable 25 Gram(s) IV Push once  dextrose 50% Injectable 25 Gram(s) IV Push once  dextrose 50% Injectable 12.5 Gram(s) IV Push once  glucagon  Injectable 1 milliGRAM(s) IntraMuscular once  heparin   Injectable 5000 Unit(s) SubCutaneous every 8 hours  hydroxychloroquine 200 milliGRAM(s) Oral two times a day  insulin glargine Injectable (LANTUS) 30 Unit(s) SubCutaneous at bedtime  insulin lispro (ADMELOG) corrective regimen sliding scale   SubCutaneous three times a day before meals  insulin lispro Injectable (ADMELOG) 8 Unit(s) SubCutaneous three times a day before meals  loratadine 10 milliGRAM(s) Oral daily  losartan 50 milliGRAM(s) Oral daily  melatonin 3 milliGRAM(s) Oral at bedtime  mycophenolate mofetil 1500 milliGRAM(s) Oral two times a day  pantoprazole    Tablet 40 milliGRAM(s) Oral before breakfast  predniSONE   Tablet 5 milliGRAM(s) Oral daily  tamsulosin 0.4 milliGRAM(s) Oral at bedtime    MEDICATIONS  (PRN):  AQUAPHOR (petrolatum Ointment) 1 Application(s) Topical three times a day PRN dry skin/itchy  benzocaine/menthol Lozenge 1 Lozenge Oral every 3 hours PRN Sore Throat  dextrose Oral Gel 15 Gram(s) Oral once PRN Blood Glucose LESS THAN 70 milliGRAM(s)/deciliter  diphenhydrAMINE Injectable 25 milliGRAM(s) IV Push every 6 hours PRN Itching  hydrOXYzine hydrochloride 25 milliGRAM(s) Oral four times a day PRN Itching  morphine  - Injectable 2 milliGRAM(s) IV Push every 6 hours PRN Severe Pain (7 - 10)  oxyCODONE    IR 5 milliGRAM(s) Oral every 6 hours PRN Moderate Pain (4 - 6)      Allergies    No Known Allergies    Intolerances    Vital Signs Last 24 Hrs  T(C): 37.7 (30 Apr 2023 11:21), Max: 37.7 (30 Apr 2023 11:21)  T(F): 99.8 (30 Apr 2023 11:21), Max: 99.8 (30 Apr 2023 11:21)  HR: 103 (30 Apr 2023 11:21) (82 - 103)  BP: 106/72 (30 Apr 2023 11:21) (106/67 - 123/68)  BP(mean): --  RR: 18 (30 Apr 2023 11:21) (18 - 18)  SpO2: 95% (30 Apr 2023 11:21) (95% - 100%)      PHYSICAL EXAM:  GENERAL: NAD, well-groomed, well-developed  HEAD:  Atraumatic, Normocephalic  EYES: EOMI, PERRLA, conjunctiva and sclera clear  ENMT: No tonsillar erythema, exudates, or enlargement; Moist mucous membranes, Good dentition, No lesions  NECK: Supple,   NERVOUS SYSTEM:  Alert & Oriented X3, Good concentration; Motor Strength 5/5 B/L upper and  right lower extremity left foot in air splint and bandaged  3rd toe on left foot amputated   CHEST/LUNG: Clear to percussion bilaterally; No rales, rhonchi, wheezing, or rubs  HEART: Regular rate and rhythm; No murmurs, rubs, or gallops  ABDOMEN: Soft, Nontender, Nondistended; Bowel sounds present  EXTREMITIES:  2+ Peripheral Pulses, No clubbing, cyanosis, or edema  SKIN: No rashes or lesions      Labs                                  A1C with Estimated Average Glucose (04.23.23 @ 06:56)    A1C with Estimated Average Glucose Result: 10.1: Method: Immunoassay       Reference Range                4.0-5.6%       High risk (prediabetic)        5.7-6.4%       Diabetic, diagnostic             >=6.5%       ADA diabetic treatment goal       <7.0%  The Hemoglobin A1c testing is NGSP-certified.Reference ranges are based  upon the 2010 recommendations of  the American Diabetes Association.  Interpretation may vary for children  and adolescents. %   Estimated Average Glucose: 243: The Estimated Average Glucose (eAG) or Mean Plasma Glucose (MPG) value is  calculated from the hemoglobin A1c value and covers the same time period.   The American Diabetes Association (ADA) and other professional  organizations recommend reporting the eAG with the HgbA1c. mg/dL      CAPILLARY BLOOD GLUCOSE  CAPILLARY BLOOD GLUCOSE      POCT Blood Glucose.: 281 mg/dL (30 Apr 2023 11:35)  POCT Blood Glucose.: 237 mg/dL (30 Apr 2023 07:59)  POCT Blood Glucose.: 174 mg/dL (29 Apr 2023 22:22)  POCT Blood Glucose.: 123 mg/dL (29 Apr 2023 16:46)          Radiology and Imaging reviewed.

## 2023-05-01 LAB
FLUAV AG NPH QL: SIGNIFICANT CHANGE UP
FLUBV AG NPH QL: SIGNIFICANT CHANGE UP
GLUCOSE BLDC GLUCOMTR-MCNC: 104 MG/DL — HIGH (ref 70–99)
GLUCOSE BLDC GLUCOMTR-MCNC: 168 MG/DL — HIGH (ref 70–99)
GLUCOSE BLDC GLUCOMTR-MCNC: 170 MG/DL — HIGH (ref 70–99)
GLUCOSE BLDC GLUCOMTR-MCNC: 198 MG/DL — HIGH (ref 70–99)
GLUCOSE BLDC GLUCOMTR-MCNC: 209 MG/DL — HIGH (ref 70–99)
GLUCOSE BLDC GLUCOMTR-MCNC: 250 MG/DL — HIGH (ref 70–99)
NON-GYNECOLOGICAL CYTOLOGY STUDY: SIGNIFICANT CHANGE UP
SARS-COV-2 RNA SPEC QL NAA+PROBE: SIGNIFICANT CHANGE UP

## 2023-05-01 PROCEDURE — 99231 SBSQ HOSP IP/OBS SF/LOW 25: CPT

## 2023-05-01 RX ADMIN — PANTOPRAZOLE SODIUM 40 MILLIGRAM(S): 20 TABLET, DELAYED RELEASE ORAL at 12:27

## 2023-05-01 RX ADMIN — Medication 4: at 12:27

## 2023-05-01 RX ADMIN — INSULIN GLARGINE 30 UNIT(S): 100 INJECTION, SOLUTION SUBCUTANEOUS at 21:58

## 2023-05-01 RX ADMIN — Medication 200 MILLIGRAM(S): at 17:23

## 2023-05-01 RX ADMIN — LORATADINE 10 MILLIGRAM(S): 10 TABLET ORAL at 12:27

## 2023-05-01 RX ADMIN — HEPARIN SODIUM 5000 UNIT(S): 5000 INJECTION INTRAVENOUS; SUBCUTANEOUS at 13:32

## 2023-05-01 RX ADMIN — Medication 5 MILLIGRAM(S): at 06:44

## 2023-05-01 RX ADMIN — Medication 1000 UNIT(S): at 13:32

## 2023-05-01 RX ADMIN — Medication 2: at 08:48

## 2023-05-01 RX ADMIN — Medication 8 UNIT(S): at 08:47

## 2023-05-01 RX ADMIN — Medication 200 MILLIGRAM(S): at 06:44

## 2023-05-01 RX ADMIN — Medication 4: at 16:49

## 2023-05-01 RX ADMIN — MYCOPHENOLATE MOFETIL 1500 MILLIGRAM(S): 250 CAPSULE ORAL at 06:44

## 2023-05-01 RX ADMIN — HEPARIN SODIUM 5000 UNIT(S): 5000 INJECTION INTRAVENOUS; SUBCUTANEOUS at 21:57

## 2023-05-01 RX ADMIN — MYCOPHENOLATE MOFETIL 1500 MILLIGRAM(S): 250 CAPSULE ORAL at 17:23

## 2023-05-01 RX ADMIN — Medication 3 MILLIGRAM(S): at 21:58

## 2023-05-01 RX ADMIN — Medication 8 UNIT(S): at 16:49

## 2023-05-01 RX ADMIN — HEPARIN SODIUM 5000 UNIT(S): 5000 INJECTION INTRAVENOUS; SUBCUTANEOUS at 06:44

## 2023-05-01 RX ADMIN — LOSARTAN POTASSIUM 50 MILLIGRAM(S): 100 TABLET, FILM COATED ORAL at 06:44

## 2023-05-01 RX ADMIN — Medication 8 UNIT(S): at 12:27

## 2023-05-01 RX ADMIN — TAMSULOSIN HYDROCHLORIDE 0.4 MILLIGRAM(S): 0.4 CAPSULE ORAL at 21:58

## 2023-05-01 NOTE — PROGRESS NOTE ADULT - SUBJECTIVE AND OBJECTIVE BOX
Patient is a 63y old  Female who presents with a chief complaint of INABILITY TO AMBULATE DUE TO LEFT ANKLE FOOT     (25 Apr 2023 11:08)    INTERVAL HPI/OVERNIGHT EVENTS: Patients seen and examined at bedside this morning. No acute events overnight.    MEDICATIONS  (STANDING):  cholecalciferol 1000 Unit(s) Oral daily  dextrose 5%. 1000 milliLiter(s) (50 mL/Hr) IV Continuous <Continuous>  dextrose 5%. 1000 milliLiter(s) (100 mL/Hr) IV Continuous <Continuous>  dextrose 50% Injectable 25 Gram(s) IV Push once  dextrose 50% Injectable 12.5 Gram(s) IV Push once  dextrose 50% Injectable 25 Gram(s) IV Push once  glucagon  Injectable 1 milliGRAM(s) IntraMuscular once  heparin   Injectable 5000 Unit(s) SubCutaneous every 8 hours  hydroxychloroquine 200 milliGRAM(s) Oral two times a day  insulin glargine Injectable (LANTUS) 30 Unit(s) SubCutaneous at bedtime  insulin lispro (ADMELOG) corrective regimen sliding scale   SubCutaneous three times a day before meals  insulin lispro Injectable (ADMELOG) 8 Unit(s) SubCutaneous three times a day before meals  loratadine 10 milliGRAM(s) Oral daily  losartan 50 milliGRAM(s) Oral daily  melatonin 3 milliGRAM(s) Oral at bedtime  mycophenolate mofetil 1500 milliGRAM(s) Oral two times a day  pantoprazole    Tablet 40 milliGRAM(s) Oral before breakfast  predniSONE   Tablet 5 milliGRAM(s) Oral daily  tamsulosin 0.4 milliGRAM(s) Oral at bedtime    MEDICATIONS  (PRN):  AQUAPHOR (petrolatum Ointment) 1 Application(s) Topical three times a day PRN dry skin/itchy  benzocaine/menthol Lozenge 1 Lozenge Oral every 3 hours PRN Sore Throat  dextrose Oral Gel 15 Gram(s) Oral once PRN Blood Glucose LESS THAN 70 milliGRAM(s)/deciliter  diphenhydrAMINE Injectable 25 milliGRAM(s) IV Push every 6 hours PRN Itching  hydrOXYzine hydrochloride 25 milliGRAM(s) Oral four times a day PRN Itching  morphine  - Injectable 2 milliGRAM(s) IV Push every 6 hours PRN Severe Pain (7 - 10)  oxyCODONE    IR 5 milliGRAM(s) Oral every 6 hours PRN Moderate Pain (4 - 6)      Allergies    No Known Allergies    Intolerances    Vital Signs Last 24 Hrs  T(C): 37.2 (01 May 2023 11:10), Max: 37.2 (01 May 2023 11:10)  T(F): 99 (01 May 2023 11:10), Max: 99 (01 May 2023 11:10)  HR: 106 (01 May 2023 11:10) (93 - 106)  BP: 113/72 (01 May 2023 11:10) (104/55 - 127/81)  BP(mean): --  RR: 17 (01 May 2023 11:10) (17 - 18)  SpO2: 99% (01 May 2023 11:10) (95% - 99%)        PHYSICAL EXAM:  GENERAL: NAD, well-groomed, well-developed  HEAD:  Atraumatic, Normocephalic  EYES: EOMI, PERRLA, conjunctiva and sclera clear  ENMT: No tonsillar erythema, exudates, or enlargement; Moist mucous membranes, Good dentition, No lesions  NECK: Supple,   NERVOUS SYSTEM:  Alert & Oriented X3, Good concentration; Motor Strength 5/5 B/L upper and  right lower extremity left foot in air splint and bandaged  3rd toe on left foot amputated   CHEST/LUNG: Clear to percussion bilaterally; No rales, rhonchi, wheezing, or rubs  HEART: Regular rate and rhythm; No murmurs, rubs, or gallops  ABDOMEN: Soft, Nontender, Nondistended; Bowel sounds present  EXTREMITIES:  2+ Peripheral Pulses, No clubbing, cyanosis, or edema  SKIN: No rashes or lesions      Labs                                  A1C with Estimated Average Glucose (04.23.23 @ 06:56)    A1C with Estimated Average Glucose Result: 10.1: Method: Immunoassay       Reference Range                4.0-5.6%       High risk (prediabetic)        5.7-6.4%       Diabetic, diagnostic             >=6.5%       ADA diabetic treatment goal       <7.0%  The Hemoglobin A1c testing is NGSP-certified.Reference ranges are based  upon the 2010 recommendations of  the American Diabetes Association.  Interpretation may vary for children  and adolescents. %   Estimated Average Glucose: 243: The Estimated Average Glucose (eAG) or Mean Plasma Glucose (MPG) value is  calculated from the hemoglobin A1c value and covers the same time period.   The American Diabetes Association (ADA) and other professional  organizations recommend reporting the eAG with the HgbA1c. mg/dL    CAPILLARY BLOOD GLUCOSE      POCT Blood Glucose.: 250 mg/dL (01 May 2023 11:41)  POCT Blood Glucose.: 198 mg/dL (01 May 2023 07:59)  POCT Blood Glucose.: 170 mg/dL (01 May 2023 03:56)  POCT Blood Glucose.: 168 mg/dL (01 May 2023 00:17)  POCT Blood Glucose.: 136 mg/dL (30 Apr 2023 21:24)  POCT Blood Glucose.: 245 mg/dL (30 Apr 2023 16:38)          Radiology and Imaging reviewed.

## 2023-05-01 NOTE — PROGRESS NOTE ADULT - PROBLEM SELECTOR PLAN 1
Continue with the current  regimen while inpatient   Metformin can be added  later once patient is better   while inpatient, finger sticks should be 100-180
Continue with the current  regimen while inpatient   can be discharged on current dose/ regimen   Metformin added as out-patient
Continue with the current  regimen while inpatient   while inpatient, finger sticks should be 100-180   can be discharged on current dose/ regimen

## 2023-05-01 NOTE — PROGRESS NOTE ADULT - SUBJECTIVE AND OBJECTIVE BOX
Patient is a 63y old  Female who presents with a chief complaint of Frequent falls, cannot walk. (30 Apr 2023 18:31)      Interval History: Currently on 30 units of Lantus and 8 units of prandial lispro.  Fingersticks are mainly in the high 100s but occasionally increased to >200.  Nutrition adequate.  Not yet on metformin    MEDICATIONS  (STANDING):  cholecalciferol 1000 Unit(s) Oral daily  dextrose 5%. 1000 milliLiter(s) (50 mL/Hr) IV Continuous <Continuous>  dextrose 5%. 1000 milliLiter(s) (100 mL/Hr) IV Continuous <Continuous>  dextrose 50% Injectable 25 Gram(s) IV Push once  dextrose 50% Injectable 12.5 Gram(s) IV Push once  dextrose 50% Injectable 25 Gram(s) IV Push once  glucagon  Injectable 1 milliGRAM(s) IntraMuscular once  heparin   Injectable 5000 Unit(s) SubCutaneous every 8 hours  hydroxychloroquine 200 milliGRAM(s) Oral two times a day  insulin glargine Injectable (LANTUS) 30 Unit(s) SubCutaneous at bedtime  insulin lispro (ADMELOG) corrective regimen sliding scale   SubCutaneous three times a day before meals  insulin lispro Injectable (ADMELOG) 8 Unit(s) SubCutaneous three times a day before meals  loratadine 10 milliGRAM(s) Oral daily  losartan 50 milliGRAM(s) Oral daily  melatonin 3 milliGRAM(s) Oral at bedtime  mycophenolate mofetil 1500 milliGRAM(s) Oral two times a day  pantoprazole    Tablet 40 milliGRAM(s) Oral before breakfast  predniSONE   Tablet 5 milliGRAM(s) Oral daily  tamsulosin 0.4 milliGRAM(s) Oral at bedtime    MEDICATIONS  (PRN):  AQUAPHOR (petrolatum Ointment) 1 Application(s) Topical three times a day PRN dry skin/itchy  benzocaine/menthol Lozenge 1 Lozenge Oral every 3 hours PRN Sore Throat  dextrose Oral Gel 15 Gram(s) Oral once PRN Blood Glucose LESS THAN 70 milliGRAM(s)/deciliter  diphenhydrAMINE Injectable 25 milliGRAM(s) IV Push every 6 hours PRN Itching  hydrOXYzine hydrochloride 25 milliGRAM(s) Oral four times a day PRN Itching  morphine  - Injectable 2 milliGRAM(s) IV Push every 6 hours PRN Severe Pain (7 - 10)  oxyCODONE    IR 5 milliGRAM(s) Oral every 6 hours PRN Moderate Pain (4 - 6)      Allergies    No Known Allergies    Intolerances        REVIEW OF SYSTEMS:  CONSTITUTIONAL: no changes  EYES: No eye pain, visual disturbances, or discharge  ENMT:  No difficulty hearing, No sinus or throat pain  NECK: No pain or stiffness  RESPIRATORY: No cough, wheezing, chills or hemoptysis; No shortness of breath  CARDIOVASCULAR: No chest pain, palpitations or leg swelling  GASTROINTESTINAL: No abdominal or epigastric pain. No nausea, vomiting, or hematemesis; No diarrhea or constipation. No melena or hematochezia.  GENITOURINARY: No dysuria, frequency, hematuria, or incontinence  NEUROLOGICAL: No headaches, memory loss, loss of strength, numbness, or tremors  SKIN: No itching, burning, rashes, or lesions   ENDOCRINE: No heat or cold intolerance; No hair loss  MUSCULOSKELETAL: No joint pain or swelling; No muscle, back, or extremity pain  PSYCHIATRIC: No depression, anxiety, mood swings, or difficulty sleeping  HEME/LYMPH: No easy bruising, or bleeding gums  ALLERY AND IMMUNOLOGIC: No hives or eczema    Vital Signs Last 24 Hrs  T(C): 37.2 (01 May 2023 11:10), Max: 37.2 (01 May 2023 11:10)  T(F): 99 (01 May 2023 11:10), Max: 99 (01 May 2023 11:10)  HR: 106 (01 May 2023 11:10) (93 - 106)  BP: 113/72 (01 May 2023 11:10) (104/55 - 127/81)  BP(mean): --  RR: 17 (01 May 2023 11:10) (17 - 18)  SpO2: 99% (01 May 2023 11:10) (95% - 99%)        PHYSICAL EXAM:  GENERAL:   HEAD: Atraumatic, Normocephalic  EYES: PERRLA, conjunctiva and sclera clear  ENMT: No  exudates,; Moist mucous membranes,, No lesions  NECK: Supple, No JVD, Normal thyroid  NERVOUS SYSTEM:  Alert & Oriented,   CHEST/LUNG: Clear to auscultation bilaterally; No rales, rhonchi, wheezing, or rubs  HEART: Regular rate and rhythm; No murmurs, rubs, or gallops  ABDOMEN: Soft, Nontender, Nondistended; Bowel sounds present  EXTREMITIES:  2+ Peripheral Pulses, no edema  SKIN: No rashes or lesions    LABS:        CAPILLARY BLOOD GLUCOSE      POCT Blood Glucose.: 250 mg/dL (01 May 2023 11:41)  POCT Blood Glucose.: 198 mg/dL (01 May 2023 07:59)  POCT Blood Glucose.: 170 mg/dL (01 May 2023 03:56)  POCT Blood Glucose.: 168 mg/dL (01 May 2023 00:17)  POCT Blood Glucose.: 136 mg/dL (30 Apr 2023 21:24)  POCT Blood Glucose.: 245 mg/dL (30 Apr 2023 16:38)    Lipid panel:           Thyroid:  Diabetes Tests:  Parathyroid Panel:  Adrenals:  RADIOLOGY & ADDITIONAL TESTS:    Imaging Personally Reviewed:  [ ] YES  [ ] NO    Consultant(s) Notes Reviewed:  [ ] YES  [ ] NO    Care Discussed with Consultants/Other Providers [ ] YES  [ ] NO

## 2023-05-01 NOTE — PROGRESS NOTE ADULT - ASSESSMENT
64 yo female PMH DM, HTN, HLD, lupus here for generalized malaise, decreased appetite, and weakness-as per daughter. Could not bring herself fully up off bed to stand and slid down on her butt. Pt states this was due to weakness as well as pain to the L ankle. Mild swelling to ankle. Denies injuries to ankle prior to fall. Denies hx of DVT/PE. Went to  yesterday for this and was rx prednisone and benadryl which pt took once yesterday. No new products, foods, etc.         medicine for weakness and left Talus fracture. Brace on left heel placed by ER. Will need Podiatry Consult by day team in AM.  ER spoke to Podiatry who agreed with outpatient follow-up. CT head no acute findings, leg dopplers negative for DVT.  UA notes pyuria, hold on ABX, appears asymptomatic.       Left talus fracture  podiatrist consulted on today  PT eval: MARTIN  4/27/2023 appreciate podiatry consult await cam boot   4/29/2023 cam boot in place    DM II uncontrolled-   a1c at 10   had been controlled last 24 hours except the most recent one at present which is 321 will ensure coverage  4/27/2023 will consult dr. becerra  as need better glucose  control  4/29/2023 dr. becerra  reccs noted   4/30/2023 continue with insulin and adjust as needed          Urinary retention, INDER on CKD- improving inder-   has overflow incontinence - await urology consult   I reached out to dr. gordon on today   per documentation nurse and urology PA unable to pass elliott presumed stricture   4/27/2023 to OR for cysto on today by urology   creatinine continues to improve suggesting post obstructive uropathy  ekg wnl  coags wnl   rcri score 6.0%  4/28/2023 s/p urological procedure and placement of elliott by urology   4/29/2023 urology hasn't determined if d/c with elliott vs tov  4/30/2023 d/c with elliott  5/1/2023 per urology will d/w  elliott     Continue home meds at home dose for SLE: Prednisone 5 mg/day, Cellcept 1,500 mg bid, Cozaar 50 mg/day, Plaquenil 200 mg bid.   dispo d/w with sw/cm to start d/c planning to str  as the patient has agreed

## 2023-05-02 ENCOUNTER — TRANSCRIPTION ENCOUNTER (OUTPATIENT)
Age: 63
End: 2023-05-02

## 2023-05-02 VITALS
SYSTOLIC BLOOD PRESSURE: 102 MMHG | OXYGEN SATURATION: 99 % | RESPIRATION RATE: 17 BRPM | DIASTOLIC BLOOD PRESSURE: 64 MMHG | HEART RATE: 88 BPM | TEMPERATURE: 98 F

## 2023-05-02 LAB
GLUCOSE BLDC GLUCOMTR-MCNC: 127 MG/DL — HIGH (ref 70–99)
GLUCOSE BLDC GLUCOMTR-MCNC: 178 MG/DL — HIGH (ref 70–99)
GLUCOSE BLDC GLUCOMTR-MCNC: 241 MG/DL — HIGH (ref 70–99)
GLUCOSE BLDC GLUCOMTR-MCNC: 287 MG/DL — HIGH (ref 70–99)

## 2023-05-02 PROCEDURE — 99239 HOSP IP/OBS DSCHRG MGMT >30: CPT

## 2023-05-02 RX ORDER — OXYCODONE HYDROCHLORIDE 5 MG/1
1 TABLET ORAL
Qty: 0 | Refills: 0 | DISCHARGE
Start: 2023-05-02

## 2023-05-02 RX ORDER — INSULIN LISPRO 100/ML
8 VIAL (ML) SUBCUTANEOUS
Qty: 0 | Refills: 0 | DISCHARGE
Start: 2023-05-02

## 2023-05-02 RX ORDER — LORATADINE 10 MG/1
1 TABLET ORAL
Qty: 0 | Refills: 0 | DISCHARGE
Start: 2023-05-02

## 2023-05-02 RX ORDER — LOSARTAN POTASSIUM 100 MG/1
1 TABLET, FILM COATED ORAL
Qty: 0 | Refills: 0 | DISCHARGE
Start: 2023-05-02

## 2023-05-02 RX ORDER — INSULIN GLARGINE 100 [IU]/ML
34 INJECTION, SOLUTION SUBCUTANEOUS
Refills: 0 | DISCHARGE
Start: 2023-05-02

## 2023-05-02 RX ORDER — HYDROXYCHLOROQUINE SULFATE 200 MG
0 TABLET ORAL
Qty: 0 | Refills: 0 | DISCHARGE

## 2023-05-02 RX ORDER — HYDROXYCHLOROQUINE SULFATE 200 MG
1 TABLET ORAL
Qty: 0 | Refills: 0 | DISCHARGE
Start: 2023-05-02

## 2023-05-02 RX ORDER — TAMSULOSIN HYDROCHLORIDE 0.4 MG/1
1 CAPSULE ORAL
Qty: 0 | Refills: 0 | DISCHARGE
Start: 2023-05-02

## 2023-05-02 RX ORDER — INSULIN ASPART 100 [IU]/ML
12 INJECTION, SOLUTION SUBCUTANEOUS
Qty: 0 | Refills: 0 | DISCHARGE

## 2023-05-02 RX ORDER — LOSARTAN POTASSIUM 100 MG/1
1 TABLET, FILM COATED ORAL
Qty: 0 | Refills: 0 | DISCHARGE

## 2023-05-02 RX ORDER — CHOLECALCIFEROL (VITAMIN D3) 125 MCG
1000 CAPSULE ORAL
Qty: 0 | Refills: 0 | DISCHARGE
Start: 2023-05-02

## 2023-05-02 RX ORDER — MYCOPHENOLATE MOFETIL 250 MG/1
6 CAPSULE ORAL
Qty: 0 | Refills: 0 | DISCHARGE
Start: 2023-05-02

## 2023-05-02 RX ORDER — HEPARIN SODIUM 5000 [USP'U]/ML
5000 INJECTION INTRAVENOUS; SUBCUTANEOUS
Qty: 0 | Refills: 0 | DISCHARGE
Start: 2023-05-02

## 2023-05-02 RX ORDER — NYSTATIN 500MM UNIT
0 POWDER (EA) MISCELLANEOUS
Qty: 0 | Refills: 0 | DISCHARGE

## 2023-05-02 RX ADMIN — Medication 200 MILLIGRAM(S): at 17:43

## 2023-05-02 RX ADMIN — Medication 1000 UNIT(S): at 14:29

## 2023-05-02 RX ADMIN — Medication 5 MILLIGRAM(S): at 05:16

## 2023-05-02 RX ADMIN — PANTOPRAZOLE SODIUM 40 MILLIGRAM(S): 20 TABLET, DELAYED RELEASE ORAL at 05:16

## 2023-05-02 RX ADMIN — Medication 2: at 08:36

## 2023-05-02 RX ADMIN — MYCOPHENOLATE MOFETIL 1500 MILLIGRAM(S): 250 CAPSULE ORAL at 17:43

## 2023-05-02 RX ADMIN — Medication 8 UNIT(S): at 08:36

## 2023-05-02 RX ADMIN — HEPARIN SODIUM 5000 UNIT(S): 5000 INJECTION INTRAVENOUS; SUBCUTANEOUS at 14:29

## 2023-05-02 RX ADMIN — MYCOPHENOLATE MOFETIL 1500 MILLIGRAM(S): 250 CAPSULE ORAL at 05:17

## 2023-05-02 RX ADMIN — HEPARIN SODIUM 5000 UNIT(S): 5000 INJECTION INTRAVENOUS; SUBCUTANEOUS at 05:18

## 2023-05-02 RX ADMIN — LORATADINE 10 MILLIGRAM(S): 10 TABLET ORAL at 14:29

## 2023-05-02 RX ADMIN — Medication 4: at 12:00

## 2023-05-02 RX ADMIN — Medication 8 UNIT(S): at 12:01

## 2023-05-02 RX ADMIN — Medication 6: at 16:58

## 2023-05-02 RX ADMIN — Medication 30 MILLILITER(S): at 06:45

## 2023-05-02 RX ADMIN — Medication 200 MILLIGRAM(S): at 05:16

## 2023-05-02 RX ADMIN — Medication 8 UNIT(S): at 16:58

## 2023-05-02 NOTE — CHART NOTE - NSCHARTNOTEFT_GEN_A_CORE
Pt admitted c generalized malaise, decreased PO intake, weakness, left ankle pain s/p fall; found c left ankle fx, INDER (urinary incontinence; Freitas placed 4/28; Urology following).  PMHx includes Lupus (on chronic steroids), CKD, DM, HTN, HLD.     Factors impacting intake: [X ] none [ ] nausea  [ ] vomiting [ ] diarrhea [ ] constipation  [ ]chewing problems [ ] swallowing issues  [ ] other:     Diet Prescription: Diet, Consistent Carbohydrate/No Snacks (04-27-23 @ 20:45)    Intake:   pt eating well; consuming % most meals    Current Weight: Weight (kg): 127 (04-27 @ 06:54)  % Weight Change:  no weights to trend    1+ edema noted left ankle/foot    Pertinent Medications: MEDICATIONS  (STANDING):  cholecalciferol 1000 Unit(s) Oral daily  dextrose 5%. 1000 milliLiter(s) (50 mL/Hr) IV Continuous <Continuous>  dextrose 5%. 1000 milliLiter(s) (100 mL/Hr) IV Continuous <Continuous>  dextrose 50% Injectable 25 Gram(s) IV Push once  dextrose 50% Injectable 12.5 Gram(s) IV Push once  dextrose 50% Injectable 25 Gram(s) IV Push once  glucagon  Injectable 1 milliGRAM(s) IntraMuscular once  heparin   Injectable 5000 Unit(s) SubCutaneous every 8 hours  hydroxychloroquine 200 milliGRAM(s) Oral two times a day  insulin glargine Injectable (LANTUS) 30 Unit(s) SubCutaneous at bedtime  insulin lispro (ADMELOG) corrective regimen sliding scale   SubCutaneous three times a day before meals  insulin lispro Injectable (ADMELOG) 8 Unit(s) SubCutaneous three times a day before meals  loratadine 10 milliGRAM(s) Oral daily  losartan 50 milliGRAM(s) Oral daily  melatonin 3 milliGRAM(s) Oral at bedtime  mycophenolate mofetil 1500 milliGRAM(s) Oral two times a day  pantoprazole    Tablet 40 milliGRAM(s) Oral before breakfast  predniSONE   Tablet 5 milliGRAM(s) Oral daily  tamsulosin 0.4 milliGRAM(s) Oral at bedtime    MEDICATIONS  (PRN):  aluminum hydroxide/magnesium hydroxide/simethicone Suspension 30 milliLiter(s) Oral every 4 hours PRN Dyspepsia  AQUAPHOR (petrolatum Ointment) 1 Application(s) Topical three times a day PRN dry skin/itchy  benzocaine/menthol Lozenge 1 Lozenge Oral every 3 hours PRN Sore Throat  dextrose Oral Gel 15 Gram(s) Oral once PRN Blood Glucose LESS THAN 70 milliGRAM(s)/deciliter  diphenhydrAMINE Injectable 25 milliGRAM(s) IV Push every 6 hours PRN Itching  hydrOXYzine hydrochloride 25 milliGRAM(s) Oral four times a day PRN Itching  morphine  - Injectable 2 milliGRAM(s) IV Push every 6 hours PRN Severe Pain (7 - 10)  oxyCODONE    IR 5 milliGRAM(s) Oral every 6 hours PRN Moderate Pain (4 - 6)    Pertinent Labs:  04-29 Phos 2.6 mg/dL  04-23-23 A1C 10.1%; 05-01 POCT 168, 170, 198, 250, 209, 104    Skin:   No pressure ulcers noted    Estimated Needs:    [x ] no change since previous assessment  (4/25)  [ ] recalculated:     Previous Nutrition Diagnosis:   [x ]   Excessive CHO Intake  Etiology:  Physiological cause (CHO) requiring modified CHO intake  Signs & Symptoms:  A1c 10.1%; pt verbalizes incomplete nutrition-related information    GOAL:  Pt to verbalize 5 CHO foods to moderate in daily diet; -180 mg/dL during admission - both not yet met      Nutrition Diagnosis is [x ] ongoing  [ ] resolved [ ] not applicable     New Nutrition Diagnosis: [ x] not applicable      Interventions:   continue current diet rx as noted  Recommend  [ ] Change Diet To:  [ ] Nutrition Supplement  [ ] Nutrition Support  [ ] Other:     Monitoring and Evaluation:   [ X] PO intake [ x ] Tolerance to diet prescription [ x ] weights [ x ] labs[ x ] follow up per protocol  [ ] other:

## 2023-05-02 NOTE — PROGRESS NOTE ADULT - ASSESSMENT
64 yo female PMH DM, HTN, HLD, lupus here for generalized malaise, decreased appetite, and weakness-as per daughter. Could not bring herself fully up off bed to stand and slid down on her butt. Pt states this was due to weakness as well as pain to the L ankle. Mild swelling to ankle. Denies injuries to ankle prior to fall. Denies hx of DVT/PE. Went to  yesterday for this and was rx prednisone and benadryl which pt took once yesterday. No new products, foods, etc.         medicine for weakness and left Talus fracture. Brace on left heel placed by ER. Will need Podiatry Consult by day team in AM.  ER spoke to Podiatry who agreed with outpatient follow-up. CT head no acute findings, leg dopplers negative for DVT.  UA notes pyuria, hold on ABX, appears asymptomatic.       Left talus fracture  podiatrist consulted on today  PT eval: MARTIN  4/27/2023 appreciate podiatry consult await cam boot   4/29/2023 cam boot in place    DM II uncontrolled-   a1c at 10   had been controlled last 24 hours except the most recent one at present which is 321 will ensure coverage  4/27/2023 will consult dr. becerra  as need better glucose  control  4/29/2023 dr. becerra  reccs noted   4/30/2023 continue with insulin and adjust as needed  5/2/2023 appreciate endocrine consult    Urinary retention, INDER on CKD- improving inder-   has overflow incontinence - await urology consult   I reached out to dr. gordon on today   per documentation nurse and urology PA unable to pass elliott presumed stricture   4/27/2023 to OR for cysto on today by urology   creatinine continues to improve suggesting post obstructive uropathy  ekg wnl  coags wnl   rcri score 6.0%  4/28/2023 s/p urological procedure and placement of elliott by urology   4/29/2023 urology hasn't determined if d/c with elliott vs tov  4/30/2023 d/c with elliott  5/1/2023 per urology will d/w  elliott     Continue home meds at home dose for SLE: Prednisone 5 mg/day, Cellcept 1,500 mg bid, Cozaar 50 mg/day, Plaquenil 200 mg bid.   dispo d/w with sw/cm to start d/c planning to str  as the patient has agreed

## 2023-05-02 NOTE — DISCHARGE NOTE NURSING/CASE MANAGEMENT/SOCIAL WORK - PATIENT PORTAL LINK FT
You can access the FollowMyHealth Patient Portal offered by [facility] by registering at the following website: [email or web address]. By joining [employer]’s FollowMyHealth portal, you will also be able to view your health information using other applications (apps) compatible with our system.

## 2023-05-02 NOTE — PROGRESS NOTE ADULT - REASON FOR ADMISSION
Frequent falls, cannot walk.

## 2023-05-02 NOTE — PROGRESS NOTE ADULT - SUBJECTIVE AND OBJECTIVE BOX
Patient is a 63y old  Female who presents with a chief complaint of INABILITY TO AMBULATE DUE TO LEFT ANKLE FOOT     (25 Apr 2023 11:08)    INTERVAL HPI/OVERNIGHT EVENTS: Patients seen and examined at bedside this morning. No acute events overnight.      MEDICATIONS  (STANDING):  cholecalciferol 1000 Unit(s) Oral daily  dextrose 5%. 1000 milliLiter(s) (50 mL/Hr) IV Continuous <Continuous>  dextrose 5%. 1000 milliLiter(s) (100 mL/Hr) IV Continuous <Continuous>  dextrose 50% Injectable 25 Gram(s) IV Push once  dextrose 50% Injectable 12.5 Gram(s) IV Push once  dextrose 50% Injectable 25 Gram(s) IV Push once  glucagon  Injectable 1 milliGRAM(s) IntraMuscular once  heparin   Injectable 5000 Unit(s) SubCutaneous every 8 hours  hydroxychloroquine 200 milliGRAM(s) Oral two times a day  insulin glargine Injectable (LANTUS) 30 Unit(s) SubCutaneous at bedtime  insulin lispro (ADMELOG) corrective regimen sliding scale   SubCutaneous three times a day before meals  insulin lispro Injectable (ADMELOG) 8 Unit(s) SubCutaneous three times a day before meals  loratadine 10 milliGRAM(s) Oral daily  losartan 50 milliGRAM(s) Oral daily  melatonin 3 milliGRAM(s) Oral at bedtime  mycophenolate mofetil 1500 milliGRAM(s) Oral two times a day  pantoprazole    Tablet 40 milliGRAM(s) Oral before breakfast  predniSONE   Tablet 5 milliGRAM(s) Oral daily  tamsulosin 0.4 milliGRAM(s) Oral at bedtime    MEDICATIONS  (PRN):  aluminum hydroxide/magnesium hydroxide/simethicone Suspension 30 milliLiter(s) Oral every 4 hours PRN Dyspepsia  AQUAPHOR (petrolatum Ointment) 1 Application(s) Topical three times a day PRN dry skin/itchy  benzocaine/menthol Lozenge 1 Lozenge Oral every 3 hours PRN Sore Throat  dextrose Oral Gel 15 Gram(s) Oral once PRN Blood Glucose LESS THAN 70 milliGRAM(s)/deciliter  diphenhydrAMINE Injectable 25 milliGRAM(s) IV Push every 6 hours PRN Itching  hydrOXYzine hydrochloride 25 milliGRAM(s) Oral four times a day PRN Itching  morphine  - Injectable 2 milliGRAM(s) IV Push every 6 hours PRN Severe Pain (7 - 10)  oxyCODONE    IR 5 milliGRAM(s) Oral every 6 hours PRN Moderate Pain (4 - 6)      Allergies    No Known Allergies    Intolerances    Vital Signs Last 24 Hrs  T(C): 37.1 (02 May 2023 10:24), Max: 37.3 (02 May 2023 00:21)  T(F): 98.8 (02 May 2023 10:24), Max: 99.2 (02 May 2023 00:21)  HR: 83 (02 May 2023 10:24) (83 - 100)  BP: 105/67 (02 May 2023 10:24) (100/56 - 112/70)  BP(mean): --  RR: 18 (02 May 2023 10:24) (18 - 18)  SpO2: 99% (02 May 2023 10:24) (98% - 99%)          PHYSICAL EXAM:  GENERAL: NAD, well-groomed, well-developed  HEAD:  Atraumatic, Normocephalic  EYES: EOMI, PERRLA, conjunctiva and sclera clear  ENMT: No tonsillar erythema, exudates, or enlargement; Moist mucous membranes, Good dentition, No lesions  NECK: Supple,   NERVOUS SYSTEM:  Alert & Oriented X3, Good concentration; Motor Strength 5/5 B/L upper and  right lower extremity left foot in air splint and bandaged  3rd toe on left foot amputated   CHEST/LUNG: Clear to percussion bilaterally; No rales, rhonchi, wheezing, or rubs  HEART: Regular rate and rhythm; No murmurs, rubs, or gallops  ABDOMEN: Soft, Nontender, Nondistended; Bowel sounds present  EXTREMITIES:  2+ Peripheral Pulses, No clubbing, cyanosis, or edema  SKIN: No rashes or lesions      Labs                                  A1C with Estimated Average Glucose (04.23.23 @ 06:56)    A1C with Estimated Average Glucose Result: 10.1: Method: Immunoassay       Reference Range                4.0-5.6%       High risk (prediabetic)        5.7-6.4%       Diabetic, diagnostic             >=6.5%       ADA diabetic treatment goal       <7.0%  The Hemoglobin A1c testing is NGSP-certified.Reference ranges are based  upon the 2010 recommendations of  the American Diabetes Association.  Interpretation may vary for children  and adolescents. %   Estimated Average Glucose: 243: The Estimated Average Glucose (eAG) or Mean Plasma Glucose (MPG) value is  calculated from the hemoglobin A1c value and covers the same time period.   The American Diabetes Association (ADA) and other professional  organizations recommend reporting the eAG with the HgbA1c. mg/dL    CAPILLARY BLOOD GLUCOSE  CAPILLARY BLOOD GLUCOSE      POCT Blood Glucose.: 241 mg/dL (02 May 2023 11:39)  POCT Blood Glucose.: 178 mg/dL (02 May 2023 08:03)  POCT Blood Glucose.: 127 mg/dL (02 May 2023 00:16)  POCT Blood Glucose.: 104 mg/dL (01 May 2023 21:35)  POCT Blood Glucose.: 209 mg/dL (01 May 2023 16:38)          Radiology and Imaging reviewed.

## 2023-05-02 NOTE — DISCHARGE NOTE NURSING/CASE MANAGEMENT/SOCIAL WORK - NSDCFUADDAPPT_GEN_ALL_CORE_FT
Podiatry Discharge Instructions:  Follow up: Please follow up with Dr. Jose Zuniga within 1 week of discharge from the hospital, please call [phone] for appointment and discuss that you recently were seen in the hospital.  Wound Care: N/A  Weight bearing: Please remain non-weightbearing to the left foot and use the left heel in a CAM boot for transfers only.   Antibiotics: Please continue as instructed.

## 2023-05-02 NOTE — PROGRESS NOTE ADULT - PROVIDER SPECIALTY LIST ADULT
Urology
Endocrinology
Hospitalist
Hospitalist
Podiatry
Hospitalist
Urology
Endocrinology
Hospitalist
Endocrinology

## 2023-05-05 DIAGNOSIS — N17.9 ACUTE KIDNEY FAILURE, UNSPECIFIED: ICD-10-CM

## 2023-05-05 DIAGNOSIS — N13.1 HYDRONEPHROSIS WITH URETERAL STRICTURE, NOT ELSEWHERE CLASSIFIED: ICD-10-CM

## 2023-05-05 DIAGNOSIS — Z79.52 LONG TERM (CURRENT) USE OF SYSTEMIC STEROIDS: ICD-10-CM

## 2023-05-05 DIAGNOSIS — I12.9 HYPERTENSIVE CHRONIC KIDNEY DISEASE WITH STAGE 1 THROUGH STAGE 4 CHRONIC KIDNEY DISEASE, OR UNSPECIFIED CHRONIC KIDNEY DISEASE: ICD-10-CM

## 2023-05-05 DIAGNOSIS — R33.9 RETENTION OF URINE, UNSPECIFIED: ICD-10-CM

## 2023-05-05 DIAGNOSIS — E11.65 TYPE 2 DIABETES MELLITUS WITH HYPERGLYCEMIA: ICD-10-CM

## 2023-05-05 DIAGNOSIS — N39.490 OVERFLOW INCONTINENCE: ICD-10-CM

## 2023-05-05 DIAGNOSIS — S92.102A UNSPECIFIED FRACTURE OF LEFT TALUS, INITIAL ENCOUNTER FOR CLOSED FRACTURE: ICD-10-CM

## 2023-05-05 DIAGNOSIS — R26.2 DIFFICULTY IN WALKING, NOT ELSEWHERE CLASSIFIED: ICD-10-CM
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
## 2023-05-05 DIAGNOSIS — R82.81 PYURIA: ICD-10-CM
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
## 2023-05-05 DIAGNOSIS — E11.22 TYPE 2 DIABETES MELLITUS WITH DIABETIC CHRONIC KIDNEY DISEASE: ICD-10-CM

## 2023-05-05 DIAGNOSIS — M32.9 SYSTEMIC LUPUS ERYTHEMATOSUS, UNSPECIFIED: ICD-10-CM

## 2023-05-05 DIAGNOSIS — Z79.4 LONG TERM (CURRENT) USE OF INSULIN: ICD-10-CM

## 2023-05-05 DIAGNOSIS — N18.9 CHRONIC KIDNEY DISEASE, UNSPECIFIED: ICD-10-CM

## 2023-05-05 DIAGNOSIS — E78.5 HYPERLIPIDEMIA, UNSPECIFIED: ICD-10-CM

## 2023-05-05 DIAGNOSIS — E66.9 OBESITY, UNSPECIFIED: ICD-10-CM

## 2023-05-15 NOTE — ED PROVIDER NOTE - TEMPLATE, MLM
Abdominal Pain, N/V/D Information: Selecting Yes will display possible errors in your note based on the variables you have selected. This validation is only offered as a suggestion for you. PLEASE NOTE THAT THE VALIDATION TEXT WILL BE REMOVED WHEN YOU FINALIZE YOUR NOTE. IF YOU WANT TO FAX A PRELIMINARY NOTE YOU WILL NEED TO TOGGLE THIS TO 'NO' IF YOU DO NOT WANT IT IN YOUR FAXED NOTE.

## 2023-05-23 PROBLEM — Z00.00 ENCOUNTER FOR PREVENTIVE HEALTH EXAMINATION: Status: ACTIVE | Noted: 2023-05-23

## 2023-08-02 ENCOUNTER — EMERGENCY (EMERGENCY)
Facility: HOSPITAL | Age: 63
LOS: 0 days | Discharge: HOME | End: 2023-08-02
Attending: STUDENT IN AN ORGANIZED HEALTH CARE EDUCATION/TRAINING PROGRAM
Payer: MEDICAID

## 2023-08-02 VITALS
RESPIRATION RATE: 20 BRPM | TEMPERATURE: 99 F | SYSTOLIC BLOOD PRESSURE: 125 MMHG | HEART RATE: 89 BPM | DIASTOLIC BLOOD PRESSURE: 76 MMHG | OXYGEN SATURATION: 99 %

## 2023-08-02 VITALS
WEIGHT: 270.07 LBS | RESPIRATION RATE: 19 BRPM | SYSTOLIC BLOOD PRESSURE: 114 MMHG | DIASTOLIC BLOOD PRESSURE: 66 MMHG | HEART RATE: 105 BPM | HEIGHT: 71 IN | TEMPERATURE: 99 F | OXYGEN SATURATION: 96 %

## 2023-08-02 DIAGNOSIS — L91.8 OTHER HYPERTROPHIC DISORDERS OF THE SKIN: Chronic | ICD-10-CM

## 2023-08-02 DIAGNOSIS — Z98.891 HISTORY OF UTERINE SCAR FROM PREVIOUS SURGERY: Chronic | ICD-10-CM

## 2023-08-02 DIAGNOSIS — J18.1 LOBAR PNEUMONIA, UNSPECIFIED ORGANISM: ICD-10-CM

## 2023-08-02 DIAGNOSIS — I10 ESSENTIAL (PRIMARY) HYPERTENSION: ICD-10-CM

## 2023-08-02 DIAGNOSIS — R05.8 OTHER SPECIFIED COUGH: ICD-10-CM

## 2023-08-02 DIAGNOSIS — Z86.39 PERSONAL HISTORY OF OTHER ENDOCRINE, NUTRITIONAL AND METABOLIC DISEASE: ICD-10-CM

## 2023-08-02 DIAGNOSIS — Z86.79 PERSONAL HISTORY OF OTHER DISEASES OF THE CIRCULATORY SYSTEM: ICD-10-CM

## 2023-08-02 DIAGNOSIS — Z79.4 LONG TERM (CURRENT) USE OF INSULIN: ICD-10-CM

## 2023-08-02 DIAGNOSIS — R53.81 OTHER MALAISE: ICD-10-CM

## 2023-08-02 DIAGNOSIS — E11.9 TYPE 2 DIABETES MELLITUS WITHOUT COMPLICATIONS: ICD-10-CM

## 2023-08-02 DIAGNOSIS — Z20.822 CONTACT WITH AND (SUSPECTED) EXPOSURE TO COVID-19: ICD-10-CM

## 2023-08-02 DIAGNOSIS — M32.9 SYSTEMIC LUPUS ERYTHEMATOSUS, UNSPECIFIED: ICD-10-CM

## 2023-08-02 DIAGNOSIS — R53.1 WEAKNESS: ICD-10-CM

## 2023-08-02 DIAGNOSIS — Z98.891 HISTORY OF UTERINE SCAR FROM PREVIOUS SURGERY: ICD-10-CM

## 2023-08-02 DIAGNOSIS — E78.5 HYPERLIPIDEMIA, UNSPECIFIED: ICD-10-CM

## 2023-08-02 LAB
ALBUMIN SERPL ELPH-MCNC: 2.8 G/DL — LOW (ref 3.3–5)
ALP SERPL-CCNC: 68 U/L — SIGNIFICANT CHANGE UP (ref 40–120)
ALT FLD-CCNC: 12 U/L — SIGNIFICANT CHANGE UP (ref 12–78)
ANION GAP SERPL CALC-SCNC: 8 MMOL/L — SIGNIFICANT CHANGE UP (ref 5–17)
APPEARANCE UR: ABNORMAL
APTT BLD: 27.4 SEC — SIGNIFICANT CHANGE UP (ref 24.5–35.6)
AST SERPL-CCNC: 18 U/L — SIGNIFICANT CHANGE UP (ref 15–37)
BACTERIA # UR AUTO: ABNORMAL
BASOPHILS # BLD AUTO: 0.02 K/UL — SIGNIFICANT CHANGE UP (ref 0–0.2)
BASOPHILS NFR BLD AUTO: 0.3 % — SIGNIFICANT CHANGE UP (ref 0–2)
BILIRUB SERPL-MCNC: 0.4 MG/DL — SIGNIFICANT CHANGE UP (ref 0.2–1.2)
BILIRUB UR-MCNC: NEGATIVE — SIGNIFICANT CHANGE UP
BUN SERPL-MCNC: 7 MG/DL — SIGNIFICANT CHANGE UP (ref 7–23)
CALCIUM SERPL-MCNC: 8.4 MG/DL — LOW (ref 8.5–10.1)
CHLORIDE SERPL-SCNC: 106 MMOL/L — SIGNIFICANT CHANGE UP (ref 96–108)
CO2 SERPL-SCNC: 27 MMOL/L — SIGNIFICANT CHANGE UP (ref 22–31)
COLOR SPEC: YELLOW — SIGNIFICANT CHANGE UP
CREAT SERPL-MCNC: 1.23 MG/DL — SIGNIFICANT CHANGE UP (ref 0.5–1.3)
DIFF PNL FLD: ABNORMAL
EGFR: 50 ML/MIN/1.73M2 — LOW
EOSINOPHIL # BLD AUTO: 0.12 K/UL — SIGNIFICANT CHANGE UP (ref 0–0.5)
EOSINOPHIL NFR BLD AUTO: 1.7 % — SIGNIFICANT CHANGE UP (ref 0–6)
EPI CELLS # UR: ABNORMAL
GLUCOSE SERPL-MCNC: 146 MG/DL — HIGH (ref 70–99)
GLUCOSE UR QL: NEGATIVE MG/DL — SIGNIFICANT CHANGE UP
HCT VFR BLD CALC: 32.9 % — LOW (ref 34.5–45)
HGB BLD-MCNC: 10.5 G/DL — LOW (ref 11.5–15.5)
IMM GRANULOCYTES NFR BLD AUTO: 0.3 % — SIGNIFICANT CHANGE UP (ref 0–0.9)
INR BLD: 0.98 RATIO — SIGNIFICANT CHANGE UP (ref 0.85–1.18)
KETONES UR-MCNC: NEGATIVE — SIGNIFICANT CHANGE UP
LACTATE SERPL-SCNC: 1 MMOL/L — SIGNIFICANT CHANGE UP (ref 0.7–2)
LACTATE SERPL-SCNC: 2.7 MMOL/L — HIGH (ref 0.7–2)
LEUKOCYTE ESTERASE UR-ACNC: ABNORMAL
LYMPHOCYTES # BLD AUTO: 1.45 K/UL — SIGNIFICANT CHANGE UP (ref 1–3.3)
LYMPHOCYTES # BLD AUTO: 21 % — SIGNIFICANT CHANGE UP (ref 13–44)
MCHC RBC-ENTMCNC: 26.3 PG — LOW (ref 27–34)
MCHC RBC-ENTMCNC: 31.9 G/DL — LOW (ref 32–36)
MCV RBC AUTO: 82.5 FL — SIGNIFICANT CHANGE UP (ref 80–100)
MONOCYTES # BLD AUTO: 0.3 K/UL — SIGNIFICANT CHANGE UP (ref 0–0.9)
MONOCYTES NFR BLD AUTO: 4.3 % — SIGNIFICANT CHANGE UP (ref 2–14)
NEUTROPHILS # BLD AUTO: 4.99 K/UL — SIGNIFICANT CHANGE UP (ref 1.8–7.4)
NEUTROPHILS NFR BLD AUTO: 72.4 % — SIGNIFICANT CHANGE UP (ref 43–77)
NITRITE UR-MCNC: NEGATIVE — SIGNIFICANT CHANGE UP
NRBC # BLD: 0 /100 WBCS — SIGNIFICANT CHANGE UP (ref 0–0)
PH UR: 6 — SIGNIFICANT CHANGE UP (ref 5–8)
PLATELET # BLD AUTO: 166 K/UL — SIGNIFICANT CHANGE UP (ref 150–400)
POTASSIUM SERPL-MCNC: 3.7 MMOL/L — SIGNIFICANT CHANGE UP (ref 3.5–5.3)
POTASSIUM SERPL-SCNC: 3.7 MMOL/L — SIGNIFICANT CHANGE UP (ref 3.5–5.3)
PROT SERPL-MCNC: 7.2 GM/DL — SIGNIFICANT CHANGE UP (ref 6–8.3)
PROT UR-MCNC: 30 MG/DL
PROTHROM AB SERPL-ACNC: 11.7 SEC — SIGNIFICANT CHANGE UP (ref 9.5–13)
RAPID RVP RESULT: SIGNIFICANT CHANGE UP
RBC # BLD: 3.99 M/UL — SIGNIFICANT CHANGE UP (ref 3.8–5.2)
RBC # FLD: 13.2 % — SIGNIFICANT CHANGE UP (ref 10.3–14.5)
RBC CASTS # UR COMP ASSIST: ABNORMAL /HPF (ref 0–4)
SARS-COV-2 RNA SPEC QL NAA+PROBE: SIGNIFICANT CHANGE UP
SODIUM SERPL-SCNC: 141 MMOL/L — SIGNIFICANT CHANGE UP (ref 135–145)
SP GR SPEC: 1.01 — SIGNIFICANT CHANGE UP (ref 1.01–1.02)
TROPONIN I, HIGH SENSITIVITY RESULT: 6.4 NG/L — SIGNIFICANT CHANGE UP
UROBILINOGEN FLD QL: NEGATIVE MG/DL — SIGNIFICANT CHANGE UP
WBC # BLD: 6.9 K/UL — SIGNIFICANT CHANGE UP (ref 3.8–10.5)
WBC # FLD AUTO: 6.9 K/UL — SIGNIFICANT CHANGE UP (ref 3.8–10.5)
WBC UR QL: ABNORMAL

## 2023-08-02 PROCEDURE — 93010 ELECTROCARDIOGRAM REPORT: CPT

## 2023-08-02 PROCEDURE — 71045 X-RAY EXAM CHEST 1 VIEW: CPT | Mod: 26

## 2023-08-02 PROCEDURE — 99284 EMERGENCY DEPT VISIT MOD MDM: CPT

## 2023-08-02 RX ORDER — CEFTRIAXONE 500 MG/1
1000 INJECTION, POWDER, FOR SOLUTION INTRAMUSCULAR; INTRAVENOUS ONCE
Refills: 0 | Status: COMPLETED | OUTPATIENT
Start: 2023-08-02 | End: 2023-08-02

## 2023-08-02 RX ORDER — SODIUM CHLORIDE 9 MG/ML
1000 INJECTION INTRAMUSCULAR; INTRAVENOUS; SUBCUTANEOUS ONCE
Refills: 0 | Status: COMPLETED | OUTPATIENT
Start: 2023-08-02 | End: 2023-08-02

## 2023-08-02 RX ORDER — AZITHROMYCIN 500 MG/1
500 TABLET, FILM COATED ORAL ONCE
Refills: 0 | Status: COMPLETED | OUTPATIENT
Start: 2023-08-02 | End: 2023-08-02

## 2023-08-02 RX ORDER — ACETAMINOPHEN 500 MG
975 TABLET ORAL ONCE
Refills: 0 | Status: COMPLETED | OUTPATIENT
Start: 2023-08-02 | End: 2023-08-02

## 2023-08-02 RX ADMIN — AZITHROMYCIN 255 MILLIGRAM(S): 500 TABLET, FILM COATED ORAL at 20:17

## 2023-08-02 RX ADMIN — SODIUM CHLORIDE 1000 MILLILITER(S): 9 INJECTION INTRAMUSCULAR; INTRAVENOUS; SUBCUTANEOUS at 16:55

## 2023-08-02 RX ADMIN — Medication 975 MILLIGRAM(S): at 16:55

## 2023-08-02 RX ADMIN — CEFTRIAXONE 100 MILLIGRAM(S): 500 INJECTION, POWDER, FOR SOLUTION INTRAMUSCULAR; INTRAVENOUS at 19:45

## 2023-08-02 RX ADMIN — Medication 975 MILLIGRAM(S): at 19:35

## 2023-08-02 RX ADMIN — CEFTRIAXONE 1000 MILLIGRAM(S): 500 INJECTION, POWDER, FOR SOLUTION INTRAMUSCULAR; INTRAVENOUS at 20:00

## 2023-08-02 RX ADMIN — SODIUM CHLORIDE 1000 MILLILITER(S): 9 INJECTION INTRAMUSCULAR; INTRAVENOUS; SUBCUTANEOUS at 19:02

## 2023-08-02 RX ADMIN — SODIUM CHLORIDE 1000 MILLILITER(S): 9 INJECTION INTRAMUSCULAR; INTRAVENOUS; SUBCUTANEOUS at 19:35

## 2023-08-02 RX ADMIN — SODIUM CHLORIDE 1000 MILLILITER(S): 9 INJECTION INTRAMUSCULAR; INTRAVENOUS; SUBCUTANEOUS at 18:19

## 2023-08-02 NOTE — ED PROVIDER NOTE - ATTENDING CONTRIBUTION TO CARE
Dichter: Pt seen w/ ACP fellow, 63M PMH HTN, HLD, DM, lupus pw cough x 3wks, worsening weakness / malaise x days. Afebrile, VSS other than + mild tachycardia. Well appearing, in NAD. Agree w/ planned w/u and dispo.

## 2023-08-02 NOTE — ED PROVIDER NOTE - OBJECTIVE STATEMENT
63F with PMH Lupus, DM2, HTN, HLD who presents to Ed with cough x 3 weeks associated with chills and generalized weakness. Denies chills, chest pain, shortness of breath, abdominal pain, N/V/D, dysuria, urinary frequency/urgency, extremity weakness/numbness/tingling, lightheadedness, dizziness, or headaches. 63F with PMH Lupus, DM2, HTN, HLD who presents to ED with dry cough x 3 weeks associated with chills and generalized weakness/malaise over the past few days. Pt states that cough has been persistent over the past 3 weeks, non-productive in nature, not associated with chest pain or shortness of breath. + Daughter with URI symptoms including cough, non-smoker, no recent travel, no recent illness. No personal or family history of Asthma/COPD. Denies fever, chest pain, shortness of breath, abdominal pain, N/V/D, dysuria, urinary frequency/urgency, extremity weakness/numbness/tingling, lightheadedness, dizziness, or headaches.

## 2023-08-02 NOTE — ED ADULT NURSE NOTE - OBJECTIVE STATEMENT
patient alert and verbally  responsive c/o of cough for 1 month and swelling all over  the body , denied chest pain no difficulty breathing at this time , c/o of general weakness, c/o of general body pain. Has hx of DM and HTN.

## 2023-08-02 NOTE — ED ADULT TRIAGE NOTE - CHIEF COMPLAINT QUOTE
patient c/o of cough for 1 month and swelling all over  the body , denied chest pain no difficulty breathing at this time , c/o of general weakness, c/o of general body pain also

## 2023-08-02 NOTE — ED PROVIDER NOTE - PROGRESS NOTE DETAILS
Dichter: W/u significant for: CXR w/ + LLL PNA, WBC WNL, lactate 2.8 > 1.0 s/p IVF. Pt received 1st dose IV abx in ED. On re-eval, resting comfortably, in NAD. Stable for d/c home. Given script for Augmentin & Doxy. Pt has PCP appt scheduled for 8/10. Return signs / symptoms d/w pt at length. She understands / agrees w/ this plan.

## 2023-08-02 NOTE — ED PROVIDER NOTE - PATIENT PORTAL LINK FT
You can access the FollowMyHealth Patient Portal offered by [facility] by registering at the following website: [email or web address]. By joining [employer]’s FollowMyHealth portal, you will also be able to view your health information using other applications (apps) compatible with our system.

## 2023-08-02 NOTE — ED PROVIDER NOTE - NSICDXPASTMEDICALHX_GEN_ALL_CORE_FT
PAST MEDICAL HISTORY:  Diabetes mellitus     HTN (hypertension)     Hyperlipidemia     Hypertension     Lupus     Obesity     Pericarditis

## 2023-08-02 NOTE — ED PROVIDER NOTE - CLINICAL SUMMARY MEDICAL DECISION MAKING FREE TEXT BOX
63F with PMH Lupus, DM2, HTN, HLD who presents to Ed with cough x 3 weeks associated with chills and generalized weakness. Patient currently afebrile, hemodynamically stable, spO2 96%. Based on history and physical, differentials include but are not limited to viral illness/COVID/Flu, electrolyte abnormality, anemia, pneumonia, UTI, ACS. Plan to assess patient for acute pathology as listed above with Labs (w/ trop), EKG, CXR, UA. Will administer IV fluids, medications for symptomatic relief, follow-up on results, and reassess. 63F with PMH Lupus, DM2, HTN, HLD who presents to ED with dry cough x 3 weeks associated with chills and generalized weakness/malaise over the past few days. Pt states that cough has been persistent over the past 3 weeks, non-productive in nature, not associated with chest pain or shortness of breath. + Daughter with URI symptoms including cough, non-smoker, no recent travel, no recent illness. No personal or family history of Asthma/COPD. Patient currently afebrile, hemodynamically stable, spO2 96%. Based on history and physical, differentials include but are not limited to viral illness/COVID/Flu, electrolyte abnormality, anemia, pneumonia, UTI, ACS. Plan to assess patient for acute pathology as listed above with Labs (w/ trop), EKG, CXR, UA. Will administer IV fluids, medications for symptomatic relief, follow-up on results, and reassess.

## 2023-08-02 NOTE — ED ADULT TRIAGE NOTE - IDEAL BODY WEIGHT(KG)
Panchito Pandey is a 60 y o  female who is currently ordered Vancomycin IV with management by the Pharmacy Consult service  Relevant clinical data and objective / subjective history reviewed  Vancomycin Assessment:  Indication and Goal AUC/Trough: Urinary tract infection (goal -600, trough >10), -600, trough >10  Clinical Status: New  Micro: pending  Renal Function:  SCr:  0 97 mg/dL  CrCl: 61 4 mL/min  Days of Therapy: 1  Current Dose: 2000 mg IV once  Vancomycin Plan:  New Dosin mg IV prn Trough < 15  Next Level: 0600 on 23   Renal Function Monitoring: Daily BMP and UOP  Pharmacy will continue to follow closely for s/sx of nephrotoxicity, infusion reactions and appropriateness of therapy  BMP and CBC will be ordered per protocol  We will continue to follow the patient’s culture results and clinical progress daily      Mary Melchor, Pharmacist [phone]

## 2023-08-02 NOTE — ED ADULT NURSE NOTE - NSFALLUNIVINTERV_ED_ALL_ED
Bed/Stretcher in lowest position, wheels locked, appropriate side rails in place/Call bell, personal items and telephone in reach/Instruct patient to call for assistance before getting out of bed/chair/stretcher/Non-slip footwear applied when patient is off stretcher/[place] to call system/Physically safe environment - no spills, clutter or unnecessary equipment/Purposeful proactive rounding/Room/bathroom lighting operational, light cord in reach

## 2023-08-05 LAB
CULTURE RESULTS: SIGNIFICANT CHANGE UP
SPECIMEN SOURCE: SIGNIFICANT CHANGE UP

## 2023-10-17 NOTE — PRE-OP CHECKLIST - DNR CLARIFICATION FORM COMPLETED
October 17, 2023      [facility] Otolaryngology  [address] [place] [address]  Phone: [phone]  Fax: [phone]       Patient: Sanam Major   YOB: 2017  Date of Visit: 10/17/2023    To Whom It May Concern:    Joaquim Major  was at [facility] on 10/17/2023. The patient may return to work/school on 10/18/2023 with no restrictions. If you have any questions or concerns, or if I can be of further assistance, please do not hesitate to contact me.    Sincerely,    Kendra Stevenson MA     
n/a
n/a

## 2023-11-24 NOTE — OCCUPATIONAL THERAPY INITIAL EVALUATION ADULT - PHYSICAL ASSIST/NONPHYSICAL ASSIST, REHAB EVAL
[facility]   PICU H&P NOTE      History Of Present Illness  Jasen is a 5 year old male with autism presenting with accidental glipizide ingestion.     Per mother, around 1pm she found patient and two siblings playing with grandmother's pill bottle of glipizide, which was under the bed. Mother found patient with two 5mg tablets of grandmother's glipizide in his mouth, which she took out. Pill bottle was empty. It is unknown how many pills were left in bottle, therefore unknown if patient ingested pills prior to mother finding him. No other empty medication bottles found near patient. Mother then called PCP and recommended she come to ED. Denies vomiting, diarrhea, altered mental status, recent sick contacts.     ED: Blood sugar 48, CBC benign, given activated charcoal and 5ml/kg D10 administered, started on D5NS@MIVF, octreotide 20mcg x1    PICU: Arrived to PICU alert, irritable, hemodynamically stable on room air.     Pediatric History  Birth History   • Birth     Length: 19.02\" (48.3 cm)     Weight: 2.97 kg (6 lb 8.8 oz)     HC 34.5 cm (13.58\")   • Apgar     One: 9     Five: 9   • Discharge Weight: 2.7 kg (5 lb 15.2 oz)   • Delivery Method: Vaginal, Spontaneous   • Gestation Age: 39 5/7 wks   • Hospital Name: [facility]     39+ 5wga. , no complications. Negative serologies. GBS negative        Past Medical History  Past Medical History:   Diagnosis Date   • Autism    • Jaundice      Surgical History  Past Surgical History:   Procedure Laterality Date   • Circumcision, primary  2020     Social History  Lives at home with mother, father, and 2 sisters. At grandmothers house during incident.  Attends [employer] ([employer] for [employer] children)  UTD on vaccinations     Allergies  ALLERGIES:  Patient has no known allergies.    Medications  Medications Prior to Admission   Medication Sig Dispense Refill   • Saccharomyces boulardii (FLORASTOR KIDS PO) Take 1 capsule by mouth daily.     • NON FORMULARY  Eusebio Brand: Kids Chillax  Chew and swallow 1 gummy daily     • COD LIVER OIL PO Take 2.5 mLs by mouth daily. TAKE WITH GLUTATHIONE     • Glutathione 200 MG/ML Solution Take 1 Pump by mouth daily.     • Pediatric Multiple Vit-C-FA (KIDS VITAMINS PO) Chew and swallow 1 gummy daily     • VITAMIN D, CHOLECALCIFEROL, PO Take 1 ml by mouth once daily         No current facility-administered medications for this encounter.       ROS  Review of Systems   All other systems reviewed and are negative.       Physical Exam  Physical Exam  Vitals reviewed.   Constitutional:       General: He is active.   HENT:      Head: Normocephalic.      Comments: Dried charcoal on face     Mouth/Throat:      Mouth: Mucous membranes are moist.   Eyes:      Pupils: Pupils are equal, round, and reactive to light.   Cardiovascular:      Rate and Rhythm: Normal rate and regular rhythm.      Pulses: Normal pulses.      Heart sounds: Normal heart sounds.   Pulmonary:      Effort: Pulmonary effort is normal.      Breath sounds: Normal breath sounds.   Abdominal:      General: Bowel sounds are normal.      Palpations: Abdomen is soft.   Skin:     General: Skin is warm and dry.      Capillary Refill: Capillary refill takes less than 2 seconds.   Neurological:      General: No focal deficit present.      Mental Status: He is alert and oriented for age.      Comments: Autistic, at neurologic baseline.           Last Recorded Vitals    VITAL SIGNS:     Vital Last Value 24 Hour Range   Temperature 97.7 °F (36.5 °C) (11/24/23 1340) Temp  Min: 97.7 °F (36.5 °C)  Max: 97.7 °F (36.5 °C)   Pulse 127 (11/24/23 1531) Pulse  Min: 127  Max: 150   Respiratory 28 (11/24/23 1531) Resp  Min: 28  Max: 32   Non-Invasive  Blood Pressure 94/70 (11/24/23 1531) BP  Min: 94/70  Max: 118/74   Pulse Oximetry 97 % (11/24/23 1531) SpO2  Min: 97 %  Max: 100 %     Vital Today Admitted   Weight 19.6 kg (43 lb 3.4 oz) (11/24/23 1340) Weight: 19.6 kg (43 lb 3.4 oz) (11/24/23 1340)    Height N/A     Body Mass Index N/A       INTAKE/OUTPUT:      Intake/Output Summary (Last 24 hours) at 11/24/2023 1701  Last data filed at 11/24/2023 1423  Gross per 24 hour   Intake 100 ml   Output --   Net 100 ml         LABORATORY DATA:    Recent Results (from the past 24 hour(s))   GLUCOSE, BEDSIDE - POINT OF CARE    Collection Time: 11/24/23  1:35 PM   Result Value Ref Range    GLUCOSE, BEDSIDE - POINT OF CARE 48 (LL) 70 - 99 mg/dL   Comprehensive Metabolic Panel    Collection Time: 11/24/23  1:51 PM   Result Value Ref Range    Fasting Status      Sodium 141 135 - 145 mmol/L    Potassium 3.2 (L) 3.4 - 5.1 mmol/L    Chloride 109 97 - 110 mmol/L    Carbon Dioxide 26 21 - 32 mmol/L    Anion Gap 9 7 - 19 mmol/L    Glucose 53 (LL) 70 - 99 mg/dL    BUN 11 5 - 18 mg/dL    Creatinine 0.37 0.21 - 0.70 mg/dL    Glomerular Filtration Rate      BUN/Cr 30 (H) 7 - 25    Calcium 8.9 8.0 - 11.0 mg/dL    Bilirubin, Total 0.4 0.2 - 1.4 mg/dL    GOT/AST 30 10 - 55 Units/L    GPT/ALT 22 6 - 45 Units/L    Alkaline Phosphatase 216 125 - 272 Units/L    Albumin 4.0 3.5 - 4.8 g/dL    Protein, Total 7.5 6.0 - 8.0 g/dL    Globulin 3.5 2.0 - 4.0 g/dL    A/G Ratio 1.1 1.0 - 2.4   CBC with Automated Differential (performable only)    Collection Time: 11/24/23  1:51 PM   Result Value Ref Range    WBC 12.3 6.0 - 17.0 K/mcL    RBC 4.87 3.90 - 5.30 mil/mcL    HGB 13.0 11.5 - 13.5 g/dL    HCT 38.5 34.0 - 40.0 %    MCV 79.1 70.0 - 86.0 fl    MCH 26.7 24.0 - 30.0 pg    MCHC 33.8 30.0 - 36.0 g/dL    RDW-CV 12.2 11.0 - 15.0 %    RDW-SD 35.2 35.0 - 47.0 fL     140 - 450 K/mcL    NRBC 0 <=0 /100 WBC    Neutrophil, Percent 49 %    Lymphocytes, Percent 39 %    Mono, Percent 8 %    Eosinophils, Percent 4 %    Basophils, Percent 0 %    Immature Granulocytes 0 %    Absolute Neutrophils 6.0 1.5 - 8.5 K/mcL    Absolute Lymphocytes 4.7 3.0 - 9.5 K/mcL    Absolute Monocytes 1.0 (H) 0.0 - 0.8 K/mcL    Absolute Eosinophils  0.5 0.0 - 0.7 K/mcL     Absolute Basophils 0.0 0.0 - 0.2 K/mcL    Absolute Immature Granulocytes 0.0 0.0 - 0.2 K/mcL   Magnesium    Collection Time: 11/24/23  1:51 PM   Result Value Ref Range    Magnesium 2.2 1.6 - 2.5 mg/dL   GLUCOSE, BEDSIDE - POINT OF CARE    Collection Time: 11/24/23  2:22 PM   Result Value Ref Range    GLUCOSE, BEDSIDE - POINT OF CARE 162 (H) 70 - 99 mg/dL   GLUCOSE, BEDSIDE - POINT OF CARE    Collection Time: 11/24/23  3:25 PM   Result Value Ref Range    GLUCOSE, BEDSIDE - POINT OF CARE 48 (LL) 70 - 99 mg/dL   GLUCOSE, BEDSIDE - POINT OF CARE    Collection Time: 11/24/23  3:28 PM   Result Value Ref Range    GLUCOSE, BEDSIDE - POINT OF CARE 62 (L) 70 - 99 mg/dL   GLUCOSE, BEDSIDE - POINT OF CARE    Collection Time: 11/24/23  4:20 PM   Result Value Ref Range    GLUCOSE, BEDSIDE - POINT OF CARE 76 70 - 99 mg/dL       IMAGING STUDIES:    No images.     Assessment:  Jasen is a 5 year old male admitted to PICU for accidental glipizide ingestion. He requires PICU for close blood sugar monitoring.     Plan:  NEURO:   - tylenol prn   - CAPD q12h    RESP:   - Room air    CV:    - Continuous cardiopulmonary monitoring    GI/ Nutrition:   - Pediatric diet  - Strict I/Os    HEME:   - no acute issues    ID:   - afebrile    Tox:  - Poison control following: Poison Control reference #[id number]  - BS Q1h for now, will space as able  - s/p activated charcoal   - s/p D10 5ml/kg bolus    VTE: 1 (PICU)  Lines: PIV (Functional, Utilized, Necessary)  PICU UP Level: 3 positioning per protocol, up ad renay  Disp: PICU    Discussed with Dr. Hussein, mother, and bedside team all in agreement with plan.     Fallon JONES   Pediatric Critical Care  11/24/23 5:01 PM     set-up required/supervision/verbal cues/nonverbal cues (demo/gestures)

## 2023-12-15 NOTE — H&P ADULT - NSHPPHYSICALEXAM_GEN_ALL_CORE
"  Patient Information  Name: Urban Denton  : 1980  MRN: [id number]     Referring Physician:  No ref. provider found   Primary Care Physician:  Aisha Hernandez MD   Date of Visit: 12/15/2023      Subjective:     History of Present lllness:    Urban Denton is a 43 y.o. male who presents with a chief complaint of moles.  Patient is here today for a "mole" check.   Today, patient has no additional complaints. Denies any new, changing, or symptomatic lesions on the skin.    Patient was last seen: 2021.  Prior notes by myself reviewed.   Clinical documentation obtained by nursing staff reviewed.    Review of Systems    Objective:   Physical Exam   Constitutional: He appears well-developed and well-nourished. No distress.   Neurological: He is alert and oriented to person, place, and time. He is not disoriented.   Psychiatric: He has a normal mood and affect.   Skin:   Areas Examined (abnormalities noted in diagram):   Scalp / Hair Palpated and Inspected  Head / Face Inspection Performed  Neck Inspection Performed  Chest / Axilla Inspection Performed  Abdomen Inspection Performed  Genitals / Buttocks / Groin Inspection Performed  Back Inspection Performed  RUE Inspected  LUE Inspection Performed  RLE Inspected  LLE Inspection Performed  Nails and Digits Inspection Performed                     Diagram Legend     Erythematous scaling macule/papule c/w actinic keratosis       Vascular papule c/w angioma      Pigmented verrucoid papule/plaque c/w seborrheic keratosis      Yellow umbilicated papule c/w sebaceous hyperplasia      Irregularly shaped tan macule c/w lentigo     1-2 mm smooth white papules consistent with Milia      Movable subcutaneous cyst with punctum c/w epidermal inclusion cyst      Subcutaneous movable cyst c/w pilar cyst      Firm pink to brown papule c/w dermatofibroma      Pedunculated fleshy papule(s) c/w skin tag(s)      Evenly pigmented macule c/w junctional nevus     Mildly " variegated pigmented, slightly irregular-bordered macule c/w mildly atypical nevus      Flesh colored to evenly pigmented papule c/w intradermal nevus       Pink pearly papule/plaque c/w basal cell carcinoma      Erythematous hyperkeratotic cursted plaque c/w SCC      Surgical scar with no sign of skin cancer recurrence      Open and closed comedones      Inflammatory papules and pustules      Verrucoid papule consistent consistent with wart     Erythematous eczematous patches and plaques     Dystrophic onycholytic nail with subungual debris c/w onychomycosis     Umbilicated papule    Erythematous-base heme-crusted tan verrucoid plaque consistent with inflamed seborrheic keratosis     Erythematous Silvery Scaling Plaque c/w Psoriasis     See annotation    No images are attached to the encounter or orders placed in the encounter.      [] Data reviewed  [] Prior external notes reviewed  [] Independent review of test  [] Management discussed with another provider  [] Independent historian    Assessment / Plan:        Multiple benign nevi  Multiple benign-appearing nevi present on exam today. Reassurance provided. Counseled patient to periodically examine moles and return to clinic if any changes in size, shape, or color are noted or if it becomes symptomatic (bleeding, itching, pain, etc).  Recommend using a broad-spectrum, water-resistant sunscreen with SPF of 30 or higher--reapply every 2 hours. Seek shade, wear sun-protective clothing, and perform regular skin self-exams.    Lentigines  These are benign sun spots which should be monitored for changes. Daily sun protection will reduce the number of new lesions.   Recommend using a broad-spectrum, water-resistant sunscreen with SPF of 30 or higher--reapply every 2 hours. Seek shade, wear sun-protective clothing, and perform regular skin self-exams.    Dermatofibroma  These growths are benign bundles of scar tissue that can arise spontaneously or from minor trauma,  such as a bug bite or a shaving nick. They are commonly found on the lower legs, arms above the elbows, and trunk.   Removal is not recommended as the lesion is just replaced with an additional scar; however, if it is symptomatic, removal can be considered. Lesions can also recur following removal.    Screening exam for skin cancer  Total body skin examination performed today as noted in physical exam. No lesions suspicious for malignancy were seen.  Recommend using a broad-spectrum, water-resistant sunscreen with SPF of 30 or higher--reapply every 2 hours. Seek shade, wear sun-protective clothing, and perform regular skin self-exams.             Follow up for any new problems or changing lesions.      Joann Harrison MD, FAAD  [facility]         GENERAL: NAD  HEAD:  Atraumatic, Normocephalic  EYES: EOMI, PERRLA, conjunctiva and sclera clear  NECK: Supple  CHEST/LUNG: Clear to auscultation bilaterally; No wheeze  HEART: Regular rate and rhythm; No murmurs, rubs, or gallops  ABDOMEN: Soft, RUQ, epigastric and LUQ tenderness Nondistended; Bowel sounds present. no guarding no rebound  EXTREMITIES: no edema  NEUROLOGY: non-focal  SKIN: No rashes or lesions

## 2024-01-05 NOTE — PRE-OP CHECKLIST - HEIGHT IN CM
[facility] - Emergency Dept  [facility] Medicine  History & Physical    Patient Name: Marely Hamilton  MRN: [id number]  Patient Class: OP- Observation  Admission Date: 1/4/2024  Attending Physician: Kaylee Chavez MD   Primary Care Provider: Viktor Ross MD    Patient information was obtained from patient, past medical records, and ER records.     Subjective:     Principal Problem:Acute liver failure without hepatic coma    Chief Complaint:   Chief Complaint   Patient presents with    Ascites     From [facility] for abd distention, BLE swelling, and jaudice. Hx of cirrhosis.       HPI: Patient is a 59-year-old woman with past medical history significant for alcoholic cirrhosis, bipolar disorder, chronic anemia, thrombocytopenia, cholelithiasis, DVT with IVC filter in place, prior GI bleeds, ESBL UTI history, seizure disorder presenting from [facility] for evaluation of abdominal distension, peripheral edema, and jaundice.     Patient denies fever, chills, or abdominal pain. Patient was recently admitted in early December for hepatic encephalopathy.   Patient denies any pain at this time. She reports she has been urinating frequently though difficult to obtain history.   She denies recent illness/fever/chills/nausea/vomiting/diarrhea/constipation.  Patient has not been taking lactulose. Reports compliance with psychiatric medications and A&Ox3 but reports she can't take the lactulose due to not tolerating the sun.    Hospital Course:   In the ED, vitals stable but hypoxic on room air to the 80s, which improved with supplemental oxygen.  Presentation consistent with decompensated liver failure, meld score 22.    She was sent in by her nursing home facility for mild abdominal distention as well as significant jaundice.    Chest x-ray revealed a moderate-to-large pleural effusion with compressive atelectasis. Likely from her ascites. Her abdomen is soft, nontender.      Intake labs remarkable for Na 142,  BUN 6, Cr 0.4, Calcium 7.8, , Tbili 12.1, , ALT 41, Ammonia 114, lactic 1.0.     Will admit for decompensated cirrhosis. Continue on lactulose for treatment of HE, rocephin for SBP ppx. Hepatology and IR consulted for thoracentesis and further recommendations.   Patient is oriented to person, place, time, and situation but with some delusions.     Past Medical History:   Diagnosis Date    Addiction to drug     Alcohol abuse     Alcohol abuse, in remission 6/15/2015    14.5 weeks ago; AA weekly    Anemia     Anxiety 6/15/2015    Behavioral problem     Bipolar disorder     Bipolar disorder in remission 6/15/2015    Cirrhosis, Laennec's 6/15/2015    Depression     Encounter for blood transfusion     Epistaxis 6/15/2015    Fatigue     Glaucoma     Hematuria     Hepatic encephalopathy 6/15/2015    Hepatic enlargement     History of psychiatric care     History of psychiatric hospitalization     History of seizure 6/15/2015    1    hx of intentional Tylenol overdose 6/15/2015    2005- situational and hx of bipolar    Hx of psychiatric care     Macrocytic anemia 9/18/2015    6 units PRBC since June 2015    Jeana     Osteoarthritis     Other ascites 6/15/2015    Psychiatric exam requested by authority     Psychiatric problem     Psychosis 9/26/2019    Renal disorder     Seizures     Self-harming behavior     Suicide attempt     Therapy     Thrombocytopenia 6/15/2015     Past Surgical History:   Procedure Laterality Date    COSMETIC SURGERY      EMBOLIZATION N/A 6/11/2023    Procedure: EMBOLIZATION, BLOOD VESSEL;  Surgeon: Debbie Surgeon;  Location: [facility];  Service: Anesthesiology;  Laterality: N/A;    ESOPHAGOGASTRODUODENOSCOPY      ESOPHAGOGASTRODUODENOSCOPY N/A 7/6/2023    Procedure: EGD (ESOPHAGOGASTRODUODENOSCOPY);  Surgeon: Bernice Guillen MD;  Location: [address]);  Service: Endoscopy;  Laterality: N/A;    ESOPHAGOGASTRODUODENOSCOPY N/A 7/11/2023    Procedure: EGD  (ESOPHAGOGASTRODUODENOSCOPY);  Surgeon: Rangel Broussard MD;  Location: [facility] ([address] [place] [address]);  Service: Endoscopy;  Laterality: N/A;     Review of patient's allergies indicates:   Allergen Reactions    Sulfa (sulfonamide antibiotics) Rash    Codeine Nausea And Vomiting     No current facility-administered medications on file prior to encounter.     Current Outpatient Medications on File Prior to Encounter   Medication Sig    ALPRAZolam (XANAX) 0.25 MG tablet Take 1 tablet (0.25 mg total) by mouth 2 (two) times daily as needed for Anxiety.    brimonidine-timoloL (COMBIGAN) 0.2-0.5 % Drop Place 1 drop into both eyes 2 (two) times a day.    ferrous sulfate (FEOSOL) 325 mg (65 mg iron) Tab tablet Take 325 mg by mouth once daily.    lactulose (CHRONULAC) 10 gram/15 mL solution Take 45 mLs (30 g total) by mouth 3 (three) times daily.    levetiracetam 500 mg/5 mL (5 mL) Soln Take 10 mLs (1,000 mg total) by mouth 2 (two) times daily.    lithium (LITHOBID) 300 MG CR tablet Take 1 tablet (300 mg total) by mouth every evening.    miconazole nitrate 2% (MICOTIN) 2 % Oint Apply topically 2 (two) times daily.    OLANZapine (ZYPREXA) 5 MG tablet Take 1 tablet (5 mg total) by mouth every evening.    pantoprazole (PROTONIX) 40 mg suspension Take 1 packet (40 mg total) by mouth 2 (two) times daily.    rifAXIMin (XIFAXAN) 550 mg Tab Take 1 tablet (550 mg total) by mouth 2 (two) times daily.    thiamine 100 MG tablet Take 1 tablet (100 mg total) by mouth once daily.    vitamin D (VITAMIN D3) 50 mcg (2,000 unit) Tab Take 1 tablet (2,000 Units total) by mouth once daily.     Tobacco Use    Smoking status: Never    Smokeless tobacco: Never   Substance and Sexual Activity    Alcohol use: Not Currently     Comment: hx of ETOH abuse with cirrhosis of liver    Drug use: No    Sexual activity: Not Currently     Review of Systems   Constitutional:  Negative for activity change, chills and fever.   HENT:  Negative for congestion.    Eyes:   180.34 Negative for visual disturbance.   Respiratory:  Positive for shortness of breath. Negative for cough and chest tightness.    Cardiovascular:  Negative for chest pain, palpitations and leg swelling.   Gastrointestinal:  Positive for abdominal distention. Negative for abdominal pain, blood in stool, constipation, diarrhea, nausea and vomiting.   Genitourinary:  Negative for dysuria, frequency, hematuria and urgency.   Musculoskeletal:  Negative for arthralgias, back pain, gait problem and neck pain.   Skin:  Positive for color change. Negative for rash and wound.   Neurological:  Positive for tremors. Negative for dizziness, seizures, syncope, facial asymmetry, speech difficulty and light-headedness.   Psychiatric/Behavioral:  Negative for agitation and confusion. The patient is not nervous/anxious.      Objective:     Vital Signs (Most Recent):  Temp: 99.2 °F (37.3 °C) (01/04/24 2133)  Pulse: 88 (01/05/24 0045)  Resp: 12 (01/05/24 0045)  BP: (!) 117/57 (01/05/24 0045)  SpO2: 95 % (01/05/24 0045) Vital Signs (24h Range):  Temp:  [99.2 °F (37.3 °C)] 99.2 °F (37.3 °C)  Pulse:  [88] 88  Resp:  [12-14] 12  SpO2:  [95 %-97 %] 95 %  BP: (114-117)/(57) 117/57     There is no height or weight on file to calculate BMI.    Physical Exam  Vitals reviewed.   Constitutional:       General: She is not in acute distress.     Appearance: She is cachectic. She is ill-appearing.   HENT:      Head: Normocephalic and atraumatic.      Mouth/Throat:      Mouth: Mucous membranes are dry.      Pharynx: No oropharyngeal exudate.   Eyes:      Extraocular Movements: Extraocular movements intact.      Conjunctiva/sclera: Conjunctivae normal.      Pupils: Pupils are equal, round, and reactive to light.   Cardiovascular:      Rate and Rhythm: Normal rate and regular rhythm.      Heart sounds: Normal heart sounds. No murmur heard.  Pulmonary:      Effort: Pulmonary effort is normal. No respiratory distress.      Breath sounds: No wheezing or  rales.   Abdominal:      General: Bowel sounds are normal. There is distension.      Palpations: Abdomen is soft.      Tenderness: There is no abdominal tenderness. There is no right CVA tenderness, left CVA tenderness or guarding.   Musculoskeletal:         General: No tenderness. Normal range of motion.      Cervical back: Normal range of motion and neck supple.   Lymphadenopathy:      Cervical: No cervical adenopathy.   Skin:     General: Skin is warm and dry.      Capillary Refill: Capillary refill takes less than 2 seconds.      Coloration: Skin is jaundiced.      Findings: Ecchymosis present. No rash.   Neurological:      General: No focal deficit present.      Mental Status: She is oriented to person, place, and time.      Motor: Weakness (bilateral lower extremities) present.      Comments: Poor participation   Psychiatric:         Behavior: Behavior is cooperative.         Thought Content: Thought content normal.         Judgment: Judgment normal.         CRANIAL NERVES     CN III, IV, VI   Pupils are equal, round, and reactive to light.    Significant Labs: All pertinent labs within the past 24 hours have been reviewed.  Bilirubin:   Recent Labs   Lab 12/06/23  0352 12/07/23  0752 12/08/23  0439 01/05/24  0050   BILIDIR  --  1.5*  --   --    BILITOT 3.6* 3.4* 3.7* 12.1*     CBC:   Recent Labs   Lab 01/05/24  0050 01/05/24  0100   WBC 3.38*  --    HGB 8.7*  --    HCT 25.6* 26*   PLT 42*  --      CMP:   Recent Labs   Lab 01/05/24  0050      K 3.5   *   CO2 22*   GLU 89   BUN 6   CREATININE 0.4*   CALCIUM 7.8*   PROT 4.7*   ALBUMIN 1.7*   BILITOT 12.1*   ALKPHOS 200*   *   ALT 41   ANIONGAP 7*     Coagulation:   Recent Labs   Lab 01/05/24  0050   INR 1.8*     Lactic Acid:   Recent Labs   Lab 01/05/24  0050   LACTATE 1.0     Significant Imaging: I have reviewed all pertinent imaging results/findings within the past 24 hours.  Assessment/Plan:     * Acute liver failure without hepatic  coma  Patient with chronic alcoholic cirrhosis. Presenting with decompensation (increased ascites, Bili 12, ammonia 114). Continue to monitor liver function while treating underlying condition. Daily labs.  -consult IR for therapeutic thoracentesis as moderate pleural effusion likely cause of shortness of breath. Consider paracentesis but no positive fluid wave on exam.  -trend labs  -restart lactulose that patient has not been taking  -consult hepatology for recs, Tbili significantly elevated compared to prior  -MELD-NA 22. Rocephin ppx, de-escalate if no clinical concern for SBP. Continue home rifampin.    Acute hypoxic respiratory failure  Patient with Hypoxic Respiratory failure which is Acute on chronic.  she is not on home oxygen. Supplemental oxygen was provided and noted improvement in her O2 sats.  -CXR with likely compressive atelectasis due to ascites.    Bipolar 1 disorder, depressed, severe  Continue home regimen: lithium, olanzapine, keppra.  -lithium level pending  -A&Ox3 but some delusions when asked why she's not on her lactulose.        VTE Risk Mitigation (From admission, onward)           Ordered     Reason for No Pharmacological VTE Prophylaxis  Once        Question:  Reasons:  Answer:  Risk of Bleeding    01/05/24 0317     IP VTE HIGH RISK PATIENT  Once         01/05/24 0317     Place sequential compression device  Until discontinued         01/05/24 0317                  Robert Mock MD  Department of Hospital Medicine  [facility] - Emergency Dept

## 2024-02-15 NOTE — ED PROVIDER NOTE - CONDITION AT DISCHARGE:
Occupational Therapy    Visit Type: treatment  SUBJECTIVE  Patient agreed to participate in therapy this date.  RN in agreement to work with patient for therapy session.  Pt stated:  I'd love to get out of bed  Patient / Family Goal: return to previous functional status, return home and maximize function    OBJECTIVE     Cognitive Status   Orientation    - Oriented to: person, place, time and situation  Functional Communication   - Overall Status: within functional limits   - Forms of Communication: verbal  Attention Span    - Attention: intact  Following Direction   - follows all commands and directions consistently  Transition Between Tasks   - transitions without difficulty  Memory   - intact  Safety Awareness/Insight   - intact  Awareness of Deficits   - fully aware of deficits  Verbal Expression   - intact    Vitals:  Stable on 2L nasal canula    Patient Activity Tolerance: 1 to 1 activity to rest    Hand Dominance: right-handed      Range of Motion (ROM)   (degrees unless noted; active unless noted; norms in ( ); negative=lacking to 0, positive=beyond 0)  WFL: RAJNI ROSS (generalized weakness s/p MS)        Bed Mobility  Pt transferred to recliner via ceiling lift to increase time in upright posture for improved respiration and postural strengthening     Interventions      Additional exercise details: Pt completed PNF D2 AAROM in unilateral and bilateral patters x 3 sets of 10 to increase trunk and UE strength, endurance and ROM for carry over to ADLs and transfers.          Education:   - Present and ready to learn: patient  Education provided during session:  - Role of OT  - Results of above outlined education: Verbalizes understanding    ASSESSMENT   Progress: progressing toward goals  Interferring components: decreased activity tolerance and medical status limitations    Discharge needs based on today's assessment:  - Current level of function: significantly below baseline level of function  - Therapy needs  at discharge: therapy 5 or more times per week  - Activities of daily living (ADLs) requiring support at discharge: bed mobility, bathing, transfers, ambulation, toileting, dressing and grooming  - Instrumental activities of daily living (IADLs) requiring support at discharge: home management and meal preparation  - Impairments that require further therapy intervention: strength, activity tolerance, pain, balance and ROM  AM-PAC  - Prior Level of Function:         Key: MOD A=moderate assistance, IND/MOD I=independent/modified independent  - Generalized Current Level of Function     - Current Self-Cares: 13       Scoring Key= >21 Modified Independent; 20-21 Supervision; 18-19 Minimal assist; 13-17 Moderate assist; 9-12 Max assist; <9 Total assist      PLAN (while hospitalized)  Suggestions for next session as indicated: Transfers, postural strengthening, bathing w/ female assist    OT Frequency: 3-5 x per week      PT/OT Mobility Equipment for Discharge: has ww, power w/c  PT/OT ADL Equipment for Discharge: has tub bench, continue to assess  Agreement to plan and goals: patient agrees with goals and treatment plan      GOALS  Long Term Goals: (to be met by time of discharge from hospital)  Feeding: Patient will complete feeding tasks modified independent.  Grooming: Patient will complete grooming tasks modified independent.  Upper body dressing: Patient will complete upper body dressing modified independent.  Lower body dressing: Patient will complete lower body dressing moderate assist.  Toileting: Patient will complete toileting minimal assist.  Bathing: Patient will complete bathingmoderate assist Toilet transfer: Patient will complete toilet transfer with minimal assist.   Tub/shower transfer: Patient will complete tub/shower transfer with minimal assist.   Home setting transfer: Patient will complete home setting transfers with minimal assist.   Home program: patient independent with home program as instructed.      Documented in the chart in the following areas: Assessment/Plan.    Patient at End of Session:   Location: in chair  Safety measures: alarm system in place/re-engaged and call light within reach  Handoff to: nurse      Therapy procedure time and total treatment time can be found documented on the Time Entry flowsheet   Satisfactory

## 2024-03-08 ENCOUNTER — INPATIENT (INPATIENT)
Facility: HOSPITAL | Age: 64
LOS: 11 days | Discharge: NURSING, REHAB OR LONG-TERM CARE | End: 2024-03-20
Attending: HOSPITALIST | Admitting: HOSPITALIST
Payer: COMMERCIAL

## 2024-03-08 VITALS
SYSTOLIC BLOOD PRESSURE: 122 MMHG | WEIGHT: 250 LBS | TEMPERATURE: 98 F | RESPIRATION RATE: 16 BRPM | OXYGEN SATURATION: 97 % | HEIGHT: 71 IN | DIASTOLIC BLOOD PRESSURE: 81 MMHG | HEART RATE: 110 BPM

## 2024-03-08 DIAGNOSIS — Z98.891 HISTORY OF UTERINE SCAR FROM PREVIOUS SURGERY: Chronic | ICD-10-CM

## 2024-03-08 DIAGNOSIS — L91.8 OTHER HYPERTROPHIC DISORDERS OF THE SKIN: Chronic | ICD-10-CM

## 2024-03-08 LAB
ALBUMIN SERPL ELPH-MCNC: 2.5 G/DL — LOW (ref 3.3–5)
ALP SERPL-CCNC: 63 U/L — SIGNIFICANT CHANGE UP (ref 40–120)
ALT FLD-CCNC: 18 U/L — SIGNIFICANT CHANGE UP (ref 12–78)
ANION GAP SERPL CALC-SCNC: 5 MMOL/L — SIGNIFICANT CHANGE UP (ref 5–17)
APPEARANCE UR: ABNORMAL
AST SERPL-CCNC: 23 U/L — SIGNIFICANT CHANGE UP (ref 15–37)
BACTERIA # UR AUTO: ABNORMAL /HPF
BASOPHILS # BLD AUTO: 0.02 K/UL — SIGNIFICANT CHANGE UP (ref 0–0.2)
BASOPHILS NFR BLD AUTO: 0.2 % — SIGNIFICANT CHANGE UP (ref 0–2)
BILIRUB SERPL-MCNC: 0.7 MG/DL — SIGNIFICANT CHANGE UP (ref 0.2–1.2)
BILIRUB UR-MCNC: ABNORMAL
BUN SERPL-MCNC: 11 MG/DL — SIGNIFICANT CHANGE UP (ref 7–23)
CALCIUM SERPL-MCNC: 8.4 MG/DL — LOW (ref 8.5–10.1)
CHLORIDE SERPL-SCNC: 107 MMOL/L — SIGNIFICANT CHANGE UP (ref 96–108)
CO2 SERPL-SCNC: 30 MMOL/L — SIGNIFICANT CHANGE UP (ref 22–31)
COLOR SPEC: SIGNIFICANT CHANGE UP
CREAT SERPL-MCNC: 1.28 MG/DL — SIGNIFICANT CHANGE UP (ref 0.5–1.3)
DIFF PNL FLD: ABNORMAL
EGFR: 47 ML/MIN/1.73M2 — LOW
EOSINOPHIL # BLD AUTO: 0 K/UL — SIGNIFICANT CHANGE UP (ref 0–0.5)
EOSINOPHIL NFR BLD AUTO: 0 % — SIGNIFICANT CHANGE UP (ref 0–6)
EPI CELLS # UR: PRESENT
ERYTHROCYTE [SEDIMENTATION RATE] IN BLOOD: 38 MM/HR — HIGH (ref 0–20)
GLUCOSE SERPL-MCNC: 81 MG/DL — SIGNIFICANT CHANGE UP (ref 70–99)
GLUCOSE UR QL: >=1000 MG/DL
HCT VFR BLD CALC: 34.6 % — SIGNIFICANT CHANGE UP (ref 34.5–45)
HGB BLD-MCNC: 11.4 G/DL — LOW (ref 11.5–15.5)
IMM GRANULOCYTES NFR BLD AUTO: 0.5 % — SIGNIFICANT CHANGE UP (ref 0–0.9)
KETONES UR-MCNC: 15 MG/DL
LEUKOCYTE ESTERASE UR-ACNC: ABNORMAL
LYMPHOCYTES # BLD AUTO: 1.03 K/UL — SIGNIFICANT CHANGE UP (ref 1–3.3)
LYMPHOCYTES # BLD AUTO: 12.8 % — LOW (ref 13–44)
MAGNESIUM SERPL-MCNC: 1.6 MG/DL — SIGNIFICANT CHANGE UP (ref 1.6–2.6)
MCHC RBC-ENTMCNC: 26.9 PG — LOW (ref 27–34)
MCHC RBC-ENTMCNC: 32.9 G/DL — SIGNIFICANT CHANGE UP (ref 32–36)
MCV RBC AUTO: 81.6 FL — SIGNIFICANT CHANGE UP (ref 80–100)
MONOCYTES # BLD AUTO: 0.29 K/UL — SIGNIFICANT CHANGE UP (ref 0–0.9)
MONOCYTES NFR BLD AUTO: 3.6 % — SIGNIFICANT CHANGE UP (ref 2–14)
NEUTROPHILS # BLD AUTO: 6.68 K/UL — SIGNIFICANT CHANGE UP (ref 1.8–7.4)
NEUTROPHILS NFR BLD AUTO: 82.9 % — HIGH (ref 43–77)
NITRITE UR-MCNC: NEGATIVE — SIGNIFICANT CHANGE UP
NRBC # BLD: 0 /100 WBCS — SIGNIFICANT CHANGE UP (ref 0–0)
PH UR: 6 — SIGNIFICANT CHANGE UP (ref 5–8)
PLATELET # BLD AUTO: 99 K/UL — LOW (ref 150–400)
POTASSIUM SERPL-MCNC: 3.3 MMOL/L — LOW (ref 3.5–5.3)
POTASSIUM SERPL-SCNC: 3.3 MMOL/L — LOW (ref 3.5–5.3)
PROT SERPL-MCNC: 7.2 GM/DL — SIGNIFICANT CHANGE UP (ref 6–8.3)
PROT UR-MCNC: 300 MG/DL
RBC # BLD: 4.24 M/UL — SIGNIFICANT CHANGE UP (ref 3.8–5.2)
RBC # FLD: 13.5 % — SIGNIFICANT CHANGE UP (ref 10.3–14.5)
RBC CASTS # UR COMP ASSIST: 25 /HPF — HIGH (ref 0–4)
SODIUM SERPL-SCNC: 142 MMOL/L — SIGNIFICANT CHANGE UP (ref 135–145)
SP GR SPEC: 1.02 — SIGNIFICANT CHANGE UP (ref 1–1.03)
UROBILINOGEN FLD QL: 1 MG/DL — SIGNIFICANT CHANGE UP (ref 0.2–1)
WBC # BLD: 8.06 K/UL — SIGNIFICANT CHANGE UP (ref 3.8–10.5)
WBC # FLD AUTO: 8.06 K/UL — SIGNIFICANT CHANGE UP (ref 3.8–10.5)
WBC UR QL: >50 /HPF — HIGH (ref 0–5)

## 2024-03-08 PROCEDURE — 99285 EMERGENCY DEPT VISIT HI MDM: CPT

## 2024-03-08 RX ORDER — POTASSIUM CHLORIDE 20 MEQ
40 PACKET (EA) ORAL ONCE
Refills: 0 | Status: COMPLETED | OUTPATIENT
Start: 2024-03-08 | End: 2024-03-08

## 2024-03-08 RX ORDER — CEFTRIAXONE 500 MG/1
1000 INJECTION, POWDER, FOR SOLUTION INTRAMUSCULAR; INTRAVENOUS ONCE
Refills: 0 | Status: COMPLETED | OUTPATIENT
Start: 2024-03-08 | End: 2024-03-08

## 2024-03-08 RX ADMIN — Medication 40 MILLIEQUIVALENT(S): at 21:51

## 2024-03-08 RX ADMIN — CEFTRIAXONE 100 MILLIGRAM(S): 500 INJECTION, POWDER, FOR SOLUTION INTRAMUSCULAR; INTRAVENOUS at 20:02

## 2024-03-08 NOTE — ED ADULT NURSE NOTE - NSFALLRISKASMT_ED_ALL_ED_DT
08-Mar-2024 18:21 O-L Flap Text: The defect edges were debeveled with a #15 scalpel blade. Given the location of the defect, shape of the defect and the proximity to free margins an O-L flap was deemed most appropriate. Using a sterile surgical marker, an appropriate advancement flap was drawn incorporating the defect and placing the expected incisions within the relaxed skin tension lines where possible. The area thus outlined was incised deep to adipose tissue with a #15 scalpel blade. The skin margins were undermined to an appropriate distance in all directions utilizing iris scissors. Following this, the designed flap was advanced and carried over into the primary defect and sutured into place.

## 2024-03-08 NOTE — ED PROVIDER NOTE - PHYSICAL EXAMINATION
General: Well appearing female in no acute distress  HEENT: Normocephalic, atraumatic. Moist mucous membranes. Oropharynx clear. No lymphadenopathy.  Eyes: No scleral icterus. EOMI. DIONNA.  Neck:. Soft and supple. Full ROM without pain. No midline tenderness  Cardiac: Regular rate and regular rhythm. No murmurs, rubs, gallops. Peripheral pulses 2+ and symmetric.  Resp: Lungs CTAB. Speaking in full sentences. No wheezes, rales or rhonchi.  Abd: Soft, non-tender, non-distended. No guarding or rebound. No scars, masses, or lesions.  Back: Spine midline and non-tender. No CVA tenderness.    Skin: ulcerative rash over b/l thigh and sacral region  Neuro: AO x 3. Moves all extremities symmetrically. Motor strength and sensation grossly intact.

## 2024-03-08 NOTE — ED ADULT NURSE NOTE - GENITOURINARY ASSESSMENT
Procedure Note    Indications: The patient was evaluated by our multidisciplinary team and was found to be a good candidate for weight loss surgery. Body mass index is 44.91 kg/m². Pre-operative Diagnosis:   Patient Active Problem List   Diagnosis    Type II diabetes mellitus, uncontrolled ([address]    Meniere's disease    Peripheral neuropathy    Depression    Mixed hyperlipidemia    Essential hypertension    Obesity, Class III, BMI 40-49.9 (morbid obesity) ([facility])    ASNHL (asymmetrical sensorineural hearing loss)    Poor compliance with medication    DM type 2 with diabetic peripheral neuropathy ([facility])    Osteomyelitis of ankle or foot, acute, left ([address]    Encounter for post surgical wound check    Diabetic ulcer of toe of left foot associated with diabetes mellitus due to underlying condition, with fat layer exposed ([address]    CKD (chronic kidney disease) stage 3, GFR 30-59 ml/min ([facility])    ANGLE (obstructive sleep apnea)    Chronic GERD    Morbid obesity with BMI of 45.0-49.9, adult ([facility])    Incisional hernia, without obstruction or gangrene         Post-operative Diagnosis:   Same      Procedure:    - Robotic assisted Laparoscopic Sleeve Gastrectomy. - Laparoscopic incisional hernia Repair, reducible          Surgeon: Jacque Aguila, DO    Anesthesia: General endotracheal anesthesia      Procedure Details   The patient was seen again in the Holding Room. The risks, benefits, complications, treatment options, and expected outcomes were discussed with the patient and/or family. The possibilities of reaction to medication, pulmonary aspiration, perforation of viscus, bleeding, recurrent infection, strictures, leaks, failure to lose weight, regaining weight,  the need for additional procedures, failure to diagnose a condition, and creating a complication requiring transfusion or operation were discussed. There was concurrence with the proposed plan and informed consent was obtained.   The site of surgery - - - used to over-sew and imbricate the sleeve staple line. The staple line was completely over-sewn over dilator. The stomach distal to the staple line was clamped and methylene blue saline was injected under pressure confirming no obstruction and no leak. Attention was then paid to a reducible incisional hernia at the umbilicus, likely from patient's hysterectomy. Picture was taken. Due to fear of a an incarceration from this hernia, decision was made to repair this primarily. Local anesthetic was injected into the defect and a separate stab incision made in the hernia defect using an 11 blade. Suture passer device was used with 0-vicryl suture to repair the hernia and reapproximate the tissue without any undue tension. Picture was taken after the repair. The abdomen was carefully inspected and there was no bleeding or any other abnormality. The stomach was brought out through the RUQ incision. Hemostasis was confirmed. The 12 port sites was closed using 0.0 Vicryl  suture at the level of the fascia. The trocar site skin wounds were closed using 4.0 Vicryl after copious Irrigation of the wounds. Local Anesthetic used at port sites then Dermabond applied. Instrument, sponge, and needle counts were correct at the conclusion of the case. Findings: Morbid Obesity. Estimated Blood Loss:  Minimal           Drains: none           Total IV Fluids: 2000 ml           Specimens: Stomach (Subtotal)           Complications:  None; patient tolerated the procedure well. Disposition: PACU - hemodynamically stable. Condition: stable    Attending Attestation: I was present and scrubbed for the entire procedure.

## 2024-03-08 NOTE — ED ADULT NURSE NOTE - ED STAT RN HANDOFF DETAILS
Handoff report given to JOSHUA Neri on 1C. RN made aware of pts current condition/test results/reason for admission for acute cystitis. pt is Aox3, resting in stretcher at this time. NADN. pts IV is patent and intact no redness/swelling noted at the site. rounding and safety checks completed. pt is vitally stable upon leaving the ED. no new orders pending. any issues endorsed to oncoming RN for followup.

## 2024-03-08 NOTE — ED PROVIDER NOTE - OBJECTIVE STATEMENT
64 F pmh HTN, HLD, DM, lupus presenting to the ED for vaginal rash. Per daughter patient has been having worsening waxing and waning mental status over the past month. Pt has had the initiation of her symptoms one year ago. Pt has not been able to see her primary doctor due to a fall that occurred on the day on her appointment. Pt follows with her rheumatologist and she is on steroids (5mg prednisone)

## 2024-03-08 NOTE — ED ADULT NURSE NOTE - NSFALLRISKINTERV_ED_ALL_ED
Assistance OOB with selected safe patient handling equipment if applicable/Assistance with ambulation/Communicate fall risk and risk factors to all staff, patient, and family/Monitor gait and stability/Monitor for mental status changes and reorient to person, place, and time, as needed/Provide visual cue: yellow wristband, yellow gown, etc/Reinforce activity limits and safety measures with patient and family/Toileting schedule using arm’s reach rule for commode and bathroom/Use of alarms - bed, stretcher, chair and/or video monitoring/Call bell, personal items and telephone in reach/Instruct patient to call for assistance before getting out of bed/chair/stretcher/Non-slip footwear applied when patient is off stretcher/Vader to call system/Physically safe environment - no spills, clutter or unnecessary equipment/Purposeful Proactive Rounding/Room/bathroom lighting operational, light cord in reach

## 2024-03-08 NOTE — ED ADULT NURSE NOTE - NS ED NOTE ABUSE SUSPICION NEGLECT YN
Subjective:   42 y.o. female for Well Woman Check. Her gyne and breast care is done elsewhere by her Ob-Gyne physician. Patient Active Problem List   Diagnosis Code    IBS (irritable bowel syndrome) K58.9    Anxiety F41.9    Depression F32.9    Allergic rhinitis 80    Snoring R06.83    Prediabetes R73.03    Advance directive discussed with patient Z71.89     Patient Active Problem List    Diagnosis Date Noted    Advance directive discussed with patient 06/13/2016    Prediabetes 10/06/2014    IBS (irritable bowel syndrome)     Anxiety     Depression     Allergic rhinitis     Snoring      Current Outpatient Medications   Medication Sig Dispense Refill    spironolactone (ALDACTONE) 50 mg tablet TAKE 1 TABLET BY MOUTH TWICE DAILY 180 Tablet 1    FLUoxetine (PROzac) 20 mg capsule Take 1 Capsule by mouth daily. 90 Capsule 3    ALPRAZolam (XANAX) 0.25 mg tablet Take 1 Tablet by mouth three (3) times daily as needed for Anxiety. Max Daily Amount: 0.75 mg. 30 Tablet 0    esomeprazole (NEXIUM) 40 mg capsule Take 1 Cap by mouth daily (after breakfast). 90 Cap 0    loratadine (CLARITIN) 10 mg tablet Take 1 Tab by mouth daily as needed for Allergies. 30 Tab 3     Allergies   Allergen Reactions    Flagyl [Metronidazole] Other (comments)     GI Distress      Flonase [Fluticasone] Other (comments)     headache    Percocet [Oxycodone-Acetaminophen] Other (comments)     \"It suppressed my breathing. If I fall asleep I awaken gasping for air\".       Past Medical History:   Diagnosis Date    Allergic rhinitis     Anemia NEC     Anxiety     Back pain     Calculus of kidney     Depression     Fecal incontinence     GERD (gastroesophageal reflux disease)     Headache     IBS (irritable bowel syndrome)     Right ankle sprain     Snoring     Wears contact lenses      Past Surgical History:   Procedure Laterality Date    HX CHOLECYSTECTOMY      HX ENDOSCOPY  01/13/2015    Dr. Ruth Gonzales G2, P1, M/S1     Family History   Problem Relation Age of Onset    Diabetes Mother     Thyroid Disease Mother     Hypertension Other     Arthritis-osteo Other      Social History     Tobacco Use    Smoking status: Never Smoker    Smokeless tobacco: Never Used   Substance Use Topics    Alcohol use: Yes     Comment: Ocassionally        Lab Results   Component Value Date/Time    WBC 11.2 09/03/2019 05:50 PM    HGB 12.6 09/03/2019 05:50 PM    HCT 39.8 09/03/2019 05:50 PM    PLATELET 010 87/43/5297 05:50 PM    MCV 85.6 09/03/2019 05:50 PM     Lab Results   Component Value Date/Time    Hemoglobin A1c 5.8 (H) 08/11/2020 08:55 AM    Hemoglobin A1c 5.9 (H) 09/01/2015 06:11 PM    Hemoglobin A1c 5.8 (H) 09/30/2014 08:38 AM    Glucose 87 08/11/2020 08:55 AM    LDL, calculated 82.8 08/11/2020 08:55 AM    Creatinine 0.65 08/11/2020 08:55 AM      Lab Results   Component Value Date/Time    Sodium 138 08/11/2020 08:55 AM    Potassium 4.4 08/11/2020 08:55 AM    Chloride 105 08/11/2020 08:55 AM    CO2 27 08/11/2020 08:55 AM    Anion gap 6 08/11/2020 08:55 AM    Glucose 87 08/11/2020 08:55 AM    BUN 9 08/11/2020 08:55 AM    Creatinine 0.65 08/11/2020 08:55 AM    BUN/Creatinine ratio 14 08/11/2020 08:55 AM    GFR est AA >60 08/11/2020 08:55 AM    GFR est non-AA >60 08/11/2020 08:55 AM    Calcium 8.5 08/11/2020 08:55 AM    Bilirubin, total 0.3 08/11/2020 08:55 AM    ALT (SGPT) 18 08/11/2020 08:55 AM    Alk. phosphatase 115 08/11/2020 08:55 AM    Protein, total 7.3 08/11/2020 08:55 AM    Albumin 3.5 08/11/2020 08:55 AM    Globulin 3.8 08/11/2020 08:55 AM    A-G Ratio 0.9 08/11/2020 08:55 AM      Lab Results   Component Value Date/Time    Hemoglobin A1c 5.8 (H) 08/11/2020 08:55 AM    Hemoglobin A1c (POC) 5.8 04/18/2019 01:14 PM         ROS: Feeling generally well. No TIA's or unusual headaches, no dysphagia. No prolonged cough. No dyspnea or chest pain on exertion.   No abdominal pain, change in bowel habits, black or bloody stools. No urinary tract symptoms. No new or unusual musculoskeletal symptoms. Specific concerns today: patient states she would like loose weight. Comments she has gained approx 30-35 lbs over the last 3 years. Objective: The patient appears well, alert, oriented x 3, in no distress. Visit Vitals  /72 (BP 1 Location: Left arm, BP Patient Position: Sitting, BP Cuff Size: Adult)   Pulse 79   Temp 97.8 °F (36.6 °C) (Temporal)   Resp 20   Ht 5' 1\" (1.549 m)   Wt 194 lb (88 kg)   LMP 09/02/2021   SpO2 98%   BMI 36.66 kg/m²     ENT normal.  Neck supple. No adenopathy or thyromegaly. FRANKIE. Lungs are clear, good air entry, no wheezes, rhonchi or rales. S1 and S2 normal, no murmurs, regular rate and rhythm. Abdomen soft without tenderness, guarding, mass or organomegaly. Extremities show no edema, normal peripheral pulses. Neurological is normal, no focal findings. Breast and Pelvic exams are deferred. Assessment/Plan:   Well Woman    ICD-10-CM ICD-9-CM    1. Well woman exam (no gynecological exam)  Z00.00 V70.0     [V70.0]   2. Prediabetes  R73.03 790.29 HEMOGLOBIN A1C WITH EAG   3. Paresthesia  R20.2 782.0 VITAMIN B12 & FOLATE   4. Screening for cardiovascular, respiratory, and genitourinary diseases  Z13.6 V81.2 LIPID PANEL    X93.59 L88.7 METABOLIC PANEL, COMPREHENSIVE    Z13.83 V81.4 CBC WITH AUTOMATED DIFF   5. Fatigue, unspecified type  R53.83 780.79 VITAMIN D, 25 HYDROXY   6. Current use of proton pump inhibitor  Z79.899 V58.69 MAGNESIUM     Orders Placed This Encounter    HEMOGLOBIN A1C WITH EAG    LIPID PANEL    METABOLIC PANEL, COMPREHENSIVE    VITAMIN D, 25 HYDROXY    CBC WITH AUTOMATED DIFF    VITAMIN B12 & FOLATE    MAGNESIUM    esomeprazole (NEXIUM) 40 mg capsule     I have discussed the diagnosis with the patient and the intended plan as seen in the above orders. The patient has received an after-visit summary and questions were answered concerning future plans.   I have discussed medication side effects and warnings with the patient as well. Patient agreeable with above plan and verbalizes understanding. Follow-up and Dispositions    · Return in about 1 year (around 9/2/2022), or if symptoms worsen or fail to improve, for WWE with PAP/CBE, in office follow up. No

## 2024-03-08 NOTE — ED PROVIDER NOTE - CLINICAL SUMMARY MEDICAL DECISION MAKING FREE TEXT BOX
elderly female w/ lupus (on prednisone), HTN, DM presenting to the ED for vaginal itching, waxing and waning mental status    + UTI  ulcerative lesions over thigh and buttocks concerning for lupus flare  abx  medicine admission

## 2024-03-08 NOTE — ED PROVIDER NOTE - CONSIDERATION OF ADMISSION OBSERVATION
Consideration of Admission/Observation will admit for worsening ambulation and waxing, waning mental status per daughter

## 2024-03-08 NOTE — ED ADULT TRIAGE NOTE - CHIEF COMPLAINT QUOTE
c/o vaginal itching and pain x today. As per daughter, patient has ulcers on her vagina and has been more confused, not eating, not taking medication, recently incontinent x months. AAOX3 in triage, F/S 120.  started on prednisone today for lupus. Hx. Lupus, DM, HTN, HLD

## 2024-03-08 NOTE — ED PROVIDER NOTE - FAMILY DETAILS FREE TEXT FOR MDM ADDL HISTORY OBTAINED FROM QUESTION
daughter states that patient is ambulatory w/ a cane but she has now progressed to being minimally ambulatory w/ walker

## 2024-03-08 NOTE — ED PROVIDER NOTE - NS ED ROS FT
General: Denies fever, chills  HEENT: Denies sensory changes, sore throat  Neck: Denies neck pain, neck stiffness  Resp: Denies coughing, SOB  Cardiovascular: Denies CP, palpitations, LE edema  GI: Denies nausea, vomiting, abdominal pain, diarrhea, constipation, blood in stool  : Denies dysuria, hematuria, frequency, incontinence  MSK: Denies back pain  Neuro: Denies HA, dizziness, numbness, weakness  Skin: + rash

## 2024-03-08 NOTE — ED ADULT NURSE NOTE - OBJECTIVE STATEMENT
c/o vaginal itching and pain x today. As per daughter, patient has ulcers on her vagina and has been more confused, not eating, not taking medication, recently incontinent x months. AAOX3 in triage, F/S 120.  started on prednisone today for lupus. Hx. Lupus, DM, HTN, HLD c/o vaginal itching and pain x today. As per daughter, patient has ulcers on her vagina and has been more confused, not eating, not taking medication, recently incontinent x months. AAOX3 in triage, F/S 120. Pt noted with multiple ulceration noted to inner thigh, and buttock; MD at bedside aware.  started on prednisone today for lupus. Hx. Lupus, DM, HTN, HLD

## 2024-03-09 LAB
A1C WITH ESTIMATED AVERAGE GLUCOSE RESULT: 10.8 % — HIGH (ref 4–5.6)
ANION GAP SERPL CALC-SCNC: 7 MMOL/L — SIGNIFICANT CHANGE UP (ref 5–17)
BASOPHILS # BLD AUTO: 0.02 K/UL — SIGNIFICANT CHANGE UP (ref 0–0.2)
BASOPHILS NFR BLD AUTO: 0.3 % — SIGNIFICANT CHANGE UP (ref 0–2)
BUN SERPL-MCNC: 12 MG/DL — SIGNIFICANT CHANGE UP (ref 7–23)
CALCIUM SERPL-MCNC: 8.1 MG/DL — LOW (ref 8.5–10.1)
CHLORIDE SERPL-SCNC: 107 MMOL/L — SIGNIFICANT CHANGE UP (ref 96–108)
CO2 SERPL-SCNC: 25 MMOL/L — SIGNIFICANT CHANGE UP (ref 22–31)
CREAT SERPL-MCNC: 1.02 MG/DL — SIGNIFICANT CHANGE UP (ref 0.5–1.3)
CRP SERPL-MCNC: 79 MG/L — HIGH
EGFR: 62 ML/MIN/1.73M2 — SIGNIFICANT CHANGE UP
EOSINOPHIL # BLD AUTO: 0.02 K/UL — SIGNIFICANT CHANGE UP (ref 0–0.5)
EOSINOPHIL NFR BLD AUTO: 0.3 % — SIGNIFICANT CHANGE UP (ref 0–6)
ESTIMATED AVERAGE GLUCOSE: 263 MG/DL — HIGH (ref 68–114)
GLUCOSE BLDC GLUCOMTR-MCNC: 182 MG/DL — HIGH (ref 70–99)
GLUCOSE BLDC GLUCOMTR-MCNC: 201 MG/DL — HIGH (ref 70–99)
GLUCOSE BLDC GLUCOMTR-MCNC: 274 MG/DL — HIGH (ref 70–99)
GLUCOSE BLDC GLUCOMTR-MCNC: 296 MG/DL — HIGH (ref 70–99)
GLUCOSE SERPL-MCNC: 139 MG/DL — HIGH (ref 70–99)
HCT VFR BLD CALC: 33.9 % — LOW (ref 34.5–45)
HGB BLD-MCNC: 10.9 G/DL — LOW (ref 11.5–15.5)
IMM GRANULOCYTES NFR BLD AUTO: 0.5 % — SIGNIFICANT CHANGE UP (ref 0–0.9)
LYMPHOCYTES # BLD AUTO: 0.69 K/UL — LOW (ref 1–3.3)
LYMPHOCYTES # BLD AUTO: 9.1 % — LOW (ref 13–44)
MAGNESIUM SERPL-MCNC: 1.6 MG/DL — SIGNIFICANT CHANGE UP (ref 1.6–2.6)
MCHC RBC-ENTMCNC: 26.6 PG — LOW (ref 27–34)
MCHC RBC-ENTMCNC: 32.2 G/DL — SIGNIFICANT CHANGE UP (ref 32–36)
MCV RBC AUTO: 82.7 FL — SIGNIFICANT CHANGE UP (ref 80–100)
MONOCYTES # BLD AUTO: 0.26 K/UL — SIGNIFICANT CHANGE UP (ref 0–0.9)
MONOCYTES NFR BLD AUTO: 3.4 % — SIGNIFICANT CHANGE UP (ref 2–14)
NEUTROPHILS # BLD AUTO: 6.57 K/UL — SIGNIFICANT CHANGE UP (ref 1.8–7.4)
NEUTROPHILS NFR BLD AUTO: 86.4 % — HIGH (ref 43–77)
NRBC # BLD: 0 /100 WBCS — SIGNIFICANT CHANGE UP (ref 0–0)
PHOSPHATE SERPL-MCNC: 2.9 MG/DL — SIGNIFICANT CHANGE UP (ref 2.5–4.5)
PLATELET # BLD AUTO: 90 K/UL — LOW (ref 150–400)
POTASSIUM SERPL-MCNC: 3.8 MMOL/L — SIGNIFICANT CHANGE UP (ref 3.5–5.3)
POTASSIUM SERPL-SCNC: 3.8 MMOL/L — SIGNIFICANT CHANGE UP (ref 3.5–5.3)
RBC # BLD: 4.1 M/UL — SIGNIFICANT CHANGE UP (ref 3.8–5.2)
RBC # FLD: 13.7 % — SIGNIFICANT CHANGE UP (ref 10.3–14.5)
SODIUM SERPL-SCNC: 139 MMOL/L — SIGNIFICANT CHANGE UP (ref 135–145)
WBC # BLD: 7.6 K/UL — SIGNIFICANT CHANGE UP (ref 3.8–10.5)
WBC # FLD AUTO: 7.6 K/UL — SIGNIFICANT CHANGE UP (ref 3.8–10.5)

## 2024-03-09 PROCEDURE — 99233 SBSQ HOSP IP/OBS HIGH 50: CPT

## 2024-03-09 RX ORDER — METRONIDAZOLE 500 MG
TABLET ORAL
Refills: 0 | Status: DISCONTINUED | OUTPATIENT
Start: 2024-03-09 | End: 2024-03-09

## 2024-03-09 RX ORDER — LOSARTAN POTASSIUM 100 MG/1
50 TABLET, FILM COATED ORAL DAILY
Refills: 0 | Status: DISCONTINUED | OUTPATIENT
Start: 2024-03-09 | End: 2024-03-20

## 2024-03-09 RX ORDER — INSULIN LISPRO 100/ML
VIAL (ML) SUBCUTANEOUS
Refills: 0 | Status: DISCONTINUED | OUTPATIENT
Start: 2024-03-09 | End: 2024-03-20

## 2024-03-09 RX ORDER — FLUCONAZOLE 150 MG/1
200 TABLET ORAL ONCE
Refills: 0 | Status: COMPLETED | OUTPATIENT
Start: 2024-03-09 | End: 2024-03-09

## 2024-03-09 RX ORDER — AMLODIPINE BESYLATE 2.5 MG/1
10 TABLET ORAL DAILY
Refills: 0 | Status: DISCONTINUED | OUTPATIENT
Start: 2024-03-09 | End: 2024-03-09

## 2024-03-09 RX ORDER — SODIUM CHLORIDE 9 MG/ML
1000 INJECTION, SOLUTION INTRAVENOUS
Refills: 0 | Status: DISCONTINUED | OUTPATIENT
Start: 2024-03-09 | End: 2024-03-20

## 2024-03-09 RX ORDER — FLUCONAZOLE 150 MG/1
TABLET ORAL
Refills: 0 | Status: DISCONTINUED | OUTPATIENT
Start: 2024-03-09 | End: 2024-03-09

## 2024-03-09 RX ORDER — ONDANSETRON 8 MG/1
4 TABLET, FILM COATED ORAL EVERY 6 HOURS
Refills: 0 | Status: DISCONTINUED | OUTPATIENT
Start: 2024-03-09 | End: 2024-03-20

## 2024-03-09 RX ORDER — CEFTRIAXONE 500 MG/1
1000 INJECTION, POWDER, FOR SOLUTION INTRAMUSCULAR; INTRAVENOUS EVERY 24 HOURS
Refills: 0 | Status: DISCONTINUED | OUTPATIENT
Start: 2024-03-09 | End: 2024-03-09

## 2024-03-09 RX ORDER — METRONIDAZOLE 500 MG
500 TABLET ORAL EVERY 8 HOURS
Refills: 0 | Status: DISCONTINUED | OUTPATIENT
Start: 2024-03-09 | End: 2024-03-09

## 2024-03-09 RX ORDER — METRONIDAZOLE 500 MG
500 TABLET ORAL ONCE
Refills: 0 | Status: COMPLETED | OUTPATIENT
Start: 2024-03-09 | End: 2024-03-09

## 2024-03-09 RX ORDER — DEXTROSE 50 % IN WATER 50 %
12.5 SYRINGE (ML) INTRAVENOUS ONCE
Refills: 0 | Status: DISCONTINUED | OUTPATIENT
Start: 2024-03-09 | End: 2024-03-20

## 2024-03-09 RX ORDER — DEXTROSE 50 % IN WATER 50 %
15 SYRINGE (ML) INTRAVENOUS ONCE
Refills: 0 | Status: DISCONTINUED | OUTPATIENT
Start: 2024-03-09 | End: 2024-03-20

## 2024-03-09 RX ORDER — INFLUENZA VIRUS VACCINE 15; 15; 15; 15 UG/.5ML; UG/.5ML; UG/.5ML; UG/.5ML
0.5 SUSPENSION INTRAMUSCULAR ONCE
Refills: 0 | Status: DISCONTINUED | OUTPATIENT
Start: 2024-03-09 | End: 2024-03-20

## 2024-03-09 RX ORDER — DEXTROSE 50 % IN WATER 50 %
25 SYRINGE (ML) INTRAVENOUS ONCE
Refills: 0 | Status: DISCONTINUED | OUTPATIENT
Start: 2024-03-09 | End: 2024-03-20

## 2024-03-09 RX ORDER — SODIUM CHLORIDE 9 MG/ML
1000 INJECTION INTRAMUSCULAR; INTRAVENOUS; SUBCUTANEOUS
Refills: 0 | Status: DISCONTINUED | OUTPATIENT
Start: 2024-03-09 | End: 2024-03-14

## 2024-03-09 RX ORDER — HYDROMORPHONE HYDROCHLORIDE 2 MG/ML
0.5 INJECTION INTRAMUSCULAR; INTRAVENOUS; SUBCUTANEOUS EVERY 4 HOURS
Refills: 0 | Status: DISCONTINUED | OUTPATIENT
Start: 2024-03-09 | End: 2024-03-10

## 2024-03-09 RX ORDER — ENOXAPARIN SODIUM 100 MG/ML
40 INJECTION SUBCUTANEOUS EVERY 24 HOURS
Refills: 0 | Status: DISCONTINUED | OUTPATIENT
Start: 2024-03-09 | End: 2024-03-20

## 2024-03-09 RX ORDER — METRONIDAZOLE 500 MG
500 TABLET ORAL
Refills: 0 | Status: DISCONTINUED | OUTPATIENT
Start: 2024-03-09 | End: 2024-03-15

## 2024-03-09 RX ORDER — GLUCAGON INJECTION, SOLUTION 0.5 MG/.1ML
1 INJECTION, SOLUTION SUBCUTANEOUS ONCE
Refills: 0 | Status: DISCONTINUED | OUTPATIENT
Start: 2024-03-09 | End: 2024-03-20

## 2024-03-09 RX ADMIN — HYDROMORPHONE HYDROCHLORIDE 0.5 MILLIGRAM(S): 2 INJECTION INTRAMUSCULAR; INTRAVENOUS; SUBCUTANEOUS at 01:03

## 2024-03-09 RX ADMIN — Medication 2: at 08:31

## 2024-03-09 RX ADMIN — Medication 1 APPLICATORFUL: at 11:08

## 2024-03-09 RX ADMIN — Medication 4: at 22:24

## 2024-03-09 RX ADMIN — FLUCONAZOLE 200 MILLIGRAM(S): 150 TABLET ORAL at 02:08

## 2024-03-09 RX ADMIN — Medication 6: at 10:54

## 2024-03-09 RX ADMIN — SODIUM CHLORIDE 100 MILLILITER(S): 9 INJECTION INTRAMUSCULAR; INTRAVENOUS; SUBCUTANEOUS at 21:53

## 2024-03-09 RX ADMIN — Medication 100 MILLIGRAM(S): at 01:13

## 2024-03-09 RX ADMIN — Medication 6: at 16:31

## 2024-03-09 RX ADMIN — LOSARTAN POTASSIUM 50 MILLIGRAM(S): 100 TABLET, FILM COATED ORAL at 09:36

## 2024-03-09 RX ADMIN — Medication 100 MILLIGRAM(S): at 06:43

## 2024-03-09 RX ADMIN — HYDROMORPHONE HYDROCHLORIDE 0.5 MILLIGRAM(S): 2 INJECTION INTRAMUSCULAR; INTRAVENOUS; SUBCUTANEOUS at 02:03

## 2024-03-09 RX ADMIN — Medication 500 MILLIGRAM(S): at 17:08

## 2024-03-09 RX ADMIN — ENOXAPARIN SODIUM 40 MILLIGRAM(S): 100 INJECTION SUBCUTANEOUS at 09:37

## 2024-03-09 RX ADMIN — Medication 20 MILLIGRAM(S): at 01:13

## 2024-03-09 NOTE — H&P ADULT - NSHPSOURCEINFORD_GEN_ALL_CORE
TRANSFER - OUT REPORT:    Verbal report given to receiving nurse [facility] room 366 on Mynor Pérez  being transferred to [facility] Med/Surg room 366 for routine progression of patient care       Report consisted of patient's Situation, Background, Assessment and   Recommendations(SBAR). Information from the following report(s) Nurse Handoff Report was reviewed with the receiving nurse. Lines:   Peripheral IV 12/22/23 Left Antecubital (Active)   Site Assessment Dry;Leaking;Positional 12/30/23 0645   Line Status Flushed;Leaking 12/30/23 0645   Line Care Connections checked and tightened 12/30/23 0645   Phlebitis Assessment No symptoms 12/30/23 0645   Infiltration Assessment 0 12/30/23 0645   Alcohol Cap Used No 12/30/23 0645   Dressing Status Dry; Old drainage noted 12/30/23 0645   Dressing Type Transparent 12/30/23 0645       Peripheral IV 12/30/23 Right;Dorsal Wrist (Active)   Site Assessment Clean, dry & intact 12/30/23 8945   Line Status Flushed; Infusing 12/30/23 0651   Line Care Connections checked and tightened 12/30/23 0651   Phlebitis Assessment No symptoms 12/30/23 0651   Infiltration Assessment 0 12/30/23 0651   Alcohol Cap Used No 12/30/23 0651   Dressing Status Clean, dry & intact 12/30/23 0651   Dressing Type Transparent 12/30/23 0651   Dressing Intervention New 12/30/23 0651        Opportunity for questions and clarification was provided. Patient transported with:  [employer]  Patient transported via stretcher by [employer] transport. Patient

## 2024-03-09 NOTE — H&P ADULT - NSHPPHYSICALEXAM_GEN_ALL_CORE
Vital Signs Last 24 Hrs  T(C): 36.8 (09 Mar 2024 00:44), Max: 36.8 (08 Mar 2024 20:30)  T(F): 98.2 (09 Mar 2024 00:44), Max: 98.2 (08 Mar 2024 20:30)  HR: 100 (09 Mar 2024 00:44) (97 - 110)  BP: 151/83 (09 Mar 2024 00:44) (122/81 - 158/95)  BP(mean): 95 (08 Mar 2024 23:40) (95 - 95)  RR: 18 (09 Mar 2024 00:44) (16 - 18)  SpO2: 98% (09 Mar 2024 00:44) (97% - 99%)    Parameters below as of 09 Mar 2024 00:44  Patient On (Oxygen Delivery Method): room air        General:  A&Ox3,Appears stated age, No acute distress,  Head: NC/AT  EENT: PERRLA. EOMI. Conjunctiva and sclera clear. Pharynx clear.  Neck: Supple. No JVD  Lungs: CTA B/l. Nonlabored Respirations  CV: +S1S2, RRR  Abdomen: Soft, Nondistended,  Nontender, no guarding, no rebound  : white discharge, tender to palpation, no rash, areas of excoriations/small ulcers in inner thigh.  Extremities: Warm and well perfused. 2+ peripheral pulses b/l. Calf soft, nontender b/l. No pedal edema.

## 2024-03-09 NOTE — H&P ADULT - NSHPLABSRESULTS_GEN_ALL_CORE
03-08    142  |  107  |  11  ----------------------------<  81  3.3<L>   |  30  |  1.28    Ca    8.4<L>      08 Mar 2024 20:13  Mg     1.6     03-08    TPro  7.2  /  Alb  2.5<L>  /  TBili  0.7  /  DBili  x   /  AST  23  /  ALT  18  /  AlkPhos  63  03-08                          11.4   8.06  )-----------( 99       ( 08 Mar 2024 20:13 )             34.6     LIVER FUNCTIONS - ( 08 Mar 2024 20:13 )  Alb: 2.5 g/dL / Pro: 7.2 gm/dL / ALK PHOS: 63 U/L / ALT: 18 U/L / AST: 23 U/L / GGT: x

## 2024-03-09 NOTE — H&P ADULT - HISTORY OF PRESENT ILLNESS
64 years old patient with PMHX of HTN, HLD, DM II, Lupus, OA and S/P left third digit toe amputation presents to ED with generalized malaise and vaginal itch X1day. Pt admitted with generalized weakness, vaginal (rash) and vaginal discomfort. pt denies fever, chills, dysuria, abdominal pain, chest pain, SOB, and N/V. Off note, patient has been taking chronic steroids and antibiotics for lupus and chronic UTI. No other complaints at this time.

## 2024-03-09 NOTE — H&P ADULT - ASSESSMENT
64 years old patient with PMHX of HTN, HLD, DM II, Lupus, OA and S/P left third digit toe amputation presents to ED with generalized malaise and vaginal itch X1day. Pt admitted with generalized weakness, vaginal (rash) and vaginal discomfort.  Off note, patient has been taking chronic steroids and antibiotics for lupus and chronic UTI. AVSS, no leucocytosis,    1. UTI  Started on IV Ceftriaxone 1G q24    2Lupus Flare  Predison 20mg q24 Hrs      3 Vaginal Candidiasis  Fluconazol 200mg IVPB,   Pain controll prn     4 R/O Trichomonas  Flagyl 500mg q8hrs  Follow up vaginal/genital swab    5 DM  ISS  POC glucose  Hgba1C in am    6 HTN  Stable   Start PO home meds    7.Hypokalemia   KCL 40meq given at ED  Follow up labs     8.HLD  Continue home meds    9. Others  DVT ppx

## 2024-03-09 NOTE — PATIENT PROFILE ADULT - FALL HARM RISK - HARM RISK INTERVENTIONS
Assistance with ambulation/Communicate Risk of Fall with Harm to all staff/Monitor gait and stability/Reinforce activity limits and safety measures with patient and family/Review medications for side effects contributing to fall risk/Sit up slowly, dangle for a short time, stand at bedside before walking/Tailored Fall Risk Interventions/Use of alarms - bed, chair and/or voice tab/Visual Cue: Yellow wristband and red socks/Bed in lowest position, wheels locked, appropriate side rails in place/Call bell, personal items and telephone in reach/Instruct patient to call for assistance before getting out of bed or chair/Non-slip footwear when patient is out of bed/[place] to call system/Physically safe environment - no spills, clutter or unnecessary equipment/Purposeful Proactive Rounding/Room/bathroom lighting operational, light cord in reach

## 2024-03-10 ENCOUNTER — TRANSCRIPTION ENCOUNTER (OUTPATIENT)
Age: 64
End: 2024-03-10

## 2024-03-10 LAB
ALBUMIN SERPL ELPH-MCNC: 2.2 G/DL — LOW (ref 3.3–5)
ALP SERPL-CCNC: 56 U/L — SIGNIFICANT CHANGE UP (ref 40–120)
ALT FLD-CCNC: 12 U/L — SIGNIFICANT CHANGE UP (ref 12–78)
ANION GAP SERPL CALC-SCNC: 8 MMOL/L — SIGNIFICANT CHANGE UP (ref 5–17)
AST SERPL-CCNC: 17 U/L — SIGNIFICANT CHANGE UP (ref 15–37)
BILIRUB SERPL-MCNC: 0.6 MG/DL — SIGNIFICANT CHANGE UP (ref 0.2–1.2)
BUN SERPL-MCNC: 11 MG/DL — SIGNIFICANT CHANGE UP (ref 7–23)
CALCIUM SERPL-MCNC: 7.4 MG/DL — LOW (ref 8.5–10.1)
CHLORIDE SERPL-SCNC: 111 MMOL/L — HIGH (ref 96–108)
CHOLEST SERPL-MCNC: 194 MG/DL — SIGNIFICANT CHANGE UP
CO2 SERPL-SCNC: 22 MMOL/L — SIGNIFICANT CHANGE UP (ref 22–31)
CREAT SERPL-MCNC: 0.97 MG/DL — SIGNIFICANT CHANGE UP (ref 0.5–1.3)
CULTURE RESULTS: SIGNIFICANT CHANGE UP
EGFR: 66 ML/MIN/1.73M2 — SIGNIFICANT CHANGE UP
GLUCOSE BLDC GLUCOMTR-MCNC: 137 MG/DL — HIGH (ref 70–99)
GLUCOSE BLDC GLUCOMTR-MCNC: 150 MG/DL — HIGH (ref 70–99)
GLUCOSE BLDC GLUCOMTR-MCNC: 155 MG/DL — HIGH (ref 70–99)
GLUCOSE BLDC GLUCOMTR-MCNC: 210 MG/DL — HIGH (ref 70–99)
GLUCOSE SERPL-MCNC: 154 MG/DL — HIGH (ref 70–99)
HCT VFR BLD CALC: 29.9 % — LOW (ref 34.5–45)
HDLC SERPL-MCNC: 31 MG/DL — LOW
HGB BLD-MCNC: 9.9 G/DL — LOW (ref 11.5–15.5)
LIPID PNL WITH DIRECT LDL SERPL: 134 MG/DL — HIGH
MAGNESIUM SERPL-MCNC: 1.4 MG/DL — LOW (ref 1.6–2.6)
MCHC RBC-ENTMCNC: 27 PG — SIGNIFICANT CHANGE UP (ref 27–34)
MCHC RBC-ENTMCNC: 33.1 G/DL — SIGNIFICANT CHANGE UP (ref 32–36)
MCV RBC AUTO: 81.5 FL — SIGNIFICANT CHANGE UP (ref 80–100)
NON HDL CHOLESTEROL: 163 MG/DL — HIGH
NRBC # BLD: 0 /100 WBCS — SIGNIFICANT CHANGE UP (ref 0–0)
PHOSPHATE SERPL-MCNC: 2.2 MG/DL — LOW (ref 2.5–4.5)
PLATELET # BLD AUTO: 96 K/UL — LOW (ref 150–400)
POTASSIUM SERPL-MCNC: 3.2 MMOL/L — LOW (ref 3.5–5.3)
POTASSIUM SERPL-SCNC: 3.2 MMOL/L — LOW (ref 3.5–5.3)
PROT SERPL-MCNC: 6 GM/DL — SIGNIFICANT CHANGE UP (ref 6–8.3)
RBC # BLD: 3.67 M/UL — LOW (ref 3.8–5.2)
RBC # FLD: 13.6 % — SIGNIFICANT CHANGE UP (ref 10.3–14.5)
SODIUM SERPL-SCNC: 141 MMOL/L — SIGNIFICANT CHANGE UP (ref 135–145)
SPECIMEN SOURCE: SIGNIFICANT CHANGE UP
TRIGL SERPL-MCNC: 162 MG/DL — HIGH
WBC # BLD: 6.1 K/UL — SIGNIFICANT CHANGE UP (ref 3.8–10.5)
WBC # FLD AUTO: 6.1 K/UL — SIGNIFICANT CHANGE UP (ref 3.8–10.5)

## 2024-03-10 PROCEDURE — 99232 SBSQ HOSP IP/OBS MODERATE 35: CPT

## 2024-03-10 RX ORDER — SODIUM,POTASSIUM PHOSPHATES 278-250MG
1 POWDER IN PACKET (EA) ORAL EVERY 4 HOURS
Refills: 0 | Status: COMPLETED | OUTPATIENT
Start: 2024-03-10 | End: 2024-03-10

## 2024-03-10 RX ORDER — ACETAMINOPHEN 500 MG
650 TABLET ORAL EVERY 6 HOURS
Refills: 0 | Status: DISCONTINUED | OUTPATIENT
Start: 2024-03-10 | End: 2024-03-20

## 2024-03-10 RX ORDER — LOSARTAN POTASSIUM 100 MG/1
1 TABLET, FILM COATED ORAL
Qty: 0 | Refills: 0 | DISCHARGE
Start: 2024-03-10

## 2024-03-10 RX ORDER — MAGNESIUM SULFATE 500 MG/ML
1 VIAL (ML) INJECTION ONCE
Refills: 0 | Status: COMPLETED | OUTPATIENT
Start: 2024-03-10 | End: 2024-03-10

## 2024-03-10 RX ORDER — METRONIDAZOLE 500 MG
1 TABLET ORAL
Qty: 10 | Refills: 0
Start: 2024-03-10 | End: 2024-03-14

## 2024-03-10 RX ORDER — FLUCONAZOLE 150 MG/1
150 TABLET ORAL ONCE
Refills: 0 | Status: COMPLETED | OUTPATIENT
Start: 2024-03-10 | End: 2024-03-10

## 2024-03-10 RX ORDER — METFORMIN HYDROCHLORIDE 850 MG/1
1 TABLET ORAL
Qty: 60 | Refills: 0
Start: 2024-03-10 | End: 2024-04-08

## 2024-03-10 RX ADMIN — Medication 20 MILLIGRAM(S): at 06:12

## 2024-03-10 RX ADMIN — Medication 650 MILLIGRAM(S): at 16:15

## 2024-03-10 RX ADMIN — HYDROMORPHONE HYDROCHLORIDE 0.5 MILLIGRAM(S): 2 INJECTION INTRAMUSCULAR; INTRAVENOUS; SUBCUTANEOUS at 14:52

## 2024-03-10 RX ADMIN — HYDROMORPHONE HYDROCHLORIDE 0.5 MILLIGRAM(S): 2 INJECTION INTRAMUSCULAR; INTRAVENOUS; SUBCUTANEOUS at 15:52

## 2024-03-10 RX ADMIN — Medication 1 PACKET(S): at 14:27

## 2024-03-10 RX ADMIN — SODIUM CHLORIDE 100 MILLILITER(S): 9 INJECTION INTRAMUSCULAR; INTRAVENOUS; SUBCUTANEOUS at 11:57

## 2024-03-10 RX ADMIN — Medication 100 GRAM(S): at 10:26

## 2024-03-10 RX ADMIN — LOSARTAN POTASSIUM 50 MILLIGRAM(S): 100 TABLET, FILM COATED ORAL at 06:12

## 2024-03-10 RX ADMIN — FLUCONAZOLE 150 MILLIGRAM(S): 150 TABLET ORAL at 14:27

## 2024-03-10 RX ADMIN — Medication 2: at 08:01

## 2024-03-10 RX ADMIN — Medication 4: at 16:44

## 2024-03-10 RX ADMIN — Medication 1 PACKET(S): at 10:26

## 2024-03-10 RX ADMIN — Medication 650 MILLIGRAM(S): at 17:15

## 2024-03-10 RX ADMIN — HYDROMORPHONE HYDROCHLORIDE 0.5 MILLIGRAM(S): 2 INJECTION INTRAMUSCULAR; INTRAVENOUS; SUBCUTANEOUS at 20:41

## 2024-03-10 RX ADMIN — ENOXAPARIN SODIUM 40 MILLIGRAM(S): 100 INJECTION SUBCUTANEOUS at 11:57

## 2024-03-10 RX ADMIN — HYDROMORPHONE HYDROCHLORIDE 0.5 MILLIGRAM(S): 2 INJECTION INTRAMUSCULAR; INTRAVENOUS; SUBCUTANEOUS at 20:11

## 2024-03-10 RX ADMIN — Medication 650 MILLIGRAM(S): at 06:11

## 2024-03-10 RX ADMIN — Medication 650 MILLIGRAM(S): at 06:41

## 2024-03-10 RX ADMIN — Medication 500 MILLIGRAM(S): at 06:11

## 2024-03-10 RX ADMIN — Medication 500 MILLIGRAM(S): at 18:07

## 2024-03-10 RX ADMIN — Medication 1 APPLICATORFUL: at 11:53

## 2024-03-10 NOTE — DISCHARGE NOTE PROVIDER - ATTENDING DISCHARGE PHYSICAL EXAMINATION:
General:  A&Ox3,Appears stated age, No acute distress,  Head: NC/AT  EENT: PERRLA. EOMI. Conjunctiva and sclera clear. Pharynx clear.  Neck: Supple. No JVD  Lungs: CTA B/l. Nonlabored Respirations  CV: +S1S2, RRR  Abdomen: Soft, Nondistended,  Nontender, no guarding, no rebound  : white discharge, tender to palpation, no rash, areas of excoriations/small ulcers in inner thigh.  Extremities: Warm and well perfused. 2+ peripheral pulses b/l. Calf soft, nontender b/l. No pedal edema.

## 2024-03-10 NOTE — DISCHARGE NOTE PROVIDER - HOSPITAL COURSE
64 years old patient with PMHX of HTN, HLD, DM II, Lupus, OA and S/P left third digit toe amputation presents to ED with generalized malaise and vaginal itch X1day. Pt admitted with generalized weakness, vaginal (rash) and vaginal discomfort.  Off note, patient has been taking chronic steroids and antibiotics for lupus and chronic UTI now stable for discharge home with outpatient follow up.     1. UTI  Started on IV Ceftriaxone 1G q24. Transitioned to po to complete 3 day course.    2Lupus Flare  Predison 20mg q24 Hrs      3 Vaginal Candidiasis  Fluconazol 200mg IVPB. D/C  clotrimazole  Pain controll prn     4 R/O Trichomonas  Flagyl 500mg q8hrs for 7 days    5 DM  ISS  POC glucose  Hgba1C:10.8  Endocrine follow up  Started on metformin 500 mg po bid    6 HTN  Stable   Start PO home meds    7.Hypokalemia   KCL 40meq given at ED  Follow up labs     8.HLD  Continue home meds    9. Others  DVT ppx

## 2024-03-10 NOTE — DISCHARGE NOTE NURSING/CASE MANAGEMENT/SOCIAL WORK - NSDCPEFALRISK_GEN_ALL_CORE
For information on Fall & Injury Prevention, visit: [email or web address] OR  [email or web address] OR  https://www.cdc.gov/steadi/patient.html

## 2024-03-10 NOTE — DISCHARGE NOTE NURSING/CASE MANAGEMENT/SOCIAL WORK - NSDCFUADDAPPT_GEN_ALL_CORE_FT
APPTS ARE READY TO BE MADE: [X] YES    Best Family or Patient Contact (if needed):    Additional Information about above appointments (if needed):    1: PCP  2: Endocinologist  3:     Other comments or requests:

## 2024-03-10 NOTE — DISCHARGE NOTE PROVIDER - NSDCFUADDAPPT_GEN_ALL_CORE_FT
APPTS ARE READY TO BE MADE: [X] YES    Best Family or Patient Contact (if needed):    Additional Information about above appointments (if needed):    1: PCP  2: Endocinologist  3:     Other comments or requests:    APPTS ARE READY TO BE MADE: [X] YES    Best Family or Patient Contact (if needed):    Additional Information about above appointments (if needed):    1: PCP  2: Endocinologist  3:     Other comments or requests:   Patient advised they did not want to proceed with scheduling appointments with the providers on their referrals. They will coordinate care on their own.

## 2024-03-10 NOTE — DISCHARGE NOTE PROVIDER - NSDCMRMEDTOKEN_GEN_ALL_CORE_FT
cholecalciferol oral tablet: 1000 unit(s) orally once a day  clotrimazole 2% vaginal cream with applicator: 1 applicatorful vaginal once a day  heparin: 5,000 unit(s) subcutaneous every 8 hours  hydroxychloroquine 200 mg oral tablet: 1 tab(s) orally 2 times a day  insulin glargine 100 units/mL subcutaneous solution: 30 unit(s) subcutaneous once a day (at bedtime)  insulin lispro 100 units/mL injectable solution: 8 unit(s) subcutaneous 3 times a day (before meals)  Left CAM Walker Boot: Please provide left CAM walker boot  Ind: To wear as instructed  Dx: Left talus fracture  loratadine 10 mg oral tablet: 1 tab(s) orally once a day  losartan 50 mg oral tablet: 1 tab(s) orally once a day  metFORMIN 500 mg oral tablet: 1 tab(s) orally 2 times a day  metroNIDAZOLE 500 mg oral tablet: 1 tab(s) orally 2 times a day  mycophenolate mofetil 250 mg oral capsule: 6 cap(s) orally 2 times a day  oxyCODONE 5 mg oral tablet: 1 tab(s) orally every 6 hours as needed for  severe pain max daily dosing 4 tab  predniSONE 20 mg oral tablet: 1 tab(s) orally once a day  Protonix 40 mg oral delayed release tablet: 1 tab(s) orally once a day   tamsulosin 0.4 mg oral capsule: 1 cap(s) orally once a day (at bedtime)   cholecalciferol oral tablet: 1000 unit(s) orally once a day  clotrimazole 2% vaginal cream with applicator: 1 applicatorful vaginal once a day  hydroxychloroquine 200 mg oral tablet: 1 tab(s) orally 2 times a day  insulin glargine 100 units/mL subcutaneous solution: 30 unit(s) subcutaneous once a day (at bedtime)  Left CAM Walker Boot: Please provide left CAM walker boot  Ind: To wear as instructed  Dx: Left talus fracture  loratadine 10 mg oral tablet: 1 tab(s) orally once a day  losartan 50 mg oral tablet: 1 tab(s) orally once a day  metFORMIN 500 mg oral tablet: 1 tab(s) orally 2 times a day  metroNIDAZOLE 500 mg oral tablet: 1 tab(s) orally 2 times a day  mycophenolate mofetil 250 mg oral capsule: 6 cap(s) orally 2 times a day  oxyCODONE 5 mg oral tablet: 1 tab(s) orally every 6 hours as needed for  severe pain max daily dosing 4 tab  predniSONE 20 mg oral tablet: 1 tab(s) orally once a day  Protonix 40 mg oral delayed release tablet: 1 tab(s) orally once a day   tamsulosin 0.4 mg oral capsule: 1 cap(s) orally once a day (at bedtime)

## 2024-03-10 NOTE — DISCHARGE NOTE NURSING/CASE MANAGEMENT/SOCIAL WORK - PATIENT PORTAL LINK FT
You can access the FollowMyHealth Patient Portal offered by [facility] by registering at the following website: [email or web address]. By joining [employer]’s FollowMyHealth portal, you will also be able to view your health information using other applications (apps) compatible with our system.

## 2024-03-10 NOTE — DISCHARGE NOTE PROVIDER - DETAILS OF MALNUTRITION DIAGNOSIS/DIAGNOSES
This patient has been assessed with a concern for Malnutrition and was treated during this hospitalization for the following Nutrition diagnosis/diagnoses:     -  03/11/2024: Severe protein-calorie malnutrition

## 2024-03-11 DIAGNOSIS — E11.65 TYPE 2 DIABETES MELLITUS WITH HYPERGLYCEMIA: ICD-10-CM

## 2024-03-11 LAB
GLUCOSE BLDC GLUCOMTR-MCNC: 219 MG/DL — HIGH (ref 70–99)
GLUCOSE BLDC GLUCOMTR-MCNC: 277 MG/DL — HIGH (ref 70–99)
GLUCOSE BLDC GLUCOMTR-MCNC: 280 MG/DL — HIGH (ref 70–99)
GLUCOSE BLDC GLUCOMTR-MCNC: 284 MG/DL — HIGH (ref 70–99)

## 2024-03-11 PROCEDURE — 99232 SBSQ HOSP IP/OBS MODERATE 35: CPT

## 2024-03-11 RX ORDER — NYSTATIN CREAM 100000 [USP'U]/G
1 CREAM TOPICAL
Refills: 0 | Status: DISCONTINUED | OUTPATIENT
Start: 2024-03-11 | End: 2024-03-20

## 2024-03-11 RX ADMIN — Medication 6: at 22:15

## 2024-03-11 RX ADMIN — Medication 6: at 11:18

## 2024-03-11 RX ADMIN — Medication 20 MILLIGRAM(S): at 06:15

## 2024-03-11 RX ADMIN — SODIUM CHLORIDE 100 MILLILITER(S): 9 INJECTION INTRAMUSCULAR; INTRAVENOUS; SUBCUTANEOUS at 16:43

## 2024-03-11 RX ADMIN — Medication 4: at 08:17

## 2024-03-11 RX ADMIN — Medication 1 APPLICATORFUL: at 11:19

## 2024-03-11 RX ADMIN — SODIUM CHLORIDE 100 MILLILITER(S): 9 INJECTION INTRAMUSCULAR; INTRAVENOUS; SUBCUTANEOUS at 17:35

## 2024-03-11 RX ADMIN — Medication 6: at 16:44

## 2024-03-11 RX ADMIN — Medication 650 MILLIGRAM(S): at 01:30

## 2024-03-11 RX ADMIN — Medication 500 MILLIGRAM(S): at 17:34

## 2024-03-11 RX ADMIN — Medication 500 MILLIGRAM(S): at 06:21

## 2024-03-11 RX ADMIN — ENOXAPARIN SODIUM 40 MILLIGRAM(S): 100 INJECTION SUBCUTANEOUS at 11:19

## 2024-03-11 RX ADMIN — NYSTATIN CREAM 1 APPLICATION(S): 100000 CREAM TOPICAL at 17:35

## 2024-03-11 RX ADMIN — LOSARTAN POTASSIUM 50 MILLIGRAM(S): 100 TABLET, FILM COATED ORAL at 06:15

## 2024-03-11 RX ADMIN — Medication 650 MILLIGRAM(S): at 02:30

## 2024-03-11 NOTE — CONSULT NOTE ADULT - PROBLEM SELECTOR RECOMMENDATION 9
Patient has uncontrolled diabetes at home secondary to 20 mg of prednisone that he is taking.  Came in with hyperglycemia but has been under good control when put on lispro coverage.  Currently nutrition is poor.  As p.o. feeds increase, patient will have increasing blood glucose then low-dose Lantus and possibly metformin low-dose (specially if there are no contraindications currently as tests may be being done), can be added back.  Goal is to have fingersticks 100-180.  Mainly steroid driven hyperglycemia and poor control of diabetes at home.  Depending on how long the steroids will be continued (patient has lupus), his insulin treatment may need to be intensified at home  Thank you for the courtesy of this consultation

## 2024-03-11 NOTE — DIETITIAN INITIAL EVALUATION ADULT - ETIOLOGY
Inadequate protein-energy intake and impaired nutrient utilization secondary to uncontrolled DM, Lupus

## 2024-03-11 NOTE — DIETITIAN NUTRITION RISK NOTIFICATION - TREATMENT: THE FOLLOWING DIET HAS BEEN RECOMMENDED
Diet, Consistent Carbohydrate w/Evening Snack:   Low Sodium  Supplement Feeding Modality:  Oral  Glucerna Shake Cans or Servings Per Day:  1       Frequency:  Two Times a day (03-11-24 @ 13:58) [Pending Verification By Attending]  Diet, DASH/TLC:   Sodium & Cholesterol Restricted (03-09-24 @ 00:08) [Active]

## 2024-03-11 NOTE — DIETITIAN INITIAL EVALUATION ADULT - ADD RECOMMEND
1. Monitor and replete electrolytes as needed   2. Continue to provide assistance and encouragement with PO intake

## 2024-03-11 NOTE — DIETITIAN INITIAL EVALUATION ADULT - PERTINENT LABORATORY DATA
03-10    141  |  111<H>  |  11  ----------------------------<  154<H>  3.2<L>   |  22  |  0.97    Ca    7.4<L>      10 Mar 2024 07:11  Phos  2.2     03-10  Mg     1.4     03-10    TPro  6.0  /  Alb  2.2<L>  /  TBili  0.6  /  DBili  x   /  AST  17  /  ALT  12  /  AlkPhos  56  03-10  POCT Blood Glucose.: 277 mg/dL (03-11-24 @ 11:13)  A1C with Estimated Average Glucose Result: 10.8 % (03-09-24 @ 06:40)  A1C with Estimated Average Glucose Result: 10.1 % (04-23-23 @ 06:56)

## 2024-03-11 NOTE — CONSULT NOTE ADULT - SUBJECTIVE AND OBJECTIVE BOX
Patient is a 64y old  Female who presents with a chief complaint of UTI (10 Mar 2024 12:18)      Reason For Consult:     HPI:  64 years old patient with PMHX of HTN, HLD, DM II, Lupus, OA and S/P left third digit toe amputation presents to ED with generalized malaise and vaginal itch X1day. Pt admitted with generalized weakness, vaginal (rash) and vaginal discomfort. pt denies fever, chills, dysuria, abdominal pain, chest pain, SOB, and N/V. Off note, patient has been taking chronic steroids and antibiotics for lupus and chronic UTI. No other complaints at this time.  (09 Mar 2024 01:20)  Patient has uncontrolled diabetes at home with HbA1c 10.6.  Creatinine is normal.  Came in with hyperglycemia with blood glucose in mid 200s.  Just on sliding scale lispro coverage and fingersticks are already in the 100s.  Nutrition not adequate.  On prednisone 20 mg at home and also on 30 units of Lantus 8 units of prandial lispro and metformin 500 mg twice a day    PAST MEDICAL & SURGICAL HISTORY:  Lupus      Diabetes mellitus      Hyperlipidemia      Pericarditis      Obesity      HTN (hypertension)      Hypertension       delivery delivered   section x 3      Skin tag  Skin tag removal, right shoulder      H/O:           FAMILY HISTORY:  Family history of coronary artery disease          Social History:    MEDICATIONS  (STANDING):  clotrimazole 2% Vaginal Cream 1 Applicatorful Vaginal daily  dextrose 5%. 1000 milliLiter(s) (100 mL/Hr) IV Continuous <Continuous>  dextrose 5%. 1000 milliLiter(s) (50 mL/Hr) IV Continuous <Continuous>  dextrose 50% Injectable 25 Gram(s) IV Push once  dextrose 50% Injectable 25 Gram(s) IV Push once  dextrose 50% Injectable 12.5 Gram(s) IV Push once  enoxaparin Injectable 40 milliGRAM(s) SubCutaneous every 24 hours  glucagon  Injectable 1 milliGRAM(s) IntraMuscular once  influenza   Vaccine 0.5 milliLiter(s) IntraMuscular once  insulin lispro (ADMELOG) corrective regimen sliding scale   SubCutaneous Before meals and at bedtime  losartan 50 milliGRAM(s) Oral daily  metroNIDAZOLE    Tablet 500 milliGRAM(s) Oral two times a day  predniSONE   Tablet 20 milliGRAM(s) Oral daily  sodium chloride 0.9%. 1000 milliLiter(s) (100 mL/Hr) IV Continuous <Continuous>    MEDICATIONS  (PRN):  acetaminophen     Tablet .. 650 milliGRAM(s) Oral every 6 hours PRN Temp greater or equal to 38C (100.4F), Mild Pain (1 - 3)  dextrose Oral Gel 15 Gram(s) Oral once PRN Blood Glucose LESS THAN 70 milliGRAM(s)/deciliter  HYDROmorphone  Injectable 0.5 milliGRAM(s) IV Push every 4 hours PRN Moderate Pain (4 - 6)  ondansetron Injectable 4 milliGRAM(s) IV Push every 6 hours PRN Nausea      REVIEW OF SYSTEMS:    T(C): 36.6 (24 @ 06:01), Max: 38.3 (03-10-24 @ 16:32)  HR: 99 (24 @ 06:01) (96 - 102)  BP: 104/67 (24 @ 06:01) (104/67 - 175/96)  RR: 18 (24 @ 06:01) (18 - 18)  SpO2: 98% (24 @ 06:01) (98% - 99%)  Wt(kg): --    PHYSICAL EXAM:  GENERAL: NAD, well-groomed, well-developed  HEAD:  Atraumatic, Normocephalic  EYES: PERRLA, conjunctiva and sclera clear  ENMT: No  exudates,, Moist mucous membranes,, No lesions  NECK: Supple, No JVD,   NERVOUS SYSTEM:  Alert & Oriented   CHEST/LUNG: Clear to percussion bilaterally; No rales, rhonchi, wheezing, or rubs  HEART: Regular rate and rhythm; No murmurs, rubs, or gallops  EXTREMITIES:s/p finger amputation     CAPILLARY BLOOD GLUCOSE      POCT Blood Glucose.: 219 mg/dL (11 Mar 2024 07:57)  POCT Blood Glucose.: 137 mg/dL (10 Mar 2024 21:16)  POCT Blood Glucose.: 210 mg/dL (10 Mar 2024 16:26)  POCT Blood Glucose.: 150 mg/dL (10 Mar 2024 11:12)                            9.9    6.10  )-----------( 96       ( 10 Mar 2024 07:11 )             29.9       CMP:  03-10 @ 07:11  SGPT 12  Albumin 2.2   Alk Phos 56   Anion Gap 8   SGOT 17   Total Bili 0.6   BUN 11   Calcium Total 7.4   CO2 22   Chloride 111   Creatinine 0.97   eGFR if AA --   eGFR if non AA --   Glucose 154   Potassium 3.2   Protein 6.0   Sodium 141      Thyroid Function Tests:      Diabetes Tests:     Parathyroids:     Adrenals:       Radiology:

## 2024-03-11 NOTE — DIETITIAN INITIAL EVALUATION ADULT - NSFNSGIIOFT_GEN_A_CORE
03-10-24 @ 07:01  -  03-11-24 @ 07:00  --------------------------------------------------------  OUT:    Stool (mL): 2 mL  Total OUT: 2 mL    Total NET: -2 mL

## 2024-03-11 NOTE — DIETITIAN INITIAL EVALUATION ADULT - ORAL INTAKE PTA/DIET HISTORY
Pt reports poor appetite and PO intake x 2 weeks PTA. Reports no diet restrictions at home. STates she consumes 2 meals/day PTA and shares her granddaughters snacks with her.

## 2024-03-11 NOTE — DIETITIAN INITIAL EVALUATION ADULT - OTHER INFO
Denies difficulty chewing/swallowing. Reports unsure of weight changes. Per previous RD note pt with weight loss as noted below.  Pt with T2DM; reports insulin and Metformin PTA. Pt noted on chronic steroid medications PTA- likely contributing to hyperglycemia. Reports self monitors blood glucose daily at home. HbA1c 10.8% indicates poor blood glucose management. Endo following.  Pt reports having received nutrition education on diabetes in the past. Reinforced need for consistent carbohydrate intake. Discussed optimizing PO intake by consuming protein foods first; pt amenable to receiving Glucerna x 2/day (provides 440 kcal, 20 g protein). Made aware RD remains available.

## 2024-03-11 NOTE — DIETITIAN INITIAL EVALUATION ADULT - PERTINENT MEDS FT
MEDICATIONS  (STANDING):  dextrose 5%. 1000 milliLiter(s) (50 mL/Hr) IV Continuous <Continuous>  dextrose 5%. 1000 milliLiter(s) (100 mL/Hr) IV Continuous <Continuous>  dextrose 50% Injectable 25 Gram(s) IV Push once  dextrose 50% Injectable 12.5 Gram(s) IV Push once  dextrose 50% Injectable 25 Gram(s) IV Push once  enoxaparin Injectable 40 milliGRAM(s) SubCutaneous every 24 hours  glucagon  Injectable 1 milliGRAM(s) IntraMuscular once  influenza   Vaccine 0.5 milliLiter(s) IntraMuscular once  insulin lispro (ADMELOG) corrective regimen sliding scale   SubCutaneous Before meals and at bedtime  losartan 50 milliGRAM(s) Oral daily  metroNIDAZOLE    Tablet 500 milliGRAM(s) Oral two times a day  predniSONE   Tablet 20 milliGRAM(s) Oral daily  sodium chloride 0.9%. 1000 milliLiter(s) (100 mL/Hr) IV Continuous <Continuous>    MEDICATIONS  (PRN):  acetaminophen     Tablet .. 650 milliGRAM(s) Oral every 6 hours PRN Temp greater or equal to 38C (100.4F), Mild Pain (1 - 3)  dextrose Oral Gel 15 Gram(s) Oral once PRN Blood Glucose LESS THAN 70 milliGRAM(s)/deciliter  HYDROmorphone  Injectable 0.5 milliGRAM(s) IV Push every 4 hours PRN Moderate Pain (4 - 6)  ondansetron Injectable 4 milliGRAM(s) IV Push every 6 hours PRN Nausea

## 2024-03-11 NOTE — PHYSICAL THERAPY INITIAL EVALUATION ADULT - GAIT DEVIATIONS NOTED, PT EVAL
decreased maggie/increased time in double stance/decreased stride length/decreased weight-shifting ability

## 2024-03-11 NOTE — PHYSICAL THERAPY INITIAL EVALUATION ADULT - LEVEL OF INDEPENDENCE: GAIT, REHAB EVAL
Caller: Alfonso Posada    Phone:[phone] (home)       Message: pt would like to notify provider she had a bx of her thyroid done on Thursday and is currently waiting for results   Would also like to notify she is currently on her way to the ER.    moderate assist (50% patients effort)

## 2024-03-12 LAB
CULTURE RESULTS: ABNORMAL
GLUCOSE BLDC GLUCOMTR-MCNC: 118 MG/DL — HIGH (ref 70–99)
GLUCOSE BLDC GLUCOMTR-MCNC: 243 MG/DL — HIGH (ref 70–99)
GLUCOSE BLDC GLUCOMTR-MCNC: 278 MG/DL — HIGH (ref 70–99)
GLUCOSE BLDC GLUCOMTR-MCNC: 413 MG/DL — HIGH (ref 70–99)
GLUCOSE BLDC GLUCOMTR-MCNC: 433 MG/DL — HIGH (ref 70–99)
SPECIMEN SOURCE: SIGNIFICANT CHANGE UP

## 2024-03-12 PROCEDURE — 99232 SBSQ HOSP IP/OBS MODERATE 35: CPT

## 2024-03-12 RX ORDER — INSULIN LISPRO 100/ML
10 VIAL (ML) SUBCUTANEOUS
Refills: 0 | Status: DISCONTINUED | OUTPATIENT
Start: 2024-03-12 | End: 2024-03-13

## 2024-03-12 RX ORDER — INSULIN GLARGINE 100 [IU]/ML
10 INJECTION, SOLUTION SUBCUTANEOUS AT BEDTIME
Refills: 0 | Status: DISCONTINUED | OUTPATIENT
Start: 2024-03-12 | End: 2024-03-15

## 2024-03-12 RX ADMIN — NYSTATIN CREAM 1 APPLICATION(S): 100000 CREAM TOPICAL at 18:29

## 2024-03-12 RX ADMIN — Medication 500 MILLIGRAM(S): at 05:44

## 2024-03-12 RX ADMIN — ENOXAPARIN SODIUM 40 MILLIGRAM(S): 100 INJECTION SUBCUTANEOUS at 08:50

## 2024-03-12 RX ADMIN — SODIUM CHLORIDE 100 MILLILITER(S): 9 INJECTION INTRAMUSCULAR; INTRAVENOUS; SUBCUTANEOUS at 17:28

## 2024-03-12 RX ADMIN — Medication 20 MILLIGRAM(S): at 05:44

## 2024-03-12 RX ADMIN — Medication 4: at 16:50

## 2024-03-12 RX ADMIN — Medication 650 MILLIGRAM(S): at 07:05

## 2024-03-12 RX ADMIN — Medication 6: at 08:18

## 2024-03-12 RX ADMIN — INSULIN GLARGINE 10 UNIT(S): 100 INJECTION, SOLUTION SUBCUTANEOUS at 21:55

## 2024-03-12 RX ADMIN — Medication 12: at 12:07

## 2024-03-12 RX ADMIN — Medication 10 UNIT(S): at 12:07

## 2024-03-12 RX ADMIN — Medication 650 MILLIGRAM(S): at 01:44

## 2024-03-12 RX ADMIN — Medication 500 MILLIGRAM(S): at 17:28

## 2024-03-12 RX ADMIN — NYSTATIN CREAM 1 APPLICATION(S): 100000 CREAM TOPICAL at 05:45

## 2024-03-12 RX ADMIN — LOSARTAN POTASSIUM 50 MILLIGRAM(S): 100 TABLET, FILM COATED ORAL at 05:44

## 2024-03-12 RX ADMIN — Medication 10 UNIT(S): at 16:50

## 2024-03-12 NOTE — PHYSICAL THERAPY INITIAL EVALUATION ADULT - ADDITIONAL COMMENTS
States she lives with her daughter. Her bedroom is up stairs with 12 steps to get up and no railing to hold on to. does not use and AD but does own a RW.
States she lives with her daughter. Her bedroom is up stairs with 12 steps to get up and no railing to hold on to. does not use and AD but does own a RW.

## 2024-03-12 NOTE — PHYSICAL THERAPY INITIAL EVALUATION ADULT - IMPAIRMENTS FOUND, PT EVAL
integumentary integrity
Cold/Sinus
aerobic capacity/endurance/gait, locomotion, and balance/muscle strength/ROM

## 2024-03-12 NOTE — PHYSICAL THERAPY INITIAL EVALUATION ADULT - PERTINENT HX OF CURRENT PROBLEM, REHAB EVAL
64 years old patient with PMHX of HTN, HLD, DM II, Lupus, OA and S/P left third digit toe amputation presents to ED with generalized malaise and vaginal itch X1day. Pt admitted with generalized weakness, vaginal (rash) and vaginal discomfort. pt denies fever, chills, dysuria, abdominal pain, chest pain, SOB, and N/V. Off note, patient has been taking chronic steroids and antibiotics for lupus and chronic UTI. No other complaints at this time.  (09 Mar 2024 01:20)  Patient has uncontrolled diabetes at home with HbA1c 10.6.  Creatinine is normal.  Came in with hyperglycemia with blood glucose in mid 200s.  Just on sliding scale lispro coverage and fingersticks are already in the 100s.  Nutrition not adequate.  On prednisone 20 mg at home and also on 30 units of Lantus 8 units of prandial lispro and metformin 500 mg twice a day

## 2024-03-12 NOTE — PHYSICAL THERAPY INITIAL EVALUATION ADULT - GENERAL OBSERVATIONS, REHAB EVAL
pt for wound care assessment, encountered in L sidelying, NAD. Pt presents with areas of excoriation along perianal area as well as clean open wound to R upper posterior thigh. All areas were cleaned R thigh was only area that was re-dressed. Dressing recommendations discussed with RN Mincy. Pt required assistance with rolling for assessment, was left semi-reclined, call bell within reach, NAD.  Wound care P.T. will not be providing daily dressing care to patient. Wound care P.T. can be consulted as needed for wound re-assessments.

## 2024-03-13 DIAGNOSIS — B37.31 ACUTE CANDIDIASIS OF VULVA AND VAGINA: ICD-10-CM

## 2024-03-13 DIAGNOSIS — I10 ESSENTIAL (PRIMARY) HYPERTENSION: ICD-10-CM

## 2024-03-13 DIAGNOSIS — E43 UNSPECIFIED SEVERE PROTEIN-CALORIE MALNUTRITION: ICD-10-CM

## 2024-03-13 DIAGNOSIS — L89.90 PRESSURE ULCER OF UNSPECIFIED SITE, UNSPECIFIED STAGE: ICD-10-CM

## 2024-03-13 LAB
ANION GAP SERPL CALC-SCNC: 10 MMOL/L — SIGNIFICANT CHANGE UP (ref 5–17)
BUN SERPL-MCNC: 9 MG/DL — SIGNIFICANT CHANGE UP (ref 7–23)
CALCIUM SERPL-MCNC: 7.9 MG/DL — LOW (ref 8.5–10.1)
CHLORIDE SERPL-SCNC: 114 MMOL/L — HIGH (ref 96–108)
CO2 SERPL-SCNC: 16 MMOL/L — LOW (ref 22–31)
CREAT SERPL-MCNC: 0.75 MG/DL — SIGNIFICANT CHANGE UP (ref 0.5–1.3)
EGFR: 90 ML/MIN/1.73M2 — SIGNIFICANT CHANGE UP
GLUCOSE BLDC GLUCOMTR-MCNC: 164 MG/DL — HIGH (ref 70–99)
GLUCOSE BLDC GLUCOMTR-MCNC: 165 MG/DL — HIGH (ref 70–99)
GLUCOSE BLDC GLUCOMTR-MCNC: 252 MG/DL — HIGH (ref 70–99)
GLUCOSE BLDC GLUCOMTR-MCNC: 54 MG/DL — CRITICAL LOW (ref 70–99)
GLUCOSE BLDC GLUCOMTR-MCNC: 55 MG/DL — LOW (ref 70–99)
GLUCOSE BLDC GLUCOMTR-MCNC: 75 MG/DL — SIGNIFICANT CHANGE UP (ref 70–99)
GLUCOSE SERPL-MCNC: 116 MG/DL — HIGH (ref 70–99)
HCT VFR BLD CALC: 29.9 % — LOW (ref 34.5–45)
HGB BLD-MCNC: 9.6 G/DL — LOW (ref 11.5–15.5)
MAGNESIUM SERPL-MCNC: 1.7 MG/DL — SIGNIFICANT CHANGE UP (ref 1.6–2.6)
MCHC RBC-ENTMCNC: 26.7 PG — LOW (ref 27–34)
MCHC RBC-ENTMCNC: 32.1 G/DL — SIGNIFICANT CHANGE UP (ref 32–36)
MCV RBC AUTO: 83.3 FL — SIGNIFICANT CHANGE UP (ref 80–100)
NRBC # BLD: 0 /100 WBCS — SIGNIFICANT CHANGE UP (ref 0–0)
PHOSPHATE SERPL-MCNC: 2.4 MG/DL — LOW (ref 2.5–4.5)
PLATELET # BLD AUTO: 165 K/UL — SIGNIFICANT CHANGE UP (ref 150–400)
POTASSIUM SERPL-MCNC: 3.9 MMOL/L — SIGNIFICANT CHANGE UP (ref 3.5–5.3)
POTASSIUM SERPL-SCNC: 3.9 MMOL/L — SIGNIFICANT CHANGE UP (ref 3.5–5.3)
RBC # BLD: 3.59 M/UL — LOW (ref 3.8–5.2)
RBC # FLD: 13.6 % — SIGNIFICANT CHANGE UP (ref 10.3–14.5)
SODIUM SERPL-SCNC: 140 MMOL/L — SIGNIFICANT CHANGE UP (ref 135–145)
WBC # BLD: 6.05 K/UL — SIGNIFICANT CHANGE UP (ref 3.8–10.5)
WBC # FLD AUTO: 6.05 K/UL — SIGNIFICANT CHANGE UP (ref 3.8–10.5)

## 2024-03-13 PROCEDURE — 99232 SBSQ HOSP IP/OBS MODERATE 35: CPT

## 2024-03-13 RX ORDER — SODIUM,POTASSIUM PHOSPHATES 278-250MG
1 POWDER IN PACKET (EA) ORAL
Refills: 0 | Status: COMPLETED | OUTPATIENT
Start: 2024-03-13 | End: 2024-03-15

## 2024-03-13 RX ADMIN — Medication 6: at 11:30

## 2024-03-13 RX ADMIN — LOSARTAN POTASSIUM 50 MILLIGRAM(S): 100 TABLET, FILM COATED ORAL at 05:42

## 2024-03-13 RX ADMIN — NYSTATIN CREAM 1 APPLICATION(S): 100000 CREAM TOPICAL at 05:42

## 2024-03-13 RX ADMIN — Medication 10 UNIT(S): at 08:00

## 2024-03-13 RX ADMIN — Medication 10 UNIT(S): at 11:30

## 2024-03-13 RX ADMIN — Medication 500 MILLIGRAM(S): at 05:42

## 2024-03-13 RX ADMIN — Medication 1 PACKET(S): at 18:40

## 2024-03-13 RX ADMIN — Medication 2: at 16:38

## 2024-03-13 RX ADMIN — Medication 500 MILLIGRAM(S): at 17:10

## 2024-03-13 RX ADMIN — NYSTATIN CREAM 1 APPLICATION(S): 100000 CREAM TOPICAL at 17:11

## 2024-03-13 RX ADMIN — Medication 10 UNIT(S): at 16:40

## 2024-03-13 RX ADMIN — ENOXAPARIN SODIUM 40 MILLIGRAM(S): 100 INJECTION SUBCUTANEOUS at 08:58

## 2024-03-13 RX ADMIN — Medication 20 MILLIGRAM(S): at 05:42

## 2024-03-13 RX ADMIN — Medication 2: at 08:00

## 2024-03-13 NOTE — PROGRESS NOTE ADULT - TIME BILLING
The necessity of the time spent during the encounter on this date of service was due to:   - Ordering, reviewing, and interpreting labs, testing, and imaging.  - Independently obtaining a review of systems and performing a physical exam  - Reviewing prior hospitalization and where necessary, outpatient records.  - Reviewing consultant recommendations/communicating with consultants  - Counselling and educating patient and family regarding interpretation of aforementioned items and plan of care.    Time-based billing (NON-critical care). Total minutes spent:36

## 2024-03-14 LAB
ANION GAP SERPL CALC-SCNC: 12 MMOL/L — SIGNIFICANT CHANGE UP (ref 5–17)
BUN SERPL-MCNC: 10 MG/DL — SIGNIFICANT CHANGE UP (ref 7–23)
CALCIUM SERPL-MCNC: 7.9 MG/DL — LOW (ref 8.5–10.1)
CHLORIDE SERPL-SCNC: 110 MMOL/L — HIGH (ref 96–108)
CO2 SERPL-SCNC: 21 MMOL/L — LOW (ref 22–31)
CREAT SERPL-MCNC: 0.75 MG/DL — SIGNIFICANT CHANGE UP (ref 0.5–1.3)
EGFR: 90 ML/MIN/1.73M2 — SIGNIFICANT CHANGE UP
FLUAV AG NPH QL: SIGNIFICANT CHANGE UP
FLUBV AG NPH QL: SIGNIFICANT CHANGE UP
GLUCOSE BLDC GLUCOMTR-MCNC: 160 MG/DL — HIGH (ref 70–99)
GLUCOSE BLDC GLUCOMTR-MCNC: 177 MG/DL — HIGH (ref 70–99)
GLUCOSE BLDC GLUCOMTR-MCNC: 319 MG/DL — HIGH (ref 70–99)
GLUCOSE BLDC GLUCOMTR-MCNC: 368 MG/DL — HIGH (ref 70–99)
GLUCOSE SERPL-MCNC: 144 MG/DL — HIGH (ref 70–99)
HCT VFR BLD CALC: 30 % — LOW (ref 34.5–45)
HGB BLD-MCNC: 9.6 G/DL — LOW (ref 11.5–15.5)
MAGNESIUM SERPL-MCNC: 1.6 MG/DL — SIGNIFICANT CHANGE UP (ref 1.6–2.6)
MCHC RBC-ENTMCNC: 26.7 PG — LOW (ref 27–34)
MCHC RBC-ENTMCNC: 32 G/DL — SIGNIFICANT CHANGE UP (ref 32–36)
MCV RBC AUTO: 83.6 FL — SIGNIFICANT CHANGE UP (ref 80–100)
NRBC # BLD: 0 /100 WBCS — SIGNIFICANT CHANGE UP (ref 0–0)
PHOSPHATE SERPL-MCNC: 3 MG/DL — SIGNIFICANT CHANGE UP (ref 2.5–4.5)
PLATELET # BLD AUTO: 210 K/UL — SIGNIFICANT CHANGE UP (ref 150–400)
POTASSIUM SERPL-MCNC: 3.4 MMOL/L — LOW (ref 3.5–5.3)
POTASSIUM SERPL-SCNC: 3.4 MMOL/L — LOW (ref 3.5–5.3)
RBC # BLD: 3.59 M/UL — LOW (ref 3.8–5.2)
RBC # FLD: 13.7 % — SIGNIFICANT CHANGE UP (ref 10.3–14.5)
SARS-COV-2 RNA SPEC QL NAA+PROBE: SIGNIFICANT CHANGE UP
SODIUM SERPL-SCNC: 143 MMOL/L — SIGNIFICANT CHANGE UP (ref 135–145)
WBC # BLD: 4.06 K/UL — SIGNIFICANT CHANGE UP (ref 3.8–10.5)
WBC # FLD AUTO: 4.06 K/UL — SIGNIFICANT CHANGE UP (ref 3.8–10.5)

## 2024-03-14 PROCEDURE — 99232 SBSQ HOSP IP/OBS MODERATE 35: CPT

## 2024-03-14 RX ADMIN — NYSTATIN CREAM 1 APPLICATION(S): 100000 CREAM TOPICAL at 05:49

## 2024-03-14 RX ADMIN — Medication 2: at 21:45

## 2024-03-14 RX ADMIN — Medication 1 PACKET(S): at 16:38

## 2024-03-14 RX ADMIN — Medication 500 MILLIGRAM(S): at 17:06

## 2024-03-14 RX ADMIN — LOSARTAN POTASSIUM 50 MILLIGRAM(S): 100 TABLET, FILM COATED ORAL at 05:49

## 2024-03-14 RX ADMIN — INSULIN GLARGINE 10 UNIT(S): 100 INJECTION, SOLUTION SUBCUTANEOUS at 21:45

## 2024-03-14 RX ADMIN — Medication 20 MILLIGRAM(S): at 05:50

## 2024-03-14 RX ADMIN — Medication 8: at 16:35

## 2024-03-14 RX ADMIN — Medication 1 PACKET(S): at 08:24

## 2024-03-14 RX ADMIN — NYSTATIN CREAM 1 APPLICATION(S): 100000 CREAM TOPICAL at 17:06

## 2024-03-14 RX ADMIN — Medication 1 PACKET(S): at 10:52

## 2024-03-14 RX ADMIN — Medication 500 MILLIGRAM(S): at 05:49

## 2024-03-14 RX ADMIN — Medication 2: at 08:21

## 2024-03-15 DIAGNOSIS — M32.9 SYSTEMIC LUPUS ERYTHEMATOSUS, UNSPECIFIED: ICD-10-CM

## 2024-03-15 LAB
ANION GAP SERPL CALC-SCNC: 10 MMOL/L — SIGNIFICANT CHANGE UP (ref 5–17)
BUN SERPL-MCNC: 12 MG/DL — SIGNIFICANT CHANGE UP (ref 7–23)
CALCIUM SERPL-MCNC: 8.4 MG/DL — LOW (ref 8.5–10.1)
CHLORIDE SERPL-SCNC: 111 MMOL/L — HIGH (ref 96–108)
CO2 SERPL-SCNC: 19 MMOL/L — LOW (ref 22–31)
CREAT SERPL-MCNC: 0.7 MG/DL — SIGNIFICANT CHANGE UP (ref 0.5–1.3)
EGFR: 98 ML/MIN/1.73M2 — SIGNIFICANT CHANGE UP
GLUCOSE BLDC GLUCOMTR-MCNC: 185 MG/DL — HIGH (ref 70–99)
GLUCOSE BLDC GLUCOMTR-MCNC: 229 MG/DL — HIGH (ref 70–99)
GLUCOSE BLDC GLUCOMTR-MCNC: 324 MG/DL — HIGH (ref 70–99)
GLUCOSE BLDC GLUCOMTR-MCNC: 372 MG/DL — HIGH (ref 70–99)
GLUCOSE BLDC GLUCOMTR-MCNC: 387 MG/DL — HIGH (ref 70–99)
GLUCOSE SERPL-MCNC: 175 MG/DL — HIGH (ref 70–99)
HCT VFR BLD CALC: 30.5 % — LOW (ref 34.5–45)
HGB BLD-MCNC: 9.7 G/DL — LOW (ref 11.5–15.5)
MCHC RBC-ENTMCNC: 26.9 PG — LOW (ref 27–34)
MCHC RBC-ENTMCNC: 31.8 G/DL — LOW (ref 32–36)
MCV RBC AUTO: 84.5 FL — SIGNIFICANT CHANGE UP (ref 80–100)
NRBC # BLD: 0 /100 WBCS — SIGNIFICANT CHANGE UP (ref 0–0)
PLATELET # BLD AUTO: 218 K/UL — SIGNIFICANT CHANGE UP (ref 150–400)
POTASSIUM SERPL-MCNC: 3.5 MMOL/L — SIGNIFICANT CHANGE UP (ref 3.5–5.3)
POTASSIUM SERPL-SCNC: 3.5 MMOL/L — SIGNIFICANT CHANGE UP (ref 3.5–5.3)
RBC # BLD: 3.61 M/UL — LOW (ref 3.8–5.2)
RBC # FLD: 13.9 % — SIGNIFICANT CHANGE UP (ref 10.3–14.5)
SODIUM SERPL-SCNC: 140 MMOL/L — SIGNIFICANT CHANGE UP (ref 135–145)
WBC # BLD: 3.99 K/UL — SIGNIFICANT CHANGE UP (ref 3.8–10.5)
WBC # FLD AUTO: 3.99 K/UL — SIGNIFICANT CHANGE UP (ref 3.8–10.5)

## 2024-03-15 PROCEDURE — 99232 SBSQ HOSP IP/OBS MODERATE 35: CPT

## 2024-03-15 RX ORDER — METFORMIN HYDROCHLORIDE 850 MG/1
500 TABLET ORAL DAILY
Refills: 0 | Status: DISCONTINUED | OUTPATIENT
Start: 2024-03-15 | End: 2024-03-20

## 2024-03-15 RX ORDER — MYCOPHENOLATE MOFETIL 250 MG/1
1500 CAPSULE ORAL
Refills: 0 | Status: DISCONTINUED | OUTPATIENT
Start: 2024-03-15 | End: 2024-03-20

## 2024-03-15 RX ORDER — INSULIN GLARGINE 100 [IU]/ML
20 INJECTION, SOLUTION SUBCUTANEOUS AT BEDTIME
Refills: 0 | Status: DISCONTINUED | OUTPATIENT
Start: 2024-03-15 | End: 2024-03-20

## 2024-03-15 RX ORDER — INSULIN LISPRO 100/ML
10 VIAL (ML) SUBCUTANEOUS
Refills: 0 | Status: DISCONTINUED | OUTPATIENT
Start: 2024-03-15 | End: 2024-03-17

## 2024-03-15 RX ADMIN — LOSARTAN POTASSIUM 50 MILLIGRAM(S): 100 TABLET, FILM COATED ORAL at 05:42

## 2024-03-15 RX ADMIN — Medication 10: at 16:14

## 2024-03-15 RX ADMIN — Medication 10: at 12:29

## 2024-03-15 RX ADMIN — Medication 4: at 21:40

## 2024-03-15 RX ADMIN — INSULIN GLARGINE 20 UNIT(S): 100 INJECTION, SOLUTION SUBCUTANEOUS at 21:43

## 2024-03-15 RX ADMIN — NYSTATIN CREAM 1 APPLICATION(S): 100000 CREAM TOPICAL at 05:42

## 2024-03-15 RX ADMIN — Medication 500 MILLIGRAM(S): at 17:10

## 2024-03-15 RX ADMIN — MYCOPHENOLATE MOFETIL 1500 MILLIGRAM(S): 250 CAPSULE ORAL at 18:44

## 2024-03-15 RX ADMIN — Medication 1 PACKET(S): at 08:19

## 2024-03-15 RX ADMIN — Medication 20 MILLIGRAM(S): at 05:42

## 2024-03-15 RX ADMIN — Medication 2: at 08:18

## 2024-03-15 RX ADMIN — Medication 500 MILLIGRAM(S): at 05:42

## 2024-03-15 RX ADMIN — Medication 1 PACKET(S): at 11:25

## 2024-03-15 RX ADMIN — NYSTATIN CREAM 1 APPLICATION(S): 100000 CREAM TOPICAL at 17:10

## 2024-03-15 RX ADMIN — Medication 1 PACKET(S): at 16:15

## 2024-03-15 RX ADMIN — ENOXAPARIN SODIUM 40 MILLIGRAM(S): 100 INJECTION SUBCUTANEOUS at 11:25

## 2024-03-16 LAB
GLUCOSE BLDC GLUCOMTR-MCNC: 121 MG/DL — HIGH (ref 70–99)
GLUCOSE BLDC GLUCOMTR-MCNC: 189 MG/DL — HIGH (ref 70–99)
GLUCOSE BLDC GLUCOMTR-MCNC: 230 MG/DL — HIGH (ref 70–99)
GLUCOSE BLDC GLUCOMTR-MCNC: 79 MG/DL — SIGNIFICANT CHANGE UP (ref 70–99)

## 2024-03-16 PROCEDURE — 99232 SBSQ HOSP IP/OBS MODERATE 35: CPT

## 2024-03-16 RX ORDER — HYDROMORPHONE HYDROCHLORIDE 2 MG/ML
2 INJECTION INTRAMUSCULAR; INTRAVENOUS; SUBCUTANEOUS ONCE
Refills: 0 | Status: DISCONTINUED | OUTPATIENT
Start: 2024-03-16 | End: 2024-03-16

## 2024-03-16 RX ADMIN — NYSTATIN CREAM 1 APPLICATION(S): 100000 CREAM TOPICAL at 17:00

## 2024-03-16 RX ADMIN — HYDROMORPHONE HYDROCHLORIDE 2 MILLIGRAM(S): 2 INJECTION INTRAMUSCULAR; INTRAVENOUS; SUBCUTANEOUS at 21:19

## 2024-03-16 RX ADMIN — HYDROMORPHONE HYDROCHLORIDE 2 MILLIGRAM(S): 2 INJECTION INTRAMUSCULAR; INTRAVENOUS; SUBCUTANEOUS at 22:15

## 2024-03-16 RX ADMIN — Medication 4: at 11:03

## 2024-03-16 RX ADMIN — ENOXAPARIN SODIUM 40 MILLIGRAM(S): 100 INJECTION SUBCUTANEOUS at 11:04

## 2024-03-16 RX ADMIN — NYSTATIN CREAM 1 APPLICATION(S): 100000 CREAM TOPICAL at 05:50

## 2024-03-16 RX ADMIN — INSULIN GLARGINE 20 UNIT(S): 100 INJECTION, SOLUTION SUBCUTANEOUS at 22:34

## 2024-03-16 RX ADMIN — LOSARTAN POTASSIUM 50 MILLIGRAM(S): 100 TABLET, FILM COATED ORAL at 05:47

## 2024-03-16 RX ADMIN — Medication 650 MILLIGRAM(S): at 23:25

## 2024-03-16 RX ADMIN — METFORMIN HYDROCHLORIDE 500 MILLIGRAM(S): 850 TABLET ORAL at 11:04

## 2024-03-16 RX ADMIN — Medication 2: at 08:08

## 2024-03-16 RX ADMIN — Medication 10 UNIT(S): at 08:09

## 2024-03-16 RX ADMIN — MYCOPHENOLATE MOFETIL 1500 MILLIGRAM(S): 250 CAPSULE ORAL at 17:00

## 2024-03-16 RX ADMIN — Medication 5 MILLIGRAM(S): at 05:47

## 2024-03-16 RX ADMIN — Medication 10 UNIT(S): at 11:04

## 2024-03-16 RX ADMIN — MYCOPHENOLATE MOFETIL 1500 MILLIGRAM(S): 250 CAPSULE ORAL at 05:47

## 2024-03-17 LAB
GLUCOSE BLDC GLUCOMTR-MCNC: 113 MG/DL — HIGH (ref 70–99)
GLUCOSE BLDC GLUCOMTR-MCNC: 185 MG/DL — HIGH (ref 70–99)
GLUCOSE BLDC GLUCOMTR-MCNC: 230 MG/DL — HIGH (ref 70–99)
GLUCOSE BLDC GLUCOMTR-MCNC: 52 MG/DL — CRITICAL LOW (ref 70–99)
GLUCOSE BLDC GLUCOMTR-MCNC: 58 MG/DL — LOW (ref 70–99)
GLUCOSE BLDC GLUCOMTR-MCNC: 61 MG/DL — LOW (ref 70–99)
GLUCOSE BLDC GLUCOMTR-MCNC: 62 MG/DL — LOW (ref 70–99)
GLUCOSE BLDC GLUCOMTR-MCNC: 81 MG/DL — SIGNIFICANT CHANGE UP (ref 70–99)

## 2024-03-17 PROCEDURE — 99232 SBSQ HOSP IP/OBS MODERATE 35: CPT

## 2024-03-17 RX ADMIN — NYSTATIN CREAM 1 APPLICATION(S): 100000 CREAM TOPICAL at 05:04

## 2024-03-17 RX ADMIN — METFORMIN HYDROCHLORIDE 500 MILLIGRAM(S): 850 TABLET ORAL at 11:12

## 2024-03-17 RX ADMIN — Medication 4: at 11:11

## 2024-03-17 RX ADMIN — Medication 2: at 07:51

## 2024-03-17 RX ADMIN — NYSTATIN CREAM 1 APPLICATION(S): 100000 CREAM TOPICAL at 17:01

## 2024-03-17 RX ADMIN — ENOXAPARIN SODIUM 40 MILLIGRAM(S): 100 INJECTION SUBCUTANEOUS at 11:12

## 2024-03-17 RX ADMIN — Medication 10 UNIT(S): at 11:12

## 2024-03-17 RX ADMIN — LOSARTAN POTASSIUM 50 MILLIGRAM(S): 100 TABLET, FILM COATED ORAL at 05:04

## 2024-03-17 RX ADMIN — MYCOPHENOLATE MOFETIL 1500 MILLIGRAM(S): 250 CAPSULE ORAL at 05:04

## 2024-03-17 RX ADMIN — Medication 10 UNIT(S): at 07:52

## 2024-03-17 RX ADMIN — Medication 650 MILLIGRAM(S): at 00:08

## 2024-03-17 RX ADMIN — Medication 5 MILLIGRAM(S): at 05:04

## 2024-03-17 RX ADMIN — MYCOPHENOLATE MOFETIL 1500 MILLIGRAM(S): 250 CAPSULE ORAL at 17:01

## 2024-03-17 RX ADMIN — Medication 650 MILLIGRAM(S): at 05:11

## 2024-03-18 DIAGNOSIS — E83.42 HYPOMAGNESEMIA: ICD-10-CM

## 2024-03-18 LAB
ANION GAP SERPL CALC-SCNC: 7 MMOL/L — SIGNIFICANT CHANGE UP (ref 5–17)
BUN SERPL-MCNC: 6 MG/DL — LOW (ref 7–23)
CALCIUM SERPL-MCNC: 8.3 MG/DL — LOW (ref 8.5–10.1)
CHLORIDE SERPL-SCNC: 109 MMOL/L — HIGH (ref 96–108)
CO2 SERPL-SCNC: 24 MMOL/L — SIGNIFICANT CHANGE UP (ref 22–31)
CREAT SERPL-MCNC: 0.72 MG/DL — SIGNIFICANT CHANGE UP (ref 0.5–1.3)
EGFR: 94 ML/MIN/1.73M2 — SIGNIFICANT CHANGE UP
GLUCOSE BLDC GLUCOMTR-MCNC: 142 MG/DL — HIGH (ref 70–99)
GLUCOSE BLDC GLUCOMTR-MCNC: 207 MG/DL — HIGH (ref 70–99)
GLUCOSE BLDC GLUCOMTR-MCNC: 276 MG/DL — HIGH (ref 70–99)
GLUCOSE BLDC GLUCOMTR-MCNC: 98 MG/DL — SIGNIFICANT CHANGE UP (ref 70–99)
GLUCOSE SERPL-MCNC: 159 MG/DL — HIGH (ref 70–99)
HCT VFR BLD CALC: 30.9 % — LOW (ref 34.5–45)
HGB BLD-MCNC: 9.7 G/DL — LOW (ref 11.5–15.5)
MAGNESIUM SERPL-MCNC: 1.4 MG/DL — LOW (ref 1.6–2.6)
MCHC RBC-ENTMCNC: 26 PG — LOW (ref 27–34)
MCHC RBC-ENTMCNC: 31.4 G/DL — LOW (ref 32–36)
MCV RBC AUTO: 82.8 FL — SIGNIFICANT CHANGE UP (ref 80–100)
NRBC # BLD: 0 /100 WBCS — SIGNIFICANT CHANGE UP (ref 0–0)
PHOSPHATE SERPL-MCNC: 3.2 MG/DL — SIGNIFICANT CHANGE UP (ref 2.5–4.5)
PLATELET # BLD AUTO: 260 K/UL — SIGNIFICANT CHANGE UP (ref 150–400)
POTASSIUM SERPL-MCNC: 3.6 MMOL/L — SIGNIFICANT CHANGE UP (ref 3.5–5.3)
POTASSIUM SERPL-SCNC: 3.6 MMOL/L — SIGNIFICANT CHANGE UP (ref 3.5–5.3)
RBC # BLD: 3.73 M/UL — LOW (ref 3.8–5.2)
RBC # FLD: 14.1 % — SIGNIFICANT CHANGE UP (ref 10.3–14.5)
SODIUM SERPL-SCNC: 140 MMOL/L — SIGNIFICANT CHANGE UP (ref 135–145)
WBC # BLD: 3.44 K/UL — LOW (ref 3.8–10.5)
WBC # FLD AUTO: 3.44 K/UL — LOW (ref 3.8–10.5)

## 2024-03-18 PROCEDURE — 99232 SBSQ HOSP IP/OBS MODERATE 35: CPT

## 2024-03-18 RX ORDER — MAGNESIUM SULFATE 500 MG/ML
2 VIAL (ML) INJECTION ONCE
Refills: 0 | Status: COMPLETED | OUTPATIENT
Start: 2024-03-18 | End: 2024-03-18

## 2024-03-18 RX ORDER — MAGNESIUM OXIDE 400 MG ORAL TABLET 241.3 MG
400 TABLET ORAL
Refills: 0 | Status: DISCONTINUED | OUTPATIENT
Start: 2024-03-18 | End: 2024-03-20

## 2024-03-18 RX ADMIN — MYCOPHENOLATE MOFETIL 1500 MILLIGRAM(S): 250 CAPSULE ORAL at 18:25

## 2024-03-18 RX ADMIN — Medication 650 MILLIGRAM(S): at 21:49

## 2024-03-18 RX ADMIN — MAGNESIUM OXIDE 400 MG ORAL TABLET 400 MILLIGRAM(S): 241.3 TABLET ORAL at 16:09

## 2024-03-18 RX ADMIN — NYSTATIN CREAM 1 APPLICATION(S): 100000 CREAM TOPICAL at 18:26

## 2024-03-18 RX ADMIN — METFORMIN HYDROCHLORIDE 500 MILLIGRAM(S): 850 TABLET ORAL at 11:47

## 2024-03-18 RX ADMIN — Medication 650 MILLIGRAM(S): at 06:30

## 2024-03-18 RX ADMIN — LOSARTAN POTASSIUM 50 MILLIGRAM(S): 100 TABLET, FILM COATED ORAL at 06:20

## 2024-03-18 RX ADMIN — Medication 6: at 11:44

## 2024-03-18 RX ADMIN — Medication 5 MILLIGRAM(S): at 06:20

## 2024-03-18 RX ADMIN — Medication 4: at 08:24

## 2024-03-18 RX ADMIN — MYCOPHENOLATE MOFETIL 1500 MILLIGRAM(S): 250 CAPSULE ORAL at 06:20

## 2024-03-18 RX ADMIN — ENOXAPARIN SODIUM 40 MILLIGRAM(S): 100 INJECTION SUBCUTANEOUS at 10:22

## 2024-03-18 RX ADMIN — Medication 650 MILLIGRAM(S): at 22:45

## 2024-03-18 RX ADMIN — NYSTATIN CREAM 1 APPLICATION(S): 100000 CREAM TOPICAL at 06:18

## 2024-03-18 RX ADMIN — MAGNESIUM OXIDE 400 MG ORAL TABLET 400 MILLIGRAM(S): 241.3 TABLET ORAL at 18:51

## 2024-03-18 NOTE — CHART NOTE - NSCHARTNOTEFT_GEN_A_CORE
Pt with PMH of HTN, HLD, T2DM (A1c=10.8%; Endo following), Lupus, OA, and s/p left third digit toe amputation. Pt has been taking chronic steroids and abx for Lupus and chronic UTI. Pt presented with generalized malaise and vaginal itch; admitted with UTI, Lupus flare & vaginal candidiasis.    Factors impacting intake: [ ] none [ ] nausea  [ ] vomiting [x] diarrhea [ ] constipation  [ ]chewing problems [ ] swallowing issues  [x] other: decreased appetite/PO intake    Diet Prescription: Diet, Consistent Carbohydrate w/Evening Snack:   Low Sodium  Supplement Feeding Modality:  Oral  Glucerna Shake Cans or Servings Per Day:  1       Frequency:  Two Times a day (03-11-24 @ 13:58)    Intake: mostly 26-75% of documented meals; pt drinking Glucerna supplements. Pt reports persistent diarrhea since admission; possibly related to abx or Glucerna supplement; will switch supplement to Sweet Tooth Glucose Support (plant-based, non-dairy, often recommended for those with diarrhea)    Current Weight: 101.6 kg (03/14), 101.1 kg (03/09)  % Weight Change: mostly stable x 5 days    1+ generalized edema b/l arms as per flow sheets    Pertinent Medications: MEDICATIONS  (STANDING):  dextrose 5%. 1000 milliLiter(s) (50 mL/Hr) IV Continuous <Continuous>  dextrose 5%. 1000 milliLiter(s) (100 mL/Hr) IV Continuous <Continuous>  dextrose 50% Injectable 25 Gram(s) IV Push once  dextrose 50% Injectable 25 Gram(s) IV Push once  dextrose 50% Injectable 12.5 Gram(s) IV Push once  enoxaparin Injectable 40 milliGRAM(s) SubCutaneous every 24 hours  glucagon  Injectable 1 milliGRAM(s) IntraMuscular once  influenza   Vaccine 0.5 milliLiter(s) IntraMuscular once  insulin glargine Injectable (LANTUS) 20 Unit(s) SubCutaneous at bedtime  insulin lispro (ADMELOG) corrective regimen sliding scale   SubCutaneous Before meals and at bedtime  losartan 50 milliGRAM(s) Oral daily  magnesium sulfate  IVPB 2 Gram(s) IV Intermittent once  metFORMIN 500 milliGRAM(s) Oral daily  mycophenolate mofetil 1500 milliGRAM(s) Oral two times a day  nystatin Ointment 1 Application(s) Topical two times a day  predniSONE   Tablet 5 milliGRAM(s) Oral daily    MEDICATIONS  (PRN):  acetaminophen     Tablet .. 650 milliGRAM(s) Oral every 6 hours PRN Temp greater or equal to 38C (100.4F), Mild Pain (1 - 3)  dextrose Oral Gel 15 Gram(s) Oral once PRN Blood Glucose LESS THAN 70 milliGRAM(s)/deciliter  ondansetron Injectable 4 milliGRAM(s) IV Push every 6 hours PRN Nausea    Pertinent Labs: 03-18 Na140 mmol/L Glu 159 mg/dL<H> K+ 3.6 mmol/L Cr  0.72 mg/dL BUN 6 mg/dL<L> 03-18 Phos 3.2 mg/dL 03-10 Chol 194 mg/dL  mg/dL  HDL 31 mg/dL<L> Trig 162 mg/dL<H>  03-09 HgbA1c 10.8%    POCT Blood Glucose.: 276 mg/dL (18 Mar 2024 11:02)  POCT Blood Glucose.: 207 mg/dL (18 Mar 2024 07:38)  POCT Blood Glucose.: 113 mg/dL (17 Mar 2024 21:12)  POCT Blood Glucose.: 81 mg/dL (17 Mar 2024 17:12)  POCT Blood Glucose.: 62 mg/dL (17 Mar 2024 16:46)  POCT Blood Glucose.: 61 mg/dL (17 Mar 2024 16:44)  POCT Blood Glucose.: 52 mg/dL (17 Mar 2024 16:14)  POCT Blood Glucose.: 58 mg/dL (17 Mar 2024 16:13)    Skin: No pressure ulcers noted as per flow sheets    Estimated Needs:   [x] no change since previous assessment on 03/11  [ ] recalculated:     Previous Nutrition Diagnosis:   [x] Severe malnutrition in the context of chronic illness  Etiology: Inadequate protein-energy intake and impaired nutrient utilization secondary to uncontrolled DM, Lupus  Signs/Symptoms: meeting <75% needs >1 month; 20.7% weight loss x 1 year    Goal: Pt to meet >75% needs via meals/supplements - mostly not met    Nutrition Diagnosis is [x] ongoing  [ ] resolved [ ] not applicable     New Nutrition Diagnosis: [x] not applicable       Interventions:   Recommend  [ ] Change Diet To:  [x] Nutrition Supplement: discontinue Glucerna supplements and add Sweet Tooth Glucose Support x 2/day (600 kcal & 32 g protein)  [ ] Nutrition Support  [ ] Other:     Monitoring and Evaluation:   [ x ] PO intake [ x ] Tolerance to diet prescription [ x ] weights [ x ] labs (glucose) [ x ] follow up per protocol  [ ] other:

## 2024-03-19 LAB
GLUCOSE BLDC GLUCOMTR-MCNC: 104 MG/DL — HIGH (ref 70–99)
GLUCOSE BLDC GLUCOMTR-MCNC: 134 MG/DL — HIGH (ref 70–99)
GLUCOSE BLDC GLUCOMTR-MCNC: 180 MG/DL — HIGH (ref 70–99)
GLUCOSE BLDC GLUCOMTR-MCNC: 251 MG/DL — HIGH (ref 70–99)

## 2024-03-19 PROCEDURE — 99232 SBSQ HOSP IP/OBS MODERATE 35: CPT

## 2024-03-19 RX ADMIN — MAGNESIUM OXIDE 400 MG ORAL TABLET 400 MILLIGRAM(S): 241.3 TABLET ORAL at 13:40

## 2024-03-19 RX ADMIN — METFORMIN HYDROCHLORIDE 500 MILLIGRAM(S): 850 TABLET ORAL at 11:10

## 2024-03-19 RX ADMIN — Medication 650 MILLIGRAM(S): at 06:19

## 2024-03-19 RX ADMIN — NYSTATIN CREAM 1 APPLICATION(S): 100000 CREAM TOPICAL at 17:47

## 2024-03-19 RX ADMIN — MAGNESIUM OXIDE 400 MG ORAL TABLET 400 MILLIGRAM(S): 241.3 TABLET ORAL at 17:46

## 2024-03-19 RX ADMIN — NYSTATIN CREAM 1 APPLICATION(S): 100000 CREAM TOPICAL at 06:15

## 2024-03-19 RX ADMIN — Medication 5 MILLIGRAM(S): at 06:16

## 2024-03-19 RX ADMIN — MAGNESIUM OXIDE 400 MG ORAL TABLET 400 MILLIGRAM(S): 241.3 TABLET ORAL at 08:14

## 2024-03-19 RX ADMIN — LOSARTAN POTASSIUM 50 MILLIGRAM(S): 100 TABLET, FILM COATED ORAL at 06:15

## 2024-03-19 RX ADMIN — Medication 6: at 11:11

## 2024-03-19 RX ADMIN — MYCOPHENOLATE MOFETIL 1500 MILLIGRAM(S): 250 CAPSULE ORAL at 17:46

## 2024-03-19 RX ADMIN — Medication 650 MILLIGRAM(S): at 07:20

## 2024-03-19 RX ADMIN — MYCOPHENOLATE MOFETIL 1500 MILLIGRAM(S): 250 CAPSULE ORAL at 06:15

## 2024-03-19 RX ADMIN — Medication 650 MILLIGRAM(S): at 21:44

## 2024-03-19 RX ADMIN — Medication 650 MILLIGRAM(S): at 21:14

## 2024-03-19 RX ADMIN — Medication 2: at 08:12

## 2024-03-19 RX ADMIN — ENOXAPARIN SODIUM 40 MILLIGRAM(S): 100 INJECTION SUBCUTANEOUS at 11:09

## 2024-03-19 NOTE — PROGRESS NOTE ADULT - PROVIDER SPECIALTY LIST ADULT
Endocrinology
Endocrinology
Hospitalist
Endocrinology
Hospitalist
Endocrinology
Hospitalist

## 2024-03-19 NOTE — PROGRESS NOTE ADULT - PROBLEM SELECTOR PROBLEM 5
Vaginal candidiasis

## 2024-03-19 NOTE — PROGRESS NOTE ADULT - PROBLEM SELECTOR PROBLEM 1
Uncontrolled type 2 diabetes mellitus with hyperglycemia

## 2024-03-19 NOTE — PROGRESS NOTE ADULT - PROBLEM SELECTOR PLAN 1
add back prandial lispro and increase Lantus dose to target range blood glucose   while inpatient, finger sticks should be 100-180   Encourage more nutrition
Continue with the current  regimen while inpatient   if finger sticks are decreased , must decrease insulin dose   while inpatient, finger sticks should be 100-180
Continue with the current  regimen while inpatient   stable finger sticks   can be discharged on current dose/ regimen with out-patient Follow up
discontinue prandial lispro ( 10 units )   continue with 10 nu Lantus   add back low dose, prandial lispro once blood glucose increases   while inpatient, finger sticks should be 100-180
5 mg of metformin and gradually increased to twice a day.  Continue with the current insulin regimen.  Encourage more nutrition.  Goal is to have fingersticks 100-180
Continue with the current  regimen while inpatient  avoid hypoglycemia   Meals to be given/taken as much possible in scheduled timings. This will eliminate any fluctuations in blood glucose and help lower the incidence of hypoglycemia
discontinue prandial lispro   hold Lantus tonight and also restart from tomorrow   while inpatient, finger sticks should be 100-180

## 2024-03-19 NOTE — PROGRESS NOTE ADULT - SUBJECTIVE AND OBJECTIVE BOX
HPI:  64 years old patient with PMHX of HTN, HLD, DM II, Lupus, OA and S/P left third digit toe amputation presents to ED with generalized malaise and vaginal itch X1day. Pt admitted with generalized weakness, vaginal (rash) and vaginal discomfort. pt denies fever, chills, dysuria, abdominal pain, chest pain, SOB, and N/V. Off note, patient has been taking chronic steroids and antibiotics for lupus and chronic UTI. No other complaints at this time.  (09 Mar 2024 01:20)  Patient is a 64y old  Female who presents with a chief complaint of UTI (14 Mar 2024 23:34)      INTERVAL HPI/OVERNIGHT EVENTS: no acute events overnight     MEDICATIONS  (STANDING):  dextrose 5%. 1000 milliLiter(s) (100 mL/Hr) IV Continuous <Continuous>  dextrose 5%. 1000 milliLiter(s) (50 mL/Hr) IV Continuous <Continuous>  dextrose 50% Injectable 25 Gram(s) IV Push once  dextrose 50% Injectable 25 Gram(s) IV Push once  dextrose 50% Injectable 12.5 Gram(s) IV Push once  enoxaparin Injectable 40 milliGRAM(s) SubCutaneous every 24 hours  glucagon  Injectable 1 milliGRAM(s) IntraMuscular once  influenza   Vaccine 0.5 milliLiter(s) IntraMuscular once  insulin glargine Injectable (LANTUS) 10 Unit(s) SubCutaneous at bedtime  insulin lispro (ADMELOG) corrective regimen sliding scale   SubCutaneous Before meals and at bedtime  losartan 50 milliGRAM(s) Oral daily  metroNIDAZOLE    Tablet 500 milliGRAM(s) Oral two times a day  nystatin Ointment 1 Application(s) Topical two times a day  predniSONE   Tablet 20 milliGRAM(s) Oral daily    MEDICATIONS  (PRN):  acetaminophen     Tablet .. 650 milliGRAM(s) Oral every 6 hours PRN Temp greater or equal to 38C (100.4F), Mild Pain (1 - 3)  dextrose Oral Gel 15 Gram(s) Oral once PRN Blood Glucose LESS THAN 70 milliGRAM(s)/deciliter  HYDROmorphone  Injectable 0.5 milliGRAM(s) IV Push every 4 hours PRN Moderate Pain (4 - 6)  ondansetron Injectable 4 milliGRAM(s) IV Push every 6 hours PRN Nausea      Allergies    No Known Allergies    Intolerances        REVIEW OF SYSTEMS:  CONSTITUTIONAL: No fever, weight loss, or fatigue  EYES: No eye pain, visual disturbances, or discharge  ENMT:  No difficulty hearing, tinnitus, vertigo; No sinus or throat pain  NECK: No pain or stiffness  BREASTS: No pain, masses, or nipple discharge  RESPIRATORY: No cough, wheezing, chills or hemoptysis; No shortness of breath  CARDIOVASCULAR: No chest pain, palpitations, dizziness, or leg swelling  GASTROINTESTINAL: No abdominal or epigastric pain. No nausea, vomiting, or hematemesis; No diarrhea or constipation. No melena or hematochezia.  GENITOURINARY: No dysuria, frequency, hematuria, or incontinence  NEUROLOGICAL: No headaches, memory loss, loss of strength, numbness, or tremors  SKIN: No itching, burning, rashes, or lesions   LYMPH NODES: No enlarged glands  ENDOCRINE: No heat or cold intolerance; No hair loss  MUSCULOSKELETAL: No joint pain or swelling; No muscle, back, or extremity pain  PSYCHIATRIC: No depression, anxiety, mood swings, or difficulty sleeping  HEME/LYMPH: No easy bruising, or bleeding gums  ALLERGY AND IMMUNOLOGIC: No hives or eczema    Vital Signs Last 24 Hrs  T(C): 36.8 (15 Mar 2024 17:14), Max: 36.8 (15 Mar 2024 17:14)  T(F): 98.2 (15 Mar 2024 17:14), Max: 98.2 (15 Mar 2024 17:14)  HR: 96 (15 Mar 2024 17:14) (75 - 96)  BP: 132/80 (15 Mar 2024 17:14) (121/60 - 156/80)  RR: 18 (15 Mar 2024 17:14) (16 - 18)  SpO2: 99% (15 Mar 2024 17:14) (99% - 100%)    Parameters below as of 15 Mar 2024 17:14  Patient On (Oxygen Delivery Method): room air        PHYSICAL EXAM:  GENERAL: NAD, well-groomed, well-developed  HEAD:  Atraumatic, Normocephalic  EYES: EOMI, PERRLA, conjunctiva and sclera clear  ENMT: No tonsillar erythema, exudates, or enlargement; Moist mucous membranes, Good dentition, No lesions  NECK: Supple, No JVD, Normal thyroid  NERVOUS SYSTEM:  Alert & Oriented X3, Good concentration; Motor Strength 5/5 B/L upper and lower extremities; DTRs 2+ intact and symmetric  CHEST/LUNG: Clear to ascultation  bilaterally; No rales, rhonchi, wheezing, or rubs  HEART: Regular rate and rhythm; No murmurs, rubs, or gallops  ABDOMEN: Soft, Nontender, Nondistended; Bowel sounds present  EXTREMITIES:   upper and lower extremity edema  LYMPH: No lymphadenopathy noted  SKIN: multiple skin lesions   LABS:                        9.7    3.99  )-----------( 218      ( 15 Mar 2024 07:51 )             30.5     03-15    140  |  111<H>  |  12  ----------------------------<  175<H>  3.5   |  19<L>  |  0.70    Ca    8.4<L>      15 Mar 2024 07:51  Phos  3.0     03-14  Mg     1.6     03-14        Urinalysis Basic - ( 15 Mar 2024 07:51 )    Color: x / Appearance: x / SG: x / pH: x  Gluc: 175 mg/dL / Ketone: x  / Bili: x / Urobili: x   Blood: x / Protein: x / Nitrite: x   Leuk Esterase: x / RBC: x / WBC x   Sq Epi: x / Non Sq Epi: x / Bacteria: x      CAPILLARY BLOOD GLUCOSE      POCT Blood Glucose.: 372 mg/dL (15 Mar 2024 16:01)  POCT Blood Glucose.: 387 mg/dL (15 Mar 2024 12:32)  POCT Blood Glucose.: 324 mg/dL (15 Mar 2024 10:53)  POCT Blood Glucose.: 185 mg/dL (15 Mar 2024 07:33)  POCT Blood Glucose.: 160 mg/dL (14 Mar 2024 21:40)      RADIOLOGY & ADDITIONAL TESTS:    Imaging Personally Reviewed:  [ ] YES  [ ] NO    Consultant(s) Notes Reviewed:  [ ] YES  [ ] NO    Care Discussed with Consultants/Other Providers [ ] YES  [ ] NO
Patient is a 64y old  Female who presents with a chief complaint of UTI (16 Mar 2024 18:27)      Interval History: hypoglycemia with mild symptoms   on increased insulin     MEDICATIONS  (STANDING):  dextrose 5%. 1000 milliLiter(s) (100 mL/Hr) IV Continuous <Continuous>  dextrose 5%. 1000 milliLiter(s) (50 mL/Hr) IV Continuous <Continuous>  dextrose 50% Injectable 25 Gram(s) IV Push once  dextrose 50% Injectable 12.5 Gram(s) IV Push once  dextrose 50% Injectable 25 Gram(s) IV Push once  enoxaparin Injectable 40 milliGRAM(s) SubCutaneous every 24 hours  glucagon  Injectable 1 milliGRAM(s) IntraMuscular once  influenza   Vaccine 0.5 milliLiter(s) IntraMuscular once  insulin glargine Injectable (LANTUS) 20 Unit(s) SubCutaneous at bedtime  insulin lispro (ADMELOG) corrective regimen sliding scale   SubCutaneous Before meals and at bedtime  losartan 50 milliGRAM(s) Oral daily  metFORMIN 500 milliGRAM(s) Oral daily  mycophenolate mofetil 1500 milliGRAM(s) Oral two times a day  nystatin Ointment 1 Application(s) Topical two times a day  predniSONE   Tablet 5 milliGRAM(s) Oral daily    MEDICATIONS  (PRN):  acetaminophen     Tablet .. 650 milliGRAM(s) Oral every 6 hours PRN Temp greater or equal to 38C (100.4F), Mild Pain (1 - 3)  dextrose Oral Gel 15 Gram(s) Oral once PRN Blood Glucose LESS THAN 70 milliGRAM(s)/deciliter  ondansetron Injectable 4 milliGRAM(s) IV Push every 6 hours PRN Nausea      Allergies    No Known Allergies    Intolerances        REVIEW OF SYSTEMS:  CONSTITUTIONAL: no changes  EYES: No eye pain, visual disturbances, or discharge  ENMT:  No difficulty hearing, No sinus or throat pain  NECK: No pain or stiffness  RESPIRATORY: No cough, wheezing, chills or hemoptysis; No shortness of breath  CARDIOVASCULAR: No chest pain, palpitations or leg swelling  GASTROINTESTINAL: No abdominal or epigastric pain. No nausea, vomiting, or hematemesis; No diarrhea or constipation. No melena or hematochezia.  GENITOURINARY: No dysuria, frequency, hematuria, or incontinence  NEUROLOGICAL: No headaches, memory loss, loss of strength, numbness, or tremors  SKIN: No itching, burning, rashes, or lesions   ENDOCRINE: No heat or cold intolerance; No hair loss  MUSCULOSKELETAL: No joint pain or swelling; No muscle, back, or extremity pain  PSYCHIATRIC: No depression, anxiety, mood swings, or difficulty sleeping  HEME/LYMPH: No easy bruising, or bleeding gums  ALLERY AND IMMUNOLOGIC: No hives or eczema    Vital Signs Last 24 Hrs  T(C): 37 (17 Mar 2024 17:33), Max: 37.8 (16 Mar 2024 23:58)  T(F): 98.6 (17 Mar 2024 17:33), Max: 100.1 (16 Mar 2024 23:58)  HR: 94 (17 Mar 2024 17:33) (86 - 112)  BP: 135/83 (17 Mar 2024 17:33) (124/77 - 158/85)  BP(mean): --  RR: 18 (17 Mar 2024 17:33) (17 - 18)  SpO2: 100% (17 Mar 2024 17:33) (99% - 100%)    Parameters below as of 17 Mar 2024 17:33  Patient On (Oxygen Delivery Method): room air        PHYSICAL EXAM:  GENERAL:   HEAD: Atraumatic, Normocephalic  EYES: PERRLA, conjunctiva and sclera clear  ENMT: No  exudates,; Moist mucous membranes,, No lesions  NECK: Supple, No JVD, Normal thyroid  NERVOUS SYSTEM:  Alert & Oriented,   CHEST/LUNG: Clear to auscultation bilaterally; No rales, rhonchi, wheezing, or rubs  HEART: Regular rate and rhythm; No murmurs, rubs, or gallops  ABDOMEN: Soft, Nontender, Nondistended; Bowel sounds present  EXTREMITIES:  2+ Peripheral Pulses, no edema  SKIN: No rashes or lesions    LABS:        CAPILLARY BLOOD GLUCOSE      POCT Blood Glucose.: 81 mg/dL (17 Mar 2024 17:12)  POCT Blood Glucose.: 62 mg/dL (17 Mar 2024 16:46)  POCT Blood Glucose.: 61 mg/dL (17 Mar 2024 16:44)  POCT Blood Glucose.: 52 mg/dL (17 Mar 2024 16:14)  POCT Blood Glucose.: 58 mg/dL (17 Mar 2024 16:13)  POCT Blood Glucose.: 230 mg/dL (17 Mar 2024 10:56)  POCT Blood Glucose.: 185 mg/dL (17 Mar 2024 07:48)  POCT Blood Glucose.: 121 mg/dL (16 Mar 2024 21:43)    Lipid panel:           Thyroid:  Diabetes Tests:  Parathyroid Panel:  Adrenals:  RADIOLOGY & ADDITIONAL TESTS:    Imaging Personally Reviewed:  [ ] YES  [ ] NO    Consultant(s) Notes Reviewed:  [ ] YES  [ ] NO    Care Discussed with Consultants/Other Providers [ ] YES  [ ] NO
HPI:  64 years old patient with PMHX of HTN, HLD, DM II, Lupus, OA and S/P left third digit toe amputation presents to ED with generalized malaise and vaginal itch X1day. Pt admitted with generalized weakness, vaginal (rash) and vaginal discomfort. pt denies fever, chills, dysuria, abdominal pain, chest pain, SOB, and N/V. Off note, patient has been taking chronic steroids and antibiotics for lupus and chronic UTI. No other complaints at this time.  (09 Mar 2024 01:20)  Patient is a 64y old  Female who presents with a chief complaint of UTI (11 Mar 2024 15:07)      INTERVAL HPI/OVERNIGHT EVENTS:    MEDICATIONS  (STANDING):  dextrose 5%. 1000 milliLiter(s) (100 mL/Hr) IV Continuous <Continuous>  dextrose 5%. 1000 milliLiter(s) (50 mL/Hr) IV Continuous <Continuous>  dextrose 50% Injectable 25 Gram(s) IV Push once  dextrose 50% Injectable 12.5 Gram(s) IV Push once  dextrose 50% Injectable 25 Gram(s) IV Push once  enoxaparin Injectable 40 milliGRAM(s) SubCutaneous every 24 hours  glucagon  Injectable 1 milliGRAM(s) IntraMuscular once  influenza   Vaccine 0.5 milliLiter(s) IntraMuscular once  insulin glargine Injectable (LANTUS) 10 Unit(s) SubCutaneous at bedtime  insulin lispro (ADMELOG) corrective regimen sliding scale   SubCutaneous Before meals and at bedtime  insulin lispro Injectable (ADMELOG) 10 Unit(s) SubCutaneous before lunch  insulin lispro Injectable (ADMELOG) 10 Unit(s) SubCutaneous before breakfast  insulin lispro Injectable (ADMELOG) 10 Unit(s) SubCutaneous before dinner  losartan 50 milliGRAM(s) Oral daily  metroNIDAZOLE    Tablet 500 milliGRAM(s) Oral two times a day  nystatin Ointment 1 Application(s) Topical two times a day  predniSONE   Tablet 20 milliGRAM(s) Oral daily  sodium chloride 0.9%. 1000 milliLiter(s) (100 mL/Hr) IV Continuous <Continuous>    MEDICATIONS  (PRN):  acetaminophen     Tablet .. 650 milliGRAM(s) Oral every 6 hours PRN Temp greater or equal to 38C (100.4F), Mild Pain (1 - 3)  dextrose Oral Gel 15 Gram(s) Oral once PRN Blood Glucose LESS THAN 70 milliGRAM(s)/deciliter  HYDROmorphone  Injectable 0.5 milliGRAM(s) IV Push every 4 hours PRN Moderate Pain (4 - 6)  ondansetron Injectable 4 milliGRAM(s) IV Push every 6 hours PRN Nausea      Allergies    No Known Allergies    Intolerances        REVIEW OF SYSTEMS:  CONSTITUTIONAL: No fever, weight loss, or fatigue  EYES: No eye pain, visual disturbances, or discharge  ENMT:  No difficulty hearing, tinnitus, vertigo; No sinus or throat pain  NECK: No pain or stiffness  BREASTS: No pain, masses, or nipple discharge  RESPIRATORY: No cough, wheezing, chills or hemoptysis; No shortness of breath  CARDIOVASCULAR: No chest pain, palpitations, dizziness, or leg swelling  GASTROINTESTINAL: No abdominal or epigastric pain. No nausea, vomiting, or hematemesis; No diarrhea or constipation. No melena or hematochezia.  GENITOURINARY: No dysuria, frequency, hematuria, or incontinence  NEUROLOGICAL: No headaches, memory loss, loss of strength, numbness, or tremors  SKIN: No itching, burning, rashes, or lesions   LYMPH NODES: No enlarged glands  ENDOCRINE: No heat or cold intolerance; No hair loss  MUSCULOSKELETAL: No joint pain or swelling; No muscle, back, or extremity pain  PSYCHIATRIC: No depression, anxiety, mood swings, or difficulty sleeping  HEME/LYMPH: No easy bruising, or bleeding gums  ALLERGY AND IMMUNOLOGIC: No hives or eczema    Vital Signs Last 24 Hrs  T(C): 37.4 (13 Mar 2024 05:00), Max: 37.4 (13 Mar 2024 05:00)  T(F): 99.4 (13 Mar 2024 05:00), Max: 99.4 (13 Mar 2024 05:00)  HR: 88 (13 Mar 2024 05:00) (80 - 88)  BP: 151/85 (13 Mar 2024 05:00) (148/85 - 153/77)  BP(mean): --  RR: 18 (13 Mar 2024 05:00) (18 - 18)  SpO2: 99% (13 Mar 2024 05:00) (98% - 100%)    Parameters below as of 13 Mar 2024 05:00  Patient On (Oxygen Delivery Method): room air        PHYSICAL EXAM:  GENERAL: NAD, well-groomed, well-developed  HEAD:  Atraumatic, Normocephalic  EYES: EOMI, PERRLA, conjunctiva and sclera clear  ENMT: No tonsillar erythema, exudates, or enlargement; Moist mucous membranes, Good dentition, No lesions  NECK: Supple, No JVD, Normal thyroid  NERVOUS SYSTEM:  Alert & Oriented X3, Good concentration; Motor Strength 5/5 B/L upper and lower extremities; DTRs 2+ intact and symmetric  CHEST/LUNG: Clear to ascultation  bilaterally; No rales, rhonchi, wheezing, or rubs  HEART: Regular rate and rhythm; No murmurs, rubs, or gallops  ABDOMEN: Soft, Nontender, Nondistended; Bowel sounds present  EXTREMITIES:  2+ Peripheral Pulses, No clubbing, cyanosis, or edema  LYMPH: No lymphadenopathy noted  SKIN: multiple skin wounds   LABS:              CAPILLARY BLOOD GLUCOSE      POCT Blood Glucose.: 118 mg/dL (12 Mar 2024 21:43)  POCT Blood Glucose.: 243 mg/dL (12 Mar 2024 16:14)  POCT Blood Glucose.: 433 mg/dL (12 Mar 2024 11:25)  POCT Blood Glucose.: 413 mg/dL (12 Mar 2024 11:22)  POCT Blood Glucose.: 278 mg/dL (12 Mar 2024 07:46)      RADIOLOGY & ADDITIONAL TESTS:    Imaging Personally Reviewed:  [ ] YES  [ ] NO    Consultant(s) Notes Reviewed:  [ ] YES  [ ] NO    Care Discussed with Consultants/Other Providers [ ] YES  [ ] NO
Patient is a 64y old  Female who presents with a chief complaint of UTI (14 Mar 2024 17:52)      Interval History: earlier prandial lispro discontinued and Lantus dose decreased   now finger sticks are in low 300s trending downwards     MEDICATIONS  (STANDING):  dextrose 5%. 1000 milliLiter(s) (50 mL/Hr) IV Continuous <Continuous>  dextrose 5%. 1000 milliLiter(s) (100 mL/Hr) IV Continuous <Continuous>  dextrose 50% Injectable 25 Gram(s) IV Push once  dextrose 50% Injectable 25 Gram(s) IV Push once  dextrose 50% Injectable 12.5 Gram(s) IV Push once  enoxaparin Injectable 40 milliGRAM(s) SubCutaneous every 24 hours  glucagon  Injectable 1 milliGRAM(s) IntraMuscular once  influenza   Vaccine 0.5 milliLiter(s) IntraMuscular once  insulin glargine Injectable (LANTUS) 10 Unit(s) SubCutaneous at bedtime  insulin lispro (ADMELOG) corrective regimen sliding scale   SubCutaneous Before meals and at bedtime  losartan 50 milliGRAM(s) Oral daily  metroNIDAZOLE    Tablet 500 milliGRAM(s) Oral two times a day  nystatin Ointment 1 Application(s) Topical two times a day  potassium phosphate / sodium phosphate Powder (PHOS-NaK) 1 Packet(s) Oral three times a day before meals  predniSONE   Tablet 20 milliGRAM(s) Oral daily    MEDICATIONS  (PRN):  acetaminophen     Tablet .. 650 milliGRAM(s) Oral every 6 hours PRN Temp greater or equal to 38C (100.4F), Mild Pain (1 - 3)  dextrose Oral Gel 15 Gram(s) Oral once PRN Blood Glucose LESS THAN 70 milliGRAM(s)/deciliter  HYDROmorphone  Injectable 0.5 milliGRAM(s) IV Push every 4 hours PRN Moderate Pain (4 - 6)  ondansetron Injectable 4 milliGRAM(s) IV Push every 6 hours PRN Nausea      Allergies    No Known Allergies    Intolerances        REVIEW OF SYSTEMS:  CONSTITUTIONAL: no changes  EYES: No eye pain, visual disturbances, or discharge  ENMT:  No difficulty hearing, No sinus or throat pain  NECK: No pain or stiffness  RESPIRATORY: No cough, wheezing, chills or hemoptysis; No shortness of breath  CARDIOVASCULAR: No chest pain, palpitations or leg swelling  GASTROINTESTINAL: No abdominal or epigastric pain. No nausea, vomiting, or hematemesis; No diarrhea or constipation. No melena or hematochezia.  GENITOURINARY: No dysuria, frequency, hematuria, or incontinence  NEUROLOGICAL: No headaches, memory loss, loss of strength, numbness, or tremors  SKIN: No itching, burning, rashes, or lesions   ENDOCRINE: No heat or cold intolerance; No hair loss  MUSCULOSKELETAL: No joint pain or swelling; No muscle, back, or extremity pain  PSYCHIATRIC: No depression, anxiety, mood swings, or difficulty sleeping  HEME/LYMPH: No easy bruising, or bleeding gums  ALLERY AND IMMUNOLOGIC: No hives or eczema    Vital Signs Last 24 Hrs  T(C): 36.9 (14 Mar 2024 16:39), Max: 37.1 (14 Mar 2024 05:23)  T(F): 98.4 (14 Mar 2024 16:39), Max: 98.8 (14 Mar 2024 05:23)  HR: 85 (14 Mar 2024 16:39) (80 - 104)  BP: 138/76 (14 Mar 2024 16:39) (137/77 - 163/87)  BP(mean): --  RR: 18 (14 Mar 2024 16:39) (18 - 19)  SpO2: 97% (14 Mar 2024 16:39) (97% - 100%)    Parameters below as of 14 Mar 2024 16:39  Patient On (Oxygen Delivery Method): room air        PHYSICAL EXAM:  GENERAL:   HEAD: Atraumatic, Normocephalic  EYES: PERRLA, conjunctiva and sclera clear  ENMT: No  exudates,; Moist mucous membranes,, No lesions  NECK: Supple, No JVD, Normal thyroid  NERVOUS SYSTEM:  Alert & Oriented,   CHEST/LUNG: Clear to auscultation bilaterally; No rales, rhonchi, wheezing, or rubs  HEART: Regular rate and rhythm; No murmurs, rubs, or gallops  ABDOMEN: Soft, Nontender, Nondistended; Bowel sounds present  EXTREMITIES:  2+ Peripheral Pulses, no edema  SKIN: No rashes or lesions    LABS:      Urinalysis Basic - ( 14 Mar 2024 07:00 )    Color: x / Appearance: x / SG: x / pH: x  Gluc: 144 mg/dL / Ketone: x  / Bili: x / Urobili: x   Blood: x / Protein: x / Nitrite: x   Leuk Esterase: x / RBC: x / WBC x   Sq Epi: x / Non Sq Epi: x / Bacteria: x      CAPILLARY BLOOD GLUCOSE      POCT Blood Glucose.: 160 mg/dL (14 Mar 2024 21:40)  POCT Blood Glucose.: 319 mg/dL (14 Mar 2024 16:13)  POCT Blood Glucose.: 368 mg/dL (14 Mar 2024 10:58)  POCT Blood Glucose.: 177 mg/dL (14 Mar 2024 07:50)    Lipid panel:           Thyroid:  Diabetes Tests:  Parathyroid Panel:  Adrenals:  RADIOLOGY & ADDITIONAL TESTS:    Imaging Personally Reviewed:  [ ] YES  [ ] NO    Consultant(s) Notes Reviewed:  [ ] YES  [ ] NO    Care Discussed with Consultants/Other Providers [ ] YES  [ ] NO
Patient is a 64y old  Female who presents with a chief complaint of UTI (13 Mar 2024 17:13)      Interval History: Patient  Episode and bout of hypoglycemia this afternoon.  Mostly asymptomatic but blood glucose was ranging from 40-60    MEDICATIONS  (STANDING):  dextrose 5%. 1000 milliLiter(s) (100 mL/Hr) IV Continuous <Continuous>  dextrose 5%. 1000 milliLiter(s) (50 mL/Hr) IV Continuous <Continuous>  dextrose 50% Injectable 25 Gram(s) IV Push once  dextrose 50% Injectable 25 Gram(s) IV Push once  dextrose 50% Injectable 12.5 Gram(s) IV Push once  enoxaparin Injectable 40 milliGRAM(s) SubCutaneous every 24 hours  glucagon  Injectable 1 milliGRAM(s) IntraMuscular once  influenza   Vaccine 0.5 milliLiter(s) IntraMuscular once  insulin glargine Injectable (LANTUS) 10 Unit(s) SubCutaneous at bedtime  insulin lispro (ADMELOG) corrective regimen sliding scale   SubCutaneous Before meals and at bedtime  losartan 50 milliGRAM(s) Oral daily  metroNIDAZOLE    Tablet 500 milliGRAM(s) Oral two times a day  nystatin Ointment 1 Application(s) Topical two times a day  potassium phosphate / sodium phosphate Powder (PHOS-NaK) 1 Packet(s) Oral three times a day before meals  predniSONE   Tablet 20 milliGRAM(s) Oral daily  sodium chloride 0.9%. 1000 milliLiter(s) (100 mL/Hr) IV Continuous <Continuous>    MEDICATIONS  (PRN):  acetaminophen     Tablet .. 650 milliGRAM(s) Oral every 6 hours PRN Temp greater or equal to 38C (100.4F), Mild Pain (1 - 3)  dextrose Oral Gel 15 Gram(s) Oral once PRN Blood Glucose LESS THAN 70 milliGRAM(s)/deciliter  HYDROmorphone  Injectable 0.5 milliGRAM(s) IV Push every 4 hours PRN Moderate Pain (4 - 6)  ondansetron Injectable 4 milliGRAM(s) IV Push every 6 hours PRN Nausea      Allergies    No Known Allergies    Intolerances        REVIEW OF SYSTEMS:  CONSTITUTIONAL: no changes  EYES: No eye pain, visual disturbances, or discharge  ENMT:  No difficulty hearing, No sinus or throat pain  NECK: No pain or stiffness  RESPIRATORY: No cough, wheezing, chills or hemoptysis; No shortness of breath  CARDIOVASCULAR: No chest pain, palpitations or leg swelling  GASTROINTESTINAL: No abdominal or epigastric pain. No nausea, vomiting, or hematemesis; No diarrhea or constipation. No melena or hematochezia.  GENITOURINARY: No dysuria, frequency, hematuria, or incontinence  NEUROLOGICAL: No headaches, memory loss, loss of strength, numbness, or tremors  SKIN: No itching, burning, rashes, or lesions   ENDOCRINE: No heat or cold intolerance; No hair loss  MUSCULOSKELETAL: No joint pain or swelling; No muscle, back, or extremity pain  PSYCHIATRIC: No depression, anxiety, mood swings, or difficulty sleeping  HEME/LYMPH: No easy bruising, or bleeding gums  ALLERY AND IMMUNOLOGIC: No hives or eczema    Vital Signs Last 24 Hrs  T(C): 36.6 (13 Mar 2024 17:03), Max: 37.4 (13 Mar 2024 05:00)  T(F): 97.9 (13 Mar 2024 17:03), Max: 99.4 (13 Mar 2024 05:00)  HR: 77 (13 Mar 2024 17:03) (77 - 102)  BP: 152/83 (13 Mar 2024 17:03) (133/73 - 152/83)  BP(mean): --  RR: 18 (13 Mar 2024 17:03) (18 - 18)  SpO2: 99% (13 Mar 2024 17:03) (97% - 100%)    Parameters below as of 13 Mar 2024 17:03  Patient On (Oxygen Delivery Method): room air        PHYSICAL EXAM:  GENERAL:   HEAD: Atraumatic, Normocephalic  EYES: PERRLA, conjunctiva and sclera clear  ENMT: No  exudates,; Moist mucous membranes,, No lesions  NECK: Supple, No JVD, Normal thyroid  NERVOUS SYSTEM:  Alert & Oriented,   CHEST/LUNG: Clear to auscultation bilaterally; No rales, rhonchi, wheezing, or rubs  HEART: Regular rate and rhythm; No murmurs, rubs, or gallops  ABDOMEN: Soft, Nontender, Nondistended; Bowel sounds present  EXTREMITIES:  2+ Peripheral Pulses, no edema  SKIN: No rashes or lesions    LABS:      Urinalysis Basic - ( 13 Mar 2024 06:15 )    Color: x / Appearance: x / SG: x / pH: x  Gluc: 116 mg/dL / Ketone: x  / Bili: x / Urobili: x   Blood: x / Protein: x / Nitrite: x   Leuk Esterase: x / RBC: x / WBC x   Sq Epi: x / Non Sq Epi: x / Bacteria: x      CAPILLARY BLOOD GLUCOSE      POCT Blood Glucose.: 75 mg/dL (13 Mar 2024 22:14)  POCT Blood Glucose.: 54 mg/dL (13 Mar 2024 21:49)  POCT Blood Glucose.: 55 mg/dL (13 Mar 2024 21:47)  POCT Blood Glucose.: 165 mg/dL (13 Mar 2024 16:32)  POCT Blood Glucose.: 252 mg/dL (13 Mar 2024 11:04)  POCT Blood Glucose.: 164 mg/dL (13 Mar 2024 07:48)    Lipid panel:           Thyroid:  Diabetes Tests:  Parathyroid Panel:  Adrenals:  RADIOLOGY & ADDITIONAL TESTS:    Imaging Personally Reviewed:  [ ] YES  [ ] NO    Consultant(s) Notes Reviewed:  [ ] YES  [ ] NO    Care Discussed with Consultants/Other Providers [ ] YES  [ ] NO      
Patient is a 64y old  Female who presents with a chief complaint of UTI (15 Mar 2024 17:51)      Interval History: Fingersticks are in the low 300s despite being on increased amount of Lantus and prandial lispro.  At home patient was on metformin and currently having increased insulin resistance.  Nutrition is adequate    MEDICATIONS  (STANDING):  dextrose 5%. 1000 milliLiter(s) (100 mL/Hr) IV Continuous <Continuous>  dextrose 5%. 1000 milliLiter(s) (50 mL/Hr) IV Continuous <Continuous>  dextrose 50% Injectable 25 Gram(s) IV Push once  dextrose 50% Injectable 12.5 Gram(s) IV Push once  dextrose 50% Injectable 25 Gram(s) IV Push once  enoxaparin Injectable 40 milliGRAM(s) SubCutaneous every 24 hours  glucagon  Injectable 1 milliGRAM(s) IntraMuscular once  influenza   Vaccine 0.5 milliLiter(s) IntraMuscular once  insulin glargine Injectable (LANTUS) 20 Unit(s) SubCutaneous at bedtime  insulin lispro (ADMELOG) corrective regimen sliding scale   SubCutaneous Before meals and at bedtime  insulin lispro Injectable (ADMELOG) 10 Unit(s) SubCutaneous three times a day before meals  losartan 50 milliGRAM(s) Oral daily  metFORMIN 500 milliGRAM(s) Oral daily  mycophenolate mofetil 1500 milliGRAM(s) Oral two times a day  nystatin Ointment 1 Application(s) Topical two times a day  predniSONE   Tablet 5 milliGRAM(s) Oral daily    MEDICATIONS  (PRN):  acetaminophen     Tablet .. 650 milliGRAM(s) Oral every 6 hours PRN Temp greater or equal to 38C (100.4F), Mild Pain (1 - 3)  dextrose Oral Gel 15 Gram(s) Oral once PRN Blood Glucose LESS THAN 70 milliGRAM(s)/deciliter  HYDROmorphone  Injectable 0.5 milliGRAM(s) IV Push every 4 hours PRN Moderate Pain (4 - 6)  ondansetron Injectable 4 milliGRAM(s) IV Push every 6 hours PRN Nausea      Allergies    No Known Allergies    Intolerances        REVIEW OF SYSTEMS:  CONSTITUTIONAL: no changes  EYES: No eye pain, visual disturbances, or discharge  ENMT:  No difficulty hearing, No sinus or throat pain  NECK: No pain or stiffness  RESPIRATORY: No cough, wheezing, chills or hemoptysis; No shortness of breath  CARDIOVASCULAR: No chest pain, palpitations or leg swelling  GASTROINTESTINAL: No abdominal or epigastric pain. No nausea, vomiting, or hematemesis; No diarrhea or constipation. No melena or hematochezia.  GENITOURINARY: No dysuria, frequency, hematuria, or incontinence  NEUROLOGICAL: No headaches, memory loss, loss of strength, numbness, or tremors  SKIN: No itching, burning, rashes, or lesions   ENDOCRINE: No heat or cold intolerance; No hair loss  MUSCULOSKELETAL: No joint pain or swelling; No muscle, back, or extremity pain  PSYCHIATRIC: No depression, anxiety, mood swings, or difficulty sleeping  HEME/LYMPH: No easy bruising, or bleeding gums  ALLERY AND IMMUNOLOGIC: No hives or eczema    Vital Signs Last 24 Hrs  T(C): 36.8 (15 Mar 2024 17:14), Max: 36.8 (15 Mar 2024 17:14)  T(F): 98.2 (15 Mar 2024 17:14), Max: 98.2 (15 Mar 2024 17:14)  HR: 96 (15 Mar 2024 17:14) (75 - 96)  BP: 132/80 (15 Mar 2024 17:14) (121/60 - 156/80)  BP(mean): --  RR: 18 (15 Mar 2024 17:14) (16 - 18)  SpO2: 99% (15 Mar 2024 17:14) (99% - 100%)    Parameters below as of 15 Mar 2024 17:14  Patient On (Oxygen Delivery Method): room air        PHYSICAL EXAM:  GENERAL:   HEAD: Atraumatic, Normocephalic  EYES: PERRLA, conjunctiva and sclera clear  ENMT: No  exudates,; Moist mucous membranes,, No lesions  NECK: Supple, No JVD, Normal thyroid  NERVOUS SYSTEM:  Alert & Oriented,   CHEST/LUNG: Clear to auscultation bilaterally; No rales, rhonchi, wheezing, or rubs  HEART: Regular rate and rhythm; No murmurs, rubs, or gallops  ABDOMEN: Soft, Nontender, Nondistended; Bowel sounds present  EXTREMITIES:  2+ Peripheral Pulses, no edema  SKIN: No rashes or lesions    LABS:      Urinalysis Basic - ( 15 Mar 2024 07:51 )    Color: x / Appearance: x / SG: x / pH: x  Gluc: 175 mg/dL / Ketone: x  / Bili: x / Urobili: x   Blood: x / Protein: x / Nitrite: x   Leuk Esterase: x / RBC: x / WBC x   Sq Epi: x / Non Sq Epi: x / Bacteria: x      CAPILLARY BLOOD GLUCOSE      POCT Blood Glucose.: 229 mg/dL (15 Mar 2024 21:38)  POCT Blood Glucose.: 372 mg/dL (15 Mar 2024 16:01)  POCT Blood Glucose.: 387 mg/dL (15 Mar 2024 12:32)  POCT Blood Glucose.: 324 mg/dL (15 Mar 2024 10:53)  POCT Blood Glucose.: 185 mg/dL (15 Mar 2024 07:33)    Lipid panel:           Thyroid:  Diabetes Tests:  Parathyroid Panel:  Adrenals:  RADIOLOGY & ADDITIONAL TESTS:    Imaging Personally Reviewed:  [ ] YES  [ ] NO    Consultant(s) Notes Reviewed:  [ ] YES  [ ] NO    Care Discussed with Consultants/Other Providers [ ] YES  [ ] NO
Patient is a 64y old  Female who presents with a chief complaint of UTI (19 Mar 2024 12:22)      Interval History: finger sticks are in 100s   on Lantus 20 units and Metformin 500 mg QD and Lispro sliding scale coverage with meals     MEDICATIONS  (STANDING):  dextrose 5%. 1000 milliLiter(s) (50 mL/Hr) IV Continuous <Continuous>  dextrose 5%. 1000 milliLiter(s) (100 mL/Hr) IV Continuous <Continuous>  dextrose 50% Injectable 25 Gram(s) IV Push once  dextrose 50% Injectable 12.5 Gram(s) IV Push once  dextrose 50% Injectable 25 Gram(s) IV Push once  enoxaparin Injectable 40 milliGRAM(s) SubCutaneous every 24 hours  glucagon  Injectable 1 milliGRAM(s) IntraMuscular once  influenza   Vaccine 0.5 milliLiter(s) IntraMuscular once  insulin glargine Injectable (LANTUS) 20 Unit(s) SubCutaneous at bedtime  insulin lispro (ADMELOG) corrective regimen sliding scale   SubCutaneous Before meals and at bedtime  losartan 50 milliGRAM(s) Oral daily  magnesium oxide 400 milliGRAM(s) Oral three times a day with meals  metFORMIN 500 milliGRAM(s) Oral daily  mycophenolate mofetil 1500 milliGRAM(s) Oral two times a day  nystatin Ointment 1 Application(s) Topical two times a day  predniSONE   Tablet 5 milliGRAM(s) Oral daily    MEDICATIONS  (PRN):  acetaminophen     Tablet .. 650 milliGRAM(s) Oral every 6 hours PRN Temp greater or equal to 38C (100.4F), Mild Pain (1 - 3)  dextrose Oral Gel 15 Gram(s) Oral once PRN Blood Glucose LESS THAN 70 milliGRAM(s)/deciliter  ondansetron Injectable 4 milliGRAM(s) IV Push every 6 hours PRN Nausea      Allergies    No Known Allergies    Intolerances        REVIEW OF SYSTEMS:  CONSTITUTIONAL: no changes  EYES: No eye pain, visual disturbances, or discharge  ENMT:  No difficulty hearing, No sinus or throat pain  NECK: No pain or stiffness  RESPIRATORY: No cough, wheezing, chills or hemoptysis; No shortness of breath  CARDIOVASCULAR: No chest pain, palpitations or leg swelling  GASTROINTESTINAL: No abdominal or epigastric pain. No nausea, vomiting, or hematemesis; No diarrhea or constipation. No melena or hematochezia.  GENITOURINARY: No dysuria, frequency, hematuria, or incontinence  NEUROLOGICAL: No headaches, memory loss, loss of strength, numbness, or tremors  SKIN: No itching, burning, rashes, or lesions   ENDOCRINE: No heat or cold intolerance; No hair loss  MUSCULOSKELETAL: No joint pain or swelling; No muscle, back, or extremity pain  PSYCHIATRIC: No depression, anxiety, mood swings, or difficulty sleeping  HEME/LYMPH: No easy bruising, or bleeding gums  ALLERY AND IMMUNOLOGIC: No hives or eczema    Vital Signs Last 24 Hrs  T(C): 36.9 (19 Mar 2024 17:12), Max: 37 (19 Mar 2024 00:23)  T(F): 98.4 (19 Mar 2024 17:12), Max: 98.6 (19 Mar 2024 00:23)  HR: 97 (19 Mar 2024 17:12) (94 - 100)  BP: 144/80 (19 Mar 2024 17:12) (123/74 - 144/80)  BP(mean): --  RR: 18 (19 Mar 2024 17:12) (18 - 18)  SpO2: 100% (19 Mar 2024 17:12) (99% - 100%)    Parameters below as of 19 Mar 2024 17:12  Patient On (Oxygen Delivery Method): room air        PHYSICAL EXAM:  GENERAL:   HEAD: Atraumatic, Normocephalic  EYES: PERRLA, conjunctiva and sclera clear  ENMT: No  exudates,; Moist mucous membranes,, No lesions  NECK: Supple, No JVD, Normal thyroid  NERVOUS SYSTEM:  Alert & Oriented,   CHEST/LUNG: Clear to auscultation bilaterally; No rales, rhonchi, wheezing, or rubs  HEART: Regular rate and rhythm; No murmurs, rubs, or gallops  ABDOMEN: Soft, Nontender, Nondistended; Bowel sounds present  EXTREMITIES:  2+ Peripheral Pulses, no edema  SKIN: No rashes or lesions    LABS:      Urinalysis Basic - ( 18 Mar 2024 05:42 )    Color: x / Appearance: x / SG: x / pH: x  Gluc: 159 mg/dL / Ketone: x  / Bili: x / Urobili: x   Blood: x / Protein: x / Nitrite: x   Leuk Esterase: x / RBC: x / WBC x   Sq Epi: x / Non Sq Epi: x / Bacteria: x      CAPILLARY BLOOD GLUCOSE      POCT Blood Glucose.: 104 mg/dL (19 Mar 2024 21:28)  POCT Blood Glucose.: 134 mg/dL (19 Mar 2024 15:52)  POCT Blood Glucose.: 251 mg/dL (19 Mar 2024 11:02)  POCT Blood Glucose.: 180 mg/dL (19 Mar 2024 07:37)    Lipid panel:           Thyroid:  Diabetes Tests:  Parathyroid Panel:  Adrenals:  RADIOLOGY & ADDITIONAL TESTS:    Imaging Personally Reviewed:  [ ] YES  [ ] NO    Consultant(s) Notes Reviewed:  [ ] YES  [ ] NO    Care Discussed with Consultants/Other Providers [ ] YES  [ ] NO
HPI:  64 years old patient with PMHX of HTN, HLD, DM II, Lupus, OA and S/P left third digit toe amputation presents to ED with generalized malaise and vaginal itch X1day. Pt admitted with generalized weakness, vaginal (rash) and vaginal discomfort. pt denies fever, chills, dysuria, abdominal pain, chest pain, SOB, and N/V. Off note, patient has been taking chronic steroids and antibiotics for lupus and chronic UTI. No other complaints at this time.  (09 Mar 2024 01:20)  Patient is a 64y old  Female who presents with a chief complaint of ACUTE CYSTITIS     (11 Mar 2024 13:33)      INTERVAL HPI/OVERNIGHT EVENTS:fever overnight     MEDICATIONS  (STANDING):  dextrose 5%. 1000 milliLiter(s) (100 mL/Hr) IV Continuous <Continuous>  dextrose 5%. 1000 milliLiter(s) (50 mL/Hr) IV Continuous <Continuous>  dextrose 50% Injectable 12.5 Gram(s) IV Push once  dextrose 50% Injectable 25 Gram(s) IV Push once  dextrose 50% Injectable 25 Gram(s) IV Push once  enoxaparin Injectable 40 milliGRAM(s) SubCutaneous every 24 hours  glucagon  Injectable 1 milliGRAM(s) IntraMuscular once  influenza   Vaccine 0.5 milliLiter(s) IntraMuscular once  insulin lispro (ADMELOG) corrective regimen sliding scale   SubCutaneous Before meals and at bedtime  losartan 50 milliGRAM(s) Oral daily  metroNIDAZOLE    Tablet 500 milliGRAM(s) Oral two times a day  nystatin Ointment 1 Application(s) Topical two times a day  predniSONE   Tablet 20 milliGRAM(s) Oral daily  sodium chloride 0.9%. 1000 milliLiter(s) (100 mL/Hr) IV Continuous <Continuous>    MEDICATIONS  (PRN):  acetaminophen     Tablet .. 650 milliGRAM(s) Oral every 6 hours PRN Temp greater or equal to 38C (100.4F), Mild Pain (1 - 3)  dextrose Oral Gel 15 Gram(s) Oral once PRN Blood Glucose LESS THAN 70 milliGRAM(s)/deciliter  HYDROmorphone  Injectable 0.5 milliGRAM(s) IV Push every 4 hours PRN Moderate Pain (4 - 6)  ondansetron Injectable 4 milliGRAM(s) IV Push every 6 hours PRN Nausea      Allergies    No Known Allergies    Intolerances        REVIEW OF SYSTEMS:  CONSTITUTIONAL: No fever, weight loss, or fatigue  EYES: No eye pain, visual disturbances, or discharge  ENMT:  No difficulty hearing, tinnitus, vertigo; No sinus or throat pain  NECK: No pain or stiffness  BREASTS: No pain, masses, or nipple discharge  RESPIRATORY: No cough, wheezing, chills or hemoptysis; No shortness of breath  CARDIOVASCULAR: No chest pain, palpitations, dizziness, or leg swelling  GASTROINTESTINAL: No abdominal or epigastric pain. No nausea, vomiting, or hematemesis; No diarrhea or constipation. No melena or hematochezia.  GENITOURINARY: No dysuria, frequency, hematuria, or incontinence  NEUROLOGICAL: No headaches, memory loss, loss of strength, numbness, or tremors  SKIN: No itching, burning, rashes, or lesions   LYMPH NODES: No enlarged glands  ENDOCRINE: No heat or cold intolerance; No hair loss  MUSCULOSKELETAL: No joint pain or swelling; No muscle, back, or extremity pain  PSYCHIATRIC: No depression, anxiety, mood swings, or difficulty sleeping  HEME/LYMPH: No easy bruising, or bleeding gums  ALLERGY AND IMMUNOLOGIC: No hives or eczema    Vital Signs Last 24 Hrs  T(C): 37 (12 Mar 2024 05:03), Max: 37.8 (11 Mar 2024 10:49)  T(F): 98.6 (12 Mar 2024 05:03), Max: 100 (11 Mar 2024 10:49)  HR: 87 (12 Mar 2024 05:03) (87 - 107)  BP: 153/79 (12 Mar 2024 05:03) (130/77 - 160/79)  BP(mean): --  RR: 18 (12 Mar 2024 05:03) (18 - 18)  SpO2: 100% (12 Mar 2024 05:03) (98% - 100%)    Parameters below as of 12 Mar 2024 05:03  Patient On (Oxygen Delivery Method): room air        PHYSICAL EXAM:  GENERAL: NAD, well-groomed, well-developed  HEAD:  Atraumatic, Normocephalic  EYES: EOMI, PERRLA, conjunctiva and sclera clear  ENMT: No tonsillar erythema, exudates, or enlargement; Moist mucous membranes, Good dentition, No lesions  NECK: Supple, No JVD, Normal thyroid  NERVOUS SYSTEM:  Alert & Oriented X3, Good concentration; Motor Strength 5/5 B/L upper and lower extremities; DTRs 2+ intact and symmetric  CHEST/LUNG: Clear to ascultation  bilaterally; No rales, rhonchi, wheezing, or rubs  HEART: Regular rate and rhythm; No murmurs, rubs, or gallops  ABDOMEN: Soft, Nontender, Nondistended; Bowel sounds present  EXTREMITIES:  2+ Peripheral Pulses, No clubbing, cyanosis, or edema  LYMPH: No lymphadenopathy noted  SKIN: skin tear right thigh   LABS:                        9.9    6.10  )-----------( 96       ( 10 Mar 2024 07:11 )             29.9     03-10    141  |  111<H>  |  11  ----------------------------<  154<H>  3.2<L>   |  22  |  0.97    Ca    7.4<L>      10 Mar 2024 07:11  Phos  2.2     03-10  Mg     1.4     03-10    TPro  6.0  /  Alb  2.2<L>  /  TBili  0.6  /  DBili  x   /  AST  17  /  ALT  12  /  AlkPhos  56  03-10      Urinalysis Basic - ( 10 Mar 2024 07:11 )    Color: x / Appearance: x / SG: x / pH: x  Gluc: 154 mg/dL / Ketone: x  / Bili: x / Urobili: x   Blood: x / Protein: x / Nitrite: x   Leuk Esterase: x / RBC: x / WBC x   Sq Epi: x / Non Sq Epi: x / Bacteria: x      CAPILLARY BLOOD GLUCOSE      POCT Blood Glucose.: 284 mg/dL (11 Mar 2024 21:59)  POCT Blood Glucose.: 280 mg/dL (11 Mar 2024 16:03)  POCT Blood Glucose.: 277 mg/dL (11 Mar 2024 11:13)  POCT Blood Glucose.: 219 mg/dL (11 Mar 2024 07:57)      RADIOLOGY & ADDITIONAL TESTS:    Imaging Personally Reviewed:  [ ] YES  [ ] NO    Consultant(s) Notes Reviewed:  [ ] YES  [ ] NO    Care Discussed with Consultants/Other Providers [ ] YES  [ ] NO
Patient is a 64y old  Female who presents with a chief complaint of UTI (09 Mar 2024 01:20)    INTERVAL HPI/OVERNIGHT EVENTS: Patients seen and examined at bedside this morning. No acute events overnight. Pt reports vaginal itch.,     MEDICATIONS  (STANDING):  clotrimazole 2% Vaginal Cream 1 Applicatorful Vaginal daily  dextrose 5%. 1000 milliLiter(s) (50 mL/Hr) IV Continuous <Continuous>  dextrose 5%. 1000 milliLiter(s) (100 mL/Hr) IV Continuous <Continuous>  dextrose 50% Injectable 25 Gram(s) IV Push once  dextrose 50% Injectable 12.5 Gram(s) IV Push once  dextrose 50% Injectable 25 Gram(s) IV Push once  enoxaparin Injectable 40 milliGRAM(s) SubCutaneous every 24 hours  glucagon  Injectable 1 milliGRAM(s) IntraMuscular once  influenza   Vaccine 0.5 milliLiter(s) IntraMuscular once  insulin lispro (ADMELOG) corrective regimen sliding scale   SubCutaneous Before meals and at bedtime  losartan 50 milliGRAM(s) Oral daily  metroNIDAZOLE    Tablet 500 milliGRAM(s) Oral two times a day  predniSONE   Tablet 20 milliGRAM(s) Oral daily    MEDICATIONS  (PRN):  dextrose Oral Gel 15 Gram(s) Oral once PRN Blood Glucose LESS THAN 70 milliGRAM(s)/deciliter  HYDROmorphone  Injectable 0.5 milliGRAM(s) IV Push every 4 hours PRN Moderate Pain (4 - 6)  ondansetron Injectable 4 milliGRAM(s) IV Push every 6 hours PRN Nausea    Allergies    No Known Allergies    Intolerances      REVIEW OF SYSTEMS:  All other systems reviewed and are negative    Vital Signs Last 24 Hrs  T(C): 37.2 (09 Mar 2024 10:25), Max: 37.2 (09 Mar 2024 10:25)  T(F): 99 (09 Mar 2024 10:25), Max: 99 (09 Mar 2024 10:25)  HR: 98 (09 Mar 2024 10:25) (97 - 110)  BP: 139/80 (09 Mar 2024 10:25) (122/81 - 161/92)  BP(mean): 95 (08 Mar 2024 23:40) (95 - 95)  RR: 19 (09 Mar 2024 10:25) (16 - 19)  SpO2: 99% (09 Mar 2024 10:25) (97% - 99%)    Parameters below as of 09 Mar 2024 10:25  Patient On (Oxygen Delivery Method): room air      Daily Height in cm: 180.34 (08 Mar 2024 17:09)    Daily Weight in k.1 (09 Mar 2024 05:15)  I&O's Summary    08 Mar 2024 07:01  -  09 Mar 2024 07:00  --------------------------------------------------------  IN: 100 mL / OUT: 0 mL / NET: 100 mL      CAPILLARY BLOOD GLUCOSE      POCT Blood Glucose.: 274 mg/dL (09 Mar 2024 10:50)  POCT Blood Glucose.: 182 mg/dL (09 Mar 2024 08:28)  POCT Blood Glucose.: 120 mg/dL (08 Mar 2024 17:13)    PHYSICAL EXAM:  General:  A&Ox3,Appears stated age, No acute distress,  Head: NC/AT  EENT: PERRLA. EOMI. Conjunctiva and sclera clear. Pharynx clear.  Neck: Supple. No JVD  Lungs: CTA B/l. Nonlabored Respirations  CV: +S1S2, RRR  Abdomen: Soft, Nondistended,  Nontender, no guarding, no rebound  : white discharge, tender to palpation, no rash, areas of excoriations/small ulcers in inner thigh.  Extremities: Warm and well perfused. 2+ peripheral pulses b/l. Calf soft, nontender b/l. No pedal edema.      Labs                          10.9   7.60  )-----------( 90       ( 09 Mar 2024 06:40 )             33.9     03-09    139  |  107  |  12  ----------------------------<  139<H>  3.8   |  25  |  1.02    Ca    8.1<L>      09 Mar 2024 06:40  Phos  2.9     03-09  Mg     1.6     03-09    TPro  7.2  /  Alb  2.5<L>  /  TBili  0.7  /  DBili  x   /  AST  23  /  ALT  18  /  AlkPhos  63  03-08      CARDIAC MARKERS ( 08 Mar 2024 20:13 )  x     / x     / 128 U/L / x     / x          Urinalysis Basic - ( 09 Mar 2024 06:40 )    Color: x / Appearance: x / SG: x / pH: x  Gluc: 139 mg/dL / Ketone: x  / Bili: x / Urobili: x   Blood: x / Protein: x / Nitrite: x   Leuk Esterase: x / RBC: x / WBC x   Sq Epi: x / Non Sq Epi: x / Bacteria: x                      Radiology and Imaging reviewed.
Patient is a 64y old  Female who presents with a chief complaint of UTI (10 Mar 2024 09:27)    INTERVAL HPI/OVERNIGHT EVENTS: Patients seen and examined at bedside this morning. No acute events overnight. Pt reports she had a loose stool and chills. Vaginal itch improved.     MEDICATIONS  (STANDING):  clotrimazole 2% Vaginal Cream 1 Applicatorful Vaginal daily  dextrose 5%. 1000 milliLiter(s) (50 mL/Hr) IV Continuous <Continuous>  dextrose 5%. 1000 milliLiter(s) (100 mL/Hr) IV Continuous <Continuous>  dextrose 50% Injectable 25 Gram(s) IV Push once  dextrose 50% Injectable 12.5 Gram(s) IV Push once  dextrose 50% Injectable 25 Gram(s) IV Push once  enoxaparin Injectable 40 milliGRAM(s) SubCutaneous every 24 hours  glucagon  Injectable 1 milliGRAM(s) IntraMuscular once  influenza   Vaccine 0.5 milliLiter(s) IntraMuscular once  insulin lispro (ADMELOG) corrective regimen sliding scale   SubCutaneous Before meals and at bedtime  losartan 50 milliGRAM(s) Oral daily  metroNIDAZOLE    Tablet 500 milliGRAM(s) Oral two times a day  potassium phosphate / sodium phosphate Powder (PHOS-NaK) 1 Packet(s) Oral every 4 hours  predniSONE   Tablet 20 milliGRAM(s) Oral daily  sodium chloride 0.9%. 1000 milliLiter(s) (100 mL/Hr) IV Continuous <Continuous>    MEDICATIONS  (PRN):  acetaminophen     Tablet .. 650 milliGRAM(s) Oral every 6 hours PRN Temp greater or equal to 38C (100.4F), Mild Pain (1 - 3)  dextrose Oral Gel 15 Gram(s) Oral once PRN Blood Glucose LESS THAN 70 milliGRAM(s)/deciliter  HYDROmorphone  Injectable 0.5 milliGRAM(s) IV Push every 4 hours PRN Moderate Pain (4 - 6)  ondansetron Injectable 4 milliGRAM(s) IV Push every 6 hours PRN Nausea    Allergies    No Known Allergies    Intolerances      REVIEW OF SYSTEMS:  All other systems reviewed and are negative    Vital Signs Last 24 Hrs  T(C): 36.7 (10 Mar 2024 11:10), Max: 38 (10 Mar 2024 05:55)  T(F): 98.1 (10 Mar 2024 11:10), Max: 100.4 (10 Mar 2024 05:55)  HR: 96 (10 Mar 2024 11:10) (96 - 107)  BP: 132/83 (10 Mar 2024 11:10) (132/83 - 158/79)  BP(mean): --  RR: 18 (10 Mar 2024 11:10) (18 - 19)  SpO2: 99% (10 Mar 2024 11:10) (99% - 100%)    Parameters below as of 10 Mar 2024 11:10  Patient On (Oxygen Delivery Method): room air      Daily     Daily   I&O's Summary    09 Mar 2024 06:01  -  10 Mar 2024 07:00  --------------------------------------------------------  IN: 480 mL / OUT: 502 mL / NET: -22 mL      CAPILLARY BLOOD GLUCOSE      POCT Blood Glucose.: 150 mg/dL (10 Mar 2024 11:12)  POCT Blood Glucose.: 155 mg/dL (10 Mar 2024 07:40)  POCT Blood Glucose.: 201 mg/dL (09 Mar 2024 22:00)  POCT Blood Glucose.: 296 mg/dL (09 Mar 2024 16:23)    PHYSICAL EXAM:  General:  A&Ox3,Appears stated age, No acute distress,  Head: NC/AT  EENT: PERRLA. EOMI. Conjunctiva and sclera clear. Pharynx clear.  Neck: Supple. No JVD  Lungs: CTA B/l. Nonlabored Respirations  CV: +S1S2, RRR  Abdomen: Soft, Nondistended,  Nontender, no guarding, no rebound  : white discharge, tender to palpation, no rash, areas of excoriations/small ulcers in inner thigh.  Extremities: Warm and well perfused. 2+ peripheral pulses b/l. Calf soft, nontender b/l. No pedal edema.      Labs                          9.9    6.10  )-----------( 96       ( 10 Mar 2024 07:11 )             29.9     03-10    141  |  111<H>  |  11  ----------------------------<  154<H>  3.2<L>   |  22  |  0.97    Ca    7.4<L>      10 Mar 2024 07:11  Phos  2.2     03-10  Mg     1.4     03-10    TPro  6.0  /  Alb  2.2<L>  /  TBili  0.6  /  DBili  x   /  AST  17  /  ALT  12  /  AlkPhos  56  03-10      CARDIAC MARKERS ( 08 Mar 2024 20:13 )  x     / x     / 128 U/L / x     / x          Urinalysis Basic - ( 10 Mar 2024 07:11 )    Color: x / Appearance: x / SG: x / pH: x  Gluc: 154 mg/dL / Ketone: x  / Bili: x / Urobili: x   Blood: x / Protein: x / Nitrite: x   Leuk Esterase: x / RBC: x / WBC x   Sq Epi: x / Non Sq Epi: x / Bacteria: x        Culture - Urine (collected 08 Mar 2024 18:25)  Source: Clean Catch Clean Catch (Midstream)  Final Report (10 Mar 2024 11:21):    >=3 organisms. Probable collection contamination.                    Radiology and Imaging reviewed.
Patient is a 64y old  Female who presents with a chief complaint of UTI (18 Mar 2024 22:27)      Interval History: finger sticks are stable in low 100s   on Lantus 20 units and Lispro sliding scale coverage with meals and Metformin 500 mg QD       MEDICATIONS  (STANDING):  dextrose 5%. 1000 milliLiter(s) (100 mL/Hr) IV Continuous <Continuous>  dextrose 5%. 1000 milliLiter(s) (50 mL/Hr) IV Continuous <Continuous>  dextrose 50% Injectable 25 Gram(s) IV Push once  dextrose 50% Injectable 25 Gram(s) IV Push once  dextrose 50% Injectable 12.5 Gram(s) IV Push once  enoxaparin Injectable 40 milliGRAM(s) SubCutaneous every 24 hours  glucagon  Injectable 1 milliGRAM(s) IntraMuscular once  influenza   Vaccine 0.5 milliLiter(s) IntraMuscular once  insulin glargine Injectable (LANTUS) 20 Unit(s) SubCutaneous at bedtime  insulin lispro (ADMELOG) corrective regimen sliding scale   SubCutaneous Before meals and at bedtime  losartan 50 milliGRAM(s) Oral daily  magnesium oxide 400 milliGRAM(s) Oral three times a day with meals  metFORMIN 500 milliGRAM(s) Oral daily  mycophenolate mofetil 1500 milliGRAM(s) Oral two times a day  nystatin Ointment 1 Application(s) Topical two times a day  predniSONE   Tablet 5 milliGRAM(s) Oral daily    MEDICATIONS  (PRN):  acetaminophen     Tablet .. 650 milliGRAM(s) Oral every 6 hours PRN Temp greater or equal to 38C (100.4F), Mild Pain (1 - 3)  dextrose Oral Gel 15 Gram(s) Oral once PRN Blood Glucose LESS THAN 70 milliGRAM(s)/deciliter  ondansetron Injectable 4 milliGRAM(s) IV Push every 6 hours PRN Nausea      Allergies    No Known Allergies    Intolerances        REVIEW OF SYSTEMS:  CONSTITUTIONAL: no changes  EYES: No eye pain, visual disturbances, or discharge  ENMT:  No difficulty hearing, No sinus or throat pain  NECK: No pain or stiffness  RESPIRATORY: No cough, wheezing, chills or hemoptysis; No shortness of breath  CARDIOVASCULAR: No chest pain, palpitations or leg swelling  GASTROINTESTINAL: No abdominal or epigastric pain. No nausea, vomiting, or hematemesis; No diarrhea or constipation. No melena or hematochezia.  GENITOURINARY: No dysuria, frequency, hematuria, or incontinence  NEUROLOGICAL: No headaches, memory loss, loss of strength, numbness, or tremors  SKIN: No itching, burning, rashes, or lesions   ENDOCRINE: No heat or cold intolerance; No hair loss  MUSCULOSKELETAL: No joint pain or swelling; No muscle, back, or extremity pain  PSYCHIATRIC: No depression, anxiety, mood swings, or difficulty sleeping  HEME/LYMPH: No easy bruising, or bleeding gums  ALLERY AND IMMUNOLOGIC: No hives or eczema    Vital Signs Last 24 Hrs  T(C): 36.6 (18 Mar 2024 16:55), Max: 37.1 (17 Mar 2024 23:44)  T(F): 97.9 (18 Mar 2024 16:55), Max: 98.8 (17 Mar 2024 23:44)  HR: 93 (18 Mar 2024 16:55) (92 - 98)  BP: 137/86 (18 Mar 2024 16:55) (107/69 - 157/83)  BP(mean): --  RR: 18 (18 Mar 2024 16:55) (18 - 18)  SpO2: 100% (18 Mar 2024 16:55) (98% - 100%)    Parameters below as of 18 Mar 2024 16:55  Patient On (Oxygen Delivery Method): room air        PHYSICAL EXAM:  GENERAL:   HEAD: Atraumatic, Normocephalic  EYES: PERRLA, conjunctiva and sclera clear  ENMT: No  exudates,; Moist mucous membranes,, No lesions  NECK: Supple, No JVD, Normal thyroid  NERVOUS SYSTEM:  Alert & Oriented,   CHEST/LUNG: Clear to auscultation bilaterally; No rales, rhonchi, wheezing, or rubs  HEART: Regular rate and rhythm; No murmurs, rubs, or gallops  ABDOMEN: Soft, Nontender, Nondistended; Bowel sounds present  EXTREMITIES:  2+ Peripheral Pulses, no edema  SKIN: No rashes or lesions    LABS:      Urinalysis Basic - ( 18 Mar 2024 05:42 )    Color: x / Appearance: x / SG: x / pH: x  Gluc: 159 mg/dL / Ketone: x  / Bili: x / Urobili: x   Blood: x / Protein: x / Nitrite: x   Leuk Esterase: x / RBC: x / WBC x   Sq Epi: x / Non Sq Epi: x / Bacteria: x      CAPILLARY BLOOD GLUCOSE      POCT Blood Glucose.: 98 mg/dL (18 Mar 2024 21:39)  POCT Blood Glucose.: 142 mg/dL (18 Mar 2024 16:14)  POCT Blood Glucose.: 276 mg/dL (18 Mar 2024 11:02)  POCT Blood Glucose.: 207 mg/dL (18 Mar 2024 07:38)    Lipid panel:           Thyroid:  Diabetes Tests:  Parathyroid Panel:  Adrenals:  RADIOLOGY & ADDITIONAL TESTS:    Imaging Personally Reviewed:  [ ] YES  [ ] NO    Consultant(s) Notes Reviewed:  [ ] YES  [ ] NO    Care Discussed with Consultants/Other Providers [ ] YES  [ ] NO
HPI:  64 years old patient with PMHX of HTN, HLD, DM II, Lupus, OA and S/P left third digit toe amputation presents to ED with generalized malaise and vaginal itch X1day. Pt admitted with generalized weakness, vaginal (rash) and vaginal discomfort. pt denies fever, chills, dysuria, abdominal pain, chest pain, SOB, and N/V. Off note, patient has been taking chronic steroids and antibiotics for lupus and chronic UTI. No other complaints at this time.  (09 Mar 2024 01:20)  Patient is a 64y old  Female who presents with a chief complaint of UTI (15 Mar 2024 22:20)      INTERVAL HPI/OVERNIGHT EVENTS: no acute events overnight     MEDICATIONS  (STANDING):  dextrose 5%. 1000 milliLiter(s) (100 mL/Hr) IV Continuous <Continuous>  dextrose 5%. 1000 milliLiter(s) (50 mL/Hr) IV Continuous <Continuous>  dextrose 50% Injectable 25 Gram(s) IV Push once  dextrose 50% Injectable 25 Gram(s) IV Push once  dextrose 50% Injectable 12.5 Gram(s) IV Push once  enoxaparin Injectable 40 milliGRAM(s) SubCutaneous every 24 hours  glucagon  Injectable 1 milliGRAM(s) IntraMuscular once  influenza   Vaccine 0.5 milliLiter(s) IntraMuscular once  insulin glargine Injectable (LANTUS) 20 Unit(s) SubCutaneous at bedtime  insulin lispro (ADMELOG) corrective regimen sliding scale   SubCutaneous Before meals and at bedtime  insulin lispro Injectable (ADMELOG) 10 Unit(s) SubCutaneous three times a day before meals  losartan 50 milliGRAM(s) Oral daily  metFORMIN 500 milliGRAM(s) Oral daily  mycophenolate mofetil 1500 milliGRAM(s) Oral two times a day  nystatin Ointment 1 Application(s) Topical two times a day  predniSONE   Tablet 5 milliGRAM(s) Oral daily    MEDICATIONS  (PRN):  acetaminophen     Tablet .. 650 milliGRAM(s) Oral every 6 hours PRN Temp greater or equal to 38C (100.4F), Mild Pain (1 - 3)  dextrose Oral Gel 15 Gram(s) Oral once PRN Blood Glucose LESS THAN 70 milliGRAM(s)/deciliter  HYDROmorphone  Injectable 0.5 milliGRAM(s) IV Push every 4 hours PRN Moderate Pain (4 - 6)  ondansetron Injectable 4 milliGRAM(s) IV Push every 6 hours PRN Nausea      Allergies    No Known Allergies    Intolerances        REVIEW OF SYSTEMS:  CONSTITUTIONAL: fatigue  EYES: No eye pain, visual disturbances, or discharge  ENMT:  No difficulty hearing, tinnitus, vertigo; No sinus or throat pain  NECK: No pain or stiffness  BREASTS: No pain, masses, or nipple discharge  RESPIRATORY: No cough, wheezing, chills or hemoptysis; No shortness of breath  CARDIOVASCULAR: No chest pain, palpitations, dizziness, or leg swelling  GASTROINTESTINAL: No abdominal or epigastric pain. No nausea, vomiting, or hematemesis; No diarrhea or constipation. No melena or hematochezia.  GENITOURINARY: No dysuria, frequency, hematuria, or incontinence  NEUROLOGICAL: No headaches, memory loss, loss of strength, numbness, or tremors  SKIN: No itching, burning, rashes, or lesions   LYMPH NODES: No enlarged glands  ENDOCRINE: No heat or cold intolerance; No hair loss  MUSCULOSKELETAL: No joint pain or swelling; No muscle, back, or extremity pain  PSYCHIATRIC: No depression, anxiety, mood swings, or difficulty sleeping  HEME/LYMPH: No easy bruising, or bleeding gums  ALLERGY AND IMMUNOLOGIC: No hives or eczema    Vital Signs Last 24 Hrs  T(C): 36.6 (16 Mar 2024 17:02), Max: 37.2 (16 Mar 2024 10:26)  T(F): 97.8 (16 Mar 2024 17:02), Max: 98.9 (16 Mar 2024 10:26)  HR: 98 (16 Mar 2024 17:02) (80 - 102)  BP: 157/86 (16 Mar 2024 17:02) (121/76 - 167/81)  BP(mean): --  RR: 18 (16 Mar 2024 17:02) (18 - 19)  SpO2: 99% (16 Mar 2024 17:02) (99% - 100%)    Parameters below as of 16 Mar 2024 17:02  Patient On (Oxygen Delivery Method): room air        PHYSICAL EXAM:  GENERAL: NAD, well-groomed, well-developed  HEAD:  Atraumatic, Normocephalic  EYES: EOMI, PERRLA, conjunctiva and sclera clear  ENMT: No tonsillar erythema, exudates, or enlargement; Moist mucous membranes, Good dentition, No lesions  NECK: Supple, No JVD, Normal thyroid  NERVOUS SYSTEM:  Alert & Oriented X3, Good concentration; Motor Strength 5/5 B/L upper and lower extremities; DTRs 2+ intact and symmetric  CHEST/LUNG: Clear to ascultation  bilaterally; No rales, rhonchi, wheezing, or rubs  HEART: Regular rate and rhythm; No murmurs, rubs, or gallops  ABDOMEN: Soft, Nontender, Nondistended; Bowel sounds present  EXTREMITIES:  2+ Peripheral Pulses, No clubbing, cyanosis, or edema  LYMPH: No lymphadenopathy noted  SKIN: skin lesions [facility]  LABS:                        9.7    3.99  )-----------( 218      ( 15 Mar 2024 07:51 )             30.5     03-15    140  |  111<H>  |  12  ----------------------------<  175<H>  3.5   |  19<L>  |  0.70    Ca    8.4<L>      15 Mar 2024 07:51        Urinalysis Basic - ( 15 Mar 2024 07:51 )    Color: x / Appearance: x / SG: x / pH: x  Gluc: 175 mg/dL / Ketone: x  / Bili: x / Urobili: x   Blood: x / Protein: x / Nitrite: x   Leuk Esterase: x / RBC: x / WBC x   Sq Epi: x / Non Sq Epi: x / Bacteria: x      CAPILLARY BLOOD GLUCOSE      POCT Blood Glucose.: 79 mg/dL (16 Mar 2024 16:30)  POCT Blood Glucose.: 230 mg/dL (16 Mar 2024 10:59)  POCT Blood Glucose.: 189 mg/dL (16 Mar 2024 07:59)  POCT Blood Glucose.: 229 mg/dL (15 Mar 2024 21:38)      RADIOLOGY & ADDITIONAL TESTS:    Imaging Personally Reviewed:  [ X] YES  [ ] NO    Consultant(s) Notes Reviewed:  [X ] YES  [ ] NO    Care Discussed with Consultants/Other Providers [X ] YES  [ ] NO
Patient is a 64y old  Female who presents with a chief complaint of UTI (16 Mar 2024 18:02)      Interval History: finger sticks are < 100   on Lantus 20 units and prandial lispro 10 units and Metformin 500 mg QD added yesterday     MEDICATIONS  (STANDING):  dextrose 5%. 1000 milliLiter(s) (100 mL/Hr) IV Continuous <Continuous>  dextrose 5%. 1000 milliLiter(s) (50 mL/Hr) IV Continuous <Continuous>  dextrose 50% Injectable 25 Gram(s) IV Push once  dextrose 50% Injectable 25 Gram(s) IV Push once  dextrose 50% Injectable 12.5 Gram(s) IV Push once  enoxaparin Injectable 40 milliGRAM(s) SubCutaneous every 24 hours  glucagon  Injectable 1 milliGRAM(s) IntraMuscular once  influenza   Vaccine 0.5 milliLiter(s) IntraMuscular once  insulin glargine Injectable (LANTUS) 20 Unit(s) SubCutaneous at bedtime  insulin lispro (ADMELOG) corrective regimen sliding scale   SubCutaneous Before meals and at bedtime  insulin lispro Injectable (ADMELOG) 10 Unit(s) SubCutaneous three times a day before meals  losartan 50 milliGRAM(s) Oral daily  metFORMIN 500 milliGRAM(s) Oral daily  mycophenolate mofetil 1500 milliGRAM(s) Oral two times a day  nystatin Ointment 1 Application(s) Topical two times a day  predniSONE   Tablet 5 milliGRAM(s) Oral daily    MEDICATIONS  (PRN):  acetaminophen     Tablet .. 650 milliGRAM(s) Oral every 6 hours PRN Temp greater or equal to 38C (100.4F), Mild Pain (1 - 3)  dextrose Oral Gel 15 Gram(s) Oral once PRN Blood Glucose LESS THAN 70 milliGRAM(s)/deciliter  HYDROmorphone  Injectable 0.5 milliGRAM(s) IV Push every 4 hours PRN Moderate Pain (4 - 6)  ondansetron Injectable 4 milliGRAM(s) IV Push every 6 hours PRN Nausea      Allergies    No Known Allergies    Intolerances        REVIEW OF SYSTEMS:  CONSTITUTIONAL: no changes  EYES: No eye pain, visual disturbances, or discharge  ENMT:  No difficulty hearing, No sinus or throat pain  NECK: No pain or stiffness  RESPIRATORY: No cough, wheezing, chills or hemoptysis; No shortness of breath  CARDIOVASCULAR: No chest pain, palpitations or leg swelling  GASTROINTESTINAL: No abdominal or epigastric pain. No nausea, vomiting, or hematemesis; No diarrhea or constipation. No melena or hematochezia.  GENITOURINARY: No dysuria, frequency, hematuria, or incontinence  NEUROLOGICAL: No headaches, memory loss, loss of strength, numbness, or tremors  SKIN: No itching, burning, rashes, or lesions   ENDOCRINE: No heat or cold intolerance; No hair loss  MUSCULOSKELETAL: No joint pain or swelling; No muscle, back, or extremity pain  PSYCHIATRIC: No depression, anxiety, mood swings, or difficulty sleeping  HEME/LYMPH: No easy bruising, or bleeding gums  ALLERY AND IMMUNOLOGIC: No hives or eczema    Vital Signs Last 24 Hrs  T(C): 36.6 (16 Mar 2024 17:02), Max: 37.2 (16 Mar 2024 10:26)  T(F): 97.8 (16 Mar 2024 17:02), Max: 98.9 (16 Mar 2024 10:26)  HR: 98 (16 Mar 2024 17:02) (80 - 102)  BP: 157/86 (16 Mar 2024 17:02) (121/76 - 167/81)  BP(mean): --  RR: 18 (16 Mar 2024 17:02) (18 - 19)  SpO2: 99% (16 Mar 2024 17:02) (99% - 100%)    Parameters below as of 16 Mar 2024 17:02  Patient On (Oxygen Delivery Method): room air        PHYSICAL EXAM:  GENERAL:   HEAD: Atraumatic, Normocephalic  EYES: PERRLA, conjunctiva and sclera clear  ENMT: No  exudates,; Moist mucous membranes,, No lesions  NECK: Supple, No JVD, Normal thyroid  NERVOUS SYSTEM:  Alert & Oriented,   CHEST/LUNG: Clear to auscultation bilaterally; No rales, rhonchi, wheezing, or rubs  HEART: Regular rate and rhythm; No murmurs, rubs, or gallops  ABDOMEN: Soft, Nontender, Nondistended; Bowel sounds present  EXTREMITIES:  2+ Peripheral Pulses, no edema  SKIN: No rashes or lesions    LABS:      Urinalysis Basic - ( 15 Mar 2024 07:51 )    Color: x / Appearance: x / SG: x / pH: x  Gluc: 175 mg/dL / Ketone: x  / Bili: x / Urobili: x   Blood: x / Protein: x / Nitrite: x   Leuk Esterase: x / RBC: x / WBC x   Sq Epi: x / Non Sq Epi: x / Bacteria: x      CAPILLARY BLOOD GLUCOSE      POCT Blood Glucose.: 79 mg/dL (16 Mar 2024 16:30)  POCT Blood Glucose.: 230 mg/dL (16 Mar 2024 10:59)  POCT Blood Glucose.: 189 mg/dL (16 Mar 2024 07:59)  POCT Blood Glucose.: 229 mg/dL (15 Mar 2024 21:38)    Lipid panel:           Thyroid:  Diabetes Tests:  Parathyroid Panel:  Adrenals:  RADIOLOGY & ADDITIONAL TESTS:    Imaging Personally Reviewed:  [ ] YES  [ ] NO    Consultant(s) Notes Reviewed:  [ ] YES  [ ] NO    Care Discussed with Consultants/Other Providers [ ] YES  [ ] NO
HPI:  64 years old patient with PMHX of HTN, HLD, DM II, Lupus, OA and S/P left third digit toe amputation presents to ED with generalized malaise and vaginal itch X1day. Pt admitted with generalized weakness, vaginal (rash) and vaginal discomfort. pt denies fever, chills, dysuria, abdominal pain, chest pain, SOB, and N/V. Off note, patient has been taking chronic steroids and antibiotics for lupus and chronic UTI. No other complaints at this time.  (09 Mar 2024 01:20)    Patient is a 64y old  Female who presents with a chief complaint of UTI (12 Mar 2024 18:52)      INTERVAL HPI/OVERNIGHT EVENTS:  feels somewhat better awaiting rehab     MEDICATIONS  (STANDING):  dextrose 5%. 1000 milliLiter(s) (100 mL/Hr) IV Continuous <Continuous>  dextrose 5%. 1000 milliLiter(s) (50 mL/Hr) IV Continuous <Continuous>  dextrose 50% Injectable 25 Gram(s) IV Push once  dextrose 50% Injectable 12.5 Gram(s) IV Push once  dextrose 50% Injectable 25 Gram(s) IV Push once  enoxaparin Injectable 40 milliGRAM(s) SubCutaneous every 24 hours  glucagon  Injectable 1 milliGRAM(s) IntraMuscular once  influenza   Vaccine 0.5 milliLiter(s) IntraMuscular once  insulin glargine Injectable (LANTUS) 10 Unit(s) SubCutaneous at bedtime  insulin lispro (ADMELOG) corrective regimen sliding scale   SubCutaneous Before meals and at bedtime  insulin lispro Injectable (ADMELOG) 10 Unit(s) SubCutaneous before lunch  insulin lispro Injectable (ADMELOG) 10 Unit(s) SubCutaneous before breakfast  insulin lispro Injectable (ADMELOG) 10 Unit(s) SubCutaneous before dinner  losartan 50 milliGRAM(s) Oral daily  metroNIDAZOLE    Tablet 500 milliGRAM(s) Oral two times a day  nystatin Ointment 1 Application(s) Topical two times a day  potassium phosphate / sodium phosphate Powder (PHOS-NaK) 1 Packet(s) Oral three times a day before meals  predniSONE   Tablet 20 milliGRAM(s) Oral daily  sodium chloride 0.9%. 1000 milliLiter(s) (100 mL/Hr) IV Continuous <Continuous>    MEDICATIONS  (PRN):  acetaminophen     Tablet .. 650 milliGRAM(s) Oral every 6 hours PRN Temp greater or equal to 38C (100.4F), Mild Pain (1 - 3)  dextrose Oral Gel 15 Gram(s) Oral once PRN Blood Glucose LESS THAN 70 milliGRAM(s)/deciliter  HYDROmorphone  Injectable 0.5 milliGRAM(s) IV Push every 4 hours PRN Moderate Pain (4 - 6)  ondansetron Injectable 4 milliGRAM(s) IV Push every 6 hours PRN Nausea      Allergies    No Known Allergies    Intolerances        REVIEW OF SYSTEMS:  CONSTITUTIONAL: No fever, weight loss, or fatigue  EYES: No eye pain, visual disturbances, or discharge  ENMT:  No difficulty hearing, tinnitus, vertigo; No sinus or throat pain  NECK: No pain or stiffness  BREASTS: No pain, masses, or nipple discharge  RESPIRATORY: No cough, wheezing, chills or hemoptysis; No shortness of breath  CARDIOVASCULAR: No chest pain, palpitations, dizziness, or leg swelling  GASTROINTESTINAL: No abdominal or epigastric pain. No nausea, vomiting, or hematemesis; No diarrhea or constipation. No melena or hematochezia.  GENITOURINARY: No dysuria, frequency, hematuria, or incontinence  NEUROLOGICAL: No headaches, memory loss, loss of strength, numbness, or tremors  SKIN: No itching, burning, rashes, or lesions   LYMPH NODES: No enlarged glands  ENDOCRINE: No heat or cold intolerance; No hair loss  MUSCULOSKELETAL: No joint pain or swelling; No muscle, back, or extremity pain  PSYCHIATRIC: No depression, anxiety, mood swings, or difficulty sleeping  HEME/LYMPH: No easy bruising, or bleeding gums  ALLERGY AND IMMUNOLOGIC: No hives or eczema    Vital Signs Last 24 Hrs  T(C): 36.6 (13 Mar 2024 17:03), Max: 37.4 (13 Mar 2024 05:00)  T(F): 97.9 (13 Mar 2024 17:03), Max: 99.4 (13 Mar 2024 05:00)  HR: 77 (13 Mar 2024 17:03) (77 - 102)  BP: 152/83 (13 Mar 2024 17:03) (133/73 - 152/83)  BP(mean): --  RR: 18 (13 Mar 2024 17:03) (18 - 18)  SpO2: 99% (13 Mar 2024 17:03) (97% - 100%)    Parameters below as of 13 Mar 2024 17:03  Patient On (Oxygen Delivery Method): room air        PHYSICAL EXAM:  GENERAL: NAD, well-groomed, well-developed  HEAD:  Atraumatic, Normocephalic  EYES: EOMI, PERRLA, conjunctiva and sclera clear  ENMT: No tonsillar erythema, exudates, or enlargement; Moist mucous membranes, Good dentition, No lesions  NECK: Supple, No JVD, Normal thyroid  NERVOUS SYSTEM:  Alert & Oriented X3, Good concentration; Motor Strength 5/5 B/L upper and lower extremities; DTRs 2+ intact and symmetric  CHEST/LUNG: Clear to ascultation  bilaterally; No rales, rhonchi, wheezing, or rubs  HEART: Regular rate and rhythm; No murmurs, rubs, or gallops  ABDOMEN: Soft, Nontender, Nondistended; Bowel sounds present  EXTREMITIES:  2+ Peripheral Pulses, No clubbing, cyanosis, or edema  LYMPH: No lymphadenopathy noted  SKIN:  skin wounds    LABS:                        9.6    6.05  )-----------( 165      ( 13 Mar 2024 08:05 )             29.9     03-13    140  |  114<H>  |  9   ----------------------------<  116<H>  3.9   |  16<L>  |  0.75    Ca    7.9<L>      13 Mar 2024 06:15  Phos  2.4     03-13  Mg     1.7     03-13        Urinalysis Basic - ( 13 Mar 2024 06:15 )    Color: x / Appearance: x / SG: x / pH: x  Gluc: 116 mg/dL / Ketone: x  / Bili: x / Urobili: x   Blood: x / Protein: x / Nitrite: x   Leuk Esterase: x / RBC: x / WBC x   Sq Epi: x / Non Sq Epi: x / Bacteria: x      CAPILLARY BLOOD GLUCOSE      POCT Blood Glucose.: 165 mg/dL (13 Mar 2024 16:32)  POCT Blood Glucose.: 252 mg/dL (13 Mar 2024 11:04)  POCT Blood Glucose.: 164 mg/dL (13 Mar 2024 07:48)  POCT Blood Glucose.: 118 mg/dL (12 Mar 2024 21:43)      RADIOLOGY & ADDITIONAL TESTS:    Imaging Personally Reviewed:  [ ] YES  [ ] NO    Consultant(s) Notes Reviewed:  [ ] YES  [ ] NO    Care Discussed with Consultants/Other Providers [ ] YES  [ ] NO
HPI:  64 years old patient with PMHX of HTN, HLD, DM II, Lupus, OA and S/P left third digit toe amputation presents to ED with generalized malaise and vaginal itch X1day. Pt admitted with generalized weakness, vaginal (rash) and vaginal discomfort. pt denies fever, chills, dysuria, abdominal pain, chest pain, SOB, and N/V. Off note, patient has been taking chronic steroids and antibiotics for lupus and chronic UTI. No other complaints at this time.  (09 Mar 2024 01:20)  Patient is a 64y old  Female who presents with a chief complaint of UTI (17 Mar 2024 18:45)      INTERVAL HPI/OVERNIGHT EVENTS: no acute events     MEDICATIONS  (STANDING):  dextrose 5%. 1000 milliLiter(s) (100 mL/Hr) IV Continuous <Continuous>  dextrose 5%. 1000 milliLiter(s) (50 mL/Hr) IV Continuous <Continuous>  dextrose 50% Injectable 12.5 Gram(s) IV Push once  dextrose 50% Injectable 25 Gram(s) IV Push once  dextrose 50% Injectable 25 Gram(s) IV Push once  enoxaparin Injectable 40 milliGRAM(s) SubCutaneous every 24 hours  glucagon  Injectable 1 milliGRAM(s) IntraMuscular once  influenza   Vaccine 0.5 milliLiter(s) IntraMuscular once  insulin glargine Injectable (LANTUS) 20 Unit(s) SubCutaneous at bedtime  insulin lispro (ADMELOG) corrective regimen sliding scale   SubCutaneous Before meals and at bedtime  losartan 50 milliGRAM(s) Oral daily  metFORMIN 500 milliGRAM(s) Oral daily  mycophenolate mofetil 1500 milliGRAM(s) Oral two times a day  nystatin Ointment 1 Application(s) Topical two times a day  predniSONE   Tablet 5 milliGRAM(s) Oral daily    MEDICATIONS  (PRN):  acetaminophen     Tablet .. 650 milliGRAM(s) Oral every 6 hours PRN Temp greater or equal to 38C (100.4F), Mild Pain (1 - 3)  dextrose Oral Gel 15 Gram(s) Oral once PRN Blood Glucose LESS THAN 70 milliGRAM(s)/deciliter  ondansetron Injectable 4 milliGRAM(s) IV Push every 6 hours PRN Nausea      Allergies    No Known Allergies    Intolerances        REVIEW OF SYSTEMS:  CONSTITUTIONAL: No fever, weight loss, or fatigue  EYES: No eye pain, visual disturbances, or discharge  ENMT:  No difficulty hearing, tinnitus, vertigo; No sinus or throat pain  NECK: No pain or stiffness  BREASTS: No pain, masses, or nipple discharge  RESPIRATORY: No cough, wheezing, chills or hemoptysis; No shortness of breath  CARDIOVASCULAR: No chest pain, palpitations, dizziness, or leg swelling  GASTROINTESTINAL: No abdominal or epigastric pain. No nausea, vomiting, or hematemesis; No diarrhea or constipation. No melena or hematochezia.  GENITOURINARY: No dysuria, frequency, hematuria, or incontinence  NEUROLOGICAL: No headaches, memory loss, loss of strength, numbness, or tremors  SKIN: No itching, burning, rashes, or lesions   LYMPH NODES: No enlarged glands  ENDOCRINE: No heat or cold intolerance; No hair loss  MUSCULOSKELETAL: No joint pain or swelling; No muscle, back, or extremity pain  PSYCHIATRIC: No depression, anxiety, mood swings, or difficulty sleeping  HEME/LYMPH: No easy bruising, or bleeding gums  ALLERGY AND IMMUNOLOGIC: No hives or eczema    Vital Signs Last 24 Hrs  T(C): 37 (17 Mar 2024 17:33), Max: 37.8 (16 Mar 2024 23:58)  T(F): 98.6 (17 Mar 2024 17:33), Max: 100.1 (16 Mar 2024 23:58)  HR: 94 (17 Mar 2024 17:33) (86 - 112)  BP: 135/83 (17 Mar 2024 17:33) (124/77 - 158/85)  BP(mean): --  RR: 18 (17 Mar 2024 17:33) (17 - 18)  SpO2: 100% (17 Mar 2024 17:33) (99% - 100%)    Parameters below as of 17 Mar 2024 17:33  Patient On (Oxygen Delivery Method): room air        PHYSICAL EXAM:  GENERAL: NAD, well-groomed, well-developed  HEAD:  Atraumatic, Normocephalic  EYES: EOMI, PERRLA, conjunctiva and sclera clear  ENMT: No tonsillar erythema, exudates, or enlargement; Moist mucous membranes, Good dentition, No lesions  NECK: Supple, No JVD, Normal thyroid  NERVOUS SYSTEM:  Alert & Oriented X3, Good concentration; Motor Strength 5/5 B/L upper and lower extremities; DTRs 2+ intact and symmetric  CHEST/LUNG: Clear to ascultation  bilaterally; No rales, rhonchi, wheezing, or rubs  HEART: Regular rate and rhythm; No murmurs, rubs, or gallops  ABDOMEN: Soft, Nontender, Nondistended; Bowel sounds present  EXTREMITIES:  2+ Peripheral Pulses, No clubbing, cyanosis, or edema  LYMPH: No lymphadenopathy noted  SKIN: skin lesions   LABS:              CAPILLARY BLOOD GLUCOSE      POCT Blood Glucose.: 81 mg/dL (17 Mar 2024 17:12)  POCT Blood Glucose.: 62 mg/dL (17 Mar 2024 16:46)  POCT Blood Glucose.: 61 mg/dL (17 Mar 2024 16:44)  POCT Blood Glucose.: 52 mg/dL (17 Mar 2024 16:14)  POCT Blood Glucose.: 58 mg/dL (17 Mar 2024 16:13)  POCT Blood Glucose.: 230 mg/dL (17 Mar 2024 10:56)  POCT Blood Glucose.: 185 mg/dL (17 Mar 2024 07:48)  POCT Blood Glucose.: 121 mg/dL (16 Mar 2024 21:43)      RADIOLOGY & ADDITIONAL TESTS:    Imaging Personally Reviewed:  [ ] YES  [ ] NO    Consultant(s) Notes Reviewed:  [ ] YES  [ ] NO    Care Discussed with Consultants/Other Providers [ ] YES  [ ] NO
HPI:  64 years old patient with PMHX of HTN, HLD, DM II, Lupus, OA and S/P left third digit toe amputation presents to ED with generalized malaise and vaginal itch X1day. Pt admitted with generalized weakness, vaginal (rash) and vaginal discomfort. pt denies fever, chills, dysuria, abdominal pain, chest pain, SOB, and N/V. Off note, patient has been taking chronic steroids and antibiotics for lupus and chronic UTI. No other complaints at this time.  (09 Mar 2024 01:20)  Patient is a 64y old  Female who presents with a chief complaint of UTI (17 Mar 2024 20:29)      INTERVAL HPI/OVERNIGHT EVENTS: no acute events overnight     MEDICATIONS  (STANDING):  dextrose 5%. 1000 milliLiter(s) (100 mL/Hr) IV Continuous <Continuous>  dextrose 5%. 1000 milliLiter(s) (50 mL/Hr) IV Continuous <Continuous>  dextrose 50% Injectable 12.5 Gram(s) IV Push once  dextrose 50% Injectable 25 Gram(s) IV Push once  dextrose 50% Injectable 25 Gram(s) IV Push once  enoxaparin Injectable 40 milliGRAM(s) SubCutaneous every 24 hours  glucagon  Injectable 1 milliGRAM(s) IntraMuscular once  influenza   Vaccine 0.5 milliLiter(s) IntraMuscular once  insulin glargine Injectable (LANTUS) 20 Unit(s) SubCutaneous at bedtime  insulin lispro (ADMELOG) corrective regimen sliding scale   SubCutaneous Before meals and at bedtime  losartan 50 milliGRAM(s) Oral daily  magnesium oxide 400 milliGRAM(s) Oral three times a day with meals  metFORMIN 500 milliGRAM(s) Oral daily  mycophenolate mofetil 1500 milliGRAM(s) Oral two times a day  nystatin Ointment 1 Application(s) Topical two times a day  predniSONE   Tablet 5 milliGRAM(s) Oral daily    MEDICATIONS  (PRN):  acetaminophen     Tablet .. 650 milliGRAM(s) Oral every 6 hours PRN Temp greater or equal to 38C (100.4F), Mild Pain (1 - 3)  dextrose Oral Gel 15 Gram(s) Oral once PRN Blood Glucose LESS THAN 70 milliGRAM(s)/deciliter  ondansetron Injectable 4 milliGRAM(s) IV Push every 6 hours PRN Nausea      Allergies    No Known Allergies    Intolerances        REVIEW OF SYSTEMS:  CONSTITUTIONAL:  fatigue  EYES: No eye pain, visual disturbances, or discharge  ENMT:  No difficulty hearing, tinnitus, vertigo; No sinus or throat pain  NECK: No pain or stiffness  BREASTS: No pain, masses, or nipple discharge  RESPIRATORY: No cough, wheezing, chills or hemoptysis; No shortness of breath  CARDIOVASCULAR: No chest pain, palpitations, dizziness, or leg swelling  GASTROINTESTINAL: No abdominal or epigastric pain. No nausea, vomiting, or hematemesis; No diarrhea or constipation. No melena or hematochezia.  GENITOURINARY: No dysuria, frequency, hematuria, or incontinence  NEUROLOGICAL: No headaches, memory loss, loss of strength, numbness, or tremors  SKIN: No itching, burning, rashes, or lesions   LYMPH NODES: No enlarged glands  ENDOCRINE: No heat or cold intolerance; No hair loss  MUSCULOSKELETAL: No joint pain or swelling; No muscle, back, or extremity pain  PSYCHIATRIC: No depression, anxiety, mood swings, or difficulty sleeping  HEME/LYMPH: No easy bruising, or bleeding gums  ALLERGY AND IMMUNOLOGIC: No hives or eczema    Vital Signs Last 24 Hrs  T(C): 36.6 (18 Mar 2024 16:55), Max: 37.1 (17 Mar 2024 23:44)  T(F): 97.9 (18 Mar 2024 16:55), Max: 98.8 (17 Mar 2024 23:44)  HR: 93 (18 Mar 2024 16:55) (92 - 98)  BP: 137/86 (18 Mar 2024 16:55) (107/69 - 157/83)  RR: 18 (18 Mar 2024 16:55) (18 - 18)  SpO2: 100% (18 Mar 2024 16:55) (98% - 100%)    Parameters below as of 18 Mar 2024 16:55  Patient On (Oxygen Delivery Method): room air        PHYSICAL EXAM:  GENERAL: NAD,  mal nourished HEAD:  Atraumatic, Normocephalic  EYES: EOMI, PERRLA, conjunctiva and sclera clear  ENMT: No tonsillar erythema, exudates, or enlargement; Moist mucous membranes, Good dentition, No lesions  NECK: Supple, No JVD, Normal thyroid  NERVOUS SYSTEM:  Alert & Oriented X3, Good concentration; Motor Strength 5/5 B/L upper and lower extremities; DTRs 2+ intact and symmetric  CHEST/LUNG: Clear to ascultation  bilaterally; No rales, rhonchi, wheezing, or rubs  HEART: Regular rate and rhythm; No murmurs, rubs, or gallops  ABDOMEN: Soft, Nontender, Nondistended; Bowel sounds present  EXTREMITIES:  2+ Peripheral Pulses, No clubbing, cyanosis, or edema  LYMPH: No lymphadenopathy noted  SKINulcers skin shear sacrum and leg   LABS:                        9.7    3.44  )-----------( 260      ( 18 Mar 2024 05:42 )             30.9     03-18    140  |  109<H>  |  6<L>  ----------------------------<  159<H>  3.6   |  24  |  0.72    Ca    8.3<L>      18 Mar 2024 05:42  Phos  3.2     03-18  Mg     1.4     03-18        Urinalysis Basic - ( 18 Mar 2024 05:42 )    Color: x / Appearance: x / SG: x / pH: x  Gluc: 159 mg/dL / Ketone: x  / Bili: x / Urobili: x   Blood: x / Protein: x / Nitrite: x   Leuk Esterase: x / RBC: x / WBC x   Sq Epi: x / Non Sq Epi: x / Bacteria: x      CAPILLARY BLOOD GLUCOSE      POCT Blood Glucose.: 98 mg/dL (18 Mar 2024 21:39)  POCT Blood Glucose.: 142 mg/dL (18 Mar 2024 16:14)  POCT Blood Glucose.: 276 mg/dL (18 Mar 2024 11:02)  POCT Blood Glucose.: 207 mg/dL (18 Mar 2024 07:38)      RADIOLOGY & ADDITIONAL TESTS:    Imaging Personally Reviewed:  [ ] YES  [ ] NO    Consultant(s) Notes Reviewed:  [ ] YES  [ ] NO    Care Discussed with Consultants/Other Providers [ ] YES  [ ] NO
HPI:  64 years old patient with PMHX of HTN, HLD, DM II, Lupus, OA and S/P left third digit toe amputation presents to ED with generalized malaise and vaginal itch X1day. Pt admitted with generalized weakness, vaginal (rash) and vaginal discomfort. pt denies fever, chills, dysuria, abdominal pain, chest pain, SOB, and N/V. Off note, patient has been taking chronic steroids and antibiotics for lupus and chronic UTI. No other complaints at this time.  (09 Mar 2024 01:20)  Patient is a 64y old  Female who presents with a chief complaint of UTI (13 Mar 2024 22:36)      INTERVAL HPI/OVERNIGHT EVENTS: no acute events     MEDICATIONS  (STANDING):  dextrose 5%. 1000 milliLiter(s) (100 mL/Hr) IV Continuous <Continuous>  dextrose 5%. 1000 milliLiter(s) (50 mL/Hr) IV Continuous <Continuous>  dextrose 50% Injectable 25 Gram(s) IV Push once  dextrose 50% Injectable 25 Gram(s) IV Push once  dextrose 50% Injectable 12.5 Gram(s) IV Push once  enoxaparin Injectable 40 milliGRAM(s) SubCutaneous every 24 hours  glucagon  Injectable 1 milliGRAM(s) IntraMuscular once  influenza   Vaccine 0.5 milliLiter(s) IntraMuscular once  insulin glargine Injectable (LANTUS) 10 Unit(s) SubCutaneous at bedtime  insulin lispro (ADMELOG) corrective regimen sliding scale   SubCutaneous Before meals and at bedtime  losartan 50 milliGRAM(s) Oral daily  metroNIDAZOLE    Tablet 500 milliGRAM(s) Oral two times a day  nystatin Ointment 1 Application(s) Topical two times a day  potassium phosphate / sodium phosphate Powder (PHOS-NaK) 1 Packet(s) Oral three times a day before meals  predniSONE   Tablet 20 milliGRAM(s) Oral daily    MEDICATIONS  (PRN):  acetaminophen     Tablet .. 650 milliGRAM(s) Oral every 6 hours PRN Temp greater or equal to 38C (100.4F), Mild Pain (1 - 3)  dextrose Oral Gel 15 Gram(s) Oral once PRN Blood Glucose LESS THAN 70 milliGRAM(s)/deciliter  HYDROmorphone  Injectable 0.5 milliGRAM(s) IV Push every 4 hours PRN Moderate Pain (4 - 6)  ondansetron Injectable 4 milliGRAM(s) IV Push every 6 hours PRN Nausea      Allergies    No Known Allergies    Intolerances        REVIEW OF SYSTEMS:  CONSTITUTIONAL: No fever, weight loss, or fatigue  EYES: No eye pain, visual disturbances, or discharge  ENMT:  No difficulty hearing, tinnitus, vertigo; No sinus or throat pain  NECK: No pain or stiffness  BREASTS: No pain, masses, or nipple discharge  RESPIRATORY: No cough, wheezing, chills or hemoptysis; No shortness of breath  CARDIOVASCULAR: No chest pain, palpitations, dizziness, or leg swelling  GASTROINTESTINAL: No abdominal or epigastric pain. No nausea, vomiting, or hematemesis; No diarrhea or constipation. No melena or hematochezia.  GENITOURINARY: No dysuria, frequency, hematuria, or incontinence  NEUROLOGICAL: No headaches, memory loss, loss of strength, numbness, or tremors  SKIN: No itching, burning, rashes, or lesions   LYMPH NODES: No enlarged glands  ENDOCRINE: No heat or cold intolerance; No hair loss  MUSCULOSKELETAL: No joint pain or swelling; No muscle, back, or extremity pain  PSYCHIATRIC: No depression, anxiety, mood swings, or difficulty sleeping  HEME/LYMPH: No easy bruising, or bleeding gums  ALLERGY AND IMMUNOLOGIC: No hives or eczema    Vital Signs Last 24 Hrs  T(C): 36.9 (14 Mar 2024 16:39), Max: 37.1 (14 Mar 2024 05:23)  T(F): 98.4 (14 Mar 2024 16:39), Max: 98.8 (14 Mar 2024 05:23)  HR: 85 (14 Mar 2024 16:39) (80 - 104)  BP: 138/76 (14 Mar 2024 16:39) (137/77 - 163/87)  RR: 18 (14 Mar 2024 16:39) (18 - 19)  SpO2: 97% (14 Mar 2024 16:39) (97% - 100%)    Parameters below as of 14 Mar 2024 16:39  Patient On (Oxygen Delivery Method): room air        PHYSICAL EXAM:  GENERAL: NAD, well-groomed, well-developed  HEAD:  Atraumatic, Normocephalic  EYES: EOMI, PERRLA, conjunctiva and sclera clear  ENMT: No tonsillar erythema, exudates, or enlargement; Moist mucous membranes, Good dentition, No lesions  NECK: Supple, No JVD, Normal thyroid  NERVOUS SYSTEM:  Alert & Oriented X3, Good concentration; Motor Strength 5/5 B/L upper and lower extremities; DTRs 2+ intact and symmetric  CHEST/LUNG: Clear to ascultation  bilaterally; No rales, rhonchi, wheezing, or rubs  HEART: Regular rate and rhythm; No murmurs, rubs, or gallops  ABDOMEN: Soft, Nontender, Nondistended; Bowel sounds present  EXTREMITIES:  2+ Peripheral Pulses, No clubbing, cyanosis, or edema  LYMPH: No lymphadenopathy noted  SKIN:  lesions sacral   LABS:                        9.6    4.06  )-----------( 210      ( 14 Mar 2024 07:00 )             30.0     03-14    143  |  110<H>  |  10  ----------------------------<  144<H>  3.4<L>   |  21<L>  |  0.75    Ca    7.9<L>      14 Mar 2024 07:00  Phos  3.0     03-14  Mg     1.6     03-14        Urinalysis Basic - ( 14 Mar 2024 07:00 )    Color: x / Appearance: x / SG: x / pH: x  Gluc: 144 mg/dL / Ketone: x  / Bili: x / Urobili: x   Blood: x / Protein: x / Nitrite: x   Leuk Esterase: x / RBC: x / WBC x   Sq Epi: x / Non Sq Epi: x / Bacteria: x      CAPILLARY BLOOD GLUCOSE      POCT Blood Glucose.: 319 mg/dL (14 Mar 2024 16:13)  POCT Blood Glucose.: 368 mg/dL (14 Mar 2024 10:58)  POCT Blood Glucose.: 177 mg/dL (14 Mar 2024 07:50)  POCT Blood Glucose.: 75 mg/dL (13 Mar 2024 22:14)  POCT Blood Glucose.: 54 mg/dL (13 Mar 2024 21:49)  POCT Blood Glucose.: 55 mg/dL (13 Mar 2024 21:47)      RADIOLOGY & ADDITIONAL TESTS:    Imaging Personally Reviewed:  [ ] YES  [ ] NO    Consultant(s) Notes Reviewed:  [ ] YES  [ ] NO    Care Discussed with Consultants/Other Providers [ ] YES  [ ] NO
Patient is a 64y old  Female who presents with a chief complaint of UTI (18 Mar 2024 23:34)    HPI:  64 years old patient with PMHX of HTN, HLD, DM II, Lupus, OA and S/P left third digit toe amputation presents to ED with generalized malaise and vaginal itch X1day. Pt admitted with generalized weakness, vaginal (rash) and vaginal discomfort. pt denies fever, chills, dysuria, abdominal pain, chest pain, SOB, and N/V. Off note, patient has been taking chronic steroids and antibiotics for lupus and chronic UTI. No other complaints at this time.  (09 Mar 2024 01:20)    INTERVAL HPI/OVERNIGHT EVENTS: no acute events overnight     MEDICATIONS  (STANDING):  dextrose 5%. 1000 milliLiter(s) (50 mL/Hr) IV Continuous <Continuous>  dextrose 5%. 1000 milliLiter(s) (100 mL/Hr) IV Continuous <Continuous>  dextrose 50% Injectable 25 Gram(s) IV Push once  dextrose 50% Injectable 12.5 Gram(s) IV Push once  dextrose 50% Injectable 25 Gram(s) IV Push once  enoxaparin Injectable 40 milliGRAM(s) SubCutaneous every 24 hours  glucagon  Injectable 1 milliGRAM(s) IntraMuscular once  influenza   Vaccine 0.5 milliLiter(s) IntraMuscular once  insulin glargine Injectable (LANTUS) 20 Unit(s) SubCutaneous at bedtime  insulin lispro (ADMELOG) corrective regimen sliding scale   SubCutaneous Before meals and at bedtime  losartan 50 milliGRAM(s) Oral daily  magnesium oxide 400 milliGRAM(s) Oral three times a day with meals  metFORMIN 500 milliGRAM(s) Oral daily  mycophenolate mofetil 1500 milliGRAM(s) Oral two times a day  nystatin Ointment 1 Application(s) Topical two times a day  predniSONE   Tablet 5 milliGRAM(s) Oral daily    MEDICATIONS  (PRN):  acetaminophen     Tablet .. 650 milliGRAM(s) Oral every 6 hours PRN Temp greater or equal to 38C (100.4F), Mild Pain (1 - 3)  dextrose Oral Gel 15 Gram(s) Oral once PRN Blood Glucose LESS THAN 70 milliGRAM(s)/deciliter  ondansetron Injectable 4 milliGRAM(s) IV Push every 6 hours PRN Nausea      Allergies    No Known Allergies    Intolerances        REVIEW OF SYSTEMS:  CONSTITUTIONAL: No fever, weight loss, or fatigue  EYES: No eye pain, visual disturbances, or discharge  ENMT:  No difficulty hearing, tinnitus, vertigo; No sinus or throat pain  NECK: No pain or stiffness  BREASTS: No pain, masses, or nipple discharge  RESPIRATORY: No cough, wheezing, chills or hemoptysis; No shortness of breath  CARDIOVASCULAR: No chest pain, palpitations, dizziness, or leg swelling  GASTROINTESTINAL: No abdominal or epigastric pain. No nausea, vomiting, or hematemesis; No diarrhea or constipation. No melena or hematochezia.  GENITOURINARY: No dysuria, frequency, hematuria, or incontinence  NEUROLOGICAL: No headaches, memory loss, loss of strength, numbness, or tremors  SKIN: No itching, burning, rashes, or lesions   LYMPH NODES: No enlarged glands  ENDOCRINE: No heat or cold intolerance; No hair loss  MUSCULOSKELETAL: No joint pain or swelling; No muscle, back, or extremity pain  PSYCHIATRIC: No depression, anxiety, mood swings, or difficulty sleeping  HEME/LYMPH: No easy bruising, or bleeding gums  ALLERGY AND IMMUNOLOGIC: No hives or eczema    Vital Signs Last 24 Hrs  T(C): 36.5 (19 Mar 2024 10:56), Max: 37 (19 Mar 2024 00:23)  T(F): 97.7 (19 Mar 2024 10:56), Max: 98.6 (19 Mar 2024 00:23)  HR: 100 (19 Mar 2024 10:56) (93 - 100)  BP: 123/87 (19 Mar 2024 10:56) (123/74 - 139/79)  RR: 18 (19 Mar 2024 10:56) (18 - 18)  SpO2: 100% (19 Mar 2024 10:56) (99% - 100%)    Parameters below as of 19 Mar 2024 10:56  Patient On (Oxygen Delivery Method): room air        PHYSICAL EXAM:  GENERAL: NAD   HEAD:  Atraumatic, Normocephalic  EYES: EOMI, PERRLA, conjunctiva and sclera clear  ENMT: No tonsillar erythema, exudates, or enlargement; Moist mucous membranes, Good dentition, No lesions  NECK: Supple, No JVD, Normal thyroid  NERVOUS SYSTEM:  Alert & Oriented X3, Good concentration; Motor Strength 5/5 B/L upper and lower extremities; DTRs 2+ intact and symmetric  CHEST/LUNG: Clear to ascultation  bilaterally; No rales, rhonchi, wheezing, or rubs  HEART: Regular rate and rhythm; No murmurs, rubs, or gallops  ABDOMEN: Soft, Nontender, Nondistended; Bowel sounds present  EXTREMITIES:  2+ Peripheral Pulses, No clubbing, cyanosis, or edema  LYMPH: No lymphadenopathy noted  SKIN: multiple skin lesions   LABS:                        9.7    3.44  )-----------( 260      ( 18 Mar 2024 05:42 )             30.9     03-18    140  |  109<H>  |  6<L>  ----------------------------<  159<H>  3.6   |  24  |  0.72    Ca    8.3<L>      18 Mar 2024 05:42  Phos  3.2     03-18  Mg     1.4     03-18        Urinalysis Basic - ( 18 Mar 2024 05:42 )    Color: x / Appearance: x / SG: x / pH: x  Gluc: 159 mg/dL / Ketone: x  / Bili: x / Urobili: x   Blood: x / Protein: x / Nitrite: x   Leuk Esterase: x / RBC: x / WBC x   Sq Epi: x / Non Sq Epi: x / Bacteria: x      CAPILLARY BLOOD GLUCOSE      POCT Blood Glucose.: 251 mg/dL (19 Mar 2024 11:02)  POCT Blood Glucose.: 180 mg/dL (19 Mar 2024 07:37)  POCT Blood Glucose.: 98 mg/dL (18 Mar 2024 21:39)  POCT Blood Glucose.: 142 mg/dL (18 Mar 2024 16:14)      RADIOLOGY & ADDITIONAL TESTS:    Imaging Personally Reviewed:  [ X] YES  [ ] NO    Consultant(s) Notes Reviewed:  [X ] YES  [ ] NO    Care Discussed with Consultants/Other Providers [ X] YES  [ ] NO

## 2024-03-19 NOTE — PROGRESS NOTE ADULT - NUTRITIONAL ASSESSMENT
This patient has been assessed with a concern for Malnutrition and has been determined to have a diagnosis/diagnoses of Severe protein-calorie malnutrition.    This patient is being managed with:   Diet Consistent Carbohydrate w/Evening Snack-  Low Sodium  Supplement Feeding Modality:  Oral  Glucerna Shake Cans or Servings Per Day:  1       Frequency:  Two Times a day  Entered: Mar 11 2024  1:58PM  
This patient has been assessed with a concern for Malnutrition and has been determined to have a diagnosis/diagnoses of Severe protein-calorie malnutrition.    This patient is being managed with:   Diet Consistent Carbohydrate w/Evening Snack-  Low Sodium  Entered: Mar 18 2024 12:17PM  
This patient has been assessed with a concern for Malnutrition and has been determined to have a diagnosis/diagnoses of Severe protein-calorie malnutrition.    This patient is being managed with:   Diet Consistent Carbohydrate w/Evening Snack-  Low Sodium  Entered: Mar 18 2024 12:17PM    This patient has been assessed with a concern for Malnutrition and has been determined to have a diagnosis/diagnoses of Severe protein-calorie malnutrition.    This patient is being managed with:   Diet Consistent Carbohydrate w/Evening Snack-  Low Sodium  Entered: Mar 18 2024 12:17PM

## 2024-03-19 NOTE — PROGRESS NOTE ADULT - PROBLEM SELECTOR PROBLEM 7
Extremity Problem HPI





- General


Source: patient, RN notes reviewed


Mode of arrival: ambulatory


Limitations: no limitations





<Latasha Simons - Last Filed: 01/02/17 19:52>





<Justice Simms - Last Filed: 01/02/17 19:59>





- General


Chief complaint: Extremity Problem,Nontraumatic


Stated complaint: poss hernia, groin pain


Time Seen by Provider: 01/02/17 18:01





- History of Present Illness


Initial comments: 





63 yo male presents to the ER with cc of right groin possible hernia.  Patient 

states last night he was lifting something and he felt something pop out of his 

right groin.  Patient states she's had a bulge there.  Patient states he 

believes it is a hernia.  Patient states that it does hurt to touch but does 

not hurt if he sits there.  Patient states that he is no tenderness to denies 

any nausea or vomiting with this.  Patient states he was concerned due to his 

continued symptoms in the vault without that he should be seen.Patient denies 

any recent fever, chills, shortness of breath, chest pain, back pain, abdominal 

pain, nausea vomiting, numbness or tingling, dysuria or hematuria, constipation 

or diarrhea, headaches or visual changes, or any other current symptoms. (Latasha Simons)





- Related Data


 Home Medications











 Medication  Instructions  Recorded  Confirmed


 


Albuterol Inhaler [Ventolin Hfa 2 puff INHALATION RT-Q6H PRN 01/02/17 01/02/17





Inhaler]   


 


Atenolol [Atenolol] 50 mg PO DAILY 01/02/17 01/02/17


 


Dextroamphetamine/Amphetamine 30 mg PO BID 01/02/17 01/02/17





[Adderall]   


 


Diazepam [Diazepam] 10 mg PO BID PRN 01/02/17 01/02/17


 


Hydrocodone/Acetaminophen 1 tab PO BID 01/02/17 01/02/17





[Hydrocodon-Acetaminophn ]   


 


oxyCODONE HCL 30 mg PO 5XD 01/02/17 01/02/17











 Allergies











Allergy/AdvReac Type Severity Reaction Status Date / Time


 


No Known Allergies Allergy   Verified 01/02/17 18:10














Review of Systems


ROS Other: All systems not noted in ROS Statement are negative.





<Latasha Simons - Last Filed: 01/02/17 19:52>


ROS Other: All systems not noted in ROS Statement are negative.





<Justice Simms - Last Filed: 01/02/17 19:59>


ROS Statement: 


Those systems with pertinent positive or pertinent negative responses have been 

documented in the HPI.


 (Latasha Simons)


 (Justice Simms)





Past Medical History


Past Medical History: Asthma, Hypertension


Additional Past Medical History / Comment(s): back pain


History of Any Multi-Drug Resistant Organisms: None Reported


Past Surgical History: Back Surgery, Joint Replacement, Orthopedic Surgery


Past Psychological History: No Psychological Hx Reported


Smoking Status: Never smoker


Past Alcohol Use History: None Reported


Past Drug Use History: None Reported





<Latasha Simons - Last Filed: 01/02/17 19:52>





General Exam


Limitations: no limitations


General appearance: alert, in no apparent distress


Eye exam: Present: normal appearance, PERRL, EOMI.  Absent: scleral icterus, 

conjunctival injection, periorbital swelling


ENT exam: Present: normal exam, mucous membranes moist


Neck exam: Present: normal inspection.  Absent: tenderness, meningismus, 

lymphadenopathy


Respiratory exam: Present: normal lung sounds bilaterally.  Absent: respiratory 

distress, wheezes, rales, rhonchi, stridor


Cardiovascular Exam: Present: regular rate, normal rhythm, normal heart sounds.

  Absent: systolic murmur, diastolic murmur, rubs, gallop, clicks


GI/Abdominal exam: Present: soft, normal bowel sounds, hernia (Right inguinal 

groin).  Absent: distended, tenderness, guarding, rebound, rigid


Extremities exam: Present: normal inspection, full ROM, normal capillary 

refill.  Absent: tenderness, pedal edema, joint swelling, calf tenderness


Neurological exam: Present: alert, oriented X3, CN II-XII intact.  Absent: 

motor sensory deficit


Psychiatric exam: Present: normal affect, normal mood


Skin exam: Present: warm, dry, intact, normal color.  Absent: rash





<Latasha Simons - Last Filed: 01/02/17 19:52>





Medical Decision Making





- Radiology Data


Radiology results: report reviewed, image reviewed





<Latasha Simons - Last Filed: 01/02/17 19:52>





<Justice Simms - Last Filed: 01/02/17 19:59>





- Medical Decision Making





64-year-old male presents emergency Department chief complaint of right 

inguinal what appears to be a hernia on exam.  We will ultrasound the area and 

ice the area and see if we can reduce it.  At this time the ultrasound was 

shown that it isn't incarcerated hernia and patient was unable to be reduced in 

the emergency department.  We discussed the case with Dr. Geiger and would 

like Labs states for the patient and admission.  She also like an abdominal x-

ray ordered.  This time we will do this patient will receive pain medication he 

will be admitted placed nothing by mouth to Dr. Geiger.  The case was 

discussed the patient is in agreement of the plan. (Latasha Simons)


The patient presents with what appears to be an incarcerated hernia.  I tried 

to reduce for over 20 minutes without significant change in the size of the 

hernia.  Ultrasound showed what appears to be incarcerated hernia.





The case discussed with Dr. Geiger on-call general surgeon patient will be 

admitted to her service with labs and x-ray.  The patient be kept nothing by 

mouth.  Dr. Simms (Justice Simms)





Disposition


Time of Disposition: 19:53


Decision Date: 01/02/17


Decision Time: 19:53





<Latasha Simons - Last Filed: 01/02/17 19:52>





<Justice Simms - Last Filed: 01/02/17 19:59>


Clinical Impression: 


 Incarcerated right inguinal hernia





Disposition: ADMITTED AS IP TO THIS [facility]


Condition: Stable


Referrals: 


Kwan Montes MD [Primary Care Provider] - 1-2 days
Hypomagnesemia
Hypomagnesemia

## 2024-03-19 NOTE — PROGRESS NOTE ADULT - PROBLEM SELECTOR PROBLEM 2
Decubitus ulcers
89 yo  woman with PMHx of HTN, and HLD brought by EMS to [facility] for Aphasia, confusion and not feeling good. Patient last normal was 12PM. So TPA was not given. PE c/w sensory aphasia. CT head shows: Area of hypodensity involving the left insula and left temporal lobe suspicious for an acute infarct in the left MCA territory distribution. No acute intracranial hemorrhage. Patient was outside window for TPA or endovascular.
Decubitus ulcers

## 2024-03-19 NOTE — PROGRESS NOTE ADULT - ASSESSMENT
64 years old patient with PMHX of HTN, HLD, DM II, Lupus, OA and S/P left third digit toe amputation presents to ED with generalized malaise and vaginal itch X1day. Pt admitted with generalized weakness, vaginal (rash) and vaginal discomfort.  Off note, patient has been taking chronic steroids and antibiotics for lupus and chronic UTI now stable for discharge home with outpatient follow up.     1. UTI  Started on IV Ceftriaxone 1G q24. Transitioned to po to complete 3 day course.  (3/10) Urine culture contaminated. Complete course of abx. Discharge nelson if fever free for 24 hrs.     2Lupus Flare  Predisone 20mg q24 Hrs      3 Vaginal Candidiasis  Fluconazol 200mg IVPB. D/C  clotrimazole X3 days.  Pain controll prn     4 R/O Trichomonas  Flagyl 500mg q8hrs for 7 days    5 DM  ISS  POC glucose  Hgba1C:10.8  Endocrine follow up  Started on metformin 500 mg po bid  (3/10) Pt to continue lantus at home upon discharge.    6 HTN  Stable   Start PO home meds    7.Hypokalemia   KCL 40meq given at ED  Follow up labs     8.HLD  Continue home meds    9. Others  DVT ppx    
diabetes 
64 years old patient with PMHX of HTN, HLD, DM II, Lupus, OA and S/P left third digit toe amputation presents to ED with generalized malaise and vaginal itch X1day. Pt admitted with generalized weakness, vaginal (rash) and vaginal discomfort.  Off note, patient has been taking chronic steroids and antibiotics for lupus and chronic UTI now stable for discharge home with outpatient follow up.         tommy burton with home care in am         1. UTI  Started on IV Ceftriaxone 1G q24. Transitioned to po to complete 3 day course.  (3/10) Urine culture contaminated. Complete course of abx. Discharge nelson if fever free for 24 hrs.     2Lupus Flare  Predisone 20mg q24 Hrs      3 Vaginal Candidiasis  Fluconazol 200mg IVPB. D/C  clotrimazole X3 days.  Pain controll prn     4 R/O Trichomonas  Flagyl 500mg q8hrs for 7 days    5 DM  ISS  POC glucose  Hgba1C:10.8  Endocrine follow up  Started on metformin 500 mg po bid  (3/10) Pt to continue lantus at home upon discharge.    6 HTN  Stable   Start PO home meds    7.Hypokalemia   KCL 40meq given at ED  Follow up labs     8.HLD  Continue home meds    9. Others  DVT ppx    
64 years old patient with PMHX of HTN, HLD, DM II, Lupus, OA and S/P left third digit toe amputation presents to ED with generalized malaise and vaginal itch X1day. Pt admitted with generalized weakness, vaginal (rash) and vaginal discomfort.  Off note, patient has been taking chronic steroids and antibiotics for lupus and chronic UTI. AVSS, no leucocytosis,    1. UTI  Started on IV Ceftriaxone 1G q24    2Lupus Flare  Predison 20mg q24 Hrs      3 Vaginal Candidiasis  Fluconazol 200mg IVPB. D/C  clotrimazole  Pain controll prn     4 R/O Trichomonas  Flagyl 500mg q8hrs transiiton to po  Follow up vaginal/genital swab    5 DM  ISS  POC glucose  Hgba1C in am    6 HTN  Stable   Start PO home meds    7.Hypokalemia   KCL 40meq given at ED  Follow up labs     8.HLD  Continue home meds    9. Others  DVT ppx    
diabetes uncontrolled 
diabetes 
diabetes / hypoglycemia 
64 years old patient with PMHX of HTN, HLD, DM II, Lupus, OA and S/P left third digit toe amputation presents to ED with generalized malaise and vaginal itch X1day. Pt admitted with generalized weakness, vaginal (rash) and vaginal discomfort.  Off note, patient has been taking chronic steroids and antibiotics for lupus and chronic UTI now stable for discharge home with outpatient follow up.         Patient wounds recommended for marissa with wound care skilled need frederick done , [facility] is facility  Auth awaiting   Patient requires physical therapy   sugars low adjusted insulin dose   hypomagnesia supplemented with PO  Awaiting Bed at [facility] AUTH has been obtained       1. UTI  Started on IV Ceftriaxone 1G q24. Transitioned to po to complete 3 day course.  (3/10) Urine culture contaminated. Completed course of abx.     2Lupus Flare  Predisone 5mg q24 Hrs    resatart cellcept     3 Vaginal Candidiasis  Fluconazol 200mg IVPB. D/C  clotrimazole X3 days.  completed     4 R/O Trichomonas  Flagyl 500mg q8hrs for 7 days completed     5 DM  ISS  POC glucose  Hgba1C:10.8  Endocrine follow up  Started on metformin 500 mg po bid  (3/10) Pt to continue lantus at home upon discharge.    6 HTN  Stable   continue Losartan       7.Hypokalemia  continue to supplement  resolved      8.HLD  Continue home meds    9. Others  DVT ppx    
64 years old patient with PMHX of HTN, HLD, DM II, Lupus, OA and S/P left third digit toe amputation presents to ED with generalized malaise and vaginal itch X1day. Pt admitted with generalized weakness, vaginal (rash) and vaginal discomfort.  Off note, patient has been taking chronic steroids and antibiotics for lupus and chronic UTI now stable for discharge home with outpatient follow up.         Patient wounds recommneded for marissa with wound care skilled need frederick, facility needed       1. UTI  Started on IV Ceftriaxone 1G q24. Transitioned to po to complete 3 day course.  (3/10) Urine culture contaminated. Complete course of abx. Discharge nelson if fever free for 24 hrs.     2Lupus Flare  Predisone 20mg q24 Hrs      3 Vaginal Candidiasis  Fluconazol 200mg IVPB. D/C  clotrimazole X3 days.  Pain controll prn     4 R/O Trichomonas  Flagyl 500mg q8hrs for 7 days    5 DM  ISS  POC glucose  Hgba1C:10.8  Endocrine follow up  Started on metformin 500 mg po bid  (3/10) Pt to continue lantus at home upon discharge.    6 HTN  Stable   Start PO home meds    7.Hypokalemia   KCL 40meq given at ED  Follow up labs     8.HLD  Continue home meds    9. Others  DVT ppx    
diabetes 
diabetes / hypoglycemia 
diabetes 
64 years old patient with PMHX of HTN, HLD, DM II, Lupus, OA and S/P left third digit toe amputation presents to ED with generalized malaise and vaginal itch X1day. Pt admitted with generalized weakness, vaginal (rash) and vaginal discomfort.  Off note, patient has been taking chronic steroids and antibiotics for lupus and chronic UTI now stable for discharge home with outpatient follow up.         Patient wounds recommended for marissa with wound care skilled need frederick done , facility needed       1. UTI  Started on IV Ceftriaxone 1G q24. Transitioned to po to complete 3 day course.  (3/10) Urine culture contaminated. Complete course of abx. Discharge nelson if fever free for 24 hrs.     2Lupus Flare  Predisone 20mg q24 Hrs      3 Vaginal Candidiasis  Fluconazol 200mg IVPB. D/C  clotrimazole X3 days.  Pain control prn     4 R/O Trichomonas  Flagyl 500mg q8hrs for 7 days    5 DM  ISS  POC glucose  Hgba1C:10.8  Endocrine follow up  Started on metformin 500 mg po bid  (3/10) Pt to continue lantus at home upon discharge.    6 HTN  Stable   Start PO home meds    7.Hypokalemia  continue to supplement     8.HLD  Continue home meds    9. Others  DVT ppx    
64 years old patient with PMHX of HTN, HLD, DM II, Lupus, OA and S/P left third digit toe amputation presents to ED with generalized malaise and vaginal itch X1day. Pt admitted with generalized weakness, vaginal (rash) and vaginal discomfort.  Off note, patient has been taking chronic steroids and antibiotics for lupus and chronic UTI now stable for discharge home with outpatient follow up.         Patient wounds recommended for marissa with wound care skilled need frederick done , [facility] is facility  Auth awaiting     1. UTI  Started on IV Ceftriaxone 1G q24. Transitioned to po to complete 3 day course.  (3/10) Urine culture contaminated. Complete course of abx.     2Lupus Flare  Predisone 5mg q24 Hrs    resatart cellcept     3 Vaginal Candidiasis  Fluconazol 200mg IVPB. D/C  clotrimazole X3 days.  completed     4 R/O Trichomonas  Flagyl 500mg q8hrs for 7 days completed     5 DM  ISS  POC glucose  Hgba1C:10.8  Endocrine follow up  Started on metformin 500 mg po bid  (3/10) Pt to continue lantus at home upon discharge.    6 HTN  Stable   continue Losartan       7.Hypokalemia  continue to supplement  resolved      8.HLD  Continue home meds    9. Others  DVT ppx    
64 years old patient with PMHX of HTN, HLD, DM II, Lupus, OA and S/P left third digit toe amputation presents to ED with generalized malaise and vaginal itch X1day. Pt admitted with generalized weakness, vaginal (rash) and vaginal discomfort.  Off note, patient has been taking chronic steroids and antibiotics for lupus and chronic UTI now stable for discharge home with outpatient follow up.         Patient wounds recommended for marissa with wound care skilled need frederick done , [facility] is facility  Auth awaiting     1. UTI  Started on IV Ceftriaxone 1G q24. Transitioned to po to complete 3 day course.  (3/10) Urine culture contaminated. Complete course of abx.     2Lupus Flare  Predisone 5mg q24 Hrs    resatart cellcept     3 Vaginal Candidiasis  Fluconazol 200mg IVPB. D/C  clotrimazole X3 days.  completed     4 R/O Trichomonas  Flagyl 500mg q8hrs for 7 days completed     5 DM  ISS  POC glucose  Hgba1C:10.8  Endocrine follow up  Started on metformin 500 mg po bid  (3/10) Pt to continue lantus at home upon discharge.    6 HTN  Stable   continue Losartan       7.Hypokalemia  continue to supplement  resolved      8.HLD  Continue home meds    9. Others  DVT ppx    
64 years old patient with PMHX of HTN, HLD, DM II, Lupus, OA and S/P left third digit toe amputation presents to ED with generalized malaise and vaginal itch X1day. Pt admitted with generalized weakness, vaginal (rash) and vaginal discomfort.  Off note, patient has been taking chronic steroids and antibiotics for lupus and chronic UTI now stable for discharge home with outpatient follow up.         Patient wounds recommended for marissa with wound care skilled need frederick done , facility needed       1. UTI  Started on IV Ceftriaxone 1G q24. Transitioned to po to complete 3 day course.  (3/10) Urine culture contaminated. Complete course of abx. Discharge nelson if fever free for 24 hrs.     2Lupus Flare  Predisone 20mg q24 Hrs      3 Vaginal Candidiasis  Fluconazol 200mg IVPB. D/C  clotrimazole X3 days.  Pain control prn     4 R/O Trichomonas  Flagyl 500mg q8hrs for 7 days    5 DM  ISS  POC glucose  Hgba1C:10.8  Endocrine follow up  Started on metformin 500 mg po bid  (3/10) Pt to continue lantus at home upon discharge.    6 HTN  Stable   Start PO home meds    7.Hypokalemia   KCL 40meq given at ED  Follow up labs     8.HLD  Continue home meds    9. Others  DVT ppx    
64 years old patient with PMHX of HTN, HLD, DM II, Lupus, OA and S/P left third digit toe amputation presents to ED with generalized malaise and vaginal itch X1day. Pt admitted with generalized weakness, vaginal (rash) and vaginal discomfort.  Off note, patient has been taking chronic steroids and antibiotics for lupus and chronic UTI now stable for discharge home with outpatient follow up.         Patient wounds recommended for marissa with wound care skilled need frederick done , [facility] is facility  Auth awaiting     1. UTI  Started on IV Ceftriaxone 1G q24. Transitioned to po to complete 3 day course.  (3/10) Urine culture contaminated. Complete course of abx.     2Lupus Flare  Predisone 5mg q24 Hrs    resatart cellcept     3 Vaginal Candidiasis  Fluconazol 200mg IVPB. D/C  clotrimazole X3 days.  completed     4 R/O Trichomonas  Flagyl 500mg q8hrs for 7 days completed     5 DM  ISS  POC glucose  Hgba1C:10.8  Endocrine follow up  Started on metformin 500 mg po bid  (3/10) Pt to continue lantus at home upon discharge.    6 HTN  Stable   continue Losartan       7.Hypokalemia  continue to supplement  resolved      8.HLD  Continue home meds    9. Others  DVT ppx    
64 years old patient with PMHX of HTN, HLD, DM II, Lupus, OA and S/P left third digit toe amputation presents to ED with generalized malaise and vaginal itch X1day. Pt admitted with generalized weakness, vaginal (rash) and vaginal discomfort.  Off note, patient has been taking chronic steroids and antibiotics for lupus and chronic UTI now stable for discharge home with outpatient follow up.         Patient wounds recommended for marissa with wound care skilled need frederick done , [facility] is facility  Auth awaiting   Patient requires physical therapy   sugars low adjusted insulin dose   hypomagnesia suplumented with PO      1. UTI  Started on IV Ceftriaxone 1G q24. Transitioned to po to complete 3 day course.  (3/10) Urine culture contaminated. Complete course of abx.     2Lupus Flare  Predisone 5mg q24 Hrs    resatart cellcept     3 Vaginal Candidiasis  Fluconazol 200mg IVPB. D/C  clotrimazole X3 days.  completed     4 R/O Trichomonas  Flagyl 500mg q8hrs for 7 days completed     5 DM  ISS  POC glucose  Hgba1C:10.8  Endocrine follow up  Started on metformin 500 mg po bid  (3/10) Pt to continue lantus at home upon discharge.    6 HTN  Stable   continue Losartan       7.Hypokalemia  continue to supplement  resolved      8.HLD  Continue home meds    9. Others  DVT ppx

## 2024-03-20 VITALS
HEART RATE: 95 BPM | TEMPERATURE: 98 F | OXYGEN SATURATION: 96 % | SYSTOLIC BLOOD PRESSURE: 133 MMHG | DIASTOLIC BLOOD PRESSURE: 76 MMHG | RESPIRATION RATE: 18 BRPM

## 2024-03-20 LAB
FLUAV AG NPH QL: SIGNIFICANT CHANGE UP
FLUBV AG NPH QL: SIGNIFICANT CHANGE UP
GLUCOSE BLDC GLUCOMTR-MCNC: 146 MG/DL — HIGH (ref 70–99)
GLUCOSE BLDC GLUCOMTR-MCNC: 208 MG/DL — HIGH (ref 70–99)
GLUCOSE BLDC GLUCOMTR-MCNC: 240 MG/DL — HIGH (ref 70–99)
SARS-COV-2 RNA SPEC QL NAA+PROBE: SIGNIFICANT CHANGE UP

## 2024-03-20 PROCEDURE — 99239 HOSP IP/OBS DSCHRG MGMT >30: CPT

## 2024-03-20 RX ORDER — ACETAMINOPHEN 500 MG
1000 TABLET ORAL ONCE
Refills: 0 | Status: DISCONTINUED | OUTPATIENT
Start: 2024-03-20 | End: 2024-03-20

## 2024-03-20 RX ORDER — IBUPROFEN 200 MG
400 TABLET ORAL ONCE
Refills: 0 | Status: COMPLETED | OUTPATIENT
Start: 2024-03-20 | End: 2024-03-20

## 2024-03-20 RX ADMIN — NYSTATIN CREAM 1 APPLICATION(S): 100000 CREAM TOPICAL at 18:17

## 2024-03-20 RX ADMIN — Medication 400 MILLIGRAM(S): at 01:26

## 2024-03-20 RX ADMIN — MYCOPHENOLATE MOFETIL 1500 MILLIGRAM(S): 250 CAPSULE ORAL at 18:17

## 2024-03-20 RX ADMIN — Medication 400 MILLIGRAM(S): at 01:56

## 2024-03-20 RX ADMIN — Medication 4: at 08:32

## 2024-03-20 RX ADMIN — ENOXAPARIN SODIUM 40 MILLIGRAM(S): 100 INJECTION SUBCUTANEOUS at 12:24

## 2024-03-20 RX ADMIN — MAGNESIUM OXIDE 400 MG ORAL TABLET 400 MILLIGRAM(S): 241.3 TABLET ORAL at 12:24

## 2024-03-20 RX ADMIN — MAGNESIUM OXIDE 400 MG ORAL TABLET 400 MILLIGRAM(S): 241.3 TABLET ORAL at 18:18

## 2024-03-20 RX ADMIN — Medication 4: at 12:23

## 2024-03-20 RX ADMIN — MAGNESIUM OXIDE 400 MG ORAL TABLET 400 MILLIGRAM(S): 241.3 TABLET ORAL at 08:33

## 2024-03-20 RX ADMIN — LOSARTAN POTASSIUM 50 MILLIGRAM(S): 100 TABLET, FILM COATED ORAL at 06:18

## 2024-03-20 RX ADMIN — METFORMIN HYDROCHLORIDE 500 MILLIGRAM(S): 850 TABLET ORAL at 12:24

## 2024-03-20 RX ADMIN — MYCOPHENOLATE MOFETIL 1500 MILLIGRAM(S): 250 CAPSULE ORAL at 06:19

## 2024-03-20 RX ADMIN — NYSTATIN CREAM 1 APPLICATION(S): 100000 CREAM TOPICAL at 06:21

## 2024-03-20 RX ADMIN — Medication 5 MILLIGRAM(S): at 06:18

## 2024-03-26 DIAGNOSIS — Z79.4 LONG TERM (CURRENT) USE OF INSULIN: ICD-10-CM

## 2024-03-26 DIAGNOSIS — E11.65 TYPE 2 DIABETES MELLITUS WITH HYPERGLYCEMIA: ICD-10-CM

## 2024-03-26 DIAGNOSIS — A59.9 TRICHOMONIASIS, UNSPECIFIED: ICD-10-CM

## 2024-03-26 DIAGNOSIS — B37.31 ACUTE CANDIDIASIS OF VULVA AND VAGINA: ICD-10-CM

## 2024-03-26 DIAGNOSIS — N39.0 URINARY TRACT INFECTION, SITE NOT SPECIFIED: ICD-10-CM

## 2024-03-26 DIAGNOSIS — E87.6 HYPOKALEMIA: ICD-10-CM

## 2024-03-26 DIAGNOSIS — E43 UNSPECIFIED SEVERE PROTEIN-CALORIE MALNUTRITION: ICD-10-CM

## 2024-03-26 DIAGNOSIS — I10 ESSENTIAL (PRIMARY) HYPERTENSION: ICD-10-CM

## 2024-03-26 DIAGNOSIS — L89.90 PRESSURE ULCER OF UNSPECIFIED SITE, UNSPECIFIED STAGE: ICD-10-CM

## 2024-03-26 DIAGNOSIS — E78.5 HYPERLIPIDEMIA, UNSPECIFIED: ICD-10-CM

## 2024-03-26 DIAGNOSIS — E83.42 HYPOMAGNESEMIA: ICD-10-CM

## 2024-03-26 DIAGNOSIS — M32.9 SYSTEMIC LUPUS ERYTHEMATOSUS, UNSPECIFIED: ICD-10-CM

## 2024-04-08 ENCOUNTER — OUTPATIENT (OUTPATIENT)
Dept: OUTPATIENT SERVICES | Facility: HOSPITAL | Age: 64
LOS: 1 days | Discharge: HOME | End: 2024-04-08
Payer: COMMERCIAL

## 2024-04-08 DIAGNOSIS — M25.572 PAIN IN LEFT ANKLE AND JOINTS OF LEFT FOOT: ICD-10-CM

## 2024-04-08 DIAGNOSIS — Z98.891 HISTORY OF UTERINE SCAR FROM PREVIOUS SURGERY: Chronic | ICD-10-CM

## 2024-04-08 DIAGNOSIS — L91.8 OTHER HYPERTROPHIC DISORDERS OF THE SKIN: Chronic | ICD-10-CM

## 2024-04-09 PROCEDURE — 73610 X-RAY EXAM OF ANKLE: CPT | Mod: 26,LT

## 2024-04-22 ENCOUNTER — INPATIENT (INPATIENT)
Facility: HOSPITAL | Age: 64
LOS: 8 days | Discharge: NURSING, REHAB OR LONG-TERM CARE | End: 2024-05-01
Attending: INTERNAL MEDICINE | Admitting: INTERNAL MEDICINE
Payer: COMMERCIAL

## 2024-04-22 VITALS
HEART RATE: 117 BPM | DIASTOLIC BLOOD PRESSURE: 77 MMHG | RESPIRATION RATE: 19 BRPM | SYSTOLIC BLOOD PRESSURE: 121 MMHG | TEMPERATURE: 98 F | HEIGHT: 71 IN | OXYGEN SATURATION: 100 % | WEIGHT: 224.21 LBS

## 2024-04-22 DIAGNOSIS — D64.9 ANEMIA, UNSPECIFIED: ICD-10-CM

## 2024-04-22 DIAGNOSIS — E11.9 TYPE 2 DIABETES MELLITUS WITHOUT COMPLICATIONS: ICD-10-CM

## 2024-04-22 DIAGNOSIS — I82.403 ACUTE EMBOLISM AND THROMBOSIS OF UNSPECIFIED DEEP VEINS OF LOWER EXTREMITY, BILATERAL: ICD-10-CM

## 2024-04-22 DIAGNOSIS — J18.9 PNEUMONIA, UNSPECIFIED ORGANISM: ICD-10-CM

## 2024-04-22 DIAGNOSIS — E87.20 ACIDOSIS, UNSPECIFIED: ICD-10-CM

## 2024-04-22 DIAGNOSIS — I26.99 OTHER PULMONARY EMBOLISM WITHOUT ACUTE COR PULMONALE: ICD-10-CM

## 2024-04-22 DIAGNOSIS — M32.9 SYSTEMIC LUPUS ERYTHEMATOSUS, UNSPECIFIED: ICD-10-CM

## 2024-04-22 DIAGNOSIS — Z98.891 HISTORY OF UTERINE SCAR FROM PREVIOUS SURGERY: Chronic | ICD-10-CM

## 2024-04-22 DIAGNOSIS — I10 ESSENTIAL (PRIMARY) HYPERTENSION: ICD-10-CM

## 2024-04-22 DIAGNOSIS — L91.8 OTHER HYPERTROPHIC DISORDERS OF THE SKIN: Chronic | ICD-10-CM

## 2024-04-22 LAB
ALBUMIN SERPL ELPH-MCNC: 2.5 G/DL — LOW (ref 3.3–5)
ALP SERPL-CCNC: 75 U/L — SIGNIFICANT CHANGE UP (ref 40–120)
ALT FLD-CCNC: 13 U/L — SIGNIFICANT CHANGE UP (ref 12–78)
ANION GAP SERPL CALC-SCNC: 11 MMOL/L — SIGNIFICANT CHANGE UP (ref 5–17)
APPEARANCE UR: CLEAR — SIGNIFICANT CHANGE UP
APTT BLD: 27.4 SEC — SIGNIFICANT CHANGE UP (ref 24.5–35.6)
APTT BLD: >200 SEC — CRITICAL HIGH (ref 24.5–35.6)
AST SERPL-CCNC: 22 U/L — SIGNIFICANT CHANGE UP (ref 15–37)
BACTERIA # UR AUTO: ABNORMAL /HPF
BASOPHILS # BLD AUTO: 0.04 K/UL — SIGNIFICANT CHANGE UP (ref 0–0.2)
BASOPHILS NFR BLD AUTO: 0.3 % — SIGNIFICANT CHANGE UP (ref 0–2)
BILIRUB SERPL-MCNC: 0.6 MG/DL — SIGNIFICANT CHANGE UP (ref 0.2–1.2)
BILIRUB UR-MCNC: NEGATIVE — SIGNIFICANT CHANGE UP
BLD GP AB SCN SERPL QL: SIGNIFICANT CHANGE UP
BUN SERPL-MCNC: 17 MG/DL — SIGNIFICANT CHANGE UP (ref 7–23)
CALCIUM SERPL-MCNC: 8.7 MG/DL — SIGNIFICANT CHANGE UP (ref 8.5–10.1)
CHLORIDE SERPL-SCNC: 104 MMOL/L — SIGNIFICANT CHANGE UP (ref 96–108)
CO2 SERPL-SCNC: 23 MMOL/L — SIGNIFICANT CHANGE UP (ref 22–31)
COLOR SPEC: YELLOW — SIGNIFICANT CHANGE UP
COMMENT - URINE: SIGNIFICANT CHANGE UP
CREAT SERPL-MCNC: 1.13 MG/DL — SIGNIFICANT CHANGE UP (ref 0.5–1.3)
DIFF PNL FLD: NEGATIVE — SIGNIFICANT CHANGE UP
EGFR: 55 ML/MIN/1.73M2 — LOW
EOSINOPHIL # BLD AUTO: 0.01 K/UL — SIGNIFICANT CHANGE UP (ref 0–0.5)
EOSINOPHIL NFR BLD AUTO: 0.1 % — SIGNIFICANT CHANGE UP (ref 0–6)
EPI CELLS # UR: PRESENT
FLUAV AG NPH QL: SIGNIFICANT CHANGE UP
FLUBV AG NPH QL: SIGNIFICANT CHANGE UP
GLUCOSE BLDC GLUCOMTR-MCNC: 163 MG/DL — HIGH (ref 70–99)
GLUCOSE SERPL-MCNC: 111 MG/DL — HIGH (ref 70–99)
GLUCOSE UR QL: NEGATIVE MG/DL — SIGNIFICANT CHANGE UP
HCT VFR BLD CALC: 29.7 % — LOW (ref 34.5–45)
HCT VFR BLD CALC: 34.8 % — SIGNIFICANT CHANGE UP (ref 34.5–45)
HGB BLD-MCNC: 10.9 G/DL — LOW (ref 11.5–15.5)
HGB BLD-MCNC: 9.5 G/DL — LOW (ref 11.5–15.5)
IMM GRANULOCYTES NFR BLD AUTO: 1.4 % — HIGH (ref 0–0.9)
INR BLD: 0.87 RATIO — SIGNIFICANT CHANGE UP (ref 0.85–1.18)
KETONES UR-MCNC: NEGATIVE MG/DL — SIGNIFICANT CHANGE UP
LACTATE SERPL-SCNC: 1.4 MMOL/L — SIGNIFICANT CHANGE UP (ref 0.7–2)
LACTATE SERPL-SCNC: 2.2 MMOL/L — HIGH (ref 0.7–2)
LEUKOCYTE ESTERASE UR-ACNC: NEGATIVE — SIGNIFICANT CHANGE UP
LYMPHOCYTES # BLD AUTO: 0.85 K/UL — LOW (ref 1–3.3)
LYMPHOCYTES # BLD AUTO: 6.8 % — LOW (ref 13–44)
MAGNESIUM SERPL-MCNC: 1.7 MG/DL — SIGNIFICANT CHANGE UP (ref 1.6–2.6)
MCHC RBC-ENTMCNC: 25.8 PG — LOW (ref 27–34)
MCHC RBC-ENTMCNC: 26 PG — LOW (ref 27–34)
MCHC RBC-ENTMCNC: 31.3 G/DL — LOW (ref 32–36)
MCHC RBC-ENTMCNC: 32 G/DL — SIGNIFICANT CHANGE UP (ref 32–36)
MCV RBC AUTO: 81.1 FL — SIGNIFICANT CHANGE UP (ref 80–100)
MCV RBC AUTO: 82.5 FL — SIGNIFICANT CHANGE UP (ref 80–100)
MONOCYTES # BLD AUTO: 0.66 K/UL — SIGNIFICANT CHANGE UP (ref 0–0.9)
MONOCYTES NFR BLD AUTO: 5.3 % — SIGNIFICANT CHANGE UP (ref 2–14)
NEUTROPHILS # BLD AUTO: 10.73 K/UL — HIGH (ref 1.8–7.4)
NEUTROPHILS NFR BLD AUTO: 86.1 % — HIGH (ref 43–77)
NITRITE UR-MCNC: NEGATIVE — SIGNIFICANT CHANGE UP
NRBC # BLD: 0 /100 WBCS — SIGNIFICANT CHANGE UP (ref 0–0)
NRBC # BLD: 0 /100 WBCS — SIGNIFICANT CHANGE UP (ref 0–0)
PH UR: 6.5 — SIGNIFICANT CHANGE UP (ref 5–8)
PHOSPHATE SERPL-MCNC: 3.5 MG/DL — SIGNIFICANT CHANGE UP (ref 2.5–4.5)
PLATELET # BLD AUTO: 141 K/UL — LOW (ref 150–400)
PLATELET # BLD AUTO: 152 K/UL — SIGNIFICANT CHANGE UP (ref 150–400)
POTASSIUM SERPL-MCNC: 4.6 MMOL/L — SIGNIFICANT CHANGE UP (ref 3.5–5.3)
POTASSIUM SERPL-SCNC: 4.6 MMOL/L — SIGNIFICANT CHANGE UP (ref 3.5–5.3)
PROT SERPL-MCNC: 7.1 GM/DL — SIGNIFICANT CHANGE UP (ref 6–8.3)
PROT UR-MCNC: 30 MG/DL
PROTHROM AB SERPL-ACNC: 10.4 SEC — SIGNIFICANT CHANGE UP (ref 9.5–13)
RBC # BLD: 3.66 M/UL — LOW (ref 3.8–5.2)
RBC # BLD: 4.22 M/UL — SIGNIFICANT CHANGE UP (ref 3.8–5.2)
RBC # FLD: 14.3 % — SIGNIFICANT CHANGE UP (ref 10.3–14.5)
RBC # FLD: 14.4 % — SIGNIFICANT CHANGE UP (ref 10.3–14.5)
RBC CASTS # UR COMP ASSIST: 1 /HPF — SIGNIFICANT CHANGE UP (ref 0–4)
SARS-COV-2 RNA SPEC QL NAA+PROBE: SIGNIFICANT CHANGE UP
SODIUM SERPL-SCNC: 138 MMOL/L — SIGNIFICANT CHANGE UP (ref 135–145)
SP GR SPEC: 1.02 — SIGNIFICANT CHANGE UP (ref 1–1.03)
TROPONIN I, HIGH SENSITIVITY RESULT: 3.9 NG/L — SIGNIFICANT CHANGE UP
UROBILINOGEN FLD QL: 0.2 MG/DL — SIGNIFICANT CHANGE UP (ref 0.2–1)
WBC # BLD: 12.47 K/UL — HIGH (ref 3.8–10.5)
WBC # BLD: 8.52 K/UL — SIGNIFICANT CHANGE UP (ref 3.8–10.5)
WBC # FLD AUTO: 12.47 K/UL — HIGH (ref 3.8–10.5)
WBC # FLD AUTO: 8.52 K/UL — SIGNIFICANT CHANGE UP (ref 3.8–10.5)
WBC UR QL: 3 /HPF — SIGNIFICANT CHANGE UP (ref 0–5)

## 2024-04-22 PROCEDURE — 93010 ELECTROCARDIOGRAM REPORT: CPT

## 2024-04-22 PROCEDURE — 74177 CT ABD & PELVIS W/CONTRAST: CPT | Mod: 26,MC

## 2024-04-22 PROCEDURE — 93970 EXTREMITY STUDY: CPT | Mod: 26

## 2024-04-22 PROCEDURE — 71275 CT ANGIOGRAPHY CHEST: CPT | Mod: 26,MC

## 2024-04-22 PROCEDURE — 76700 US EXAM ABDOM COMPLETE: CPT | Mod: 26

## 2024-04-22 PROCEDURE — 71045 X-RAY EXAM CHEST 1 VIEW: CPT | Mod: 26

## 2024-04-22 PROCEDURE — 99223 1ST HOSP IP/OBS HIGH 75: CPT

## 2024-04-22 PROCEDURE — 99291 CRITICAL CARE FIRST HOUR: CPT

## 2024-04-22 RX ORDER — LOSARTAN POTASSIUM 100 MG/1
50 TABLET, FILM COATED ORAL DAILY
Refills: 0 | Status: DISCONTINUED | OUTPATIENT
Start: 2024-04-22 | End: 2024-05-01

## 2024-04-22 RX ORDER — VANCOMYCIN HCL 1 G
1000 VIAL (EA) INTRAVENOUS EVERY 12 HOURS
Refills: 0 | Status: DISCONTINUED | OUTPATIENT
Start: 2024-04-23 | End: 2024-04-23

## 2024-04-22 RX ORDER — PIPERACILLIN AND TAZOBACTAM 4; .5 G/20ML; G/20ML
3.38 INJECTION, POWDER, LYOPHILIZED, FOR SOLUTION INTRAVENOUS ONCE
Refills: 0 | Status: COMPLETED | OUTPATIENT
Start: 2024-04-22 | End: 2024-04-22

## 2024-04-22 RX ORDER — PIPERACILLIN AND TAZOBACTAM 4; .5 G/20ML; G/20ML
3.38 INJECTION, POWDER, LYOPHILIZED, FOR SOLUTION INTRAVENOUS EVERY 8 HOURS
Refills: 0 | Status: DISCONTINUED | OUTPATIENT
Start: 2024-04-22 | End: 2024-04-23

## 2024-04-22 RX ORDER — PANTOPRAZOLE SODIUM 20 MG/1
40 TABLET, DELAYED RELEASE ORAL
Refills: 0 | Status: DISCONTINUED | OUTPATIENT
Start: 2024-04-22 | End: 2024-05-01

## 2024-04-22 RX ORDER — LANOLIN ALCOHOL/MO/W.PET/CERES
3 CREAM (GRAM) TOPICAL AT BEDTIME
Refills: 0 | Status: DISCONTINUED | OUTPATIENT
Start: 2024-04-22 | End: 2024-05-01

## 2024-04-22 RX ORDER — HYDROXYCHLOROQUINE SULFATE 200 MG
200 TABLET ORAL
Refills: 0 | Status: DISCONTINUED | OUTPATIENT
Start: 2024-04-22 | End: 2024-05-01

## 2024-04-22 RX ORDER — GLUCAGON INJECTION, SOLUTION 0.5 MG/.1ML
1 INJECTION, SOLUTION SUBCUTANEOUS ONCE
Refills: 0 | Status: DISCONTINUED | OUTPATIENT
Start: 2024-04-22 | End: 2024-05-01

## 2024-04-22 RX ORDER — DEXTROSE 50 % IN WATER 50 %
15 SYRINGE (ML) INTRAVENOUS ONCE
Refills: 0 | Status: DISCONTINUED | OUTPATIENT
Start: 2024-04-22 | End: 2024-05-01

## 2024-04-22 RX ORDER — CHOLECALCIFEROL (VITAMIN D3) 125 MCG
1000 CAPSULE ORAL DAILY
Refills: 0 | Status: DISCONTINUED | OUTPATIENT
Start: 2024-04-22 | End: 2024-05-01

## 2024-04-22 RX ORDER — TAMSULOSIN HYDROCHLORIDE 0.4 MG/1
0.4 CAPSULE ORAL AT BEDTIME
Refills: 0 | Status: DISCONTINUED | OUTPATIENT
Start: 2024-04-22 | End: 2024-05-01

## 2024-04-22 RX ORDER — DEXTROSE 50 % IN WATER 50 %
12.5 SYRINGE (ML) INTRAVENOUS ONCE
Refills: 0 | Status: DISCONTINUED | OUTPATIENT
Start: 2024-04-22 | End: 2024-05-01

## 2024-04-22 RX ORDER — HEPARIN SODIUM 5000 [USP'U]/ML
INJECTION INTRAVENOUS; SUBCUTANEOUS
Qty: 25000 | Refills: 0 | Status: DISCONTINUED | OUTPATIENT
Start: 2024-04-22 | End: 2024-04-23

## 2024-04-22 RX ORDER — MYCOPHENOLATE MOFETIL 250 MG/1
1500 CAPSULE ORAL
Refills: 0 | Status: DISCONTINUED | OUTPATIENT
Start: 2024-04-22 | End: 2024-05-01

## 2024-04-22 RX ORDER — SENNA PLUS 8.6 MG/1
2 TABLET ORAL AT BEDTIME
Refills: 0 | Status: DISCONTINUED | OUTPATIENT
Start: 2024-04-22 | End: 2024-05-01

## 2024-04-22 RX ORDER — OXYCODONE HYDROCHLORIDE 5 MG/1
5 TABLET ORAL EVERY 6 HOURS
Refills: 0 | Status: DISCONTINUED | OUTPATIENT
Start: 2024-04-22 | End: 2024-04-29

## 2024-04-22 RX ORDER — INFLUENZA VIRUS VACCINE 15; 15; 15; 15 UG/.5ML; UG/.5ML; UG/.5ML; UG/.5ML
0.5 SUSPENSION INTRAMUSCULAR ONCE
Refills: 0 | Status: DISCONTINUED | OUTPATIENT
Start: 2024-04-22 | End: 2024-05-01

## 2024-04-22 RX ORDER — DEXTROSE 10 % IN WATER 10 %
125 INTRAVENOUS SOLUTION INTRAVENOUS ONCE
Refills: 0 | Status: DISCONTINUED | OUTPATIENT
Start: 2024-04-22 | End: 2024-05-01

## 2024-04-22 RX ORDER — SODIUM CHLORIDE 9 MG/ML
1000 INJECTION, SOLUTION INTRAVENOUS
Refills: 0 | Status: DISCONTINUED | OUTPATIENT
Start: 2024-04-22 | End: 2024-05-01

## 2024-04-22 RX ORDER — ONDANSETRON 8 MG/1
4 TABLET, FILM COATED ORAL EVERY 8 HOURS
Refills: 0 | Status: DISCONTINUED | OUTPATIENT
Start: 2024-04-22 | End: 2024-04-23

## 2024-04-22 RX ORDER — INSULIN GLARGINE 100 [IU]/ML
34 INJECTION, SOLUTION SUBCUTANEOUS AT BEDTIME
Refills: 0 | Status: DISCONTINUED | OUTPATIENT
Start: 2024-04-22 | End: 2024-04-28

## 2024-04-22 RX ORDER — INSULIN LISPRO 100/ML
VIAL (ML) SUBCUTANEOUS
Refills: 0 | Status: DISCONTINUED | OUTPATIENT
Start: 2024-04-22 | End: 2024-04-30

## 2024-04-22 RX ORDER — HEPARIN SODIUM 5000 [USP'U]/ML
8500 INJECTION INTRAVENOUS; SUBCUTANEOUS EVERY 6 HOURS
Refills: 0 | Status: DISCONTINUED | OUTPATIENT
Start: 2024-04-22 | End: 2024-04-25

## 2024-04-22 RX ORDER — HEPARIN SODIUM 5000 [USP'U]/ML
4000 INJECTION INTRAVENOUS; SUBCUTANEOUS EVERY 6 HOURS
Refills: 0 | Status: DISCONTINUED | OUTPATIENT
Start: 2024-04-22 | End: 2024-04-25

## 2024-04-22 RX ORDER — LORATADINE 10 MG/1
10 TABLET ORAL DAILY
Refills: 0 | Status: DISCONTINUED | OUTPATIENT
Start: 2024-04-22 | End: 2024-05-01

## 2024-04-22 RX ORDER — ACETAMINOPHEN 500 MG
650 TABLET ORAL EVERY 6 HOURS
Refills: 0 | Status: DISCONTINUED | OUTPATIENT
Start: 2024-04-22 | End: 2024-05-01

## 2024-04-22 RX ORDER — VANCOMYCIN HCL 1 G
1000 VIAL (EA) INTRAVENOUS ONCE
Refills: 0 | Status: COMPLETED | OUTPATIENT
Start: 2024-04-22 | End: 2024-04-22

## 2024-04-22 RX ORDER — ACETAMINOPHEN 500 MG
1000 TABLET ORAL ONCE
Refills: 0 | Status: COMPLETED | OUTPATIENT
Start: 2024-04-22 | End: 2024-04-22

## 2024-04-22 RX ORDER — SODIUM CHLORIDE 9 MG/ML
2200 INJECTION INTRAMUSCULAR; INTRAVENOUS; SUBCUTANEOUS ONCE
Refills: 0 | Status: COMPLETED | OUTPATIENT
Start: 2024-04-22 | End: 2024-04-22

## 2024-04-22 RX ORDER — HEPARIN SODIUM 5000 [USP'U]/ML
8500 INJECTION INTRAVENOUS; SUBCUTANEOUS ONCE
Refills: 0 | Status: COMPLETED | OUTPATIENT
Start: 2024-04-22 | End: 2024-04-22

## 2024-04-22 RX ORDER — DEXTROSE 50 % IN WATER 50 %
25 SYRINGE (ML) INTRAVENOUS ONCE
Refills: 0 | Status: DISCONTINUED | OUTPATIENT
Start: 2024-04-22 | End: 2024-05-01

## 2024-04-22 RX ADMIN — INSULIN GLARGINE 34 UNIT(S): 100 INJECTION, SOLUTION SUBCUTANEOUS at 22:46

## 2024-04-22 RX ADMIN — Medication 250 MILLIGRAM(S): at 20:07

## 2024-04-22 RX ADMIN — HEPARIN SODIUM 1800 UNIT(S)/HR: 5000 INJECTION INTRAVENOUS; SUBCUTANEOUS at 19:31

## 2024-04-22 RX ADMIN — TAMSULOSIN HYDROCHLORIDE 0.4 MILLIGRAM(S): 0.4 CAPSULE ORAL at 22:45

## 2024-04-22 RX ADMIN — SENNA PLUS 2 TABLET(S): 8.6 TABLET ORAL at 22:45

## 2024-04-22 RX ADMIN — PIPERACILLIN AND TAZOBACTAM 25 GRAM(S): 4; .5 INJECTION, POWDER, LYOPHILIZED, FOR SOLUTION INTRAVENOUS at 22:46

## 2024-04-22 RX ADMIN — HEPARIN SODIUM 8500 UNIT(S): 5000 INJECTION INTRAVENOUS; SUBCUTANEOUS at 16:34

## 2024-04-22 RX ADMIN — HEPARIN SODIUM 1800 UNIT(S)/HR: 5000 INJECTION INTRAVENOUS; SUBCUTANEOUS at 21:38

## 2024-04-22 RX ADMIN — SODIUM CHLORIDE 2200 MILLILITER(S): 9 INJECTION INTRAMUSCULAR; INTRAVENOUS; SUBCUTANEOUS at 11:26

## 2024-04-22 RX ADMIN — HEPARIN SODIUM 1800 UNIT(S)/HR: 5000 INJECTION INTRAVENOUS; SUBCUTANEOUS at 16:36

## 2024-04-22 RX ADMIN — Medication 400 MILLIGRAM(S): at 11:27

## 2024-04-22 RX ADMIN — PIPERACILLIN AND TAZOBACTAM 200 GRAM(S): 4; .5 INJECTION, POWDER, LYOPHILIZED, FOR SOLUTION INTRAVENOUS at 18:16

## 2024-04-22 NOTE — H&P ADULT - NSHPLABSRESULTS_GEN_ALL_CORE
10.9   12.47 )-----------( 152      ( 2024 11:05 )             34.8     138  |  104  |  17  ----------------------------<  111<H>       4.6   |  23  |  1.13    Ca    8.7      2024 11:05  Phos  3.5       Mg     1.7         TPro  7.1  /  Alb  2.5<L>  /  TBili  0.6  /  DBili  x   /  AST  22  /  ALT  13  /  AlkPhos  75      PT/INR: 10.4/0.87 (24 @ 11:17)  PTT: 27.4 (24 @ 11:17)    Urinalysis Basic - ( 2024 14:40 )  Color: Yellow / Appearance: Clear / S.024 / pH: 6.5  Gluc: Negative mg/dL / Ketone: Negative mg/dL  / Bili: Negative / Urobili: 0.2 mg/dL   Blood: Negative / Protein: 30 mg/dL / Nitrite: Negative   Leuk Esterase: Negative / RBC: 1 /HPF / WBC 3 /HPF   Sq Epi: Present / Non Sq Epi: x / Bacteria: Occasional /HPF    Chest x-ray 24  IMPRESSION:  Low lung volumes.    Patchy right upper lobe and bilateral lower lung opacities, of indeterminate nature. Atelectasis and/or infection are possible.    A CTA of the chest was performed and will be reported separately.    CTA chest PE protocol with IV contrast 24  IMPRESSION:  Multifocal pulmonary embolism.    No evidence of right heart strain.    Bilateral lower lobe consolidations and additional small airspace opacities in both lungs could be on the basis of infection/inflammation versus infarction.    CT A/P with IV contrast 24  IMPRESSION:  Positive for pulmonary emboli in the bilateral lung bases.  Small right pleural effusion.  Bibasilar lung opacities, similar to prior and suspicious for pneumonia.    Findings were discussed with Dr. Ambriz by telephone on 2024 at 1425 hours.    U/S abdomen complete 24  IMPRESSION:  No gallbladder disease or biliary dilatation.  Enlarged fatty liver.  Nonobstructive left nephrolithiasis.    B/l LE venous doppler 24  IMPRESSION:  Bilateral lower extremity deep venous thrombosis.    COMMUNICATION:  I discussed the findings of this report on the telephone with Burke Sanders RN in the ER at [facility] on 2024 at 7:00 PM.  Critical value policy of the hospital was followed.  Read back and confirmation of receipt of this communication was performed.  This verbal communication supplements the text report of this document.

## 2024-04-22 NOTE — PROVIDER CONTACT NOTE (CRITICAL VALUE NOTIFICATION) - ASSESSMENT
no bleeding noted at this time
no complaints reported, no signs of distressed noted
no complaints reported, no signs of distress noted

## 2024-04-22 NOTE — ED ADULT TRIAGE NOTE - CHIEF COMPLAINT QUOTE
Brought in from [facility] for right side pain started last night. No n/v/d. No falls or trauma. Uses a walker at baseline. PMH HTN, SLE, DM, HLD.

## 2024-04-22 NOTE — ED ADULT NURSE REASSESSMENT NOTE - NS ED NURSE REASSESS COMMENT FT1
RN received call from , patient was inadvertently transported to  after imaging, RN did not give report prior, mistake by transport. RN gave report bedside to Pratibha

## 2024-04-22 NOTE — H&P ADULT - ASSESSMENT
Tamika Allred is a 64 year old female with PMHx of HTN, IDDM2, and lupus who presented to the ED on 4/22/24 from [facility] for complaints of not feeling well and admitted for sepsis secondary to suspected HCAP and bilateral pulmonary emboli and DVT.    Sepsis secondary to suspected HCAP  Complaints of not feeling well since yesterday, described as RUQ abdominal pain and R. sided back pain  Tmax 100.2,  and WBC 12.47K on admission  U/A with occasional bacteria and squamous epithelial cells present, COVID/influenza negative  CXR with patchy RUL and b/l lower lung opacities, CTA chest with bibasilar lung opacities  CT A/P without infectious etiology, U/S abdomen with enlarged fatty liver and nonobstructive L. nephrolithiasis  S/p NS 2200 cc bolus, vancomycin 1 g IV, zosyn 3.375 mg IV, and acetaminophen 1 g IV in the ED  Vancomycin 1 g IV q12 and zosyn 3.375 g IV q8 empirically continued for now   F/u procal, MRSA/MSSA PCR, blood cultures, urine culture  Monitor temperature curve and WBC trend    Lactic acidosis secondary to above, resolved  Lactic acid 2.2 on admission  S/p NS 2200 cc bolus in the ED    Bilateral pulmonary emboli and DVT, unclear etiology  CTA CAP with b/l PE without evidence of R. heart strain  B/l LE venous doppler ordered; b/l DVT  Started on heparin gtt, monitor aPTT closely while on heparin gtt  F/u echocardiogram  Telemetry  Consult IR for consideration of thrombectomy due to extensive clot burden      Chronic medical conditions:   HTN: PTA losartan 50 mg  Lupus: PTA prednisone 20 mg, hydroxychloroquine 200 mg BID, mycophenolate 1500 mg BID, pantoprazole 40 mg ?GI prophylaxis for steroids  IDDM2: last known A1c 10.2 (on 3/25/24), POC qac and qhs, PTA Lantus 34 U qhs and Humalog low dose SSI qac, PTA metformin 500 mg BID held, blood glucose goal < 180  Normocytic anemia, chronic: hgb 10.9 on admission, appears better than baseline, no signs of bleeding    Medication reconciliation completed using med list from [facility] using paperwork in chart. Tamika Allred is a 64 year old female with PMHx of HTN, IDDM2, and lupus who presented to the ED on 4/22/24 from [facility] for complaints of not feeling well and admitted for sepsis secondary to suspected HCAP and bilateral pulmonary emboli and DVT.    Sepsis secondary to suspected HCAP  Complaints of not feeling well since yesterday, described as RUQ abdominal pain and R. sided back pain  Tmax 100.2,  and WBC 12.47K on admission  U/A with occasional bacteria and squamous epithelial cells present, COVID/influenza negative  CXR with patchy RUL and b/l lower lung opacities, CTA chest with bibasilar lung opacities  CT A/P without infectious etiology, U/S abdomen with enlarged fatty liver and nonobstructive L. nephrolithiasis  S/p NS 2200 cc bolus, vancomycin 1 g IV, zosyn 3.375 mg IV, and acetaminophen 1 g IV in the ED  Vancomycin 1 g IV q12 and zosyn 3.375 g IV q8 empirically continued for now   F/u procal, MRSA/MSSA PCR, blood cultures, urine culture  Monitor temperature curve and WBC trend    Lactic acidosis secondary to above, resolved  Lactic acid 2.2 on admission  S/p NS 2200 cc bolus in the ED    Unprovoked bilateral pulmonary emboli and DVT, unclear etiology  CTA CAP with b/l PE without evidence of R. heart strain, troponin WNL  B/l LE venous doppler ordered; b/l DVT  Started on heparin gtt, monitor aPTT closely while on heparin gtt  F/u anti-phospholipase Ab, echocardiogram  Telemetry  Consult IR for consideration of thrombectomy due to extensive clot burden - please call in AM      Chronic medical conditions:   HTN: PTA losartan 50 mg  Lupus: PTA prednisone 20 mg, hydroxychloroquine 200 mg BID, mycophenolate 1500 mg BID, pantoprazole 40 mg ?GI prophylaxis for steroids  IDDM2: last known A1c 10.2 (on 3/25/24), POC qac and qhs, PTA Lantus 34 U qhs and Humalog low dose SSI qac, PTA metformin 500 mg BID held, blood glucose goal < 180  Normocytic anemia, chronic: hgb 10.9 on admission, appears better than baseline, no signs of bleeding    Medication reconciliation completed using med list from [facility] using paperwork in chart. Tamika Allred is a 64 year old female with PMHx of HTN, IDDM2, and lupus who presented to the ED on 4/22/24 from [facility] for complaints of not feeling well and admitted for sepsis secondary to suspected HCAP and bilateral pulmonary emboli and DVT.    Sepsis secondary to suspected HCAP  Complaints of not feeling well since yesterday, described as RUQ abdominal pain and R. sided back pain  Tmax 100.2,  and WBC 12.47K on admission  U/A with occasional bacteria and squamous epithelial cells present, COVID/influenza negative  CXR with patchy RUL and b/l lower lung opacities, CTA chest with bibasilar lung opacities  CT A/P without infectious etiology, U/S abdomen with enlarged fatty liver and nonobstructive L. nephrolithiasis  S/p NS 2200 cc bolus, vancomycin 1 g IV, zosyn 3.375 mg IV, and acetaminophen 1 g IV in the ED  Vancomycin 1 g IV q12 and zosyn 3.375 g IV q8 empirically continued for now   F/u procal, MRSA/MSSA PCR, blood cultures, urine culture  Monitor temperature curve and WBC trend    Lactic acidosis secondary to above, resolved  Lactic acid 2.2 on admission  S/p NS 2200 cc bolus in the ED    Bilateral pulmonary emboli and DVT, ?unprovoked, unclear etiology  CTA CAP with b/l PE without evidence of R. heart strain, troponin WNL  B/l LE venous doppler ordered; b/l DVT  Started on heparin gtt, monitor aPTT closely while on heparin gtt  F/u anti-phospholipase Ab, echocardiogram  Telemetry  Consult IR for consideration of thrombectomy due to extensive clot burden - please call in AM      Chronic medical conditions:   HTN: PTA losartan 50 mg  Lupus: PTA prednisone 20 mg, hydroxychloroquine 200 mg BID, mycophenolate 1500 mg BID, pantoprazole 40 mg ?GI prophylaxis for steroids  IDDM2: last known A1c 10.2 (on 3/25/24), POC qac and qhs, PTA Lantus 34 U qhs and Humalog low dose SSI qac, PTA metformin 500 mg BID held, blood glucose goal < 180  Normocytic anemia, chronic: hgb 10.9 on admission, appears better than baseline, no signs of bleeding    Medication reconciliation completed using med list from [facility] using paperwork in chart.

## 2024-04-22 NOTE — ED ADULT NURSE NOTE - NSFALLRISK_ED_ALL_ED
Per endo, additional injectable agent to be added to patient's home metformin regimen.  Sent x3 scripts to patient's [facility]'s pharmacy to check insurance coverage for medication.  Team will cancel medications not required/covered.     TREVOR Steinberg PA-C  #[id number] No

## 2024-04-22 NOTE — ED ADULT NURSE NOTE - NSFALLRISKINTERV_ED_ALL_ED

## 2024-04-22 NOTE — ED PROVIDER NOTE - OBJECTIVE STATEMENT
64-year-old female with past medical history of hypertension, hyperlipidemia, diabetes, lupus on prednisone presents emergency room from [facility] for right-sided pain.  Patient reports right-sided upper abdominal pain rating to her back that began earlier today.  Reports fever without chills, URI symptoms, chest pain, shortness of breath, difficulty breathing, nausea, vomiting, diarrhea or urinary complaints.  Denies any pain medications or fever meds prior to arrival.

## 2024-04-22 NOTE — ED PROVIDER NOTE - CRITICAL CARE ATTENDING CONTRIBUTION TO CARE
64 year old female with h/o HTN, lupus and DM presents today sent from [facility] for evaluation, pt sent in for complaints of right sided pain, on initial exam, pt found to have right sided abdominal pain,  on exam her abd is soft and tender in the ruq and llq, lungs are clear, skin dry, mildly ashen appearing, however pt is alert and awake x 3 an ein no respiratory distress, ct angio found PE in the lower lobes of her lungs and PNA, sono and ct angio chest also found b/l LE extremity DVT and PE.  Pt started on heparin and antibiotics and admitted for further care

## 2024-04-22 NOTE — ED ADULT NURSE NOTE - OBJECTIVE STATEMENT
patient, is alert, drowsy, complaining of 10/10 RUQ pain since today. Rebound tenderness noted, reports fever at home, unknown temp. patient denies any chills, body aches, dysuria, hematuria.

## 2024-04-22 NOTE — H&P ADULT - NSHPPHYSICALEXAM_GEN_ALL_CORE
T(C): 36.8 (04-22-24 @ 19:54), Max: 37.9 (04-22-24 @ 10:35)  HR: 99 (04-22-24 @ 19:54) (94 - 117)  BP: 132/63 (04-22-24 @ 19:54) (121/77 - 142/77)  RR: 20 (04-22-24 @ 19:54) (18 - 20)  SpO2: 99% (04-22-24 @ 19:54) (99% - 100%)    CONSTITUTIONAL: Well groomed  EYES: PERRLA and symmetric, EOMI  ENMT: Oral mucosa with moist membranes  RESP: No respiratory distress, no use of accessory muscles, diminished breath sounds b/l  CV: RRR  GI: Soft, NT, ND  SKIN: b/l LE with nonpitting edema, legs appear symmetric in size

## 2024-04-22 NOTE — ED ADULT NURSE NOTE - CHIEF COMPLAINT QUOTE
Brought in from [facility] for right side pain started last night. No n/v/d. No falls or trauma. Uses a walker at baseline. PMH HTN, SLE, DM, HLD.

## 2024-04-22 NOTE — ED PROVIDER NOTE - CLINICAL SUMMARY MEDICAL DECISION MAKING FREE TEXT BOX
64-year-old female with past medical history of hypertension, hyperlipidemia, diabetes, lupus on prednisone presents emergency room from [facility] for right-sided pain.  Patient reports right-sided upper abdominal pain rating to her back that began earlier today.  Reports fever without chills, URI symptoms, chest pain, shortness of breath, difficulty breathing, nausea, vomiting, diarrhea or urinary complaints. Tachycardiac, rectal temp 100.2F, RUQ and LLQ abdominal pain, concern for early sepsis secondary to acute lori or diverticulitis, will get sepsis labs, IVF, US, CT, reassess. No code sepsis at this time, will call if white count or lactate is elevated. Dispo pending results/reassessment.

## 2024-04-22 NOTE — H&P ADULT - TIME BILLING
coordination of care with ER PA and ER RN, reviewing paperwork from [facility] in chart, reviewing notes (from endocrinology and hospitalist) from recent hospitalization here at [facility], obtaining history, performing a physical examination, reviewing and interpreting labs and imaging, ordering further studies and tests, explaining the diagnosis and treatment plan to patient, completing medication reconciliation, and documentation as above.

## 2024-04-22 NOTE — ED PROVIDER NOTE - PROGRESS NOTE DETAILS
CHRIS Ambriz: vital signs stable, in NAD, received call from radiologist, concern for PE and PNA, will add on CTA of chest, no prior history reported of blood clots, CTA order added, will continue to follow up and reassess, plan for admission. CHRIS Ambriz: +PE on exam with concern for PNA vs inflammatory changes, given abx for hospital acquired PNA, patient aware of plan to admit, will admit to tele, Dr Sears aware

## 2024-04-22 NOTE — PATIENT PROFILE ADULT - FALL HARM RISK - HARM RISK INTERVENTIONS

## 2024-04-22 NOTE — ED ADULT NURSE NOTE - CAS EDN DISCHARGE INTERVENTIONS
"Chief Complaint   Patient presents with   • Weakness     Pt is complaining of increased weakness over the last three days. Pt states she has lost 10lbs over the last week. Pt usually ambulates on her own with a steady gait. Pt lives by herself and since becoming weak, patient has been having episodes of incontinence.    • GLF     Pt fell onto her knees today and used her med alert button. EMS found pt on her knees. Pt denies hitting her head. Pt taking eliquis.      /57   Pulse (!) 52   Resp 18   Ht 1.499 m (4' 11\")   Wt 45.4 kg (100 lb)   SpO2 100%   BMI 20.20 kg/m²     Patient BIB EMS for the above complaint. Patient placed on monitor. Chart up for ERP.   " IV intact

## 2024-04-22 NOTE — H&P ADULT - HISTORY OF PRESENT ILLNESS
Tamika Allred is a 64 year old female with PMHx of HTN, IDDM2, and lupus who presented to the ED on 4/22/24 from [facility] for complaints of not feeling well.    History is very limited as patient is a poor historian. Patient reports she started feeling unwell yesterday. Admits to right upper quadrant abdominal pain and right sided back pain that started while working with physical therapy yesterday. No associated chest pain, palpitations, or shortness of breath. Baseline functional status is ambulates with walker and dependent with all ADLs. Lives at home with daughter.    Recent admission from 3/8/24 - 3/20/24 for generalized weakness secondary to UTI. Completed 3-day course of antibiotic therapy. Course complicated by lupus flare and was started on prednisone. Also with vaginal candidiasis which was treated with clotrimazole. Discharged to [facility].    In the ED, VSS except Tmax 100.2 and HR as elevated as 117. WBC 12.47K, hgb 10.9. CMP grossly unremarkable. Lactic acid 2.2. U/A with occasional bacteria and squamous epithelial cells present. COVID/influenza negative. CXR with patchy right upper lobe and bilateral lower lung opacities CTA CAP with b/l PE without evidence of right heart strain. U/S abdomen with enlarged fatty liver and nonobstructive left nephrolithiasis. Received NS 2200 cc bolus, vancomycin 1 g IV, zosyn 3.375 mg IV, acetaminophen 1 g IV, and started on heparin gtt.

## 2024-04-22 NOTE — ED PROVIDER NOTE - NONTENDER LOCATION
left upper quadrant/right lower quadrant/periumbilical/umbilical/suprapubic/left costovertebral angle/right costovertebral angle

## 2024-04-23 LAB
APTT BLD: 115 SEC — HIGH (ref 24.5–35.6)
APTT BLD: 134.9 SEC — CRITICAL HIGH (ref 24.5–35.6)
GLUCOSE BLDC GLUCOMTR-MCNC: 119 MG/DL — HIGH (ref 70–99)
GLUCOSE BLDC GLUCOMTR-MCNC: 232 MG/DL — HIGH (ref 70–99)
GLUCOSE BLDC GLUCOMTR-MCNC: 247 MG/DL — HIGH (ref 70–99)
HCT VFR BLD CALC: 27.2 % — LOW (ref 34.5–45)
HGB BLD-MCNC: 8.6 G/DL — LOW (ref 11.5–15.5)
MCHC RBC-ENTMCNC: 25.8 PG — LOW (ref 27–34)
MCHC RBC-ENTMCNC: 31.6 G/DL — LOW (ref 32–36)
MCV RBC AUTO: 81.7 FL — SIGNIFICANT CHANGE UP (ref 80–100)
NRBC # BLD: 0 /100 WBCS — SIGNIFICANT CHANGE UP (ref 0–0)
PLATELET # BLD AUTO: 142 K/UL — LOW (ref 150–400)
PROCALCITONIN SERPL-MCNC: 0.11 NG/ML — HIGH (ref 0.02–0.1)
RBC # BLD: 3.33 M/UL — LOW (ref 3.8–5.2)
RBC # FLD: 14.3 % — SIGNIFICANT CHANGE UP (ref 10.3–14.5)
WBC # BLD: 7.31 K/UL — SIGNIFICANT CHANGE UP (ref 3.8–10.5)
WBC # FLD AUTO: 7.31 K/UL — SIGNIFICANT CHANGE UP (ref 3.8–10.5)

## 2024-04-23 PROCEDURE — 99232 SBSQ HOSP IP/OBS MODERATE 35: CPT

## 2024-04-23 RX ORDER — HEPARIN SODIUM 5000 [USP'U]/ML
1500 INJECTION INTRAVENOUS; SUBCUTANEOUS
Qty: 25000 | Refills: 0 | Status: DISCONTINUED | OUTPATIENT
Start: 2024-04-23 | End: 2024-04-25

## 2024-04-23 RX ADMIN — PANTOPRAZOLE SODIUM 40 MILLIGRAM(S): 20 TABLET, DELAYED RELEASE ORAL at 05:10

## 2024-04-23 RX ADMIN — Medication 200 MILLIGRAM(S): at 17:09

## 2024-04-23 RX ADMIN — Medication 20 MILLIGRAM(S): at 05:11

## 2024-04-23 RX ADMIN — LORATADINE 10 MILLIGRAM(S): 10 TABLET ORAL at 12:11

## 2024-04-23 RX ADMIN — PIPERACILLIN AND TAZOBACTAM 25 GRAM(S): 4; .5 INJECTION, POWDER, LYOPHILIZED, FOR SOLUTION INTRAVENOUS at 05:10

## 2024-04-23 RX ADMIN — Medication 1000 UNIT(S): at 12:10

## 2024-04-23 RX ADMIN — HEPARIN SODIUM 1000 UNIT(S)/HR: 5000 INJECTION INTRAVENOUS; SUBCUTANEOUS at 19:22

## 2024-04-23 RX ADMIN — SENNA PLUS 2 TABLET(S): 8.6 TABLET ORAL at 22:29

## 2024-04-23 RX ADMIN — HEPARIN SODIUM 1500 UNIT(S)/HR: 5000 INJECTION INTRAVENOUS; SUBCUTANEOUS at 02:45

## 2024-04-23 RX ADMIN — OXYCODONE HYDROCHLORIDE 5 MILLIGRAM(S): 5 TABLET ORAL at 05:59

## 2024-04-23 RX ADMIN — MYCOPHENOLATE MOFETIL 1500 MILLIGRAM(S): 250 CAPSULE ORAL at 17:09

## 2024-04-23 RX ADMIN — MYCOPHENOLATE MOFETIL 1500 MILLIGRAM(S): 250 CAPSULE ORAL at 05:11

## 2024-04-23 RX ADMIN — OXYCODONE HYDROCHLORIDE 5 MILLIGRAM(S): 5 TABLET ORAL at 06:37

## 2024-04-23 RX ADMIN — HEPARIN SODIUM 1200 UNIT(S)/HR: 5000 INJECTION INTRAVENOUS; SUBCUTANEOUS at 13:48

## 2024-04-23 RX ADMIN — Medication 200 MILLIGRAM(S): at 05:10

## 2024-04-23 RX ADMIN — HEPARIN SODIUM 1500 UNIT(S)/HR: 5000 INJECTION INTRAVENOUS; SUBCUTANEOUS at 07:37

## 2024-04-23 RX ADMIN — HEPARIN SODIUM 0 UNIT(S)/HR: 5000 INJECTION INTRAVENOUS; SUBCUTANEOUS at 00:04

## 2024-04-23 RX ADMIN — HEPARIN SODIUM 0 UNIT(S)/HR: 5000 INJECTION INTRAVENOUS; SUBCUTANEOUS at 09:07

## 2024-04-23 RX ADMIN — Medication 2: at 17:10

## 2024-04-23 RX ADMIN — LOSARTAN POTASSIUM 50 MILLIGRAM(S): 100 TABLET, FILM COATED ORAL at 05:10

## 2024-04-23 RX ADMIN — HEPARIN SODIUM 1200 UNIT(S)/HR: 5000 INJECTION INTRAVENOUS; SUBCUTANEOUS at 10:15

## 2024-04-23 RX ADMIN — Medication 2: at 11:50

## 2024-04-23 RX ADMIN — TAMSULOSIN HYDROCHLORIDE 0.4 MILLIGRAM(S): 0.4 CAPSULE ORAL at 22:29

## 2024-04-23 RX ADMIN — Medication 250 MILLIGRAM(S): at 05:10

## 2024-04-23 RX ADMIN — HEPARIN SODIUM 1000 UNIT(S)/HR: 5000 INJECTION INTRAVENOUS; SUBCUTANEOUS at 18:47

## 2024-04-23 NOTE — PROGRESS NOTE ADULT - ASSESSMENT
Tamika Allred is a 64 year old female with PMHx of HTN, IDDM2, and lupus who presented to the ED on 4/22/24 from [facility] for complaints of not feeling well and admitted for sepsis secondary to suspected HCAP and bilateral pulmonary emboli and DVT.    Sepsis secondary to suspected HCAP  Complaints of not feeling well since yesterday, described as RUQ abdominal pain and R. sided back pain  Tmax 100.2,  and WBC 12.47K on admission  U/A with occasional bacteria and squamous epithelial cells present, COVID/influenza negative  CXR with patchy RUL and b/l lower lung opacities, CTA chest with bibasilar lung opacities  CT A/P without infectious etiology, U/S abdomen with enlarged fatty liver and nonobstructive L. nephrolithiasis  S/p NS 2200 cc bolus, vancomycin 1 g IV, zosyn 3.375 mg IV, and acetaminophen 1 g IV in the ED  Vancomycin 1 g IV q12 and zosyn 3.375 g IV q8 empirically continued for now   F/u procal, MRSA/MSSA PCR, blood cultures, urine culture  Monitor temperature curve and WBC trend    Lactic acidosis secondary to above, resolved  Lactic acid 2.2 on admission  S/p NS 2200 cc bolus in the ED    Bilateral pulmonary emboli and DVT, ?unprovoked, unclear etiology  CTA CAP with b/l PE without evidence of R. heart strain, troponin WNL  B/l LE venous doppler ordered; b/l DVT  Started on heparin gtt, monitor aPTT closely while on heparin gtt  F/u anti-phospholipase Ab, echocardiogram  Telemetry  Consult IR for consideration of thrombectomy due to extensive clot burden - please call in AM      Chronic medical conditions:   HTN: PTA losartan 50 mg  Lupus: PTA prednisone 20 mg, hydroxychloroquine 200 mg BID, mycophenolate 1500 mg BID, pantoprazole 40 mg ?GI prophylaxis for steroids  IDDM2: last known A1c 10.2 (on 3/25/24), POC qac and qhs, PTA Lantus 34 U qhs and Humalog low dose SSI qac, PTA metformin 500 mg BID held, blood glucose goal < 180  Normocytic anemia, chronic: hgb 10.9 on admission, appears better than baseline, no signs of bleeding    Medication reconciliation completed using med list from [facility] using paperwork in chart. Tamika Allred is a 64 year old female with PMHx of HTN, IDDM2, and lupus who presented to the ED on 4/22/24 from [facility] for complaints of not feeling well and admitted for sepsis secondary to suspected HCAP and bilateral pulmonary emboli and DVT.      Sepsis secondary to suspected HCAP    Complaints of not feeling well since yesterday, described as RUQ abdominal pain and R. sided back pain  Tmax 100.2,  and WBC 12.47K on admission  U/A with occasional bacteria and squamous epithelial cells present, COVID/influenza negative  CXR with patchy RUL and b/l lower lung opacities, CTA chest with bibasilar lung opacities    CT A/P without infectious etiology, U/S abdomen with enlarged fatty liver and nonobstructive L. nephrolithiasis    Doubt need for ABX, CTA findings all likely from PE.     Bilateral pulmonary emboli and DVT, ?unprovoked, unclear etiology  CTA CAP with b/l PE without evidence of R. heart strain, troponin WNL  B/l LE venous doppler ordered; b/l DVT  Started on heparin gtt, monitor aPTT closely while on heparin gtt  F/u anti-phospholipase Ab, echocardiogram,     Telemetry, follow up TTE to eval RV strain.  Will ask pulmonary to follow.     Chronic medical conditions:   HTN: PTA losartan 50 mg  Lupus: PTA prednisone 20 mg, hydroxychloroquine 200 mg BID, mycophenolate 1500 mg BID, pantoprazole 40 mg ?GI prophylaxis for steroids  IDDM2: last known A1c 10.2 (on 3/25/24), POC qac and qhs, PTA Lantus 34 U qhs and Humalog low dose SSI qac, PTA metformin 500 mg BID held, blood glucose goal < 180.

## 2024-04-23 NOTE — PROGRESS NOTE ADULT - SUBJECTIVE AND OBJECTIVE BOX
INTERVAL HPI/OVERNIGHT EVENTS:  Pt seen and examined at bedside.     Allergies/Intolerance: No Known Allergies      MEDICATIONS  (STANDING):  cholecalciferol 1000 Unit(s) Oral daily  dextrose 10% Bolus 125 milliLiter(s) IV Bolus once  dextrose 5%. 1000 milliLiter(s) (100 mL/Hr) IV Continuous <Continuous>  dextrose 5%. 1000 milliLiter(s) (50 mL/Hr) IV Continuous <Continuous>  dextrose 50% Injectable 25 Gram(s) IV Push once  dextrose 50% Injectable 12.5 Gram(s) IV Push once  glucagon  Injectable 1 milliGRAM(s) IntraMuscular once  heparin  Infusion. 1500 Unit(s)/Hr (15 mL/Hr) IV Continuous <Continuous>  hydroxychloroquine 200 milliGRAM(s) Oral two times a day  influenza   Vaccine 0.5 milliLiter(s) IntraMuscular once  insulin glargine Injectable (LANTUS) 34 Unit(s) SubCutaneous at bedtime  insulin lispro (ADMELOG) corrective regimen sliding scale   SubCutaneous three times a day before meals  loratadine 10 milliGRAM(s) Oral daily  losartan 50 milliGRAM(s) Oral daily  mycophenolate mofetil 1500 milliGRAM(s) Oral two times a day  pantoprazole    Tablet 40 milliGRAM(s) Oral before breakfast  piperacillin/tazobactam IVPB.. 3.375 Gram(s) IV Intermittent every 8 hours  predniSONE   Tablet 20 milliGRAM(s) Oral daily  senna 2 Tablet(s) Oral at bedtime  tamsulosin 0.4 milliGRAM(s) Oral at bedtime  vancomycin  IVPB 1000 milliGRAM(s) IV Intermittent every 12 hours    MEDICATIONS  (PRN):  acetaminophen     Tablet .. 650 milliGRAM(s) Oral every 6 hours PRN Temp greater or equal to 38C (100.4F), Mild Pain (1 - 3)  aluminum hydroxide/magnesium hydroxide/simethicone Suspension 30 milliLiter(s) Oral every 4 hours PRN Dyspepsia  dextrose Oral Gel 15 Gram(s) Oral once PRN Blood Glucose LESS THAN 70 milliGRAM(s)/deciliter  heparin   Injectable 8500 Unit(s) IV Push every 6 hours PRN For aPTT less than 40  heparin   Injectable 4000 Unit(s) IV Push every 6 hours PRN For aPTT between 40 - 57  melatonin 3 milliGRAM(s) Oral at bedtime PRN Insomnia  ondansetron Injectable 4 milliGRAM(s) IV Push every 8 hours PRN Nausea and/or Vomiting  oxyCODONE    IR 5 milliGRAM(s) Oral every 6 hours PRN for severe pain        ROS: all systems reviewed and wnl      PHYSICAL EXAMINATION:  Vital Signs Last 24 Hrs  T(C): 36.9 (2024 10:07), Max: 37.1 (2024 11:47)  T(F): 98.4 (2024 10:07), Max: 98.8 (2024 11:47)  HR: 99 (2024 10:07) (94 - 99)  BP: 133/84 (2024 10:07) (115/70 - 142/77)  BP(mean): --  RR: 18 (2024 10:07) (18 - 20)  SpO2: 99% (2024 10:07) (99% - 100%)    Parameters below as of 2024 10:07  Patient On (Oxygen Delivery Method): room air      CAPILLARY BLOOD GLUCOSE      POCT Blood Glucose.: 119 mg/dL (2024 08:55)  POCT Blood Glucose.: 163 mg/dL (2024 22:28)       @ 07:01  -   @ 10:54  --------------------------------------------------------  IN: 320 mL / OUT: 1000 mL / NET: -680 mL        GENERAL:   NECK: supple, No JVD  CHEST/LUNG: clear to auscultation bilaterally; no rales, rhonchi, or wheezing b/l  HEART: normal S1, S2  ABDOMEN: BS+, soft, ND, NT   EXTREMITIES:  pulses palpable; no clubbing, cyanosis, or edema b/l LEs  SKIN: no rashes or lesions      LABS:                        8.6    7.31  )-----------( 142      ( 2024 07:40 )             27.2         138  |  104  |  17  ----------------------------<  111<H>  4.6   |  23  |  1.13    Ca    8.7      2024 11:05  Phos  3.5     -  Mg     1.7     -    TPro  7.1  /  Alb  2.5<L>  /  TBili  0.6  /  DBili  x   /  AST  22  /  ALT  13  /  AlkPhos  75  04-    PT/INR - ( 2024 11:17 )   PT: 10.4 sec;   INR: 0.87 ratio         PTT - ( 2024 07:40 )  PTT:134.9 sec  Urinalysis Basic - ( 2024 14:40 )    Color: Yellow / Appearance: Clear / S.024 / pH: x  Gluc: x / Ketone: Negative mg/dL  / Bili: Negative / Urobili: 0.2 mg/dL   Blood: x / Protein: 30 mg/dL / Nitrite: Negative   Leuk Esterase: Negative / RBC: 1 /HPF / WBC 3 /HPF   Sq Epi: x / Non Sq Epi: x / Bacteria: Occasional /HPF             INTERVAL HPI/OVERNIGHT EVENTS:  Pt seen and examined at bedside.     Allergies/Intolerance: No Known Allergies      MEDICATIONS  (STANDING):  cholecalciferol 1000 Unit(s) Oral daily  dextrose 10% Bolus 125 milliLiter(s) IV Bolus once  dextrose 5%. 1000 milliLiter(s) (100 mL/Hr) IV Continuous <Continuous>  dextrose 5%. 1000 milliLiter(s) (50 mL/Hr) IV Continuous <Continuous>  dextrose 50% Injectable 25 Gram(s) IV Push once  dextrose 50% Injectable 12.5 Gram(s) IV Push once  glucagon  Injectable 1 milliGRAM(s) IntraMuscular once  heparin  Infusion. 1500 Unit(s)/Hr (15 mL/Hr) IV Continuous <Continuous>  hydroxychloroquine 200 milliGRAM(s) Oral two times a day  influenza   Vaccine 0.5 milliLiter(s) IntraMuscular once  insulin glargine Injectable (LANTUS) 34 Unit(s) SubCutaneous at bedtime  insulin lispro (ADMELOG) corrective regimen sliding scale   SubCutaneous three times a day before meals  loratadine 10 milliGRAM(s) Oral daily  losartan 50 milliGRAM(s) Oral daily  mycophenolate mofetil 1500 milliGRAM(s) Oral two times a day  pantoprazole    Tablet 40 milliGRAM(s) Oral before breakfast  piperacillin/tazobactam IVPB.. 3.375 Gram(s) IV Intermittent every 8 hours  predniSONE   Tablet 20 milliGRAM(s) Oral daily  senna 2 Tablet(s) Oral at bedtime  tamsulosin 0.4 milliGRAM(s) Oral at bedtime  vancomycin  IVPB 1000 milliGRAM(s) IV Intermittent every 12 hours    MEDICATIONS  (PRN):  acetaminophen     Tablet .. 650 milliGRAM(s) Oral every 6 hours PRN Temp greater or equal to 38C (100.4F), Mild Pain (1 - 3)  aluminum hydroxide/magnesium hydroxide/simethicone Suspension 30 milliLiter(s) Oral every 4 hours PRN Dyspepsia  dextrose Oral Gel 15 Gram(s) Oral once PRN Blood Glucose LESS THAN 70 milliGRAM(s)/deciliter  heparin   Injectable 8500 Unit(s) IV Push every 6 hours PRN For aPTT less than 40  heparin   Injectable 4000 Unit(s) IV Push every 6 hours PRN For aPTT between 40 - 57  melatonin 3 milliGRAM(s) Oral at bedtime PRN Insomnia  ondansetron Injectable 4 milliGRAM(s) IV Push every 8 hours PRN Nausea and/or Vomiting  oxyCODONE    IR 5 milliGRAM(s) Oral every 6 hours PRN for severe pain        ROS: all systems reviewed and wnl      PHYSICAL EXAMINATION:  Vital Signs Last 24 Hrs  T(C): 36.9 (2024 10:07), Max: 37.1 (2024 11:47)  T(F): 98.4 (2024 10:07), Max: 98.8 (2024 11:47)  HR: 99 (2024 10:07) (94 - 99)  BP: 133/84 (2024 10:07) (115/70 - 142/77)  BP(mean): --  RR: 18 (2024 10:07) (18 - 20)  SpO2: 99% (2024 10:07) (99% - 100%)    Parameters below as of 2024 10:07  Patient On (Oxygen Delivery Method): room air      CAPILLARY BLOOD GLUCOSE      POCT Blood Glucose.: 119 mg/dL (2024 08:55)  POCT Blood Glucose.: 163 mg/dL (2024 22:28)       @ 07:01  -   @ 10:54  --------------------------------------------------------  IN: 320 mL / OUT: 1000 mL / NET: -680 mL        GENERAL: stable, comfortable on RA, no CP or SOB at rest.   NECK: supple, No JVD  CHEST/LUNG: clear to auscultation bilaterally; no rales, rhonchi, or wheezing b/l  HEART: normal S1, S2  ABDOMEN: BS+, soft, ND, NT   EXTREMITIES:  pulses palpable; no clubbing, cyanosis, or edema b/l LEs      LABS:                        8.6    7.31  )-----------( 142      ( 2024 07:40 )             27.2         138  |  104  |  17  ----------------------------<  111<H>  4.6   |  23  |  1.13    Ca    8.7      2024 11:05  Phos  3.5     -  Mg     1.7         TPro  7.1  /  Alb  2.5<L>  /  TBili  0.6  /  DBili  x   /  AST  22  /  ALT  13  /  AlkPhos  75  04-    PT/INR - ( 2024 11:17 )   PT: 10.4 sec;   INR: 0.87 ratio         PTT - ( 2024 07:40 )  PTT:134.9 sec  Urinalysis Basic - ( 2024 14:40 )    Color: Yellow / Appearance: Clear / S.024 / pH: x  Gluc: x / Ketone: Negative mg/dL  / Bili: Negative / Urobili: 0.2 mg/dL   Blood: x / Protein: 30 mg/dL / Nitrite: Negative   Leuk Esterase: Negative / RBC: 1 /HPF / WBC 3 /HPF   Sq Epi: x / Non Sq Epi: x / Bacteria: Occasional /HPF

## 2024-04-24 ENCOUNTER — RESULT REVIEW (OUTPATIENT)
Age: 64
End: 2024-04-24

## 2024-04-24 LAB
APTT BLD: 156.9 SEC — CRITICAL HIGH (ref 24.5–35.6)
APTT BLD: 57 SEC — HIGH (ref 24.5–35.6)
APTT BLD: 84.7 SEC — HIGH (ref 24.5–35.6)
GLUCOSE BLDC GLUCOMTR-MCNC: 161 MG/DL — HIGH (ref 70–99)
GLUCOSE BLDC GLUCOMTR-MCNC: 167 MG/DL — HIGH (ref 70–99)
GLUCOSE BLDC GLUCOMTR-MCNC: 176 MG/DL — HIGH (ref 70–99)
GLUCOSE BLDC GLUCOMTR-MCNC: 317 MG/DL — HIGH (ref 70–99)
GLUCOSE BLDC GLUCOMTR-MCNC: 419 MG/DL — HIGH (ref 70–99)
HCT VFR BLD CALC: 26.8 % — LOW (ref 34.5–45)
HGB BLD-MCNC: 8.5 G/DL — LOW (ref 11.5–15.5)
MCHC RBC-ENTMCNC: 26.1 PG — LOW (ref 27–34)
MCHC RBC-ENTMCNC: 31.7 G/DL — LOW (ref 32–36)
MCV RBC AUTO: 82.2 FL — SIGNIFICANT CHANGE UP (ref 80–100)
MRSA PCR RESULT.: SIGNIFICANT CHANGE UP
NRBC # BLD: 0 /100 WBCS — SIGNIFICANT CHANGE UP (ref 0–0)
PLATELET # BLD AUTO: 172 K/UL — SIGNIFICANT CHANGE UP (ref 150–400)
RBC # BLD: 3.26 M/UL — LOW (ref 3.8–5.2)
RBC # FLD: 14.3 % — SIGNIFICANT CHANGE UP (ref 10.3–14.5)
S AUREUS DNA NOSE QL NAA+PROBE: SIGNIFICANT CHANGE UP
WBC # BLD: 4.83 K/UL — SIGNIFICANT CHANGE UP (ref 3.8–10.5)
WBC # FLD AUTO: 4.83 K/UL — SIGNIFICANT CHANGE UP (ref 3.8–10.5)

## 2024-04-24 PROCEDURE — 93306 TTE W/DOPPLER COMPLETE: CPT | Mod: 26

## 2024-04-24 PROCEDURE — 99232 SBSQ HOSP IP/OBS MODERATE 35: CPT

## 2024-04-24 PROCEDURE — G0452: CPT | Mod: 26

## 2024-04-24 RX ADMIN — MYCOPHENOLATE MOFETIL 1500 MILLIGRAM(S): 250 CAPSULE ORAL at 06:10

## 2024-04-24 RX ADMIN — Medication 200 MILLIGRAM(S): at 06:11

## 2024-04-24 RX ADMIN — Medication 4: at 17:12

## 2024-04-24 RX ADMIN — HEPARIN SODIUM 1000 UNIT(S)/HR: 5000 INJECTION INTRAVENOUS; SUBCUTANEOUS at 09:29

## 2024-04-24 RX ADMIN — Medication 20 MILLIGRAM(S): at 06:11

## 2024-04-24 RX ADMIN — HEPARIN SODIUM 1200 UNIT(S)/HR: 5000 INJECTION INTRAVENOUS; SUBCUTANEOUS at 11:22

## 2024-04-24 RX ADMIN — HEPARIN SODIUM 0 UNIT(S)/HR: 5000 INJECTION INTRAVENOUS; SUBCUTANEOUS at 18:24

## 2024-04-24 RX ADMIN — HEPARIN SODIUM 1000 UNIT(S)/HR: 5000 INJECTION INTRAVENOUS; SUBCUTANEOUS at 07:35

## 2024-04-24 RX ADMIN — Medication 6: at 11:45

## 2024-04-24 RX ADMIN — Medication 1000 UNIT(S): at 11:45

## 2024-04-24 RX ADMIN — MYCOPHENOLATE MOFETIL 1500 MILLIGRAM(S): 250 CAPSULE ORAL at 17:12

## 2024-04-24 RX ADMIN — Medication 200 MILLIGRAM(S): at 17:12

## 2024-04-24 RX ADMIN — Medication 1: at 08:12

## 2024-04-24 RX ADMIN — PANTOPRAZOLE SODIUM 40 MILLIGRAM(S): 20 TABLET, DELAYED RELEASE ORAL at 06:11

## 2024-04-24 RX ADMIN — HEPARIN SODIUM 1000 UNIT(S)/HR: 5000 INJECTION INTRAVENOUS; SUBCUTANEOUS at 01:41

## 2024-04-24 RX ADMIN — LORATADINE 10 MILLIGRAM(S): 10 TABLET ORAL at 11:45

## 2024-04-24 RX ADMIN — HEPARIN SODIUM 4000 UNIT(S): 5000 INJECTION INTRAVENOUS; SUBCUTANEOUS at 11:26

## 2024-04-24 RX ADMIN — TAMSULOSIN HYDROCHLORIDE 0.4 MILLIGRAM(S): 0.4 CAPSULE ORAL at 22:05

## 2024-04-24 RX ADMIN — HEPARIN SODIUM 900 UNIT(S)/HR: 5000 INJECTION INTRAVENOUS; SUBCUTANEOUS at 19:27

## 2024-04-24 RX ADMIN — HEPARIN SODIUM 0 UNIT(S)/HR: 5000 INJECTION INTRAVENOUS; SUBCUTANEOUS at 19:24

## 2024-04-24 RX ADMIN — LOSARTAN POTASSIUM 50 MILLIGRAM(S): 100 TABLET, FILM COATED ORAL at 06:12

## 2024-04-24 NOTE — PROGRESS NOTE ADULT - SUBJECTIVE AND OBJECTIVE BOX
INTERVAL HPI/OVERNIGHT EVENTS:  Pt seen and examined at bedside.     Allergies/Intolerance: No Known Allergies    MEDICATIONS  (STANDING):  cholecalciferol 1000 Unit(s) Oral daily  dextrose 10% Bolus 125 milliLiter(s) IV Bolus once  dextrose 5%. 1000 milliLiter(s) (100 mL/Hr) IV Continuous <Continuous>  dextrose 5%. 1000 milliLiter(s) (50 mL/Hr) IV Continuous <Continuous>  dextrose 50% Injectable 12.5 Gram(s) IV Push once  dextrose 50% Injectable 25 Gram(s) IV Push once  glucagon  Injectable 1 milliGRAM(s) IntraMuscular once  heparin  Infusion. 1500 Unit(s)/Hr (15 mL/Hr) IV Continuous <Continuous>  hydroxychloroquine 200 milliGRAM(s) Oral two times a day  influenza   Vaccine 0.5 milliLiter(s) IntraMuscular once  insulin glargine Injectable (LANTUS) 34 Unit(s) SubCutaneous at bedtime  insulin lispro (ADMELOG) corrective regimen sliding scale   SubCutaneous three times a day before meals  loratadine 10 milliGRAM(s) Oral daily  losartan 50 milliGRAM(s) Oral daily  mycophenolate mofetil 1500 milliGRAM(s) Oral two times a day  pantoprazole    Tablet 40 milliGRAM(s) Oral before breakfast  predniSONE   Tablet 20 milliGRAM(s) Oral daily  senna 2 Tablet(s) Oral at bedtime  tamsulosin 0.4 milliGRAM(s) Oral at bedtime    MEDICATIONS  (PRN):  acetaminophen     Tablet .. 650 milliGRAM(s) Oral every 6 hours PRN Temp greater or equal to 38C (100.4F), Mild Pain (1 - 3)  dextrose Oral Gel 15 Gram(s) Oral once PRN Blood Glucose LESS THAN 70 milliGRAM(s)/deciliter  heparin   Injectable 8500 Unit(s) IV Push every 6 hours PRN For aPTT less than 40  heparin   Injectable 4000 Unit(s) IV Push every 6 hours PRN For aPTT between 40 - 57  melatonin 3 milliGRAM(s) Oral at bedtime PRN Insomnia  oxyCODONE    IR 5 milliGRAM(s) Oral every 6 hours PRN for severe pain        ROS: all systems reviewed and wnl    Vital Signs Last 24 Hrs  T(C): 36.6 (24 Apr 2024 04:45), Max: 36.9 (23 Apr 2024 10:07)  T(F): 97.9 (24 Apr 2024 04:45), Max: 98.4 (23 Apr 2024 10:07)  HR: 90 (24 Apr 2024 04:45) (90 - 104)  BP: 118/70 (24 Apr 2024 04:45) (118/70 - 133/84)  BP(mean): --  RR: 18 (24 Apr 2024 04:45) (18 - 18)  SpO2: 100% (24 Apr 2024 04:45) (99% - 100%)    Parameters below as of 23 Apr 2024 10:07  Patient On (Oxygen Delivery Method): room air        GENERAL: stable, comfortable on RA, no CP or SOB at rest.   NECK: supple, No JVD  CHEST/LUNG: clear to auscultation bilaterally; no rales, rhonchi, or wheezing b/l  HEART: normal S1, S2  ABDOMEN: BS+, soft, ND, NT   EXTREMITIES:  pulses palpable; no clubbing, cyanosis, or edema b/l LEs      LABS:                                             8.5    4.83  )-----------( 172      ( 24 Apr 2024 07:12 )             26.8   04-22    138  |  104  |  17  ----------------------------<  111<H>  4.6   |  23  |  1.13    Ca    8.7      22 Apr 2024 11:05  Phos  3.5     04-22  Mg     1.7     04-22    TPro  7.1  /  Alb  2.5<L>  /  TBili  0.6  /  DBili  x   /  AST  22  /  ALT  13  /  AlkPhos  75  04-22       PT/INR - ( 22 Apr 2024 11:17 )   PT: 10.4 sec;   INR: 0.87 ratio         PTT - ( 24 Apr 2024 07:12 )  PTT:57.0 sec  CAPILLARY BLOOD GLUCOSE      POCT Blood Glucose.: 161 mg/dL (24 Apr 2024 07:41)  POCT Blood Glucose.: 167 mg/dL (24 Apr 2024 01:25)  POCT Blood Glucose.: 247 mg/dL (23 Apr 2024 16:32)  POCT Blood Glucose.: 232 mg/dL (23 Apr 2024 11:15)         INTERVAL HPI/OVERNIGHT EVENTS:  Pt seen and examined at bedside.     Allergies/Intolerance: No Known Allergies    MEDICATIONS  (STANDING):  cholecalciferol 1000 Unit(s) Oral daily  dextrose 10% Bolus 125 milliLiter(s) IV Bolus once  dextrose 5%. 1000 milliLiter(s) (100 mL/Hr) IV Continuous <Continuous>  dextrose 5%. 1000 milliLiter(s) (50 mL/Hr) IV Continuous <Continuous>  dextrose 50% Injectable 12.5 Gram(s) IV Push once  dextrose 50% Injectable 25 Gram(s) IV Push once  glucagon  Injectable 1 milliGRAM(s) IntraMuscular once  heparin  Infusion. 1500 Unit(s)/Hr (15 mL/Hr) IV Continuous <Continuous>  hydroxychloroquine 200 milliGRAM(s) Oral two times a day  influenza   Vaccine 0.5 milliLiter(s) IntraMuscular once  insulin glargine Injectable (LANTUS) 34 Unit(s) SubCutaneous at bedtime  insulin lispro (ADMELOG) corrective regimen sliding scale   SubCutaneous three times a day before meals  loratadine 10 milliGRAM(s) Oral daily  losartan 50 milliGRAM(s) Oral daily  mycophenolate mofetil 1500 milliGRAM(s) Oral two times a day  pantoprazole    Tablet 40 milliGRAM(s) Oral before breakfast  predniSONE   Tablet 20 milliGRAM(s) Oral daily  senna 2 Tablet(s) Oral at bedtime  tamsulosin 0.4 milliGRAM(s) Oral at bedtime    MEDICATIONS  (PRN):  acetaminophen     Tablet .. 650 milliGRAM(s) Oral every 6 hours PRN Temp greater or equal to 38C (100.4F), Mild Pain (1 - 3)  dextrose Oral Gel 15 Gram(s) Oral once PRN Blood Glucose LESS THAN 70 milliGRAM(s)/deciliter  heparin   Injectable 8500 Unit(s) IV Push every 6 hours PRN For aPTT less than 40  heparin   Injectable 4000 Unit(s) IV Push every 6 hours PRN For aPTT between 40 - 57  melatonin 3 milliGRAM(s) Oral at bedtime PRN Insomnia  oxyCODONE    IR 5 milliGRAM(s) Oral every 6 hours PRN for severe pain        ROS: all systems reviewed and wnl    Vital Signs Last 24 Hrs  T(C): 36.6 (24 Apr 2024 04:45), Max: 36.9 (23 Apr 2024 10:07)  T(F): 97.9 (24 Apr 2024 04:45), Max: 98.4 (23 Apr 2024 10:07)  HR: 90 (24 Apr 2024 04:45) (90 - 104)  BP: 118/70 (24 Apr 2024 04:45) (118/70 - 133/84)  BP(mean): --  RR: 18 (24 Apr 2024 04:45) (18 - 18)  SpO2: 100% (24 Apr 2024 04:45) (99% - 100%)    Parameters below as of 23 Apr 2024 10:07  Patient On (Oxygen Delivery Method): room air        GENERAL: stable, comfortable on RA, no CP or SOB at rest.   NECK: supple, No JVD  CHEST/LUNG: clear to auscultation bilaterally; no rales, rhonchi, or wheezing b/l  HEART: normal S1, S2  ABDOMEN: BS+, soft, ND, NT   EXTREMITIES:  pulses palpable; no clubbing, cyanosis, + edema b/l LEs      LABS:                                             8.5    4.83  )-----------( 172      ( 24 Apr 2024 07:12 )             26.8   04-22    138  |  104  |  17  ----------------------------<  111<H>  4.6   |  23  |  1.13    Ca    8.7      22 Apr 2024 11:05  Phos  3.5     04-22  Mg     1.7     04-22    TPro  7.1  /  Alb  2.5<L>  /  TBili  0.6  /  DBili  x   /  AST  22  /  ALT  13  /  AlkPhos  75  04-22       PT/INR - ( 22 Apr 2024 11:17 )   PT: 10.4 sec;   INR: 0.87 ratio         PTT - ( 24 Apr 2024 07:12 )  PTT:57.0 sec  CAPILLARY BLOOD GLUCOSE      POCT Blood Glucose.: 161 mg/dL (24 Apr 2024 07:41)  POCT Blood Glucose.: 167 mg/dL (24 Apr 2024 01:25)  POCT Blood Glucose.: 247 mg/dL (23 Apr 2024 16:32)  POCT Blood Glucose.: 232 mg/dL (23 Apr 2024 11:15)

## 2024-04-24 NOTE — DIETITIAN INITIAL EVALUATION ADULT - PERTINENT MEDS FT
MEDICATIONS  (STANDING):  cholecalciferol 1000 Unit(s) Oral daily  dextrose 10% Bolus 125 milliLiter(s) IV Bolus once  dextrose 5%. 1000 milliLiter(s) (50 mL/Hr) IV Continuous <Continuous>  dextrose 5%. 1000 milliLiter(s) (100 mL/Hr) IV Continuous <Continuous>  dextrose 50% Injectable 25 Gram(s) IV Push once  dextrose 50% Injectable 12.5 Gram(s) IV Push once  glucagon  Injectable 1 milliGRAM(s) IntraMuscular once  heparin  Infusion. 1500 Unit(s)/Hr (15 mL/Hr) IV Continuous <Continuous>  hydroxychloroquine 200 milliGRAM(s) Oral two times a day  influenza   Vaccine 0.5 milliLiter(s) IntraMuscular once  insulin glargine Injectable (LANTUS) 34 Unit(s) SubCutaneous at bedtime  insulin lispro (ADMELOG) corrective regimen sliding scale   SubCutaneous three times a day before meals  loratadine 10 milliGRAM(s) Oral daily  losartan 50 milliGRAM(s) Oral daily  mycophenolate mofetil 1500 milliGRAM(s) Oral two times a day  pantoprazole    Tablet 40 milliGRAM(s) Oral before breakfast  predniSONE   Tablet 20 milliGRAM(s) Oral daily  senna 2 Tablet(s) Oral at bedtime  tamsulosin 0.4 milliGRAM(s) Oral at bedtime    MEDICATIONS  (PRN):  acetaminophen     Tablet .. 650 milliGRAM(s) Oral every 6 hours PRN Temp greater or equal to 38C (100.4F), Mild Pain (1 - 3)  dextrose Oral Gel 15 Gram(s) Oral once PRN Blood Glucose LESS THAN 70 milliGRAM(s)/deciliter  heparin   Injectable 8500 Unit(s) IV Push every 6 hours PRN For aPTT less than 40  heparin   Injectable 4000 Unit(s) IV Push every 6 hours PRN For aPTT between 40 - 57  melatonin 3 milliGRAM(s) Oral at bedtime PRN Insomnia  oxyCODONE    IR 5 milliGRAM(s) Oral every 6 hours PRN for severe pain

## 2024-04-24 NOTE — PHYSICAL THERAPY INITIAL EVALUATION ADULT - LIVES WITH, PROFILE
Pt states she lives in a pvt house with 2 daughters, 5 steps with rails to enter the house, once inside bedroom on 2nd floor, 1 flight with rails.

## 2024-04-24 NOTE — PROVIDER CONTACT NOTE (CRITICAL VALUE NOTIFICATION) - SITUATION
Elevated ptt
lactate 2.2
+dvt in b/l lower extremities
pt on heparin drip due to bilateral PE.
Pt's PTT elevated

## 2024-04-24 NOTE — PHYSICAL THERAPY INITIAL EVALUATION ADULT - PERTINENT HX OF CURRENT PROBLEM, REHAB EVAL
Pt is from [facility] admitted to this facility due to c/o not feeling well. Dx Sepsis due to suspected HCAP, bilat pul. emboli and dvt Pmhx HTN, IDDM,2, Lupus.

## 2024-04-24 NOTE — PHYSICAL THERAPY INITIAL EVALUATION ADULT - PHYSICAL ASSIST/NONPHYSICAL ASSIST: SIT/SUPINE, REHAB EVAL
Patient has c/o dizziness, chest pain, falling times three, and upper left shoulder pain set-up required/verbal cues/1 person assist

## 2024-04-24 NOTE — PROVIDER CONTACT NOTE (CRITICAL VALUE NOTIFICATION) - BACKGROUND
patient in [facility] for abdominal pain
admitted to [facility] for b/le PE
pt admitted from bilateral PE
Pt on heparin drip
Pt on heparin drip

## 2024-04-24 NOTE — PHYSICAL THERAPY INITIAL EVALUATION ADULT - GENERAL OBSERVATIONS, REHAB EVAL
Pt is alert, oriented x 4,follow instructions, on room air, has IV heparin, c/o generalized weakness.

## 2024-04-24 NOTE — DIETITIAN INITIAL EVALUATION ADULT - PERTINENT LABORATORY DATA
04-22    138  |  104  |  17  ----------------------------<  111<H>  4.6   |  23  |  1.13    Ca    8.7      22 Apr 2024 11:05  Phos  3.5     04-22  Mg     1.7     04-22    TPro  7.1  /  Alb  2.5<L>  /  TBili  0.6  /  DBili  x   /  AST  22  /  ALT  13  /  AlkPhos  75  04-22  POCT Blood Glucose.: 161 mg/dL (04-24-24 @ 07:41)  A1C with Estimated Average Glucose Result: 10.8 % (03-09-24 @ 06:40)

## 2024-04-24 NOTE — DIETITIAN INITIAL EVALUATION ADULT - OTHER INFO
Pt being transported to [facility] at time of visit. Pt known to this writer from previous admission (03/2024).  Pt presents from [facility] for not feeling well, found with sepsis, secondary to HCAP, DVT, and PEs.   Pt with good PO intake during LOS as per flow sheets.  Weight stable as per previous RD note (03/11/2024).  Pt with T2DM; Metformin PTA as per MD notes. HbA1c 10.8% indicates poor blood glucose management.  RD remains available.

## 2024-04-24 NOTE — PROGRESS NOTE ADULT - ASSESSMENT
Tamika Allred is a 64 year old female with PMHx of HTN, IDDM2, and lupus who presented to the ED on 4/22/24 from [facility] for complaints of not feeling well and admitted for Sepsis-secondary to suspected HCAP and bilateral pulmonary emboli and DVT.      Sepsis- secondary to suspected HCAP    per my colleague's documentation "Complaints of not feeling well since yesterday, described as RUQ abdominal pain and R. sided back pain  Tmax 100.2,  and WBC 12.47K on admission  U/A with occasional bacteria and squamous epithelial cells present, COVID/influenza negative  CXR with patchy RUL and b/l lower lung opacities, CTA chest with bibasilar lung opacities    CT A/P without infectious etiology, U/S abdomen with enlarged fatty liver and nonobstructive L. nephrolithiasis    Doubt need for ABX, CTA findings all likely from PE. "  4/24/2024 - not on antibx as my colleague now feel initial presentation and CT chest findings secondary to b/l pulmonary emboli  continue with heparin drip   has h/o lupus which may be reason for dvt-and pulmonary emboli   add on hypercoagulable w/u if possible to admitting blood work. get heme/onc consult unprovoked PE-vs secondary to Lupus   f/u echo    HTN: continue with losartan 50 mg  Lupus:  continue with prednisone 20 mg, hydroxychloroquine 200 mg BID, mycophenolate 1500 mg BID, pantoprazole 40 mg   IDDM2: last known A1c 10.2 (on 3/25/24), POC qac and qhs, PTA Lantus 34 U qhs and Humalog low dose SSI qac, PTA metformin 500 mg BID held, blood glucose goal < 180.

## 2024-04-24 NOTE — PHYSICAL THERAPY INITIAL EVALUATION ADULT - BALANCE TRAINING, PT EVAL
Sitter at pt's cartside, panic button within reach.   Independent sitting, transfers, standing and ambulation with good balance using appropriate assistive device and prevent falls.

## 2024-04-24 NOTE — PHYSICAL THERAPY INITIAL EVALUATION ADULT - STRENGTHENING, PT EVAL
Improve strength in the UE and LE to 5/5, improve gen endurance to good and be able to perform functional tasks-bed mobility, sitting, standing, transfers and ambulate in a safe manner with or without  assistive device and prevent falls.

## 2024-04-24 NOTE — PHYSICAL THERAPY INITIAL EVALUATION ADULT - ADDITIONAL COMMENTS
Pt states prior to being admitted in hosp and rehab facility she is independent in ADLS and ambulates inside the house with or with out any walking device, has own cane and rolling walker.

## 2024-04-24 NOTE — DIETITIAN INITIAL EVALUATION ADULT - DIET TYPE
Glucerna x 1/day (provides 220 kcal, 10 g protein)/low sodium/consistent carbohydrate (evening snack)

## 2024-04-24 NOTE — PROVIDER CONTACT NOTE (OTHER) - REASON
[facility] cancelled MRSA PCR [facility] cancelled MRSA PCR for inproper collection

## 2024-04-25 LAB
-  AMOXICILLIN/CLAVULANIC ACID: SIGNIFICANT CHANGE UP
-  AMPICILLIN/SULBACTAM: SIGNIFICANT CHANGE UP
-  AMPICILLIN: SIGNIFICANT CHANGE UP
-  AZTREONAM: SIGNIFICANT CHANGE UP
-  CEFAZOLIN: SIGNIFICANT CHANGE UP
-  CEFEPIME: SIGNIFICANT CHANGE UP
-  CEFOXITIN: SIGNIFICANT CHANGE UP
-  CEFTRIAXONE: SIGNIFICANT CHANGE UP
-  CEFUROXIME: SIGNIFICANT CHANGE UP
-  CIPROFLOXACIN: SIGNIFICANT CHANGE UP
-  ERTAPENEM: SIGNIFICANT CHANGE UP
-  GENTAMICIN: SIGNIFICANT CHANGE UP
-  LEVOFLOXACIN: SIGNIFICANT CHANGE UP
-  MEROPENEM: SIGNIFICANT CHANGE UP
-  NITROFURANTOIN: SIGNIFICANT CHANGE UP
-  PIPERACILLIN/TAZOBACTAM: SIGNIFICANT CHANGE UP
-  TOBRAMYCIN: SIGNIFICANT CHANGE UP
-  TRIMETHOPRIM/SULFAMETHOXAZOLE: SIGNIFICANT CHANGE UP
APTT BLD: 50.1 SEC — HIGH (ref 24.5–35.6)
APTT BLD: 90.8 SEC — HIGH (ref 24.5–35.6)
APTT BLD: 91.6 SEC — HIGH (ref 24.5–35.6)
AT III ACT/NOR PPP CHRO: 70 % — LOW (ref 85–135)
AT III AG PPP IA-MCNC: 22 MG/DL — SIGNIFICANT CHANGE UP (ref 19–31)
B2 GLYCOPROT1 AB SER QL: NEGATIVE — SIGNIFICANT CHANGE UP
CARDIOLIPIN AB SER-ACNC: NEGATIVE — SIGNIFICANT CHANGE UP
CULTURE RESULTS: ABNORMAL
DRVVT RATIO: 1.06 RATIO — SIGNIFICANT CHANGE UP (ref 0–1.21)
DRVVT SCREEN TO CONFIRM RATIO: SIGNIFICANT CHANGE UP
GLUCOSE BLDC GLUCOMTR-MCNC: 196 MG/DL — HIGH (ref 70–99)
GLUCOSE BLDC GLUCOMTR-MCNC: 200 MG/DL — HIGH (ref 70–99)
GLUCOSE BLDC GLUCOMTR-MCNC: 379 MG/DL — HIGH (ref 70–99)
GLUCOSE BLDC GLUCOMTR-MCNC: 406 MG/DL — HIGH (ref 70–99)
GLUCOSE BLDC GLUCOMTR-MCNC: 448 MG/DL — HIGH (ref 70–99)
HCT VFR BLD CALC: 27.6 % — LOW (ref 34.5–45)
HGB BLD-MCNC: 8.6 G/DL — LOW (ref 11.5–15.5)
MCHC RBC-ENTMCNC: 25.5 PG — LOW (ref 27–34)
MCHC RBC-ENTMCNC: 31.2 G/DL — LOW (ref 32–36)
MCV RBC AUTO: 81.9 FL — SIGNIFICANT CHANGE UP (ref 80–100)
METHOD TYPE: SIGNIFICANT CHANGE UP
NRBC # BLD: 0 /100 WBCS — SIGNIFICANT CHANGE UP (ref 0–0)
ORGANISM # SPEC MICROSCOPIC CNT: ABNORMAL
ORGANISM # SPEC MICROSCOPIC CNT: SIGNIFICANT CHANGE UP
PLATELET # BLD AUTO: 195 K/UL — SIGNIFICANT CHANGE UP (ref 150–400)
PROT C ACT/NOR PPP: 120 % — SIGNIFICANT CHANGE UP (ref 74–150)
RBC # BLD: 3.37 M/UL — LOW (ref 3.8–5.2)
RBC # FLD: 14.2 % — SIGNIFICANT CHANGE UP (ref 10.3–14.5)
SPECIMEN SOURCE: SIGNIFICANT CHANGE UP
WBC # BLD: 5.49 K/UL — SIGNIFICANT CHANGE UP (ref 3.8–10.5)
WBC # FLD AUTO: 5.49 K/UL — SIGNIFICANT CHANGE UP (ref 3.8–10.5)

## 2024-04-25 PROCEDURE — 99232 SBSQ HOSP IP/OBS MODERATE 35: CPT

## 2024-04-25 RX ORDER — APIXABAN 2.5 MG/1
10 TABLET, FILM COATED ORAL EVERY 12 HOURS
Refills: 0 | Status: DISCONTINUED | OUTPATIENT
Start: 2024-04-25 | End: 2024-05-01

## 2024-04-25 RX ADMIN — Medication 1: at 09:02

## 2024-04-25 RX ADMIN — LOSARTAN POTASSIUM 50 MILLIGRAM(S): 100 TABLET, FILM COATED ORAL at 05:43

## 2024-04-25 RX ADMIN — HEPARIN SODIUM 4000 UNIT(S): 5000 INJECTION INTRAVENOUS; SUBCUTANEOUS at 02:10

## 2024-04-25 RX ADMIN — Medication 1000 UNIT(S): at 11:28

## 2024-04-25 RX ADMIN — PANTOPRAZOLE SODIUM 40 MILLIGRAM(S): 20 TABLET, DELAYED RELEASE ORAL at 05:43

## 2024-04-25 RX ADMIN — HEPARIN SODIUM 1100 UNIT(S)/HR: 5000 INJECTION INTRAVENOUS; SUBCUTANEOUS at 02:04

## 2024-04-25 RX ADMIN — HEPARIN SODIUM 1100 UNIT(S)/HR: 5000 INJECTION INTRAVENOUS; SUBCUTANEOUS at 09:05

## 2024-04-25 RX ADMIN — Medication 6: at 17:19

## 2024-04-25 RX ADMIN — INSULIN GLARGINE 34 UNIT(S): 100 INJECTION, SOLUTION SUBCUTANEOUS at 21:57

## 2024-04-25 RX ADMIN — MYCOPHENOLATE MOFETIL 1500 MILLIGRAM(S): 250 CAPSULE ORAL at 17:19

## 2024-04-25 RX ADMIN — HEPARIN SODIUM 1100 UNIT(S)/HR: 5000 INJECTION INTRAVENOUS; SUBCUTANEOUS at 09:56

## 2024-04-25 RX ADMIN — Medication 20 MILLIGRAM(S): at 05:41

## 2024-04-25 RX ADMIN — LORATADINE 10 MILLIGRAM(S): 10 TABLET ORAL at 11:28

## 2024-04-25 RX ADMIN — Medication 5: at 11:29

## 2024-04-25 RX ADMIN — Medication 200 MILLIGRAM(S): at 17:21

## 2024-04-25 RX ADMIN — APIXABAN 10 MILLIGRAM(S): 2.5 TABLET, FILM COATED ORAL at 17:18

## 2024-04-25 RX ADMIN — TAMSULOSIN HYDROCHLORIDE 0.4 MILLIGRAM(S): 0.4 CAPSULE ORAL at 21:55

## 2024-04-25 RX ADMIN — MYCOPHENOLATE MOFETIL 1500 MILLIGRAM(S): 250 CAPSULE ORAL at 05:40

## 2024-04-25 RX ADMIN — HEPARIN SODIUM 1100 UNIT(S)/HR: 5000 INJECTION INTRAVENOUS; SUBCUTANEOUS at 07:27

## 2024-04-25 RX ADMIN — HEPARIN SODIUM 1100 UNIT(S)/HR: 5000 INJECTION INTRAVENOUS; SUBCUTANEOUS at 09:58

## 2024-04-25 RX ADMIN — Medication 200 MILLIGRAM(S): at 05:40

## 2024-04-25 NOTE — OCCUPATIONAL THERAPY INITIAL EVALUATION ADULT - STRENGTHENING, PT EVAL
Pt will show improvement in B LE strength by 1-2 grades in order to improve balance and decreased risk of falls.

## 2024-04-25 NOTE — OCCUPATIONAL THERAPY INITIAL EVALUATION ADULT - PERTINENT HX OF CURRENT PROBLEM, REHAB EVAL
Tamika Allred is a 64 year old female with PMHx of HTN, IDDM2, and lupus who presented to the ED on 4/22/24 from [facility] for complaints of not feeling well. Dx: Sepsis secondary to suspected HCAP, bilateral pulmonary emboli and DVT

## 2024-04-25 NOTE — PROGRESS NOTE ADULT - ASSESSMENT
Tamika Allred is a 64 year old female with PMHx of HTN, IDDM2, and lupus who presented to the ED on 4/22/24 from [facility] for complaints of not feeling well and admitted for Sepsis-secondary to suspected HCAP and bilateral pulmonary emboli and DVT.      Sepsis- secondary to suspected HCAP    per my colleague's documentation "Complaints of not feeling well since yesterday, described as RUQ abdominal pain and R. sided back pain  Tmax 100.2,  and WBC 12.47K on admission  U/A with occasional bacteria and squamous epithelial cells present, COVID/influenza negative  CXR with patchy RUL and b/l lower lung opacities, CTA chest with bibasilar lung opacities    CT A/P without infectious etiology, U/S abdomen with enlarged fatty liver and nonobstructive L. nephrolithiasis    Doubt need for ABX, CTA findings all likely from PE. "  4/24/2024 - not on antibx as my colleague now feel initial presentation and CT chest findings secondary to b/l pulmonary emboli  continue with heparin drip   has h/o lupus which may be reason for dvt-and pulmonary emboli   add on hypercoagulable w/u if possible to admitting blood work. get heme/onc consult unprovoked PE-vs secondary to Lupus   f/u echo  4/25/2024-echo as above    HTN: continue with losartan 50 mg  4/25/2024- bp stable     Lupus:  continue with prednisone 20 mg, hydroxychloroquine 200 mg BID, mycophenolate 1500 mg BID, pantoprazole 40 mg   IDDM2: last known A1c 10.2 (on 3/25/24), POC qac and qhs, PTA Lantus 34 U qhs and Humalog low dose SSI qac, PTA metformin 500 mg BID held, blood glucose goal < 180.

## 2024-04-25 NOTE — OCCUPATIONAL THERAPY INITIAL EVALUATION ADULT - ADDITIONAL COMMENTS
Pt states she lives in a pvt house with 2 daughters, 5 steps with rails to enter the house, once inside bedroom on 2nd floor, 1 flight with rails. Pt was Independent in all ADL's and IADL's prior to her admission to [facility]. Pt states she lives in a pvt house with 2 daughters, 5 steps with rails to enter the house, once inside bedroom is on 2nd floor, 1 flight with rails. Pt was Independent in all ADL's and IADL's prior to her admission to [facility]. Utilized AD for at home and community mobility as needed.

## 2024-04-25 NOTE — PROGRESS NOTE ADULT - SUBJECTIVE AND OBJECTIVE BOX
INTERVAL HPI/OVERNIGHT EVENTS:  Pt seen and examined at bedside.     Allergies/Intolerance: No Known Allergies  MEDICATIONS  (STANDING):  cholecalciferol 1000 Unit(s) Oral daily  dextrose 10% Bolus 125 milliLiter(s) IV Bolus once  dextrose 5%. 1000 milliLiter(s) (100 mL/Hr) IV Continuous <Continuous>  dextrose 5%. 1000 milliLiter(s) (50 mL/Hr) IV Continuous <Continuous>  dextrose 50% Injectable 25 Gram(s) IV Push once  dextrose 50% Injectable 12.5 Gram(s) IV Push once  glucagon  Injectable 1 milliGRAM(s) IntraMuscular once  heparin  Infusion. 1500 Unit(s)/Hr (15 mL/Hr) IV Continuous <Continuous>  hydroxychloroquine 200 milliGRAM(s) Oral two times a day  influenza   Vaccine 0.5 milliLiter(s) IntraMuscular once  insulin glargine Injectable (LANTUS) 34 Unit(s) SubCutaneous at bedtime  insulin lispro (ADMELOG) corrective regimen sliding scale   SubCutaneous three times a day before meals  loratadine 10 milliGRAM(s) Oral daily  losartan 50 milliGRAM(s) Oral daily  mycophenolate mofetil 1500 milliGRAM(s) Oral two times a day  pantoprazole    Tablet 40 milliGRAM(s) Oral before breakfast  predniSONE   Tablet 20 milliGRAM(s) Oral daily  senna 2 Tablet(s) Oral at bedtime  tamsulosin 0.4 milliGRAM(s) Oral at bedtime    MEDICATIONS  (PRN):  acetaminophen     Tablet .. 650 milliGRAM(s) Oral every 6 hours PRN Temp greater or equal to 38C (100.4F), Mild Pain (1 - 3)  dextrose Oral Gel 15 Gram(s) Oral once PRN Blood Glucose LESS THAN 70 milliGRAM(s)/deciliter  heparin   Injectable 8500 Unit(s) IV Push every 6 hours PRN For aPTT less than 40  heparin   Injectable 4000 Unit(s) IV Push every 6 hours PRN For aPTT between 40 - 57  melatonin 3 milliGRAM(s) Oral at bedtime PRN Insomnia  oxyCODONE    IR 5 milliGRAM(s) Oral every 6 hours PRN for severe pain        ROS: all systems reviewed and wnl      Vital Signs Last 24 Hrs  T(C): 36.6 (25 Apr 2024 04:47), Max: 36.8 (24 Apr 2024 16:30)  T(F): 97.9 (25 Apr 2024 04:47), Max: 98.2 (24 Apr 2024 16:30)  HR: 90 (25 Apr 2024 04:47) (78 - 105)  BP: 128/77 (25 Apr 2024 04:47) (107/54 - 143/84)  BP(mean): --  RR: 18 (25 Apr 2024 04:47) (18 - 18)  SpO2: 100% (25 Apr 2024 04:47) (98% - 100%)    Parameters below as of 25 Apr 2024 04:47  Patient On (Oxygen Delivery Method): room air      GENERAL: stable, comfortable on RA, no CP or SOB at rest.   NECK: supple, No JVD  CHEST/LUNG: clear to auscultation bilaterally; no rales, rhonchi, or wheezing b/l  HEART: normal S1, S2  ABDOMEN: BS+, soft, ND, NT   EXTREMITIES:  pulses palpable; no clubbing, cyanosis, + edema b/l LEs      LABS:                                              8.6    5.49  )-----------( 195      ( 25 Apr 2024 07:45 )             27.6          CAPILLARY BLOOD GLUCOSE      POCT Blood Glucose.: 200 mg/dL (25 Apr 2024 08:04)  POCT Blood Glucose.: 176 mg/dL (24 Apr 2024 21:07)  POCT Blood Glucose.: 317 mg/dL (24 Apr 2024 16:58)  POCT Blood Glucose.: 419 mg/dL (24 Apr 2024 11:32)      < from: TTE Echo Complete w/o Contrast w/ Doppler (04.24.24 @ 09:48) >    Summary:   1. Left ventricular ejection fraction, by visual estimation, is 60 to   65%.   2. Normal left ventricular size and wall thicknesses, with normal   systolic and diastolic function.   3. Normal right ventricular size and function.   4. The left atrium is normal in size.   5. The right atrium is normal in size.   6. Sclerotic aortic valve with normal opening.   7. Normal pulmonary artery pressure.      < end of copied text >

## 2024-04-25 NOTE — SBIRT NOTE ADULT - NSSBIRTALCACTION/INTER_GEN_A_CORE
Positive reinforcement Action 3: Continue Decrease Regimen: Stop UVB treatments as of now, will resume PRN worsening.\\nDenies joint concerns at this time. Detail Level: Zone Continue Regimen: Vtama to AA on body BID \\nClobetasol foam to scalp once daily until cleared then PRN flares

## 2024-04-25 NOTE — OCCUPATIONAL THERAPY INITIAL EVALUATION ADULT - BALANCE TRAINING, PT EVAL
Pt will show improvement in sit<>stand and dynamic standing balance within 4 weeks in order to improve performance of ADLs and functional transfers.

## 2024-04-25 NOTE — OCCUPATIONAL THERAPY INITIAL EVALUATION ADULT - DEEP PRESSURE SENSATION, HEAD/NECK, OT EVAL
Is This A New Presentation, Or A Follow-Up?: Skin Lesion Is This A New Presentation, Or A Follow-Up?: Skin Lesions within normal limits

## 2024-04-26 ENCOUNTER — TRANSCRIPTION ENCOUNTER (OUTPATIENT)
Age: 64
End: 2024-04-26

## 2024-04-26 LAB
APCR PPP: 2.78 RATIO — SIGNIFICANT CHANGE UP
APPEARANCE UR: CLEAR — SIGNIFICANT CHANGE UP
BACTERIA # UR AUTO: ABNORMAL /HPF
BILIRUB UR-MCNC: NEGATIVE — SIGNIFICANT CHANGE UP
COLOR SPEC: YELLOW — SIGNIFICANT CHANGE UP
DIFF PNL FLD: NEGATIVE — SIGNIFICANT CHANGE UP
EPI CELLS # UR: PRESENT
FLUAV AG NPH QL: SIGNIFICANT CHANGE UP
FLUBV AG NPH QL: SIGNIFICANT CHANGE UP
GLUCOSE BLDC GLUCOMTR-MCNC: 122 MG/DL — HIGH (ref 70–99)
GLUCOSE BLDC GLUCOMTR-MCNC: 143 MG/DL — HIGH (ref 70–99)
GLUCOSE BLDC GLUCOMTR-MCNC: 276 MG/DL — HIGH (ref 70–99)
GLUCOSE BLDC GLUCOMTR-MCNC: 322 MG/DL — HIGH (ref 70–99)
GLUCOSE UR QL: 100 MG/DL
HCT VFR BLD CALC: 26.7 % — LOW (ref 34.5–45)
HGB BLD-MCNC: 8.3 G/DL — LOW (ref 11.5–15.5)
KETONES UR-MCNC: NEGATIVE MG/DL — SIGNIFICANT CHANGE UP
LEUKOCYTE ESTERASE UR-ACNC: NEGATIVE — SIGNIFICANT CHANGE UP
MCHC RBC-ENTMCNC: 25.7 PG — LOW (ref 27–34)
MCHC RBC-ENTMCNC: 31.1 G/DL — LOW (ref 32–36)
MCV RBC AUTO: 82.7 FL — SIGNIFICANT CHANGE UP (ref 80–100)
NITRITE UR-MCNC: NEGATIVE — SIGNIFICANT CHANGE UP
NRBC # BLD: 0 /100 WBCS — SIGNIFICANT CHANGE UP (ref 0–0)
PH UR: 6 — SIGNIFICANT CHANGE UP (ref 5–8)
PHOSPHOLIPASE A2 RECEPTOR ELISA: <1.8 RU/ML — SIGNIFICANT CHANGE UP (ref 0–19.9)
PLATELET # BLD AUTO: 217 K/UL — SIGNIFICANT CHANGE UP (ref 150–400)
PROT UR-MCNC: 100 MG/DL
RBC # BLD: 3.23 M/UL — LOW (ref 3.8–5.2)
RBC # FLD: 14.2 % — SIGNIFICANT CHANGE UP (ref 10.3–14.5)
RBC CASTS # UR COMP ASSIST: 1 /HPF — SIGNIFICANT CHANGE UP (ref 0–4)
SARS-COV-2 RNA SPEC QL NAA+PROBE: SIGNIFICANT CHANGE UP
SP GR SPEC: 1.02 — SIGNIFICANT CHANGE UP (ref 1–1.03)
UROBILINOGEN FLD QL: 0.2 MG/DL — SIGNIFICANT CHANGE UP (ref 0.2–1)
WBC # BLD: 4.8 K/UL — SIGNIFICANT CHANGE UP (ref 3.8–10.5)
WBC # FLD AUTO: 4.8 K/UL — SIGNIFICANT CHANGE UP (ref 3.8–10.5)
WBC UR QL: 3 /HPF — SIGNIFICANT CHANGE UP (ref 0–5)

## 2024-04-26 PROCEDURE — 99232 SBSQ HOSP IP/OBS MODERATE 35: CPT

## 2024-04-26 RX ORDER — LORATADINE 10 MG/1
1 TABLET ORAL
Qty: 0 | Refills: 0 | DISCHARGE
Start: 2024-04-26

## 2024-04-26 RX ORDER — INSULIN LISPRO 100/ML
10 VIAL (ML) SUBCUTANEOUS
Qty: 0 | Refills: 0 | DISCHARGE
Start: 2024-04-26

## 2024-04-26 RX ORDER — LANOLIN ALCOHOL/MO/W.PET/CERES
1 CREAM (GRAM) TOPICAL
Qty: 0 | Refills: 0 | DISCHARGE
Start: 2024-04-26

## 2024-04-26 RX ORDER — APIXABAN 2.5 MG/1
2 TABLET, FILM COATED ORAL
Qty: 0 | Refills: 0 | DISCHARGE
Start: 2024-04-26

## 2024-04-26 RX ORDER — PANTOPRAZOLE SODIUM 20 MG/1
1 TABLET, DELAYED RELEASE ORAL
Qty: 0 | Refills: 0 | DISCHARGE
Start: 2024-04-26

## 2024-04-26 RX ORDER — MYCOPHENOLATE MOFETIL 250 MG/1
6 CAPSULE ORAL
Qty: 0 | Refills: 0 | DISCHARGE
Start: 2024-04-26

## 2024-04-26 RX ORDER — SENNA PLUS 8.6 MG/1
1 TABLET ORAL
Refills: 0 | DISCHARGE

## 2024-04-26 RX ORDER — INSULIN GLARGINE 100 [IU]/ML
40 INJECTION, SOLUTION SUBCUTANEOUS
Qty: 0 | Refills: 0 | DISCHARGE
Start: 2024-04-26

## 2024-04-26 RX ORDER — INSULIN LISPRO 100/ML
2 VIAL (ML) SUBCUTANEOUS
Qty: 0 | Refills: 0 | DISCHARGE
Start: 2024-04-26

## 2024-04-26 RX ORDER — TAMSULOSIN HYDROCHLORIDE 0.4 MG/1
1 CAPSULE ORAL
Qty: 0 | Refills: 0 | DISCHARGE
Start: 2024-04-26

## 2024-04-26 RX ORDER — LOSARTAN POTASSIUM 100 MG/1
1 TABLET, FILM COATED ORAL
Qty: 0 | Refills: 0 | DISCHARGE
Start: 2024-04-26

## 2024-04-26 RX ORDER — INSULIN LISPRO 100/ML
0 VIAL (ML) SUBCUTANEOUS
Refills: 0 | DISCHARGE

## 2024-04-26 RX ORDER — HYDROXYCHLOROQUINE SULFATE 200 MG
1 TABLET ORAL
Qty: 0 | Refills: 0 | DISCHARGE
Start: 2024-04-26

## 2024-04-26 RX ORDER — SENNA PLUS 8.6 MG/1
2 TABLET ORAL
Qty: 0 | Refills: 0 | DISCHARGE
Start: 2024-04-26

## 2024-04-26 RX ORDER — INSULIN LISPRO 100/ML
3 VIAL (ML) SUBCUTANEOUS
Refills: 0 | Status: DISCONTINUED | OUTPATIENT
Start: 2024-04-26 | End: 2024-04-30

## 2024-04-26 RX ORDER — OXYCODONE HYDROCHLORIDE 5 MG/1
1 TABLET ORAL
Qty: 0 | Refills: 0 | DISCHARGE
Start: 2024-04-26

## 2024-04-26 RX ADMIN — Medication 4: at 11:25

## 2024-04-26 RX ADMIN — Medication 200 MILLIGRAM(S): at 06:17

## 2024-04-26 RX ADMIN — APIXABAN 10 MILLIGRAM(S): 2.5 TABLET, FILM COATED ORAL at 17:09

## 2024-04-26 RX ADMIN — Medication 3: at 17:20

## 2024-04-26 RX ADMIN — Medication 1000 UNIT(S): at 11:25

## 2024-04-26 RX ADMIN — Medication 20 MILLIGRAM(S): at 06:17

## 2024-04-26 RX ADMIN — PANTOPRAZOLE SODIUM 40 MILLIGRAM(S): 20 TABLET, DELAYED RELEASE ORAL at 06:17

## 2024-04-26 RX ADMIN — MYCOPHENOLATE MOFETIL 1500 MILLIGRAM(S): 250 CAPSULE ORAL at 17:09

## 2024-04-26 RX ADMIN — LOSARTAN POTASSIUM 50 MILLIGRAM(S): 100 TABLET, FILM COATED ORAL at 06:18

## 2024-04-26 RX ADMIN — LORATADINE 10 MILLIGRAM(S): 10 TABLET ORAL at 11:25

## 2024-04-26 RX ADMIN — MYCOPHENOLATE MOFETIL 1500 MILLIGRAM(S): 250 CAPSULE ORAL at 06:17

## 2024-04-26 RX ADMIN — Medication 3 UNIT(S): at 11:26

## 2024-04-26 RX ADMIN — Medication 200 MILLIGRAM(S): at 17:09

## 2024-04-26 RX ADMIN — Medication 3 UNIT(S): at 17:21

## 2024-04-26 RX ADMIN — APIXABAN 10 MILLIGRAM(S): 2.5 TABLET, FILM COATED ORAL at 06:23

## 2024-04-26 NOTE — DISCHARGE NOTE PROVIDER - CARE PROVIDER_API CALL
Santiago Lemos  Hematology  [address] [id number]  Phone: [phone]  Fax: [phone]  Follow Up Time:    Santiago Lemos  Hematology  [address] [place] [address]  Phone: [phone]  Fax: [phone]  Follow Up Time:     Margi Burt  Urology  [address] [place] [address]  Phone: [phone]  Fax: [phone]  Established Patient  Follow Up Time: 2 weeks

## 2024-04-26 NOTE — DISCHARGE NOTE PROVIDER - HOSPITAL COURSE
64 year old female with PMHx of HTN, IDDM2, and lupus who presented to the ED on 4/22/24 from [facility] for complaints of not feeling well. Patient is status post recent hospital stay with admission for uti noted with lupus flare in hospital and treated with prednisone poor historian. Patient reports she started feeling unwell yesterday during physical therapy , initial work up vitals 121/77 117,19,98,4 100 percent on room air , wbc 12.5 cxr hazy bibasilar opacities ct a/p no infectious source identified ,ct angiogram positive for multiple pulmonary emboli , b/l venous doppler positive for b/l deep vein thrombi . Patient was started on heparin drip and hematology consulted . initial hypercoagulable labs drawn hematology suggest outpatient follow up . Patient was transitioned to apixaban for treatment .physical therapy consulted and patient will benefit from sub acute rehab ,blood cultures negative x 2 ua negative for infection urine culture positive for proteus mirables pt received abx for 2 days .    64 year old female with PMHx of HTN, IDDM2, and lupus who presented to the ED on 4/22/24 from [facility] for complaints of not feeling well. Patient is status post recent hospital stay with admission for uti noted with lupus flare in hospital and treated with prednisone poor historian. Patient reports she started feeling unwell yesterday during physical therapy , initial work up vitals 121/77 117,19,98,4 100 percent on room air , wbc 12.5 cxr hazy bibasilar opacities ct a/p no infectious source identified ,ct angiogram positive for multiple pulmonary emboli , b/l venous doppler positive for b/l deep vein thrombi . Patient was started on heparin drip and hematology consulted . initial hypercoagulable labs drawn hematology suggest outpatient follow up . Patient was transitioned to apixaban for dvt/pe treatment .physical therapy consulted and patient will benefit from sub acute rehab ,blood cultures negative x 2 ua negative for infection ,urine culture positive for proteus mirables 50-99k . Please call for appointment with [facility]emat[facility]  .

## 2024-04-26 NOTE — PROGRESS NOTE ADULT - SUBJECTIVE AND OBJECTIVE BOX
INTERVAL HPI/OVERNIGHT EVENTS:  Pt seen and examined at bedside.     Allergies/Intolerance: No Known Allergies    MEDICATIONS  (STANDING):  apixaban 10 milliGRAM(s) Oral every 12 hours  cholecalciferol 1000 Unit(s) Oral daily  dextrose 10% Bolus 125 milliLiter(s) IV Bolus once  dextrose 5%. 1000 milliLiter(s) (100 mL/Hr) IV Continuous <Continuous>  dextrose 5%. 1000 milliLiter(s) (50 mL/Hr) IV Continuous <Continuous>  dextrose 50% Injectable 25 Gram(s) IV Push once  dextrose 50% Injectable 12.5 Gram(s) IV Push once  glucagon  Injectable 1 milliGRAM(s) IntraMuscular once  hydroxychloroquine 200 milliGRAM(s) Oral two times a day  influenza   Vaccine 0.5 milliLiter(s) IntraMuscular once  insulin glargine Injectable (LANTUS) 34 Unit(s) SubCutaneous at bedtime  insulin lispro (ADMELOG) corrective regimen sliding scale   SubCutaneous three times a day before meals  loratadine 10 milliGRAM(s) Oral daily  losartan 50 milliGRAM(s) Oral daily  mycophenolate mofetil 1500 milliGRAM(s) Oral two times a day  pantoprazole    Tablet 40 milliGRAM(s) Oral before breakfast  predniSONE   Tablet 20 milliGRAM(s) Oral daily  senna 2 Tablet(s) Oral at bedtime  tamsulosin 0.4 milliGRAM(s) Oral at bedtime    MEDICATIONS  (PRN):  acetaminophen     Tablet .. 650 milliGRAM(s) Oral every 6 hours PRN Temp greater or equal to 38C (100.4F), Mild Pain (1 - 3)  dextrose Oral Gel 15 Gram(s) Oral once PRN Blood Glucose LESS THAN 70 milliGRAM(s)/deciliter  melatonin 3 milliGRAM(s) Oral at bedtime PRN Insomnia  oxyCODONE    IR 5 milliGRAM(s) Oral every 6 hours PRN for severe pain        ROS: all systems reviewed and wnl    Vital Signs Last 24 Hrs  T(C): 36.6 (26 Apr 2024 04:55), Max: 36.6 (25 Apr 2024 11:21)  T(F): 97.9 (26 Apr 2024 04:55), Max: 97.9 (26 Apr 2024 04:55)  HR: 84 (26 Apr 2024 07:00) (82 - 101)  BP: 120/79 (26 Apr 2024 04:55) (120/79 - 144/74)  BP(mean): --  RR: 18 (26 Apr 2024 04:55) (18 - 18)  SpO2: 99% (26 Apr 2024 04:55) (99% - 100%)    Parameters below as of 26 Apr 2024 04:55  Patient On (Oxygen Delivery Method): room air        GENERAL: stable, comfortable on RA, no CP or SOB at rest.   NECK: supple, No JVD  CHEST/LUNG: clear to auscultation bilaterally; no rales, rhonchi, or wheezing b/l  HEART: normal S1, S2  ABDOMEN: BS+, soft, ND, NT   EXTREMITIES:  pulses palpable; no clubbing, cyanosis, + edema b/l LEs      LABS:                                                   8.3    4.80  )-----------( 217      ( 26 Apr 2024 07:47 )             26.7      CAPILLARY BLOOD GLUCOSE  CAPILLARY BLOOD GLUCOSE      POCT Blood Glucose.: 143 mg/dL (26 Apr 2024 07:28)  POCT Blood Glucose.: 196 mg/dL (25 Apr 2024 21:06)  POCT Blood Glucose.: 448 mg/dL (25 Apr 2024 16:55)  POCT Blood Glucose.: 406 mg/dL (25 Apr 2024 16:53)  POCT Blood Glucose.: 379 mg/dL (25 Apr 2024 10:51)        < from: TTE Echo Complete w/o Contrast w/ Doppler (04.24.24 @ 09:48) >    Summary:   1. Left ventricular ejection fraction, by visual estimation, is 60 to   65%.   2. Normal left ventricular size and wall thicknesses, with normal   systolic and diastolic function.   3. Normal right ventricular size and function.   4. The left atrium is normal in size.   5. The right atrium is normal in size.   6. Sclerotic aortic valve with normal opening.   7. Normal pulmonary artery pressure.      < end of copied text >

## 2024-04-26 NOTE — DISCHARGE NOTE PROVIDER - NSDCMRMEDTOKEN_GEN_ALL_CORE_FT
Admelog 100 units/mL injectable solution: injectable 3 times a day (before meals) sliding scale  cholecalciferol oral tablet: 1000 unit(s) orally once a day  hydroxychloroquine 200 mg oral tablet: 1 tab(s) orally 2 times a day  insulin glargine 100 units/mL subcutaneous solution: 34 unit(s) subcutaneous once a day (at bedtime)  loratadine 10 mg oral tablet: 1 tab(s) orally once a day  losartan 50 mg oral tablet: 1 tab(s) orally once a day  metFORMIN 500 mg oral tablet: 1 tab(s) orally 2 times a day  mycophenolate mofetil 250 mg oral capsule: 6 cap(s) orally 2 times a day  oxyCODONE 5 mg oral tablet: 1 tab(s) orally every 6 hours as needed for  severe pain max daily dosing 4 tab  predniSONE 20 mg oral tablet: 1 tab(s) orally once a day  Protonix 40 mg oral delayed release tablet: 1 tab(s) orally once a day   senna (sennosides) 17.2 mg oral tablet: 1 tab(s) orally once a day (at bedtime)  tamsulosin 0.4 mg oral capsule: 1 cap(s) orally once a day (at bedtime)   cholecalciferol oral tablet: 1000 unit(s) orally once a day  Eliquis 5 mg oral tablet: 2 tab(s) orally every 12 hours  hydroxychloroquine 200 mg oral tablet: 1 tab(s) orally 2 times a day  insulin glargine 100 units/mL subcutaneous solution: 34 unit(s) subcutaneous once a day (at bedtime)  insulin lispro 100 units/mL injectable solution: 3 unit(s) subcutaneous 3 times a day (with meals)  insulin lispro 100 units/mL injectable solution: 2 injectable 3 times a day (with meals)  loratadine 10 mg oral tablet: 1 tab(s) orally once a day  losartan 50 mg oral tablet: 1 tab(s) orally once a day  melatonin 3 mg oral tablet: 1 tab(s) orally once a day (at bedtime) As needed Insomnia  metFORMIN 500 mg oral tablet: 1 tab(s) orally 2 times a day  mycophenolate mofetil 250 mg oral capsule: 6 cap(s) orally 2 times a day  oxyCODONE 5 mg oral tablet: 1 tab(s) orally every 6 hours As needed for severe pain  pantoprazole 40 mg oral delayed release tablet: 1 tab(s) orally once a day (before a meal)  predniSONE 20 mg oral tablet: 1 tab(s) orally once a day  senna leaf extract oral tablet: 2 tab(s) orally once a day (at bedtime)  tamsulosin 0.4 mg oral capsule: 1 cap(s) orally once a day (at bedtime)   Admelog SoloStar 100 units/mL injectable solution: 1 dose(s) injectable 3 times a day (with meals) 2 Unit(s) if Glucose 151 - 200  4 Unit(s) if Glucose 201 - 250  6 Unit(s) if Glucose 251 - 300  8 Unit(s) if Glucose 301 - 350  10 Unit(s) if Glucose 351 - 400  12 Unit(s) if Glucose Greater Than 400    Give correctional scale insulin  REGARDLESS of PO status NOTIFY Provider for blood glucose LESS THAN 70 milliGRAM(s)/deciLiter or GREATER THAN 400 milliGRAM(s)/deciLiter  ascorbic acid 500 mg oral tablet: 1 tab(s) orally once a day  calcium-vitamin D 500 mg-5 mcg (200 intl units) oral tablet: 1 tab(s) orally 2 times a day  cholecalciferol oral tablet: 1000 unit(s) orally once a day  Eliquis 5 mg oral tablet: 2 tab(s) orally every 12 hours  guaiFENesin 100 mg/5 mL oral liquid: 5 milliliter(s) orally every 6 hours As needed Cough  hydroxychloroquine 200 mg oral tablet: 1 tab(s) orally 2 times a day  insulin glargine 100 units/mL subcutaneous solution: 40 unit(s) subcutaneous once a day (at bedtime)  insulin lispro 100 units/mL injectable solution: 10 unit(s) subcutaneous 3 times a day (with meals)  loratadine 10 mg oral tablet: 1 tab(s) orally once a day  losartan 50 mg oral tablet: 1 tab(s) orally once a day  melatonin 3 mg oral tablet: 1 tab(s) orally once a day (at bedtime) As needed Insomnia  mycophenolate mofetil 250 mg oral capsule: 6 cap(s) orally 2 times a day  oxyCODONE 5 mg oral tablet: 1 tab(s) orally every 6 hours As needed for severe pain  pantoprazole 40 mg oral delayed release tablet: 1 tab(s) orally once a day (before a meal)  predniSONE 20 mg oral tablet: 1 tab(s) orally once a day  senna leaf extract oral tablet: 2 tab(s) orally once a day (at bedtime)  tamsulosin 0.4 mg oral capsule: 1 cap(s) orally once a day (at bedtime)

## 2024-04-26 NOTE — DISCHARGE NOTE PROVIDER - NSDCFUADDAPPT_GEN_ALL_CORE_FT
APPTS ARE READY TO BE MADE: [x ] YES    Best Family or Patient Contact (if needed):    Additional Information about above appointments (if needed):    1: Santiago Lemos MD- hematology  2: urology- Dr. Margi Burt  3:     Other comments or requests:    APPTS ARE READY TO BE MADE: [x ] YES    Best Family or Patient Contact (if needed):    Additional Information about above appointments (if needed):    1: Santiago Lemos MD- hematology  2: urology- Dr. Margi Burt  3:     Other comments or requests:   Patient is being discharged to rehab/hospice. Caregiver will arrange follow up.

## 2024-04-26 NOTE — DISCHARGE NOTE PROVIDER - PROVIDER TOKENS
PROVIDER:[TOKEN:[[id number]]] PROVIDER:[TOKEN:[[id number]]],PROVIDER:[TOKEN:[[id number]],FOLLOWUP:[2 weeks],ESTABLISHEDPATIENT:[T]]

## 2024-04-26 NOTE — PROGRESS NOTE ADULT - ASSESSMENT
Tamika Allred is a 64 year old female with PMHx of HTN, IDDM2, and lupus who presented to the ED on 4/22/24 from [facility] for complaints of not feeling well and admitted for Sepsis-secondary to suspected HCAP and bilateral pulmonary emboli and DVT.      Sepsis- secondary to suspected HCAP    per my colleague's documentation "Complaints of not feeling well since yesterday, described as RUQ abdominal pain and R. sided back pain  Tmax 100.2,  and WBC 12.47K on admission  U/A with occasional bacteria and squamous epithelial cells present, COVID/influenza negative  CXR with patchy RUL and b/l lower lung opacities, CTA chest with bibasilar lung opacities    CT A/P without infectious etiology, U/S abdomen with enlarged fatty liver and nonobstructive L. nephrolithiasis    Doubt need for ABX, CTA findings all likely from PE. "  4/24/2024 - not on antibx as my colleague now feel initial presentation and CT chest findings secondary to b/l pulmonary emboli  continue with heparin drip   has h/o lupus which may be reason for dvt-and pulmonary emboli   add on hypercoagulable w/u if possible to admitting blood work. get heme/onc consult unprovoked PE-vs secondary to Lupus   f/u echo  4/25/2024-echo as above  4/26/2024 heme/onc unavailable and upon my d/w heme/onc will f/u as outpt- protein c , beta 2, anti thrombin, lupus anticoagulant and anti cardiolipin ab- are negtaive   outstanding is Protein S-->pending    HTN: continue with losartan 50 mg  4/25/2024- bp stable     Lupus:  continue with prednisone 20 mg, hydroxychloroquine 200 mg BID, mycophenolate 1500 mg BID, pantoprazole 40 mg   IDDM2: last known A1c 10.2 (on 3/25/24), POC qac and qhs, PTA Lantus 34 U qhs and Humalog low dose SSI qac, PTA metformin 500 mg BID held, blood glucose goal < 180.    4/26/2024- will add 3 units pre meal

## 2024-04-26 NOTE — DISCHARGE NOTE PROVIDER - NSDCCPCAREPLAN_GEN_ALL_CORE_FT
PRINCIPAL DISCHARGE DIAGNOSIS  Diagnosis: Pulmonary embolism  Assessment and Plan of Treatment: continue on apixiban after 7 days decrease dose from 10 mg bid to 5 mg bid (5/1/25 is date of cahnge to 5 mg)   follow up with [facility] hematology for evaluation of unprovoked pe/dvt

## 2024-04-26 NOTE — DISCHARGE NOTE PROVIDER - NSDCFUADDINST_GEN_ALL_CORE_FT
Glucerna Shake Cans or Servings Per Day:  1       Frequency:  Two Times a day (04-24-24 @ 10:09) [Active]

## 2024-04-27 LAB
APPEARANCE UR: CLEAR — SIGNIFICANT CHANGE UP
BACTERIA # UR AUTO: ABNORMAL /HPF
BILIRUB UR-MCNC: NEGATIVE — SIGNIFICANT CHANGE UP
COLOR SPEC: YELLOW — SIGNIFICANT CHANGE UP
CULTURE RESULTS: SIGNIFICANT CHANGE UP
CULTURE RESULTS: SIGNIFICANT CHANGE UP
DIFF PNL FLD: NEGATIVE — SIGNIFICANT CHANGE UP
EPI CELLS # UR: SIGNIFICANT CHANGE UP
GLUCOSE BLDC GLUCOMTR-MCNC: 131 MG/DL — HIGH (ref 70–99)
GLUCOSE BLDC GLUCOMTR-MCNC: 198 MG/DL — HIGH (ref 70–99)
GLUCOSE BLDC GLUCOMTR-MCNC: 243 MG/DL — HIGH (ref 70–99)
GLUCOSE BLDC GLUCOMTR-MCNC: 348 MG/DL — HIGH (ref 70–99)
GLUCOSE UR QL: NEGATIVE MG/DL — SIGNIFICANT CHANGE UP
HCT VFR BLD CALC: 29.4 % — LOW (ref 34.5–45)
HGB BLD-MCNC: 9.4 G/DL — LOW (ref 11.5–15.5)
KETONES UR-MCNC: NEGATIVE MG/DL — SIGNIFICANT CHANGE UP
LEUKOCYTE ESTERASE UR-ACNC: NEGATIVE — SIGNIFICANT CHANGE UP
MCHC RBC-ENTMCNC: 26.3 PG — LOW (ref 27–34)
MCHC RBC-ENTMCNC: 32 G/DL — SIGNIFICANT CHANGE UP (ref 32–36)
MCV RBC AUTO: 82.1 FL — SIGNIFICANT CHANGE UP (ref 80–100)
NITRITE UR-MCNC: NEGATIVE — SIGNIFICANT CHANGE UP
NRBC # BLD: 0 /100 WBCS — SIGNIFICANT CHANGE UP (ref 0–0)
PH UR: 6 — SIGNIFICANT CHANGE UP (ref 5–8)
PLATELET # BLD AUTO: 264 K/UL — SIGNIFICANT CHANGE UP (ref 150–400)
PROT S FREE PPP-ACNC: 79 % — SIGNIFICANT CHANGE UP (ref 63–140)
PROT UR-MCNC: 100 MG/DL
RBC # BLD: 3.58 M/UL — LOW (ref 3.8–5.2)
RBC # FLD: 14.2 % — SIGNIFICANT CHANGE UP (ref 10.3–14.5)
RBC CASTS # UR COMP ASSIST: 2 /HPF — SIGNIFICANT CHANGE UP (ref 0–4)
SP GR SPEC: 1.01 — SIGNIFICANT CHANGE UP (ref 1–1.03)
SPECIMEN SOURCE: SIGNIFICANT CHANGE UP
SPECIMEN SOURCE: SIGNIFICANT CHANGE UP
UROBILINOGEN FLD QL: 0.2 MG/DL — SIGNIFICANT CHANGE UP (ref 0.2–1)
WBC # BLD: 5.94 K/UL — SIGNIFICANT CHANGE UP (ref 3.8–10.5)
WBC # FLD AUTO: 5.94 K/UL — SIGNIFICANT CHANGE UP (ref 3.8–10.5)
WBC UR QL: 5 /HPF — SIGNIFICANT CHANGE UP (ref 0–5)

## 2024-04-27 PROCEDURE — 71250 CT THORAX DX C-: CPT | Mod: 26

## 2024-04-27 PROCEDURE — 74176 CT ABD & PELVIS W/O CONTRAST: CPT | Mod: 26

## 2024-04-27 PROCEDURE — 99232 SBSQ HOSP IP/OBS MODERATE 35: CPT

## 2024-04-27 RX ORDER — IOHEXOL 300 MG/ML
30 INJECTION, SOLUTION INTRAVENOUS ONCE
Refills: 0 | Status: COMPLETED | OUTPATIENT
Start: 2024-04-27 | End: 2024-04-27

## 2024-04-27 RX ADMIN — Medication 3 UNIT(S): at 17:01

## 2024-04-27 RX ADMIN — IOHEXOL 30 MILLILITER(S): 300 INJECTION, SOLUTION INTRAVENOUS at 16:16

## 2024-04-27 RX ADMIN — Medication 3 UNIT(S): at 11:21

## 2024-04-27 RX ADMIN — LORATADINE 10 MILLIGRAM(S): 10 TABLET ORAL at 11:06

## 2024-04-27 RX ADMIN — MYCOPHENOLATE MOFETIL 1500 MILLIGRAM(S): 250 CAPSULE ORAL at 05:26

## 2024-04-27 RX ADMIN — APIXABAN 10 MILLIGRAM(S): 2.5 TABLET, FILM COATED ORAL at 05:26

## 2024-04-27 RX ADMIN — Medication 1 TABLET(S): at 17:00

## 2024-04-27 RX ADMIN — LOSARTAN POTASSIUM 50 MILLIGRAM(S): 100 TABLET, FILM COATED ORAL at 05:25

## 2024-04-27 RX ADMIN — Medication 4: at 11:21

## 2024-04-27 RX ADMIN — Medication 200 MILLIGRAM(S): at 05:25

## 2024-04-27 RX ADMIN — Medication 3 UNIT(S): at 08:04

## 2024-04-27 RX ADMIN — Medication 200 MILLIGRAM(S): at 17:00

## 2024-04-27 RX ADMIN — Medication 1000 UNIT(S): at 11:06

## 2024-04-27 RX ADMIN — Medication 1: at 08:03

## 2024-04-27 RX ADMIN — Medication 20 MILLIGRAM(S): at 05:25

## 2024-04-27 RX ADMIN — Medication 2: at 17:00

## 2024-04-27 RX ADMIN — APIXABAN 10 MILLIGRAM(S): 2.5 TABLET, FILM COATED ORAL at 17:00

## 2024-04-27 RX ADMIN — PANTOPRAZOLE SODIUM 40 MILLIGRAM(S): 20 TABLET, DELAYED RELEASE ORAL at 05:25

## 2024-04-27 RX ADMIN — MYCOPHENOLATE MOFETIL 1500 MILLIGRAM(S): 250 CAPSULE ORAL at 17:00

## 2024-04-27 NOTE — PROGRESS NOTE ADULT - ASSESSMENT
Tamika Allred is a 64 year old female with PMHx of HTN, IDDM2, and lupus who presented to the ED on 4/22/24 from [facility] for complaints of not feeling well and admitted for Sepsis-secondary to suspected HCAP and bilateral pulmonary emboli and DVT.      Sepsis- secondary to suspected HCAP    per my colleague's documentation "Complaints of not feeling well since yesterday, described as RUQ abdominal pain and R. sided back pain  Tmax 100.2,  and WBC 12.47K on admission  U/A with occasional bacteria and squamous epithelial cells present, COVID/influenza negative  CXR with patchy RUL and b/l lower lung opacities, CTA chest with bibasilar lung opacities    CT A/P without infectious etiology, U/S abdomen with enlarged fatty liver and nonobstructive L. nephrolithiasis    Doubt need for ABX, CTA findings all likely from PE. "  4/24/2024 - not on antibx as my colleague now feel initial presentation and CT chest findings secondary to b/l pulmonary emboli  continue with heparin drip   has h/o lupus which may be reason for dvt-and pulmonary emboli   add on hypercoagulable w/u if possible to admitting blood work. get heme/onc consult unprovoked PE-vs secondary to Lupus   f/u echo  4/25/2024-echo as above  4/26/2024 heme/onc unavailable and upon my d/w heme/onc will f/u as outpt- protein c , beta 2, anti thrombin, lupus anticoagulant and anti cardiolipin ab- are negtaive   outstanding is Protein S-->pending    HTN: continue with losartan 50 mg  4/25/2024- bp stable     Lupus:  continue with prednisone 20 mg, hydroxychloroquine 200 mg BID, mycophenolate 1500 mg BID, pantoprazole 40 mg   IDDM2: last known A1c 10.2 (on 3/25/24), POC qac and qhs, PTA Lantus 34 U qhs and Humalog low dose SSI qac, PTA metformin 500 mg BID held, blood glucose goal < 180.    4/26/2024- will add 3 units pre meal   4/27/2024 blood sugar is better   dispo- per d/w sw/cm om 4/26/2024 insurance issues have arised- sw d/w daughter ways to address it -   pt and daughter decline going home and Pt reccs are still for STR Tamika Allred is a 64 year old female with PMHx of HTN, IDDM2, and lupus who presented to the ED on 4/22/24 from [facility] for complaints of not feeling well and admitted for Sepsis-secondary to suspected HCAP and bilateral pulmonary emboli and DVT.      Sepsis- secondary to suspected HCAP    per my colleague's documentation "Complaints of not feeling well since yesterday, described as RUQ abdominal pain and R. sided back pain  Tmax 100.2,  and WBC 12.47K on admission  U/A with occasional bacteria and squamous epithelial cells present, COVID/influenza negative  CXR with patchy RUL and b/l lower lung opacities, CTA chest with bibasilar lung opacities    CT A/P without infectious etiology, U/S abdomen with enlarged fatty liver and nonobstructive L. nephrolithiasis    Doubt need for ABX, CTA findings all likely from PE. "  4/24/2024 - not on antibx as my colleague now feel initial presentation and CT chest findings secondary to b/l pulmonary emboli  continue with heparin drip   has h/o lupus which may be reason for dvt-and pulmonary emboli   add on hypercoagulable w/u if possible to admitting blood work. get heme/onc consult unprovoked PE-vs secondary to Lupus   f/u echo  4/25/2024-echo as above  4/26/2024 heme/onc unavailable and upon my d/w heme/onc will f/u as outpt- protein c , beta 2, anti thrombin, lupus anticoagulant and anti cardiolipin ab- are negtaive   outstanding is Protein S-->pending    HTN: continue with losartan 50 mg  4/25/2024- bp stable     Lupus:  continue with prednisone 20 mg, hydroxychloroquine 200 mg BID, mycophenolate 1500 mg BID, pantoprazole 40 mg   IDDM2: last known A1c 10.2 (on 3/25/24), POC qac and qhs, PTA Lantus 34 U qhs and Humalog low dose SSI qac, PTA metformin 500 mg BID held, blood glucose goal < 180.    4/26/2024- will add 3 units pre meal   4/27/2024 blood sugar is better   dispo- per d/w sw/cm om 4/26/2024 insurance issues have arised- sw d/w daughter ways to address it -   pt and daughter decline going home and Pt reccs are still for STR     abd pain- etiology unclear reviewed ct scan with IV contrast  done on admission and no acute abnormality however pt had a prior cat scan in [id number] with nephrolithiasis  she report good bm while here . will get an non contrast  scan to eval for kidney stones as cause of abd pain.    UA noted will repeat again to ensure certainty

## 2024-04-27 NOTE — PROVIDER CONTACT NOTE (OTHER) - ACTION/TREATMENT ORDERED:
CHRIS Grayson stated to perform another bladder scan in 4 hours, 14:00
CHRIS Grayson suggested not needing to do straight cath and to continue to monitor patient.
New order placed for MRSA PCR swab.

## 2024-04-27 NOTE — PROVIDER CONTACT NOTE (OTHER) - SITUATION
Pt complained of suprapubic discomfort. CHRIS Grayson stated to complete bladder scan. Bladder scan showed 368ml.
Repeat bladder scan after 4 hours was performed and showed 352ml.

## 2024-04-27 NOTE — PROGRESS NOTE ADULT - SUBJECTIVE AND OBJECTIVE BOX
INTERVAL HPI/OVERNIGHT EVENTS:  Pt seen and examined at bedside.     Allergies/Intolerance: No Known Allergies    MEDICATIONS  (STANDING):  apixaban 10 milliGRAM(s) Oral every 12 hours  cholecalciferol 1000 Unit(s) Oral daily  dextrose 10% Bolus 125 milliLiter(s) IV Bolus once  dextrose 5%. 1000 milliLiter(s) (100 mL/Hr) IV Continuous <Continuous>  dextrose 5%. 1000 milliLiter(s) (50 mL/Hr) IV Continuous <Continuous>  dextrose 50% Injectable 12.5 Gram(s) IV Push once  dextrose 50% Injectable 25 Gram(s) IV Push once  glucagon  Injectable 1 milliGRAM(s) IntraMuscular once  hydroxychloroquine 200 milliGRAM(s) Oral two times a day  influenza   Vaccine 0.5 milliLiter(s) IntraMuscular once  insulin glargine Injectable (LANTUS) 34 Unit(s) SubCutaneous at bedtime  insulin lispro (ADMELOG) corrective regimen sliding scale   SubCutaneous three times a day before meals  insulin lispro Injectable (ADMELOG) 3 Unit(s) SubCutaneous three times a day before meals  loratadine 10 milliGRAM(s) Oral daily  losartan 50 milliGRAM(s) Oral daily  mycophenolate mofetil 1500 milliGRAM(s) Oral two times a day  pantoprazole    Tablet 40 milliGRAM(s) Oral before breakfast  predniSONE   Tablet 20 milliGRAM(s) Oral daily  senna 2 Tablet(s) Oral at bedtime  tamsulosin 0.4 milliGRAM(s) Oral at bedtime    MEDICATIONS  (PRN):  acetaminophen     Tablet .. 650 milliGRAM(s) Oral every 6 hours PRN Temp greater or equal to 38C (100.4F), Mild Pain (1 - 3)  dextrose Oral Gel 15 Gram(s) Oral once PRN Blood Glucose LESS THAN 70 milliGRAM(s)/deciliter  melatonin 3 milliGRAM(s) Oral at bedtime PRN Insomnia  oxyCODONE    IR 5 milliGRAM(s) Oral every 6 hours PRN for severe pain      ROS: all systems reviewed and wnl  Vital Signs Last 24 Hrs  T(C): 36.6 (27 Apr 2024 10:17), Max: 36.8 (26 Apr 2024 23:36)  T(F): 97.9 (27 Apr 2024 10:17), Max: 98.3 (26 Apr 2024 23:36)  HR: 94 (27 Apr 2024 10:17) (84 - 100)  BP: 125/73 (27 Apr 2024 10:17) (120/74 - 150/74)  BP(mean): --  RR: 19 (27 Apr 2024 10:17) (18 - 19)  SpO2: 100% (27 Apr 2024 10:17) (100% - 100%)    Parameters below as of 27 Apr 2024 10:17  Patient On (Oxygen Delivery Method): room air          GENERAL: stable, comfortable on RA, no CP or SOB at rest.   NECK: supple, No JVD  CHEST/LUNG: clear to auscultation bilaterally; no rales, rhonchi, or wheezing b/l  HEART: normal S1, S2  ABDOMEN: BS+, soft, ND, NT   EXTREMITIES:  pulses palpable; no clubbing, cyanosis, + edema b/l LEs      LABS:                                                            9.4    5.94  )-----------( 264      ( 27 Apr 2024 06:50 )             29.4      CAPILLARY BLOOD GLUCOSE    CAPILLARY BLOOD GLUCOSE      POCT Blood Glucose.: 198 mg/dL (27 Apr 2024 07:43)  POCT Blood Glucose.: 122 mg/dL (26 Apr 2024 21:12)  POCT Blood Glucose.: 276 mg/dL (26 Apr 2024 17:18)        < from: TTE Echo Complete w/o Contrast w/ Doppler (04.24.24 @ 09:48) >    Summary:   1. Left ventricular ejection fraction, by visual estimation, is 60 to   65%.   2. Normal left ventricular size and wall thicknesses, with normal   systolic and diastolic function.   3. Normal right ventricular size and function.   4. The left atrium is normal in size.   5. The right atrium is normal in size.   6. Sclerotic aortic valve with normal opening.   7. Normal pulmonary artery pressure.      < end of copied text >   INTERVAL HPI/OVERNIGHT EVENTS:  Pt seen and examined at bedside.   Todayv- c/o abd pain  Allergies/Intolerance: No Known Allergies    MEDICATIONS  (STANDING):  apixaban 10 milliGRAM(s) Oral every 12 hours  cholecalciferol 1000 Unit(s) Oral daily  dextrose 10% Bolus 125 milliLiter(s) IV Bolus once  dextrose 5%. 1000 milliLiter(s) (100 mL/Hr) IV Continuous <Continuous>  dextrose 5%. 1000 milliLiter(s) (50 mL/Hr) IV Continuous <Continuous>  dextrose 50% Injectable 12.5 Gram(s) IV Push once  dextrose 50% Injectable 25 Gram(s) IV Push once  glucagon  Injectable 1 milliGRAM(s) IntraMuscular once  hydroxychloroquine 200 milliGRAM(s) Oral two times a day  influenza   Vaccine 0.5 milliLiter(s) IntraMuscular once  insulin glargine Injectable (LANTUS) 34 Unit(s) SubCutaneous at bedtime  insulin lispro (ADMELOG) corrective regimen sliding scale   SubCutaneous three times a day before meals  insulin lispro Injectable (ADMELOG) 3 Unit(s) SubCutaneous three times a day before meals  loratadine 10 milliGRAM(s) Oral daily  losartan 50 milliGRAM(s) Oral daily  mycophenolate mofetil 1500 milliGRAM(s) Oral two times a day  pantoprazole    Tablet 40 milliGRAM(s) Oral before breakfast  predniSONE   Tablet 20 milliGRAM(s) Oral daily  senna 2 Tablet(s) Oral at bedtime  tamsulosin 0.4 milliGRAM(s) Oral at bedtime    MEDICATIONS  (PRN):  acetaminophen     Tablet .. 650 milliGRAM(s) Oral every 6 hours PRN Temp greater or equal to 38C (100.4F), Mild Pain (1 - 3)  dextrose Oral Gel 15 Gram(s) Oral once PRN Blood Glucose LESS THAN 70 milliGRAM(s)/deciliter  melatonin 3 milliGRAM(s) Oral at bedtime PRN Insomnia  oxyCODONE    IR 5 milliGRAM(s) Oral every 6 hours PRN for severe pain      ROS: all systems reviewed and wnl  Vital Signs Last 24 Hrs  T(C): 36.6 (27 Apr 2024 10:17), Max: 36.8 (26 Apr 2024 23:36)  T(F): 97.9 (27 Apr 2024 10:17), Max: 98.3 (26 Apr 2024 23:36)  HR: 94 (27 Apr 2024 10:17) (84 - 100)  BP: 125/73 (27 Apr 2024 10:17) (120/74 - 150/74)  BP(mean): --  RR: 19 (27 Apr 2024 10:17) (18 - 19)  SpO2: 100% (27 Apr 2024 10:17) (100% - 100%)    Parameters below as of 27 Apr 2024 10:17  Patient On (Oxygen Delivery Method): room air          GENERAL: stable, comfortable on RA, no CP or SOB at rest.   NECK: supple,  CHEST/LUNG: clear to auscultation bilaterally; no rales, rhonchi, or wheezing b/l  HEART: normal S1, S2  ABDOMEN: BS+, soft, ND, RLQ and LQ and hypogastric abd pain   EXTREMITIES:  pulses palpable; no clubbing, cyanosis, + edema b/l LEs      LABS:                                                            9.4    5.94  )-----------( 264      ( 27 Apr 2024 06:50 )             29.4      CAPILLARY BLOOD GLUCOSE    CAPILLARY BLOOD GLUCOSE      POCT Blood Glucose.: 198 mg/dL (27 Apr 2024 07:43)  POCT Blood Glucose.: 122 mg/dL (26 Apr 2024 21:12)  POCT Blood Glucose.: 276 mg/dL (26 Apr 2024 17:18)        < from: TTE Echo Complete w/o Contrast w/ Doppler (04.24.24 @ 09:48) >    Summary:   1. Left ventricular ejection fraction, by visual estimation, is 60 to   65%.   2. Normal left ventricular size and wall thicknesses, with normal   systolic and diastolic function.   3. Normal right ventricular size and function.   4. The left atrium is normal in size.   5. The right atrium is normal in size.   6. Sclerotic aortic valve with normal opening.   7. Normal pulmonary artery pressure.      < end of copied text >

## 2024-04-27 NOTE — PROVIDER CONTACT NOTE (OTHER) - ASSESSMENT
Pt not complaining of any suprapubic discomfort. Pt stated discomfort is on and off.
Pt stated previously done catheter previous years and have not been done successfully. Pt stated professional individual came with bigger catheter and was successful. CHRIS Grayson made aware.

## 2024-04-28 LAB
ANION GAP SERPL CALC-SCNC: 7 MMOL/L — SIGNIFICANT CHANGE UP (ref 5–17)
BUN SERPL-MCNC: 20 MG/DL — SIGNIFICANT CHANGE UP (ref 7–23)
CALCIUM SERPL-MCNC: 8.7 MG/DL — SIGNIFICANT CHANGE UP (ref 8.5–10.1)
CHLORIDE SERPL-SCNC: 110 MMOL/L — HIGH (ref 96–108)
CO2 SERPL-SCNC: 25 MMOL/L — SIGNIFICANT CHANGE UP (ref 22–31)
CREAT SERPL-MCNC: 1.02 MG/DL — SIGNIFICANT CHANGE UP (ref 0.5–1.3)
EGFR: 62 ML/MIN/1.73M2 — SIGNIFICANT CHANGE UP
GLUCOSE BLDC GLUCOMTR-MCNC: 209 MG/DL — HIGH (ref 70–99)
GLUCOSE BLDC GLUCOMTR-MCNC: 259 MG/DL — HIGH (ref 70–99)
GLUCOSE BLDC GLUCOMTR-MCNC: 274 MG/DL — HIGH (ref 70–99)
GLUCOSE BLDC GLUCOMTR-MCNC: 358 MG/DL — HIGH (ref 70–99)
GLUCOSE SERPL-MCNC: 242 MG/DL — HIGH (ref 70–99)
HCT VFR BLD CALC: 26.6 % — LOW (ref 34.5–45)
HGB BLD-MCNC: 8.6 G/DL — LOW (ref 11.5–15.5)
MAGNESIUM SERPL-MCNC: 1.7 MG/DL — SIGNIFICANT CHANGE UP (ref 1.6–2.6)
MCHC RBC-ENTMCNC: 26.1 PG — LOW (ref 27–34)
MCHC RBC-ENTMCNC: 32.3 G/DL — SIGNIFICANT CHANGE UP (ref 32–36)
MCV RBC AUTO: 80.9 FL — SIGNIFICANT CHANGE UP (ref 80–100)
NRBC # BLD: 0 /100 WBCS — SIGNIFICANT CHANGE UP (ref 0–0)
PHOSPHATE SERPL-MCNC: 3.1 MG/DL — SIGNIFICANT CHANGE UP (ref 2.5–4.5)
PLATELET # BLD AUTO: 239 K/UL — SIGNIFICANT CHANGE UP (ref 150–400)
POTASSIUM SERPL-MCNC: 3.3 MMOL/L — LOW (ref 3.5–5.3)
POTASSIUM SERPL-SCNC: 3.3 MMOL/L — LOW (ref 3.5–5.3)
RBC # BLD: 3.29 M/UL — LOW (ref 3.8–5.2)
RBC # FLD: 14.3 % — SIGNIFICANT CHANGE UP (ref 10.3–14.5)
SODIUM SERPL-SCNC: 142 MMOL/L — SIGNIFICANT CHANGE UP (ref 135–145)
WBC # BLD: 5.31 K/UL — SIGNIFICANT CHANGE UP (ref 3.8–10.5)
WBC # FLD AUTO: 5.31 K/UL — SIGNIFICANT CHANGE UP (ref 3.8–10.5)

## 2024-04-28 PROCEDURE — 99232 SBSQ HOSP IP/OBS MODERATE 35: CPT

## 2024-04-28 PROCEDURE — 99223 1ST HOSP IP/OBS HIGH 75: CPT

## 2024-04-28 RX ORDER — POTASSIUM CHLORIDE 20 MEQ
40 PACKET (EA) ORAL EVERY 4 HOURS
Refills: 0 | Status: COMPLETED | OUTPATIENT
Start: 2024-04-28 | End: 2024-04-28

## 2024-04-28 RX ORDER — INSULIN GLARGINE 100 [IU]/ML
36 INJECTION, SOLUTION SUBCUTANEOUS AT BEDTIME
Refills: 0 | Status: DISCONTINUED | OUTPATIENT
Start: 2024-04-28 | End: 2024-04-30

## 2024-04-28 RX ORDER — MAGNESIUM SULFATE 500 MG/ML
1 VIAL (ML) INJECTION ONCE
Refills: 0 | Status: COMPLETED | OUTPATIENT
Start: 2024-04-28 | End: 2024-04-28

## 2024-04-28 RX ADMIN — Medication 20 MILLIGRAM(S): at 09:34

## 2024-04-28 RX ADMIN — Medication 200 MILLIGRAM(S): at 09:34

## 2024-04-28 RX ADMIN — LORATADINE 10 MILLIGRAM(S): 10 TABLET ORAL at 11:08

## 2024-04-28 RX ADMIN — Medication 5: at 17:04

## 2024-04-28 RX ADMIN — Medication 1000 UNIT(S): at 11:08

## 2024-04-28 RX ADMIN — Medication 3: at 11:08

## 2024-04-28 RX ADMIN — APIXABAN 10 MILLIGRAM(S): 2.5 TABLET, FILM COATED ORAL at 09:34

## 2024-04-28 RX ADMIN — PANTOPRAZOLE SODIUM 40 MILLIGRAM(S): 20 TABLET, DELAYED RELEASE ORAL at 08:15

## 2024-04-28 RX ADMIN — OXYCODONE HYDROCHLORIDE 5 MILLIGRAM(S): 5 TABLET ORAL at 23:00

## 2024-04-28 RX ADMIN — Medication 1 TABLET(S): at 21:50

## 2024-04-28 RX ADMIN — INSULIN GLARGINE 36 UNIT(S): 100 INJECTION, SOLUTION SUBCUTANEOUS at 21:48

## 2024-04-28 RX ADMIN — MYCOPHENOLATE MOFETIL 1500 MILLIGRAM(S): 250 CAPSULE ORAL at 09:34

## 2024-04-28 RX ADMIN — Medication 3 UNIT(S): at 11:09

## 2024-04-28 RX ADMIN — Medication 40 MILLIEQUIVALENT(S): at 17:05

## 2024-04-28 RX ADMIN — Medication 40 MILLIEQUIVALENT(S): at 21:48

## 2024-04-28 RX ADMIN — Medication 1 TABLET(S): at 09:34

## 2024-04-28 RX ADMIN — Medication 200 MILLIGRAM(S): at 21:50

## 2024-04-28 RX ADMIN — Medication 2: at 08:05

## 2024-04-28 RX ADMIN — Medication 100 GRAM(S): at 14:58

## 2024-04-28 RX ADMIN — Medication 3 UNIT(S): at 08:05

## 2024-04-28 RX ADMIN — OXYCODONE HYDROCHLORIDE 5 MILLIGRAM(S): 5 TABLET ORAL at 21:56

## 2024-04-28 RX ADMIN — APIXABAN 10 MILLIGRAM(S): 2.5 TABLET, FILM COATED ORAL at 17:05

## 2024-04-28 RX ADMIN — Medication 3 UNIT(S): at 17:05

## 2024-04-28 RX ADMIN — LOSARTAN POTASSIUM 50 MILLIGRAM(S): 100 TABLET, FILM COATED ORAL at 09:36

## 2024-04-28 NOTE — PROGRESS NOTE ADULT - SUBJECTIVE AND OBJECTIVE BOX
INTERVAL HPI/OVERNIGHT EVENTS:  Pt seen and examined at bedside.   Today- c/o abd pain  Allergies/Intolerance: No Known Allergies      MEDICATIONS  (STANDING):  apixaban 10 milliGRAM(s) Oral every 12 hours  calcium carbonate 1250 mG  + Vitamin D (OsCal 500 + D) 1 Tablet(s) Oral two times a day  cholecalciferol 1000 Unit(s) Oral daily  dextrose 10% Bolus 125 milliLiter(s) IV Bolus once  dextrose 5%. 1000 milliLiter(s) (50 mL/Hr) IV Continuous <Continuous>  dextrose 5%. 1000 milliLiter(s) (100 mL/Hr) IV Continuous <Continuous>  dextrose 50% Injectable 25 Gram(s) IV Push once  dextrose 50% Injectable 12.5 Gram(s) IV Push once  glucagon  Injectable 1 milliGRAM(s) IntraMuscular once  hydroxychloroquine 200 milliGRAM(s) Oral two times a day  influenza   Vaccine 0.5 milliLiter(s) IntraMuscular once  insulin glargine Injectable (LANTUS) 34 Unit(s) SubCutaneous at bedtime  insulin lispro (ADMELOG) corrective regimen sliding scale   SubCutaneous three times a day before meals  insulin lispro Injectable (ADMELOG) 3 Unit(s) SubCutaneous three times a day before meals  loratadine 10 milliGRAM(s) Oral daily  losartan 50 milliGRAM(s) Oral daily  mycophenolate mofetil 1500 milliGRAM(s) Oral two times a day  pantoprazole    Tablet 40 milliGRAM(s) Oral before breakfast  predniSONE   Tablet 20 milliGRAM(s) Oral daily  senna 2 Tablet(s) Oral at bedtime  tamsulosin 0.4 milliGRAM(s) Oral at bedtime    MEDICATIONS  (PRN):  acetaminophen     Tablet .. 650 milliGRAM(s) Oral every 6 hours PRN Temp greater or equal to 38C (100.4F), Mild Pain (1 - 3)  dextrose Oral Gel 15 Gram(s) Oral once PRN Blood Glucose LESS THAN 70 milliGRAM(s)/deciliter  melatonin 3 milliGRAM(s) Oral at bedtime PRN Insomnia  oxyCODONE    IR 5 milliGRAM(s) Oral every 6 hours PRN for severe pain    Vital Signs Last 24 Hrs  T(C): 37.2 (28 Apr 2024 05:02), Max: 37.2 (28 Apr 2024 05:02)  T(F): 99 (28 Apr 2024 05:02), Max: 99 (28 Apr 2024 05:02)  HR: 92 (28 Apr 2024 09:39) (67 - 94)  BP: 112/73 (28 Apr 2024 09:39) (112/73 - 146/82)  BP(mean): --  RR: 19 (28 Apr 2024 05:02) (18 - 19)  SpO2: 98% (28 Apr 2024 05:02) (96% - 100%)    Parameters below as of 27 Apr 2024 17:09  Patient On (Oxygen Delivery Method): room air      GENERAL: stable, comfortable on RA, no CP or SOB at rest.   NECK: supple,  CHEST/LUNG: clear to auscultation bilaterally; no rales, rhonchi, or wheezing b/l  HEART: normal S1, S2  ABDOMEN: BS+, soft, ND, RLQ and LQ and hypogastric abd pain   EXTREMITIES:  pulses palpable; no clubbing, cyanosis, + edema b/l LEs      LABS:                                                          8.6    5.31  )-----------( 239      ( 28 Apr 2024 07:05 )             26.6     < from: TTE Echo Complete w/o Contrast w/ Doppler (04.24.24 @ 09:48) >    Summary:   1. Left ventricular ejection fraction, by visual estimation, is 60 to   65%.   2. Normal left ventricular size and wall thicknesses, with normal   systolic and diastolic function.   3. Normal right ventricular size and function.   4. The left atrium is normal in size.   5. The right atrium is normal in size.   6. Sclerotic aortic valve with normal opening.   7. Normal pulmonary artery pressure.      < end of copied text >  < from: CT Abdomen and Pelvis w/ Oral Cont (04.27.24 @ 17:17) >  There is persistent relatively extensive bibasilar airspace consolidation   and/or subsegmental atelectasis.    Trace right pleural effusion.    No acute findings within the abdomen or pelvis.    Please refer to detailed findings otherwise described above.      < end of copied text >

## 2024-04-28 NOTE — CONSULT NOTE ADULT - REASON FOR ADMISSION
Sepsis secondary to suspected HCAP, bilateral pulmonary emboli and DVT
Sepsis secondary to suspected HCAP, bilateral pulmonary emboli and DVT

## 2024-04-28 NOTE — CONSULT NOTE ADULT - SUBJECTIVE AND OBJECTIVE BOX
HPI:  Tamika Allred is a 64 year old female with PMHx of HTN, IDDM2, and lupus who presented to the ED on 24 from [facility] for complaints of not feeling well.    History is very limited as patient is a poor historian. Patient reports she started feeling unwell yesterday. Admits to right upper quadrant abdominal pain and right sided back pain that started while working with physical therapy yesterday. No associated chest pain, palpitations, or shortness of breath. Baseline functional status is ambulates with walker and dependent with all ADLs. Lives at home with daughter.    Recent admission from 3/8/24 - 3/20/24 for generalized weakness secondary to UTI. Completed 3-day course of antibiotic therapy. Course complicated by lupus flare and was started on prednisone. Also with vaginal candidiasis which was treated with clotrimazole. Discharged to [facility].    In the ED, VSS except Tmax 100.2 and HR as elevated as 117. WBC 12.47K, hgb 10.9. CMP grossly unremarkable. Lactic acid 2.2. U/A with occasional bacteria and squamous epithelial cells present. COVID/influenza negative. CXR with patchy right upper lobe and bilateral lower lung opacities CTA CAP with b/l PE without evidence of right heart strain. U/S abdomen with enlarged fatty liver and nonobstructive left nephrolithiasis. Received NS 2200 cc bolus, vancomycin 1 g IV, zosyn 3.375 mg IV, acetaminophen 1 g IV, and started on heparin gtt. (2024 20:43). During hospital stay, patient c/o suprapubic fullness and tenderness. PVR performed today with 250cc of urine remaining in bladder with concerns for urinary retention.     Patient seen and examined at bedside resting comfortably. Offers no complaints at this time but admits to a history of urinary retention. Patient states she needed a elliott catheter 4 years ago for urinary retention and needed to be straight cath'd at a point during this admission. She states she has periods where she is able to completely empty her bladder and states she feels ok at the moment after voiding earlier. Patient denies ever seeing an outpatient urologist in the past.   Denies fever, chills, N/V/D, CP, SOB.     PAST MEDICAL & SURGICAL HISTORY:  Lupus      Hypertension      Insulin dependent type 2 diabetes mellitus       delivery delivered   section x 3      Skin tag  Skin tag removal, right shoulder      H/O:           Review of Systems:  contained within HPI    MEDICATIONS  (STANDING):  apixaban 10 milliGRAM(s) Oral every 12 hours  calcium carbonate 1250 mG  + Vitamin D (OsCal 500 + D) 1 Tablet(s) Oral two times a day  cholecalciferol 1000 Unit(s) Oral daily  dextrose 10% Bolus 125 milliLiter(s) IV Bolus once  dextrose 5%. 1000 milliLiter(s) (100 mL/Hr) IV Continuous <Continuous>  dextrose 5%. 1000 milliLiter(s) (50 mL/Hr) IV Continuous <Continuous>  dextrose 50% Injectable 12.5 Gram(s) IV Push once  dextrose 50% Injectable 25 Gram(s) IV Push once  glucagon  Injectable 1 milliGRAM(s) IntraMuscular once  hydroxychloroquine 200 milliGRAM(s) Oral two times a day  influenza   Vaccine 0.5 milliLiter(s) IntraMuscular once  insulin glargine Injectable (LANTUS) 34 Unit(s) SubCutaneous at bedtime  insulin lispro (ADMELOG) corrective regimen sliding scale   SubCutaneous three times a day before meals  insulin lispro Injectable (ADMELOG) 3 Unit(s) SubCutaneous three times a day before meals  loratadine 10 milliGRAM(s) Oral daily  losartan 50 milliGRAM(s) Oral daily  mycophenolate mofetil 1500 milliGRAM(s) Oral two times a day  pantoprazole    Tablet 40 milliGRAM(s) Oral before breakfast  predniSONE   Tablet 20 milliGRAM(s) Oral daily  senna 2 Tablet(s) Oral at bedtime  tamsulosin 0.4 milliGRAM(s) Oral at bedtime    MEDICATIONS  (PRN):  acetaminophen     Tablet .. 650 milliGRAM(s) Oral every 6 hours PRN Temp greater or equal to 38C (100.4F), Mild Pain (1 - 3)  dextrose Oral Gel 15 Gram(s) Oral once PRN Blood Glucose LESS THAN 70 milliGRAM(s)/deciliter  melatonin 3 milliGRAM(s) Oral at bedtime PRN Insomnia  oxyCODONE    IR 5 milliGRAM(s) Oral every 6 hours PRN for severe pain      Allergies    No Known Allergies    Intolerances    SOCIAL HISTORY          Smoking: Yes [ ]  No [x]   ______pk yrs          ETOH  Yes [ ]  No [x]  Social [ ]          DRUGS:  Yes [ ]  No [x]  if so what______________    FAMILY HISTORY:  Family history of coronary artery disease    Vital Signs Last 24 Hrs  T(C): 36.7 (2024 10:41), Max: 37.2 (2024 05:02)  T(F): 98 (2024 10:41), Max: 99 (2024 05:02)  HR: 88 (2024 10:41) (67 - 92)  BP: 151/85 (2024 10:41) (112/73 - 151/85)  RR: 19 (2024 10:41) (18 - 19)  SpO2: 100% (2024 10:41) (96% - 100%)    Parameters below as of 2024 10:41  Patient On (Oxygen Delivery Method): room air    Physical Exam:  General:  Appears stated age, well-groomed, well-nourished, no distress  Eyes: EOMI  HENT: NCAT. WNL, no JVD  Chest: clear breath sounds  Cardiovascular: Regular rate & rhythm  Abdomen: soft, NT/ND. Small, reducible umbilical hernia. No skin changes noted  : no suprapubic tenderness or distention   Extremities: non edematous  Skin: No rash  Musculoskeletal: normal strength  Neuro/Psych: AAO x3      LABS:                        8.6    5.31  )-----------( 239      ( 2024 07:05 )             26.6       Urinalysis Basic - ( 2024 18:00 )    Color: Yellow / Appearance: Clear / S.012 / pH: x  Gluc: x / Ketone: Negative mg/dL  / Bili: Negative / Urobili: 0.2 mg/dL   Blood: x / Protein: 100 mg/dL / Nitrite: Negative   Leuk Esterase: Negative / RBC: 2 /HPF / WBC 5 /HPF   Sq Epi: x / Non Sq Epi: x / Bacteria: Occasional /HPF    RADIOLOGY & ADDITIONAL STUDIES:  < from: CT Abdomen and Pelvis w/ Oral Cont (24 @ 17:17) >  ACC: [id number] EXAM:  CT ABDOMEN AND PELVIS OC   ORDERED BY: Renuka Peterson     ACC: [id number] EXAM:  CT CHEST OC   ORDERED BY: Renuka Peterson     PROCEDURE DATE:  2024          INTERPRETATION:  CLINICAL INFORMATION: abdominal / suprapubic pain,   evaluate for kidney stones vs intra-abdominal pathology    COMPARISON: 2024.    CONTRAST/COMPLICATIONS:  IV Contrast: NONE  Oral Contrast: NONE (accession [id number]), Omnipaque 300 (accession   [id number])  Complications: None reported at time ofstudy completion    PROCEDURE:  CT of the Chest, Abdomen and Pelvis was performed.  Sagittal and coronal reformats were performed.    FINDINGS:  CHEST:  LUNGS AND LARGE AIRWAYS: Patent central airways. No pulmonary nodules.  There is persistent relatively extensive bibasilar airspace consolidation   and/or subsegmental atelectasis.  PLEURA: Trace right pleural effusion.  VESSELS: Within normal limits.  HEART: Heart size is normal. No pericardial effusion.  MEDIASTINUM AND BOO: No lymphadenopathy. Small hiatal hernia.  CHEST WALL AND LOWER NECK: Within normal limits.    ABDOMEN AND PELVIS:  LIVER: Within normal limits.  BILE DUCTS: Normal caliber.  GALLBLADDER: Within normal limits.  SPLEEN: Within normal limits.  PANCREAS: Within normal limits.  ADRENALS: Within normal limits.  KIDNEYS/URETERS: No renal/ureteral stones or hydronephrosis.    BLADDER: Within normal limits.  REPRODUCTIVE ORGANS: Uterus and adnexa within normal limits.    BOWEL: No bowel obstruction. Appendix is normal.  PERITONEUM: No ascites.  No free air or abscess.  VESSELS: Within normal limits.  RETROPERITONEUM/LYMPH NODES: No lymphadenopathy.  ABDOMINAL WALL: Small fat-containing umbilical hernia. Stable 1.9 cm   calcified subcutaneous soft tissue nodule right posterior abdominal wall.  BONES: Within normal limits.    IMPRESSION:  There is persistent relatively extensive bibasilar airspace consolidation   and/or subsegmental atelectasis.    Trace right pleural effusion.    No acute findings within the abdomen or pelvis.    Please refer to detailed findings otherwise described above.    A/P  63yo F with PMHx of HTN, IDDM2, and lupus, PSH of c- section x3 who presented to the ED on 24 from [facility] for complaints of not feeling well. Found to have PNA and b/l PEs, currently on heparin gtt.   C/o abdominal fullness and discomfort and PVR of 250cc earlier today.  Patient states she has needed a catheter in the past (4 years ago) but has never followed up with a urologist.  Patient likely with urinary retention 2/2 neurogenic bladder  - f/u PVR, may benefit from indwelling catheter   - OP f/u for further workup, urodynamics   - continue care per primary team   - will d/w urologist

## 2024-04-28 NOTE — CONSULT NOTE ADULT - SUBJECTIVE AND OBJECTIVE BOX
HPI:  Tamika Allred is a 64 year old female with PMHx of HTN, IDDM2, and lupus who presented to the ED on 24 from [facility] for complaints of not feeling well.    History is very limited as patient is a poor historian. Patient reports she started feeling unwell yesterday. Admits to right upper quadrant abdominal pain and right sided back pain that started while working with physical therapy yesterday. No associated chest pain, palpitations, or shortness of breath. Baseline functional status is ambulates with walker and dependent with all ADLs. Lives at home with daughter.    Recent admission from 3/8/24 - 3/20/24 for generalized weakness secondary to UTI. Completed 3-day course of antibiotic therapy. Course complicated by lupus flare and was started on prednisone. Also with vaginal candidiasis which was treated with clotrimazole. Discharged to [facility].    In the ED, VSS except Tmax 100.2 and HR as elevated as 117. WBC 12.47K, hgb 10.9. CMP grossly unremarkable. Lactic acid 2.2. U/A with occasional bacteria and squamous epithelial cells present. COVID/influenza negative. CXR with patchy right upper lobe and bilateral lower lung opacities CTA CAP with b/l PE without evidence of right heart strain. U/S abdomen with enlarged fatty liver and nonobstructive left nephrolithiasis. Received NS 2200 cc bolus, vancomycin 1 g IV, zosyn 3.375 mg IV, acetaminophen 1 g IV, and started on heparin gtt. (2024 20:43). During hospital stay, patient c/o suprapubic fullness and tenderness prompting primary team to order CT A/P, revealing a fat containing umbilical hernia.     Patient seen and examined at bedside resting comfortably. Offers no complaints at this time. Patient unaware that she had an umbilical hernia but states she has not been experiencing       PAST MEDICAL & SURGICAL HISTORY:  Lupus      Hypertension      Insulin dependent type 2 diabetes mellitus       delivery delivered   section x 3      Skin tag  Skin tag removal, right shoulder      H/O:           Review of Systems:  contained within HPI    MEDICATIONS  (STANDING):  apixaban 10 milliGRAM(s) Oral every 12 hours  calcium carbonate 1250 mG  + Vitamin D (OsCal 500 + D) 1 Tablet(s) Oral two times a day  cholecalciferol 1000 Unit(s) Oral daily  dextrose 10% Bolus 125 milliLiter(s) IV Bolus once  dextrose 5%. 1000 milliLiter(s) (100 mL/Hr) IV Continuous <Continuous>  dextrose 5%. 1000 milliLiter(s) (50 mL/Hr) IV Continuous <Continuous>  dextrose 50% Injectable 12.5 Gram(s) IV Push once  dextrose 50% Injectable 25 Gram(s) IV Push once  glucagon  Injectable 1 milliGRAM(s) IntraMuscular once  hydroxychloroquine 200 milliGRAM(s) Oral two times a day  influenza   Vaccine 0.5 milliLiter(s) IntraMuscular once  insulin glargine Injectable (LANTUS) 34 Unit(s) SubCutaneous at bedtime  insulin lispro (ADMELOG) corrective regimen sliding scale   SubCutaneous three times a day before meals  insulin lispro Injectable (ADMELOG) 3 Unit(s) SubCutaneous three times a day before meals  loratadine 10 milliGRAM(s) Oral daily  losartan 50 milliGRAM(s) Oral daily  mycophenolate mofetil 1500 milliGRAM(s) Oral two times a day  pantoprazole    Tablet 40 milliGRAM(s) Oral before breakfast  predniSONE   Tablet 20 milliGRAM(s) Oral daily  senna 2 Tablet(s) Oral at bedtime  tamsulosin 0.4 milliGRAM(s) Oral at bedtime    MEDICATIONS  (PRN):  acetaminophen     Tablet .. 650 milliGRAM(s) Oral every 6 hours PRN Temp greater or equal to 38C (100.4F), Mild Pain (1 - 3)  dextrose Oral Gel 15 Gram(s) Oral once PRN Blood Glucose LESS THAN 70 milliGRAM(s)/deciliter  melatonin 3 milliGRAM(s) Oral at bedtime PRN Insomnia  oxyCODONE    IR 5 milliGRAM(s) Oral every 6 hours PRN for severe pain      Allergies    No Known Allergies    Intolerances        SOCIAL HISTORY          Smoking: Yes [ ]  No [ ]   ______pk yrs          ETOH  Yes [ ]  No [ ]  Social [ ]          DRUGS:  Yes [ ]  No [ ]  if so what______________    FAMILY HISTORY:  Family history of coronary artery disease        Vital Signs Last 24 Hrs  T(C): 36.7 (2024 10:41), Max: 37.2 (2024 05:02)  T(F): 98 (2024 10:41), Max: 99 (2024 05:02)  HR: 88 (2024 10:41) (67 - 92)  BP: 151/85 (2024 10:41) (112/73 - 151/85)  BP(mean): --  RR: 19 (2024 10:41) (18 - 19)  SpO2: 100% (2024 10:41) (96% - 100%)    Parameters below as of 2024 10:41  Patient On (Oxygen Delivery Method): room air        Physical Exam:    General:  Appears stated age, well-groomed, well-nourished, no distress  Eyes: EOMI  HENT: NCAT. WNL, no JVD  Chest: clear breath sounds  Cardiovascular: Regular rate & rhythm  Abdomen:    Extremities:   Skin: No rash  Musculoskeletal: normal strength  Neuro/Psych: AAO x3      LABS:                        8.6    5.31  )-----------( 239      ( 2024 07:05 )             26.6             Urinalysis Basic - ( 2024 18:00 )    Color: Yellow / Appearance: Clear / S.012 / pH: x  Gluc: x / Ketone: Negative mg/dL  / Bili: Negative / Urobili: 0.2 mg/dL   Blood: x / Protein: 100 mg/dL / Nitrite: Negative   Leuk Esterase: Negative / RBC: 2 /HPF / WBC 5 /HPF   Sq Epi: x / Non Sq Epi: x / Bacteria: Occasional /HPF        RADIOLOGY & ADDITIONAL STUDIES: HPI:  Tamika Allred is a 64 year old female with PMHx of HTN, IDDM2, and lupus who presented to the ED on 24 from [facility] for complaints of not feeling well.    History is very limited as patient is a poor historian. Patient reports she started feeling unwell yesterday. Admits to right upper quadrant abdominal pain and right sided back pain that started while working with physical therapy yesterday. No associated chest pain, palpitations, or shortness of breath. Baseline functional status is ambulates with walker and dependent with all ADLs. Lives at home with daughter.    Recent admission from 3/8/24 - 3/20/24 for generalized weakness secondary to UTI. Completed 3-day course of antibiotic therapy. Course complicated by lupus flare and was started on prednisone. Also with vaginal candidiasis which was treated with clotrimazole. Discharged to [facility].    In the ED, VSS except Tmax 100.2 and HR as elevated as 117. WBC 12.47K, hgb 10.9. CMP grossly unremarkable. Lactic acid 2.2. U/A with occasional bacteria and squamous epithelial cells present. COVID/influenza negative. CXR with patchy right upper lobe and bilateral lower lung opacities CTA CAP with b/l PE without evidence of right heart strain. U/S abdomen with enlarged fatty liver and nonobstructive left nephrolithiasis. Received NS 2200 cc bolus, vancomycin 1 g IV, zosyn 3.375 mg IV, acetaminophen 1 g IV, and started on heparin gtt. (2024 20:43). During hospital stay, patient c/o suprapubic fullness and tenderness prompting primary team to order CT A/P, revealing a fat containing umbilical hernia.     Patient seen and examined at bedside resting comfortably. Offers no complaints at this time. Patient unaware that she had an umbilical hernia but states she has not been experiencing any discomfort by her umbilicus and her abdominal discomfort is suprapubic.   Admits to normal bowel function. Denies N/V.  Denies fever, chills, CP.  PSH  x3    PAST MEDICAL & SURGICAL HISTORY:  Lupus      Hypertension      Insulin dependent type 2 diabetes mellitus       delivery delivered   section x 3      Skin tag  Skin tag removal, right shoulder      H/O:           Review of Systems:  contained within HPI    MEDICATIONS  (STANDING):  apixaban 10 milliGRAM(s) Oral every 12 hours  calcium carbonate 1250 mG  + Vitamin D (OsCal 500 + D) 1 Tablet(s) Oral two times a day  cholecalciferol 1000 Unit(s) Oral daily  dextrose 10% Bolus 125 milliLiter(s) IV Bolus once  dextrose 5%. 1000 milliLiter(s) (100 mL/Hr) IV Continuous <Continuous>  dextrose 5%. 1000 milliLiter(s) (50 mL/Hr) IV Continuous <Continuous>  dextrose 50% Injectable 12.5 Gram(s) IV Push once  dextrose 50% Injectable 25 Gram(s) IV Push once  glucagon  Injectable 1 milliGRAM(s) IntraMuscular once  hydroxychloroquine 200 milliGRAM(s) Oral two times a day  influenza   Vaccine 0.5 milliLiter(s) IntraMuscular once  insulin glargine Injectable (LANTUS) 34 Unit(s) SubCutaneous at bedtime  insulin lispro (ADMELOG) corrective regimen sliding scale   SubCutaneous three times a day before meals  insulin lispro Injectable (ADMELOG) 3 Unit(s) SubCutaneous three times a day before meals  loratadine 10 milliGRAM(s) Oral daily  losartan 50 milliGRAM(s) Oral daily  mycophenolate mofetil 1500 milliGRAM(s) Oral two times a day  pantoprazole    Tablet 40 milliGRAM(s) Oral before breakfast  predniSONE   Tablet 20 milliGRAM(s) Oral daily  senna 2 Tablet(s) Oral at bedtime  tamsulosin 0.4 milliGRAM(s) Oral at bedtime    MEDICATIONS  (PRN):  acetaminophen     Tablet .. 650 milliGRAM(s) Oral every 6 hours PRN Temp greater or equal to 38C (100.4F), Mild Pain (1 - 3)  dextrose Oral Gel 15 Gram(s) Oral once PRN Blood Glucose LESS THAN 70 milliGRAM(s)/deciliter  melatonin 3 milliGRAM(s) Oral at bedtime PRN Insomnia  oxyCODONE    IR 5 milliGRAM(s) Oral every 6 hours PRN for severe pain      Allergies    No Known Allergies    Intolerances    SOCIAL HISTORY          Smoking: Yes [ ]  No [x]   ______pk yrs          ETOH  Yes [ ]  No [x]  Social [ ]          DRUGS:  Yes [ ]  No [x]  if so what______________    FAMILY HISTORY:  Family history of coronary artery disease    Vital Signs Last 24 Hrs  T(C): 36.7 (2024 10:41), Max: 37.2 (2024 05:02)  T(F): 98 (2024 10:41), Max: 99 (2024 05:02)  HR: 88 (2024 10:41) (67 - 92)  BP: 151/85 (2024 10:41) (112/73 - 151/85)  RR: 19 (2024 10:41) (18 - 19)  SpO2: 100% (2024 10:41) (96% - 100%)    Parameters below as of 2024 10:41  Patient On (Oxygen Delivery Method): room air    Physical Exam:  General:  Appears stated age, well-groomed, well-nourished, no distress  Eyes: EOMI  HENT: NCAT. WNL, no JVD  Chest: clear breath sounds  Cardiovascular: Regular rate & rhythm  Abdomen: soft, NT/ND. Small, reducible umbilical hernia. No skin changes noted  Extremities: non edematous  Skin: No rash  Musculoskeletal: normal strength  Neuro/Psych: AAO x3      LABS:                        8.6    5.31  )-----------( 239      ( 2024 07:05 )             26.6       Urinalysis Basic - ( 2024 18:00 )    Color: Yellow / Appearance: Clear / S.012 / pH: x  Gluc: x / Ketone: Negative mg/dL  / Bili: Negative / Urobili: 0.2 mg/dL   Blood: x / Protein: 100 mg/dL / Nitrite: Negative   Leuk Esterase: Negative / RBC: 2 /HPF / WBC 5 /HPF   Sq Epi: x / Non Sq Epi: x / Bacteria: Occasional /HPF    RADIOLOGY & ADDITIONAL STUDIES:  < from: CT Abdomen and Pelvis w/ Oral Cont (24 @ 17:17) >  ACC: [id number] EXAM:  CT ABDOMEN AND PELVIS OC   ORDERED BY: Renuka Peterson     ACC: [id number] EXAM:  CT CHEST OC   ORDERED BY: Renuka Peterson     PROCEDURE DATE:  2024          INTERPRETATION:  CLINICAL INFORMATION: abdominal / suprapubic pain,   evaluate for kidney stones vs intra-abdominal pathology    COMPARISON: 2024.    CONTRAST/COMPLICATIONS:  IV Contrast: NONE  Oral Contrast: NONE (accession [id number]), Omnipaque 300 (accession   [id number])  Complications: None reported at time ofstudy completion    PROCEDURE:  CT of the Chest, Abdomen and Pelvis was performed.  Sagittal and coronal reformats were performed.    FINDINGS:  CHEST:  LUNGS AND LARGE AIRWAYS: Patent central airways. No pulmonary nodules.  There is persistent relatively extensive bibasilar airspace consolidation   and/or subsegmental atelectasis.  PLEURA: Trace right pleural effusion.  VESSELS: Within normal limits.  HEART: Heart size is normal. No pericardial effusion.  MEDIASTINUM AND BOO: No lymphadenopathy. Small hiatal hernia.  CHEST WALL AND LOWER NECK: Within normal limits.    ABDOMEN AND PELVIS:  LIVER: Within normal limits.  BILE DUCTS: Normal caliber.  GALLBLADDER: Within normal limits.  SPLEEN: Within normal limits.  PANCREAS: Within normal limits.  ADRENALS: Within normal limits.  KIDNEYS/URETERS: No renal/ureteral stones or hydronephrosis.    BLADDER: Within normal limits.  REPRODUCTIVE ORGANS: Uterus and adnexa within normal limits.    BOWEL: No bowel obstruction. Appendix is normal.  PERITONEUM: No ascites.  No free air or abscess.  VESSELS: Within normal limits.  RETROPERITONEUM/LYMPH NODES: No lymphadenopathy.  ABDOMINAL WALL: Small fat-containing umbilical hernia. Stable 1.9 cm   calcified subcutaneous soft tissue nodule right posterior abdominal wall.  BONES: Within normal limits.    IMPRESSION:  There is persistent relatively extensive bibasilar airspace consolidation   and/or subsegmental atelectasis.    Trace right pleural effusion.    No acute findings within the abdomen or pelvis.    Please refer to detailed findings otherwise described above.    A/P  65yo F with PMHx of HTN, IDDM2, and lupus, PSH of c- section x3 who presented to the ED on 24 from [facility] for complaints of not feeling well. Found to have PNA and b/l PEs, currently on heparin gtt.   C/o abdominal fullness and discomfort. With CT findings of small fat containing umbilical hernia. Hernia reducible at bedside  No surgical intervention at this time. Poor OR candidate given b/l PEs, AC  OP f/u for elective repair with Dr. Rodney León  d/w Dr. León

## 2024-04-28 NOTE — PROGRESS NOTE ADULT - ASSESSMENT
Tamika Allred is a 64 year old female with PMHx of HTN, IDDM2, and lupus who presented to the ED on 4/22/24 from [facility] for complaints of not feeling well and admitted for Sepsis-secondary to suspected HCAP and bilateral pulmonary emboli and DVT.      Sepsis- secondary to suspected HCAP    per my colleague's documentation "Complaints of not feeling well since yesterday, described as RUQ abdominal pain and R. sided back pain  Tmax 100.2,  and WBC 12.47K on admission  U/A with occasional bacteria and squamous epithelial cells present, COVID/influenza negative  CXR with patchy RUL and b/l lower lung opacities, CTA chest with bibasilar lung opacities    CT A/P without infectious etiology, U/S abdomen with enlarged fatty liver and nonobstructive L. nephrolithiasis    Doubt need for ABX, CTA findings all likely from PE. "  4/24/2024 - not on antibx as my colleague now feel initial presentation and CT chest findings secondary to b/l pulmonary emboli  continue with heparin drip   has h/o lupus which may be reason for dvt-and pulmonary emboli   add on hypercoagulable w/u if possible to admitting blood work. get heme/onc consult unprovoked PE-vs secondary to Lupus   f/u echo  4/25/2024-echo as above  4/26/2024 heme/onc unavailable and upon my d/w heme/onc will f/u as outpt- protein c , beta 2, anti thrombin, lupus anticoagulant and anti cardiolipin ab- are negtaive   outstanding is Protein S-->pending    HTN: continue with losartan 50 mg  4/25/2024- bp stable     Lupus:  continue with prednisone 20 mg, hydroxychloroquine 200 mg BID, mycophenolate 1500 mg BID, pantoprazole 40 mg   IDDM2: last known A1c 10.2 (on 3/25/24), POC qac and qhs, PTA Lantus 34 U qhs and Humalog low dose SSI qac, PTA metformin 500 mg BID held, blood glucose goal < 180.    4/26/2024- will add 3 units pre meal   4/27/2024 blood sugar is better   dispo- per d/w sw/cm om 4/26/2024 insurance issues have arised- sw d/w daughter ways to address it -   pt and daughter decline going home and Pt reccs are still for STR     abd pain- etiology unclear reviewed ct scan with IV contrast  done on admission and no acute abnormality however pt had a prior cat scan in [id number] with nephrolithiasis  she report good bm while here . will get an non contrast  scan to eval for kidney stones as cause of abd pain.    UA noted will repeat again to ensure certainty   4/28/2024 repeat ua negative ct scan doen no stones , shows small umbilical hernia ? the cause will get gen surgery consult    also pt seems to be having intermittent urinary retention will get urology consult  as this may be the real cause

## 2024-04-28 NOTE — CONSULT NOTE ADULT - NS ATTEND AMEND GEN_ALL_CORE FT
Patient seen and examined with PA  Labs, imaging, and hospital course reviewed  PE on anticoagulation; small fat containing umbilical hernia  Hernia reducible on exam; not tender, no rebound, no guarding, no skin changes  No emergent surgical intervention required at this time  Can follow up as outpatient; contact information given to patient  Continue supportive care per primary team Patient seen and examined with PA  Labs, imaging, and hospital course reviewed  PE on anticoagulation; small fat containing umbilical hernia  Hernia reducible on exam; not tender, no rebound, no guarding, no skin changes  No emergent surgical intervention required at this time; does not seem to be the cause of abdominal pain as seen in history  Can follow up as outpatient; contact information given to patient  Continue supportive care per primary team Patient seen and examined with PA  Labs, imaging, and hospital course reviewed  PE on anticoagulation; small fat containing umbilical hernia  Hernia reducible on exam; not tender, no rebound, no guarding, no skin changes  No emergent surgical intervention required at this time; does not seem to be the cause of abdominal pain as seen in history  Can follow up as outpatient; contact information given to patient  Continue supportive care per primary team.

## 2024-04-29 LAB
DNA PLOIDY SPEC FC-IMP: SIGNIFICANT CHANGE UP
GLUCOSE BLDC GLUCOMTR-MCNC: 186 MG/DL — HIGH (ref 70–99)
GLUCOSE BLDC GLUCOMTR-MCNC: 264 MG/DL — HIGH (ref 70–99)
GLUCOSE BLDC GLUCOMTR-MCNC: 270 MG/DL — HIGH (ref 70–99)
GLUCOSE BLDC GLUCOMTR-MCNC: 337 MG/DL — HIGH (ref 70–99)

## 2024-04-29 PROCEDURE — 99231 SBSQ HOSP IP/OBS SF/LOW 25: CPT

## 2024-04-29 RX ORDER — MORPHINE SULFATE 50 MG/1
2 CAPSULE, EXTENDED RELEASE ORAL EVERY 6 HOURS
Refills: 0 | Status: DISCONTINUED | OUTPATIENT
Start: 2024-04-29 | End: 2024-05-01

## 2024-04-29 RX ADMIN — PANTOPRAZOLE SODIUM 40 MILLIGRAM(S): 20 TABLET, DELAYED RELEASE ORAL at 08:15

## 2024-04-29 RX ADMIN — Medication 1: at 08:13

## 2024-04-29 RX ADMIN — Medication 1 TABLET(S): at 17:23

## 2024-04-29 RX ADMIN — Medication 3 MILLIGRAM(S): at 21:21

## 2024-04-29 RX ADMIN — Medication 1000 UNIT(S): at 11:11

## 2024-04-29 RX ADMIN — LORATADINE 10 MILLIGRAM(S): 10 TABLET ORAL at 11:12

## 2024-04-29 RX ADMIN — Medication 3 UNIT(S): at 16:53

## 2024-04-29 RX ADMIN — Medication 200 MILLIGRAM(S): at 17:24

## 2024-04-29 RX ADMIN — MYCOPHENOLATE MOFETIL 1500 MILLIGRAM(S): 250 CAPSULE ORAL at 17:24

## 2024-04-29 RX ADMIN — TAMSULOSIN HYDROCHLORIDE 0.4 MILLIGRAM(S): 0.4 CAPSULE ORAL at 21:18

## 2024-04-29 RX ADMIN — Medication 4: at 16:54

## 2024-04-29 RX ADMIN — Medication 200 MILLIGRAM(S): at 09:18

## 2024-04-29 RX ADMIN — INSULIN GLARGINE 36 UNIT(S): 100 INJECTION, SOLUTION SUBCUTANEOUS at 21:17

## 2024-04-29 RX ADMIN — Medication 1 TABLET(S): at 09:16

## 2024-04-29 RX ADMIN — APIXABAN 10 MILLIGRAM(S): 2.5 TABLET, FILM COATED ORAL at 09:16

## 2024-04-29 RX ADMIN — Medication 3: at 11:29

## 2024-04-29 RX ADMIN — Medication 3 UNIT(S): at 11:30

## 2024-04-29 RX ADMIN — Medication 3 UNIT(S): at 08:14

## 2024-04-29 RX ADMIN — MYCOPHENOLATE MOFETIL 1500 MILLIGRAM(S): 250 CAPSULE ORAL at 09:17

## 2024-04-29 RX ADMIN — LOSARTAN POTASSIUM 50 MILLIGRAM(S): 100 TABLET, FILM COATED ORAL at 09:16

## 2024-04-29 RX ADMIN — APIXABAN 10 MILLIGRAM(S): 2.5 TABLET, FILM COATED ORAL at 17:23

## 2024-04-29 RX ADMIN — MORPHINE SULFATE 2 MILLIGRAM(S): 50 CAPSULE, EXTENDED RELEASE ORAL at 13:34

## 2024-04-29 RX ADMIN — SENNA PLUS 2 TABLET(S): 8.6 TABLET ORAL at 21:18

## 2024-04-29 RX ADMIN — MORPHINE SULFATE 2 MILLIGRAM(S): 50 CAPSULE, EXTENDED RELEASE ORAL at 13:50

## 2024-04-29 RX ADMIN — Medication 20 MILLIGRAM(S): at 09:16

## 2024-04-29 NOTE — PROGRESS NOTE ADULT - SUBJECTIVE AND OBJECTIVE BOX
A/P: 63yo F with PMHx of HTN, IDDM2, lupus, prior hx of UR requiring catheter 4 years ago (never Follow up outpt with urology) who presented to the ED on 4/22/24 from [facility] for complaints of not feeling well. Found to have PNA and b/l PEs, currently on heparin gtt.   pt yesterday had suprapubic fullness/pain found to have a PVR of 250cc. now s/p elliott catheter decompression   Patient likely with urinary retention 2/2 neurogenic bladder  - c/w elliott catheter, monitor UOP  - pt will require outpt f/u for further workup & urodynamics   - continue care per primary team      Patient seen and examined bedside resting comfortably.  No complaints offered.   .      T(F): 98.3 (04-30-24 @ 04:56), Max: 98.3 (04-30-24 @ 04:56)  HR: 93 (04-30-24 @ 04:56) (73 - 93)  BP: 122/79 (04-30-24 @ 04:56) (122/79 - 147/83)  RR: 18 (04-30-24 @ 04:56) (16 - 18)  SpO2: 99% (04-30-24 @ 04:56) (97% - 100%)  Wt(kg): --  CAPILLARY BLOOD GLUCOSE      POCT Blood Glucose.: 189 mg/dL (30 Apr 2024 07:24)  POCT Blood Glucose.: 264 mg/dL (29 Apr 2024 21:06)  POCT Blood Glucose.: 337 mg/dL (29 Apr 2024 16:47)  POCT Blood Glucose.: 270 mg/dL (29 Apr 2024 11:04)      PHYSICAL EXAM:  General: NAD, alert and awake  HEENT: NCAT, EOMI, conjunctiva clear  Chest: nonlabored respirations, good inspiratory effort  Abdomen: soft, NTND.   Extremities: no pedal edema or calf tenderness noted   : no suprapubic tenderness, draining clear yellow urine     LABS:  04-28    142  |  110<H>  |  20  ----------------------------<  242<H>  3.3<L>   |  25  |  1.02    Ca    8.7      28 Apr 2024 10:37  Phos  3.1     04-28  Mg     1.7     04-28      I&O's Detail    29 Apr 2024 07:01  -  30 Apr 2024 07:00  --------------------------------------------------------  IN:    Oral Fluid: 700 mL  Total IN: 700 mL    OUT:    Indwelling Catheter - Urethral (mL): 2600 mL  Total OUT: 2600 mL    Total NET: -1900 mL            Plan:  A/P: 63yo F with PMHx of HTN, IDDM2, lupus, prior hx of UR requiring catheter 4 years ago (never Follow up outpt with urology) who presented to the ED on 4/22/24 from [facility] for complaints of not feeling well. Found to have PNA and b/l PEs, currently on heparin gtt.   pt yesterday had suprapubic fullness/pain found to have a PVR of 250cc. now s/p elliott catheter decompression   Patient likely with urinary retention 2/2 neurogenic bladder  - c/w elliott catheter, monitor UOP  - pt will require outpt f/u for further workup & urodynamics   - continue care per primary team

## 2024-04-29 NOTE — PROGRESS NOTE ADULT - ASSESSMENT
Tamika Allred is a 64 year old female with PMHx of HTN, IDDM2, and lupus who presented to the ED on 4/22/24 from [facility] for complaints of not feeling well and admitted for Sepsis-secondary to suspected HCAP and bilateral pulmonary emboli and DVT.      Sepsis- secondary to suspected HCAP    per my colleague's documentation "Complaints of not feeling well since yesterday, described as RUQ abdominal pain and R. sided back pain  Tmax 100.2,  and WBC 12.47K on admission  U/A with occasional bacteria and squamous epithelial cells present, COVID/influenza negative  CXR with patchy RUL and b/l lower lung opacities, CTA chest with bibasilar lung opacities    CT A/P without infectious etiology, U/S abdomen with enlarged fatty liver and nonobstructive L. nephrolithiasis    Doubt need for ABX, CTA findings all likely from PE. "  4/24/2024 - not on antibx as my colleague now feel initial presentation and CT chest findings secondary to b/l pulmonary emboli  continue with heparin drip   has h/o lupus which may be reason for dvt-and pulmonary emboli   add on hypercoagulable w/u if possible to admitting blood work. get heme/onc consult unprovoked PE-vs secondary to Lupus   f/u echo  4/25/2024-echo as above  4/26/2024 heme/onc unavailable and upon my d/w heme/onc will f/u as outpt- protein c , beta 2, anti thrombin, lupus anticoagulant and anti cardiolipin ab- are negative   outstanding is Protein S-->pending  4/29/2024 protein s--> wnl   antithrombin III--> low ( but could be due to concurrent use of heparin drip at the time of sampling )    molecular factor V Leiden --> pending    HTN: continue with losartan 50 mg  4/25/2024- bp stable     Lupus:  continue with prednisone 20 mg, hydroxychloroquine 200 mg BID, mycophenolate 1500 mg BID, pantoprazole 40 mg   IDDM2: last known A1c 10.2 (on 3/25/24), POC qac and qhs, PTA Lantus 34 U qhs and Humalog low dose SSI qac, PTA metformin 500 mg BID held, blood glucose goal < 180.    4/26/2024- will add 3 units pre meal   4/27/2024 blood sugar is better  4/29/2024 had to incresae  insulin BS better this morning     abd pain- etiology unclear reviewed ct scan with IV contrast  done on admission and no acute abnormality however pt had a prior cat scan in [id number] with nephrolithiasis  she report good bm while here . will get an non contrast  scan to eval for kidney stones as cause of abd pain.    UA noted will repeat again to ensure certainty   4/28/2024 repeat ua negative ct scan doen no stones , shows small umbilical hernia ? the cause will get gen surgery consult    also pt seems to be having intermittent urinary retention will get urology consult  as this may be the real cause   4/29/2024 urology teams agrees this is urinary retention and ordered a elliott - with outpt f/u for urodynamic studies       dispo- per d/w sw/cm om 4/26/2024 insurance issues have arised- sw d/w daughter ways to address it -   pt and daughter decline going home and Pt reccs are still for STR       Tamika Allred is a 64 year old female with PMHx of HTN, IDDM2, and lupus who presented to the ED on 4/22/24 from [facility] for complaints of not feeling well and admitted for Sepsis-secondary to suspected HCAP and bilateral pulmonary emboli and DVT.      Sepsis- secondary to suspected HCAP    per my colleague's documentation "Complaints of not feeling well since yesterday, described as RUQ abdominal pain and R. sided back pain  Tmax 100.2,  and WBC 12.47K on admission  U/A with occasional bacteria and squamous epithelial cells present, COVID/influenza negative  CXR with patchy RUL and b/l lower lung opacities, CTA chest with bibasilar lung opacities    CT A/P without infectious etiology, U/S abdomen with enlarged fatty liver and nonobstructive L. nephrolithiasis    Doubt need for ABX, CTA findings all likely from PE. "  4/24/2024 - not on antibx as my colleague now feel initial presentation and CT chest findings secondary to b/l pulmonary emboli  continue with heparin drip   has h/o lupus which may be reason for dvt-and pulmonary emboli   add on hypercoagulable w/u if possible to admitting blood work. get heme/onc consult unprovoked PE-vs secondary to Lupus   f/u echo  4/25/2024-echo as above  4/26/2024 heme/onc unavailable and upon my d/w heme/onc will f/u as outpt- protein c , beta 2, anti thrombin, lupus anticoagulant and anti cardiolipin ab- are negative   outstanding is Protein S-->pending  4/29/2024 protein s--> wnl   antithrombin III--> low ( but could be due to concurrent use of heparin drip at the time of sampling )    molecular factor V Leiden --> pending  SEPSIS---ruled out     HTN: continue with losartan 50 mg  4/25/2024- bp stable     Lupus:  continue with prednisone 20 mg, hydroxychloroquine 200 mg BID, mycophenolate 1500 mg BID, pantoprazole 40 mg   IDDM2: last known A1c 10.2 (on 3/25/24), POC qac and qhs, PTA Lantus 34 U qhs and Humalog low dose SSI qac, PTA metformin 500 mg BID held, blood glucose goal < 180.    4/26/2024- will add 3 units pre meal   4/27/2024 blood sugar is better  4/29/2024 had to incresae  insulin BS better this morning     abd pain- etiology unclear reviewed ct scan with IV contrast  done on admission and no acute abnormality however pt had a prior cat scan in [id number] with nephrolithiasis  she report good bm while here . will get an non contrast  scan to eval for kidney stones as cause of abd pain.    UA noted will repeat again to ensure certainty   4/28/2024 repeat ua negative ct scan doen no stones , shows small umbilical hernia ? the cause will get gen surgery consult    also pt seems to be having intermittent urinary retention will get urology consult  as this may be the real cause   4/29/2024 urology teams agrees this is urinary retention and ordered a elliott - with outpt f/u for urodynamic studies       dispo- per d/w sw/cm om 4/26/2024 insurance issues have arised- sw d/w daughter ways to address it -   pt and daughter decline going home and Pt reccs are still for STR

## 2024-04-29 NOTE — PROGRESS NOTE ADULT - SUBJECTIVE AND OBJECTIVE BOX
INTERVAL HPI/OVERNIGHT EVENTS:  Pt seen and examined at bedside.   Allergies/Intolerance: No Known Allergies      MEDICATIONS  (STANDING):  apixaban 10 milliGRAM(s) Oral every 12 hours  calcium carbonate 1250 mG  + Vitamin D (OsCal 500 + D) 1 Tablet(s) Oral two times a day  cholecalciferol 1000 Unit(s) Oral daily  dextrose 10% Bolus 125 milliLiter(s) IV Bolus once  dextrose 5%. 1000 milliLiter(s) (100 mL/Hr) IV Continuous <Continuous>  dextrose 5%. 1000 milliLiter(s) (50 mL/Hr) IV Continuous <Continuous>  dextrose 50% Injectable 25 Gram(s) IV Push once  dextrose 50% Injectable 12.5 Gram(s) IV Push once  glucagon  Injectable 1 milliGRAM(s) IntraMuscular once  hydroxychloroquine 200 milliGRAM(s) Oral two times a day  influenza   Vaccine 0.5 milliLiter(s) IntraMuscular once  insulin glargine Injectable (LANTUS) 36 Unit(s) SubCutaneous at bedtime  insulin lispro (ADMELOG) corrective regimen sliding scale   SubCutaneous three times a day before meals  insulin lispro Injectable (ADMELOG) 3 Unit(s) SubCutaneous three times a day before meals  loratadine 10 milliGRAM(s) Oral daily  losartan 50 milliGRAM(s) Oral daily  mycophenolate mofetil 1500 milliGRAM(s) Oral two times a day  pantoprazole    Tablet 40 milliGRAM(s) Oral before breakfast  predniSONE   Tablet 20 milliGRAM(s) Oral daily  senna 2 Tablet(s) Oral at bedtime  tamsulosin 0.4 milliGRAM(s) Oral at bedtime    MEDICATIONS  (PRN):  acetaminophen     Tablet .. 650 milliGRAM(s) Oral every 6 hours PRN Temp greater or equal to 38C (100.4F), Mild Pain (1 - 3)  dextrose Oral Gel 15 Gram(s) Oral once PRN Blood Glucose LESS THAN 70 milliGRAM(s)/deciliter  melatonin 3 milliGRAM(s) Oral at bedtime PRN Insomnia  oxyCODONE    IR 5 milliGRAM(s) Oral every 6 hours PRN for severe pain    Vital Signs Last 24 Hrs  T(C): 36.8 (29 Apr 2024 04:57), Max: 36.8 (29 Apr 2024 04:57)  T(F): 98.3 (29 Apr 2024 04:57), Max: 98.3 (29 Apr 2024 04:57)  HR: 74 (29 Apr 2024 04:57) (70 - 93)  BP: 130/79 (29 Apr 2024 04:57) (122/75 - 151/85)  BP(mean): --  RR: 19 (29 Apr 2024 04:57) (18 - 19)  SpO2: 99% (29 Apr 2024 04:57) (97% - 100%)    Parameters below as of 28 Apr 2024 16:24  Patient On (Oxygen Delivery Method): room air      GENERAL: stable, comfortable on RA, no CP or SOB at rest.   NECK: supple,  CHEST/LUNG: clear to auscultation bilaterally; no rales, rhonchi, or wheezing b/l  HEART: normal S1, S2  ABDOMEN: BS+, soft, ND, RLQ and LQ and hypogastric abd pain   EXTREMITIES:  pulses palpable; no clubbing, cyanosis, + edema b/l LEs      LABS:                          8.6    5.31  )-----------( 239      ( 28 Apr 2024 07:05 )             26.6   04-28    142  |  110<H>  |  20  ----------------------------<  242<H>  3.3<L>   |  25  |  1.02    Ca    8.7      28 Apr 2024 10:37  Phos  3.1     04-28  Mg     1.7     04-28      < from: TTE Echo Complete w/o Contrast w/ Doppler (04.24.24 @ 09:48) >    Summary:   1. Left ventricular ejection fraction, by visual estimation, is 60 to   65%.   2. Normal left ventricular size and wall thicknesses, with normal   systolic and diastolic function.   3. Normal right ventricular size and function.   4. The left atrium is normal in size.   5. The right atrium is normal in size.   6. Sclerotic aortic valve with normal opening.   7. Normal pulmonary artery pressure.      < end of copied text >  < from: CT Abdomen and Pelvis w/ Oral Cont (04.27.24 @ 17:17) >  There is persistent relatively extensive bibasilar airspace consolidation   and/or subsegmental atelectasis.    Trace right pleural effusion.    No acute findings within the abdomen or pelvis.    Please refer to detailed findings otherwise described above.      < end of copied text >  CAPILLARY BLOOD GLUCOSE      POCT Blood Glucose.: 186 mg/dL (29 Apr 2024 07:57)  POCT Blood Glucose.: 274 mg/dL (28 Apr 2024 21:05)  POCT Blood Glucose.: 358 mg/dL (28 Apr 2024 17:03)  POCT Blood Glucose.: 259 mg/dL (28 Apr 2024 11:06)

## 2024-04-30 LAB
FLUAV AG NPH QL: SIGNIFICANT CHANGE UP
FLUBV AG NPH QL: SIGNIFICANT CHANGE UP
GLUCOSE BLDC GLUCOMTR-MCNC: 189 MG/DL — HIGH (ref 70–99)
GLUCOSE BLDC GLUCOMTR-MCNC: 212 MG/DL — HIGH (ref 70–99)
GLUCOSE BLDC GLUCOMTR-MCNC: 272 MG/DL — HIGH (ref 70–99)
GLUCOSE BLDC GLUCOMTR-MCNC: 337 MG/DL — HIGH (ref 70–99)
GLUCOSE BLDC GLUCOMTR-MCNC: 458 MG/DL — CRITICAL HIGH (ref 70–99)
GLUCOSE BLDC GLUCOMTR-MCNC: 498 MG/DL — CRITICAL HIGH (ref 70–99)
SARS-COV-2 RNA SPEC QL NAA+PROBE: SIGNIFICANT CHANGE UP

## 2024-04-30 PROCEDURE — 99232 SBSQ HOSP IP/OBS MODERATE 35: CPT

## 2024-04-30 PROCEDURE — 99231 SBSQ HOSP IP/OBS SF/LOW 25: CPT

## 2024-04-30 RX ORDER — INSULIN GLARGINE 100 [IU]/ML
40 INJECTION, SOLUTION SUBCUTANEOUS AT BEDTIME
Refills: 0 | Status: DISCONTINUED | OUTPATIENT
Start: 2024-04-30 | End: 2024-05-01

## 2024-04-30 RX ORDER — INSULIN LISPRO 100/ML
8 VIAL (ML) SUBCUTANEOUS
Refills: 0 | Status: DISCONTINUED | OUTPATIENT
Start: 2024-04-30 | End: 2024-05-01

## 2024-04-30 RX ORDER — INSULIN LISPRO 100/ML
VIAL (ML) SUBCUTANEOUS
Refills: 0 | Status: DISCONTINUED | OUTPATIENT
Start: 2024-04-30 | End: 2024-05-01

## 2024-04-30 RX ADMIN — APIXABAN 10 MILLIGRAM(S): 2.5 TABLET, FILM COATED ORAL at 17:25

## 2024-04-30 RX ADMIN — Medication 1: at 07:34

## 2024-04-30 RX ADMIN — Medication 1 TABLET(S): at 05:15

## 2024-04-30 RX ADMIN — MYCOPHENOLATE MOFETIL 1500 MILLIGRAM(S): 250 CAPSULE ORAL at 07:36

## 2024-04-30 RX ADMIN — Medication 8 UNIT(S): at 11:32

## 2024-04-30 RX ADMIN — Medication 20 MILLIGRAM(S): at 05:18

## 2024-04-30 RX ADMIN — APIXABAN 10 MILLIGRAM(S): 2.5 TABLET, FILM COATED ORAL at 05:15

## 2024-04-30 RX ADMIN — MYCOPHENOLATE MOFETIL 1500 MILLIGRAM(S): 250 CAPSULE ORAL at 17:26

## 2024-04-30 RX ADMIN — Medication 12: at 11:33

## 2024-04-30 RX ADMIN — Medication 1 TABLET(S): at 17:25

## 2024-04-30 RX ADMIN — Medication 650 MILLIGRAM(S): at 20:31

## 2024-04-30 RX ADMIN — LOSARTAN POTASSIUM 50 MILLIGRAM(S): 100 TABLET, FILM COATED ORAL at 05:19

## 2024-04-30 RX ADMIN — PANTOPRAZOLE SODIUM 40 MILLIGRAM(S): 20 TABLET, DELAYED RELEASE ORAL at 05:29

## 2024-04-30 RX ADMIN — Medication 650 MILLIGRAM(S): at 21:50

## 2024-04-30 RX ADMIN — Medication 1000 UNIT(S): at 11:31

## 2024-04-30 RX ADMIN — Medication 8: at 17:21

## 2024-04-30 RX ADMIN — Medication 200 MILLIGRAM(S): at 05:16

## 2024-04-30 RX ADMIN — Medication 200 MILLIGRAM(S): at 17:25

## 2024-04-30 RX ADMIN — Medication 3 UNIT(S): at 07:35

## 2024-04-30 RX ADMIN — LORATADINE 10 MILLIGRAM(S): 10 TABLET ORAL at 11:31

## 2024-04-30 RX ADMIN — Medication 8 UNIT(S): at 17:21

## 2024-04-30 RX ADMIN — INSULIN GLARGINE 40 UNIT(S): 100 INJECTION, SOLUTION SUBCUTANEOUS at 21:53

## 2024-04-30 RX ADMIN — TAMSULOSIN HYDROCHLORIDE 0.4 MILLIGRAM(S): 0.4 CAPSULE ORAL at 21:21

## 2024-04-30 NOTE — PROGRESS NOTE ADULT - SUBJECTIVE AND OBJECTIVE BOX
INTERVAL HPI/OVERNIGHT EVENTS:  Pt seen and examined at bedside.   Allergies/Intolerance: No Known Allergies      MEDICATIONS  (STANDING):  apixaban 10 milliGRAM(s) Oral every 12 hours  calcium carbonate 1250 mG  + Vitamin D (OsCal 500 + D) 1 Tablet(s) Oral two times a day  cholecalciferol 1000 Unit(s) Oral daily  dextrose 10% Bolus 125 milliLiter(s) IV Bolus once  dextrose 5%. 1000 milliLiter(s) (50 mL/Hr) IV Continuous <Continuous>  dextrose 5%. 1000 milliLiter(s) (100 mL/Hr) IV Continuous <Continuous>  dextrose 50% Injectable 25 Gram(s) IV Push once  dextrose 50% Injectable 12.5 Gram(s) IV Push once  glucagon  Injectable 1 milliGRAM(s) IntraMuscular once  hydroxychloroquine 200 milliGRAM(s) Oral two times a day  influenza   Vaccine 0.5 milliLiter(s) IntraMuscular once  insulin glargine Injectable (LANTUS) 36 Unit(s) SubCutaneous at bedtime  insulin lispro (ADMELOG) corrective regimen sliding scale   SubCutaneous three times a day before meals  insulin lispro Injectable (ADMELOG) 3 Unit(s) SubCutaneous three times a day before meals  loratadine 10 milliGRAM(s) Oral daily  losartan 50 milliGRAM(s) Oral daily  mycophenolate mofetil 1500 milliGRAM(s) Oral two times a day  pantoprazole    Tablet 40 milliGRAM(s) Oral before breakfast  predniSONE   Tablet 20 milliGRAM(s) Oral daily  senna 2 Tablet(s) Oral at bedtime  tamsulosin 0.4 milliGRAM(s) Oral at bedtime    MEDICATIONS  (PRN):  acetaminophen     Tablet .. 650 milliGRAM(s) Oral every 6 hours PRN Temp greater or equal to 38C (100.4F), Mild Pain (1 - 3)  dextrose Oral Gel 15 Gram(s) Oral once PRN Blood Glucose LESS THAN 70 milliGRAM(s)/deciliter  melatonin 3 milliGRAM(s) Oral at bedtime PRN Insomnia  morphine  - Injectable 2 milliGRAM(s) IV Push every 6 hours PRN Severe Pain (7 - 10)    Vital Signs Last 24 Hrs  T(C): 36.8 (30 Apr 2024 04:56), Max: 36.8 (30 Apr 2024 04:56)  T(F): 98.3 (30 Apr 2024 04:56), Max: 98.3 (30 Apr 2024 04:56)  HR: 93 (30 Apr 2024 04:56) (73 - 93)  BP: 122/79 (30 Apr 2024 04:56) (122/79 - 147/83)  BP(mean): --  RR: 18 (30 Apr 2024 04:56) (16 - 18)  SpO2: 99% (30 Apr 2024 04:56) (97% - 100%)    Parameters below as of 29 Apr 2024 16:08  Patient On (Oxygen Delivery Method): room air    04-28    142  |  110<H>  |  20  ----------------------------<  242<H>  3.3<L>   |  25  |  1.02    Ca    8.7      28 Apr 2024 10:37  Phos  3.1     04-28  Mg     1.7     04-28        GENERAL: stable, comfortable on RA, no CP or SOB at rest.   NECK: supple,  CHEST/LUNG: clear to auscultation bilaterally; no rales, rhonchi, or wheezing b/l  HEART: normal S1, S2  ABDOMEN: BS+, soft, ND, imporved RLQ and LQ and hypogastric abd pain   EXTREMITIES:  pulses palpable; no clubbing, cyanosis, + edema b/l LEs      LABS:            < from: TTE Echo Complete w/o Contrast w/ Doppler (04.24.24 @ 09:48) >    Summary:   1. Left ventricular ejection fraction, by visual estimation, is 60 to   65%.   2. Normal left ventricular size and wall thicknesses, with normal   systolic and diastolic function.   3. Normal right ventricular size and function.   4. The left atrium is normal in size.   5. The right atrium is normal in size.   6. Sclerotic aortic valve with normal opening.   7. Normal pulmonary artery pressure.      < end of copied text >  < from: CT Abdomen and Pelvis w/ Oral Cont (04.27.24 @ 17:17) >  There is persistent relatively extensive bibasilar airspace consolidation   and/or subsegmental atelectasis.    Trace right pleural effusion.    No acute findings within the abdomen or pelvis.    Please refer to detailed findings otherwise described above.      < end of copied text >  CAPILLARY BLOOD GLUCOSE      POCT Blood Glucose.: 186 mg/dL (29 Apr 2024 07:57)  POCT Blood Glucose.: 274 mg/dL (28 Apr 2024 21:05)  POCT Blood Glucose.: 358 mg/dL (28 Apr 2024 17:03)  POCT Blood Glucose.: 259 mg/dL (28 Apr 2024 11:06)

## 2024-04-30 NOTE — PROGRESS NOTE ADULT - ASSESSMENT
Tamika Allred is a 64 year old female with PMHx of HTN, IDDM2, and lupus who presented to the ED on 4/22/24 from [facility] for complaints of not feeling well and admitted for Sepsis-secondary to suspected HCAP and bilateral pulmonary emboli and DVT.      Sepsis- secondary to suspected HCAP    per my colleague's documentation "Complaints of not feeling well since yesterday, described as RUQ abdominal pain and R. sided back pain  Tmax 100.2,  and WBC 12.47K on admission  U/A with occasional bacteria and squamous epithelial cells present, COVID/influenza negative  CXR with patchy RUL and b/l lower lung opacities, CTA chest with bibasilar lung opacities    CT A/P without infectious etiology, U/S abdomen with enlarged fatty liver and nonobstructive L. nephrolithiasis    Doubt need for ABX, CTA findings all likely from PE. "  4/24/2024 - not on antibx as my colleague now feel initial presentation and CT chest findings secondary to b/l pulmonary emboli  continue with heparin drip   has h/o lupus which may be reason for dvt-and pulmonary emboli   add on hypercoagulable w/u if possible to admitting blood work. get heme/onc consult unprovoked PE-vs secondary to Lupus   f/u echo  4/25/2024-echo as above  4/26/2024 heme/onc unavailable and upon my d/w heme/onc will f/u as outpt- protein c , beta 2, anti thrombin, lupus anticoagulant and anti cardiolipin ab- are negative   outstanding is Protein S-->pending  4/29/2024 protein s--> wnl   antithrombin III--> low ( but could be due to concurrent use of heparin drip at the time of sampling )    molecular factor V Leiden --> pending  SEPSIS---ruled out     HTN: continue with losartan 50 mg  4/25/2024- bp stable     Lupus:  continue with prednisone 20 mg, hydroxychloroquine 200 mg BID, mycophenolate 1500 mg BID, pantoprazole 40 mg   IDDM2: last known A1c 10.2 (on 3/25/24), POC qac and qhs, PTA Lantus 34 U qhs and Humalog low dose SSI qac, PTA metformin 500 mg BID held, blood glucose goal < 180.    4/26/2024- will add 3 units pre meal   4/27/2024 blood sugar is better  4/29/2024 had to incresae  insulin BS better this morning     abd pain- etiology unclear reviewed ct scan with IV contrast  done on admission and no acute abnormality however pt had a prior cat scan in [id number] with nephrolithiasis  she report good bm while here . will get an non contrast  scan to eval for kidney stones as cause of abd pain.    UA noted will repeat again to ensure certainty   4/28/2024 repeat ua negative ct scan doen no stones , shows small umbilical hernia ? the cause will get gen surgery consult    also pt seems to be having intermittent urinary retention will get urology consult  as this may be the real cause   4/29/2024 urology teams agrees this is urinary retention and ordered a elliott - with outpt f/u for urodynamic studies       dispo- per d/w sw/cm om 4/26/2024 insurance issues have arised- sw d/w daughter ways to address it -   pt and daughter decline going home and Pt reccs are still for STR  4/30/2024 per IDR to be discharged in am when insurance takes over May 1st

## 2024-04-30 NOTE — PROGRESS NOTE ADULT - SUBJECTIVE AND OBJECTIVE BOX
Patient seen and examined bedside resting comfortably.  No complaints offered.       T(F): 98.3 (04-30-24 @ 04:56), Max: 98.3 (04-30-24 @ 04:56)  HR: 93 (04-30-24 @ 04:56) (73 - 93)  BP: 122/79 (04-30-24 @ 04:56) (122/79 - 147/83)  RR: 18 (04-30-24 @ 04:56) (16 - 18)  SpO2: 99% (04-30-24 @ 04:56) (97% - 100%)  Wt(kg): --  CAPILLARY BLOOD GLUCOSE      POCT Blood Glucose.: 189 mg/dL (30 Apr 2024 07:24)  POCT Blood Glucose.: 264 mg/dL (29 Apr 2024 21:06)  POCT Blood Glucose.: 337 mg/dL (29 Apr 2024 16:47)  POCT Blood Glucose.: 270 mg/dL (29 Apr 2024 11:04)      PHYSICAL EXAM:  General: NAD, alert and awake  HEENT: NCAT, EOMI, conjunctiva clear  Chest: nonlabored respirations, good inspiratory effort  Abdomen: soft, NTND.   Extremities: no pedal edema or calf tenderness noted   : no suprapubic tenderness, catheter drianing clear yellow urine, 2600cc/24hrs    LABS:  04-28    142  |  110<H>  |  20  ----------------------------<  242<H>  3.3<L>   |  25  |  1.02    Ca    8.7      28 Apr 2024 10:37  Phos  3.1     04-28  Mg     1.7     04-28      I&O's Detail    29 Apr 2024 07:01  -  30 Apr 2024 07:00  --------------------------------------------------------  IN:    Oral Fluid: 700 mL  Total IN: 700 mL    OUT:    Indwelling Catheter - Urethral (mL): 2600 mL  Total OUT: 2600 mL    Total NET: -1900 mL        A/P: 63yo F with PMHx of HTN, IDDM2, lupus, prior hx of UR requiring catheter 4 years ago (never Followed up outpt with urology) who presented to the ED on 4/22/24 from [facility] for complaints of not feeling well. Found to have PNA and b/l PEs, currently on heparin gtt.   pt on sunday had suprapubic fullness/pain found to have a PVR of 250cc. now s/p elliott catheter decompression   Patient likely with urinary retention 2/2 neurogenic bladder  - c/w elliott catheter, monitor UOP. can be d/c with the catheter  - pt will require outpt f/u for further workup & urodynamics   - continue care per primary team    Patient seen and examined bedside resting comfortably.  No complaints offered.       T(F): 98.3 (04-30-24 @ 04:56), Max: 98.3 (04-30-24 @ 04:56)  HR: 93 (04-30-24 @ 04:56) (73 - 93)  BP: 122/79 (04-30-24 @ 04:56) (122/79 - 147/83)  RR: 18 (04-30-24 @ 04:56) (16 - 18)  SpO2: 99% (04-30-24 @ 04:56) (97% - 100%)  Wt(kg): --  CAPILLARY BLOOD GLUCOSE      POCT Blood Glucose.: 189 mg/dL (30 Apr 2024 07:24)  POCT Blood Glucose.: 264 mg/dL (29 Apr 2024 21:06)  POCT Blood Glucose.: 337 mg/dL (29 Apr 2024 16:47)  POCT Blood Glucose.: 270 mg/dL (29 Apr 2024 11:04)      PHYSICAL EXAM:  General: NAD, alert and awake  HEENT: NCAT, EOMI, conjunctiva clear  Chest: nonlabored respirations, good inspiratory effort  Abdomen: soft, NTND.   Extremities: no pedal edema or calf tenderness noted   : no suprapubic tenderness, catheter drianing clear yellow urine, 2600cc/24hrs    LABS:  04-28    142  |  110<H>  |  20  ----------------------------<  242<H>  3.3<L>   |  25  |  1.02    Ca    8.7      28 Apr 2024 10:37  Phos  3.1     04-28  Mg     1.7     04-28      I&O's Detail    29 Apr 2024 07:01  -  30 Apr 2024 07:00  --------------------------------------------------------  IN:    Oral Fluid: 700 mL  Total IN: 700 mL    OUT:    Indwelling Catheter - Urethral (mL): 2600 mL  Total OUT: 2600 mL    Total NET: -1900 mL        A/P: 63yo F with PMHx of HTN, IDDM2, lupus, prior hx of UR requiring catheter 4 years ago (never Followed up outpt with urology) who presented to the ED on 4/22/24 from [facility] for complaints of not feeling well. Found to have PNA and b/l PEs, currently on heparin gtt.   pt on sunday had suprapubic fullness/pain found to have a PVR of 250cc. now s/p elliott catheter decompression   Patient likely with urinary retention 2/2 neurogenic bladder  - c/w elliott catheter, monitor UOP. can be d/c with the catheter  - pt will require outpt f/u for further workup & urodynamics   - continue care per primary team   -case discussed with Dr Burt, will sign off, please reconsult PRN

## 2024-05-01 ENCOUNTER — TRANSCRIPTION ENCOUNTER (OUTPATIENT)
Age: 64
End: 2024-05-01

## 2024-05-01 VITALS
SYSTOLIC BLOOD PRESSURE: 125 MMHG | TEMPERATURE: 98 F | RESPIRATION RATE: 18 BRPM | OXYGEN SATURATION: 100 % | DIASTOLIC BLOOD PRESSURE: 67 MMHG

## 2024-05-01 LAB
GLUCOSE BLDC GLUCOMTR-MCNC: 159 MG/DL — HIGH (ref 70–99)
GLUCOSE BLDC GLUCOMTR-MCNC: 310 MG/DL — HIGH (ref 70–99)

## 2024-05-01 PROCEDURE — 99239 HOSP IP/OBS DSCHRG MGMT >30: CPT

## 2024-05-01 RX ORDER — INSULIN LISPRO 100/ML
1 VIAL (ML) SUBCUTANEOUS
Qty: 0 | Refills: 0 | DISCHARGE
Start: 2024-05-01

## 2024-05-01 RX ORDER — ASCORBIC ACID 60 MG
1 TABLET,CHEWABLE ORAL
Qty: 0 | Refills: 0 | DISCHARGE
Start: 2024-05-01

## 2024-05-01 RX ORDER — BENZOCAINE AND MENTHOL 5; 1 G/100ML; G/100ML
1 LIQUID ORAL EVERY 4 HOURS
Refills: 0 | Status: DISCONTINUED | OUTPATIENT
Start: 2024-05-01 | End: 2024-05-01

## 2024-05-01 RX ORDER — INSULIN LISPRO 100/ML
10 VIAL (ML) SUBCUTANEOUS
Refills: 0 | Status: DISCONTINUED | OUTPATIENT
Start: 2024-05-01 | End: 2024-05-01

## 2024-05-01 RX ORDER — ASCORBIC ACID 60 MG
500 TABLET,CHEWABLE ORAL DAILY
Refills: 0 | Status: DISCONTINUED | OUTPATIENT
Start: 2024-05-01 | End: 2024-05-01

## 2024-05-01 RX ADMIN — Medication 500 MILLIGRAM(S): at 12:38

## 2024-05-01 RX ADMIN — Medication 2: at 07:41

## 2024-05-01 RX ADMIN — Medication 100 MILLIGRAM(S): at 05:06

## 2024-05-01 RX ADMIN — LORATADINE 10 MILLIGRAM(S): 10 TABLET ORAL at 11:33

## 2024-05-01 RX ADMIN — BENZOCAINE AND MENTHOL 1 LOZENGE: 5; 1 LIQUID ORAL at 10:16

## 2024-05-01 RX ADMIN — Medication 1 TABLET(S): at 05:08

## 2024-05-01 RX ADMIN — PANTOPRAZOLE SODIUM 40 MILLIGRAM(S): 20 TABLET, DELAYED RELEASE ORAL at 05:07

## 2024-05-01 RX ADMIN — Medication 8: at 11:32

## 2024-05-01 RX ADMIN — MYCOPHENOLATE MOFETIL 1500 MILLIGRAM(S): 250 CAPSULE ORAL at 09:34

## 2024-05-01 RX ADMIN — Medication 20 MILLIGRAM(S): at 05:07

## 2024-05-01 RX ADMIN — Medication 8 UNIT(S): at 07:40

## 2024-05-01 RX ADMIN — Medication 10 UNIT(S): at 11:33

## 2024-05-01 RX ADMIN — APIXABAN 10 MILLIGRAM(S): 2.5 TABLET, FILM COATED ORAL at 05:08

## 2024-05-01 RX ADMIN — Medication 1000 UNIT(S): at 11:33

## 2024-05-01 RX ADMIN — Medication 200 MILLIGRAM(S): at 05:11

## 2024-05-01 RX ADMIN — LOSARTAN POTASSIUM 50 MILLIGRAM(S): 100 TABLET, FILM COATED ORAL at 05:07

## 2024-05-01 NOTE — PROGRESS NOTE ADULT - REASON FOR ADMISSION
Sepsis secondary to suspected HCAP, bilateral pulmonary emboli and DVT

## 2024-05-01 NOTE — PROGRESS NOTE ADULT - PROVIDER SPECIALTY LIST ADULT
Hospitalist
Urology
Hospitalist
Hospitalist
Urology
Hospitalist

## 2024-05-01 NOTE — DISCHARGE NOTE NURSING/CASE MANAGEMENT/SOCIAL WORK - NSDCPEFALRISK_GEN_ALL_CORE
For information on Fall & Injury Prevention, visit: [email or web address] OR  [email or web address] OR  https://www.cdc.gov/steadi/patient.html

## 2024-05-01 NOTE — PROGRESS NOTE ADULT - SUBJECTIVE AND OBJECTIVE BOX
INTERVAL HPI/OVERNIGHT EVENTS:  Pt seen and examined at bedside.   Allergies/Intolerance: No Known Allergies    MEDICATIONS  (STANDING):  apixaban 10 milliGRAM(s) Oral every 12 hours  ascorbic acid 500 milliGRAM(s) Oral daily  calcium carbonate 1250 mG  + Vitamin D (OsCal 500 + D) 1 Tablet(s) Oral two times a day  cholecalciferol 1000 Unit(s) Oral daily  dextrose 10% Bolus 125 milliLiter(s) IV Bolus once  dextrose 5%. 1000 milliLiter(s) (100 mL/Hr) IV Continuous <Continuous>  dextrose 5%. 1000 milliLiter(s) (50 mL/Hr) IV Continuous <Continuous>  dextrose 50% Injectable 12.5 Gram(s) IV Push once  dextrose 50% Injectable 25 Gram(s) IV Push once  glucagon  Injectable 1 milliGRAM(s) IntraMuscular once  hydroxychloroquine 200 milliGRAM(s) Oral two times a day  influenza   Vaccine 0.5 milliLiter(s) IntraMuscular once  insulin glargine Injectable (LANTUS) 40 Unit(s) SubCutaneous at bedtime  insulin lispro (ADMELOG) corrective regimen sliding scale   SubCutaneous three times a day before meals  insulin lispro Injectable (ADMELOG) 8 Unit(s) SubCutaneous three times a day before meals  loratadine 10 milliGRAM(s) Oral daily  losartan 50 milliGRAM(s) Oral daily  mycophenolate mofetil 1500 milliGRAM(s) Oral two times a day  pantoprazole    Tablet 40 milliGRAM(s) Oral before breakfast  predniSONE   Tablet 20 milliGRAM(s) Oral daily  senna 2 Tablet(s) Oral at bedtime  tamsulosin 0.4 milliGRAM(s) Oral at bedtime    MEDICATIONS  (PRN):  acetaminophen     Tablet .. 650 milliGRAM(s) Oral every 6 hours PRN Temp greater or equal to 38C (100.4F), Mild Pain (1 - 3)  benzocaine/menthol Lozenge 1 Lozenge Oral every 4 hours PRN Sore Throat  dextrose Oral Gel 15 Gram(s) Oral once PRN Blood Glucose LESS THAN 70 milliGRAM(s)/deciliter  guaiFENesin Oral Liquid (Sugar-Free) 100 milliGRAM(s) Oral every 6 hours PRN Cough  melatonin 3 milliGRAM(s) Oral at bedtime PRN Insomnia  morphine  - Injectable 2 milliGRAM(s) IV Push every 6 hours PRN Severe Pain (7 - 10)        GENERAL: stable, comfortable on RA, no CP or SOB at rest.   NECK: supple,  CHEST/LUNG: clear to auscultation bilaterally; no rales, rhonchi, or wheezing b/l  HEART: normal S1, S2  ABDOMEN: BS+, soft, ND, imporved RLQ and LQ and hypogastric abd pain   EXTREMITIES:  pulses palpable; no clubbing, cyanosis, + edema b/l LEs      LABS:            < from: TTE Echo Complete w/o Contrast w/ Doppler (04.24.24 @ 09:48) >    Summary:   1. Left ventricular ejection fraction, by visual estimation, is 60 to   65%.   2. Normal left ventricular size and wall thicknesses, with normal   systolic and diastolic function.   3. Normal right ventricular size and function.   4. The left atrium is normal in size.   5. The right atrium is normal in size.   6. Sclerotic aortic valve with normal opening.   7. Normal pulmonary artery pressure.      < end of copied text >  < from: CT Abdomen and Pelvis w/ Oral Cont (04.27.24 @ 17:17) >  There is persistent relatively extensive bibasilar airspace consolidation   and/or subsegmental atelectasis.    Trace right pleural effusion.    No acute findings within the abdomen or pelvis.    Please refer to detailed findings otherwise described above.      < end of copied text >  CAPILLARY BLOOD GLUCOSE  CAPILLARY BLOOD GLUCOSE      POCT Blood Glucose.: 159 mg/dL (01 May 2024 07:34)  POCT Blood Glucose.: 212 mg/dL (30 Apr 2024 21:43)  POCT Blood Glucose.: 337 mg/dL (30 Apr 2024 16:50)  POCT Blood Glucose.: 458 mg/dL (30 Apr 2024 11:06)  POCT Blood Glucose.: 498 mg/dL (30 Apr 2024 11:03)

## 2024-05-01 NOTE — DISCHARGE NOTE NURSING/CASE MANAGEMENT/SOCIAL WORK - PATIENT PORTAL LINK FT
You can access the FollowMyHealth Patient Portal offered by [facility] by registering at the following website: [email or web address]. By joining [employer]’s FollowMyHealth portal, you will also be able to view your health information using other applications (apps) compatible with our system.

## 2024-05-01 NOTE — PROGRESS NOTE ADULT - ASSESSMENT
Tamika Allred is a 64 year old female with PMHx of HTN, IDDM2, and lupus who presented to the ED on 4/22/24 from [facility] for complaints of not feeling well and admitted for Sepsis-secondary to suspected HCAP and bilateral pulmonary emboli and DVT.      Sepsis- secondary to suspected HCAP    per my colleague's documentation "Complaints of not feeling well since yesterday, described as RUQ abdominal pain and R. sided back pain  Tmax 100.2,  and WBC 12.47K on admission  U/A with occasional bacteria and squamous epithelial cells present, COVID/influenza negative  CXR with patchy RUL and b/l lower lung opacities, CTA chest with bibasilar lung opacities    CT A/P without infectious etiology, U/S abdomen with enlarged fatty liver and nonobstructive L. nephrolithiasis    Doubt need for ABX, CTA findings all likely from PE. "  4/24/2024 - not on antibx as my colleague now feel initial presentation and CT chest findings secondary to b/l pulmonary emboli  continue with heparin drip   has h/o lupus which may be reason for dvt-and pulmonary emboli   add on hypercoagulable w/u if possible to admitting blood work. get heme/onc consult unprovoked PE-vs secondary to Lupus   f/u echo  4/25/2024-echo as above  4/26/2024 heme/onc unavailable and upon my d/w heme/onc will f/u as outpt- protein c , beta 2, anti thrombin, lupus anticoagulant and anti cardiolipin ab- are negative   outstanding is Protein S-->pending  4/29/2024 protein s--> wnl   antithrombin III--> low ( but could be due to concurrent use of heparin drip at the time of sampling )    molecular factor V Leiden --> pending  SEPSIS---ruled out     HTN: continue with losartan 50 mg  4/25/2024- bp stable     Lupus:  continue with prednisone 20 mg, hydroxychloroquine 200 mg BID, mycophenolate 1500 mg BID, pantoprazole 40 mg   IDDM2: last known A1c 10.2 (on 3/25/24), POC qac and qhs, PTA Lantus 34 U qhs and Humalog low dose SSI qac, PTA metformin 500 mg BID held, blood glucose goal < 180.    4/26/2024- will add 3 units pre meal   4/27/2024 blood sugar is better  4/29/2024 had to incresae  insulin BS better this morning  5/1/2024 increased insulin premeal from 8--> 10     abd pain- etiology unclear reviewed ct scan with IV contrast  done on admission and no acute abnormality however pt had a prior cat scan in [id number] with nephrolithiasis  she report good bm while here . will get an non contrast  scan to eval for kidney stones as cause of abd pain.    UA noted will repeat again to ensure certainty   4/28/2024 repeat ua negative ct scan doen no stones , shows small umbilical hernia ? the cause will get gen surgery consult    also pt seems to be having intermittent urinary retention will get urology consult  as this may be the real cause   4/29/2024 urology teams agrees this is urinary retention and ordered a elliott - with outpt f/u for urodynamic studies       dispo- per d/w sw/cm om 4/26/2024 insurance issues have arised- sw d/w daughter ways to address it -   pt and daughter decline going home and Pt reccs are still for STR  4/30/2024 per IDR to be discharged in am when insurance takes over May 1st

## 2024-05-07 PROBLEM — E11.9 TYPE 2 DIABETES MELLITUS WITHOUT COMPLICATIONS: Chronic | Status: ACTIVE | Noted: 2024-04-22

## 2024-05-08 DIAGNOSIS — I82.433 ACUTE EMBOLISM AND THROMBOSIS OF POPLITEAL VEIN, BILATERAL: ICD-10-CM

## 2024-05-08 DIAGNOSIS — I26.99 OTHER PULMONARY EMBOLISM WITHOUT ACUTE COR PULMONALE: ICD-10-CM

## 2024-05-08 DIAGNOSIS — M32.9 SYSTEMIC LUPUS ERYTHEMATOSUS, UNSPECIFIED: ICD-10-CM

## 2024-05-08 DIAGNOSIS — I10 ESSENTIAL (PRIMARY) HYPERTENSION: ICD-10-CM

## 2024-05-08 DIAGNOSIS — I82.442 ACUTE EMBOLISM AND THROMBOSIS OF LEFT TIBIAL VEIN: ICD-10-CM

## 2024-05-08 DIAGNOSIS — Z79.52 LONG TERM (CURRENT) USE OF SYSTEMIC STEROIDS: ICD-10-CM

## 2024-05-08 DIAGNOSIS — I82.452 ACUTE EMBOLISM AND THROMBOSIS OF LEFT PERONEAL VEIN: ICD-10-CM

## 2024-05-08 DIAGNOSIS — N20.0 CALCULUS OF KIDNEY: ICD-10-CM

## 2024-05-08 DIAGNOSIS — K76.0 FATTY (CHANGE OF) LIVER, NOT ELSEWHERE CLASSIFIED: ICD-10-CM

## 2024-05-08 DIAGNOSIS — R10.9 UNSPECIFIED ABDOMINAL PAIN: ICD-10-CM

## 2024-05-08 DIAGNOSIS — N31.9 NEUROMUSCULAR DYSFUNCTION OF BLADDER, UNSPECIFIED: ICD-10-CM

## 2024-05-08 DIAGNOSIS — E78.5 HYPERLIPIDEMIA, UNSPECIFIED: ICD-10-CM

## 2024-05-08 DIAGNOSIS — R33.9 RETENTION OF URINE, UNSPECIFIED: ICD-10-CM

## 2024-05-08 DIAGNOSIS — E11.9 TYPE 2 DIABETES MELLITUS WITHOUT COMPLICATIONS: ICD-10-CM

## 2024-05-08 DIAGNOSIS — D64.9 ANEMIA, UNSPECIFIED: ICD-10-CM

## 2024-05-08 DIAGNOSIS — Z79.4 LONG TERM (CURRENT) USE OF INSULIN: ICD-10-CM

## 2024-05-08 DIAGNOSIS — Z11.52 ENCOUNTER FOR SCREENING FOR COVID-19: ICD-10-CM

## 2024-05-08 DIAGNOSIS — I82.411 ACUTE EMBOLISM AND THROMBOSIS OF RIGHT FEMORAL VEIN: ICD-10-CM

## 2024-05-08 DIAGNOSIS — K42.9 UMBILICAL HERNIA WITHOUT OBSTRUCTION OR GANGRENE: ICD-10-CM

## 2024-05-28 ENCOUNTER — APPOINTMENT (OUTPATIENT)
Dept: UROLOGY | Facility: CLINIC | Age: 64
End: 2024-05-28

## 2024-05-31 ENCOUNTER — APPOINTMENT (OUTPATIENT)
Dept: UROLOGY | Facility: CLINIC | Age: 64
End: 2024-05-31
Payer: MEDICARE

## 2024-05-31 VITALS
SYSTOLIC BLOOD PRESSURE: 157 MMHG | DIASTOLIC BLOOD PRESSURE: 82 MMHG | HEIGHT: 71 IN | OXYGEN SATURATION: 98 % | TEMPERATURE: 97.9 F | HEART RATE: 109 BPM

## 2024-05-31 PROCEDURE — 99203 OFFICE O/P NEW LOW 30 MIN: CPT

## 2024-06-04 NOTE — PHYSICAL EXAM
[Normal Appearance] : normal appearance [Well Groomed] : well groomed [General Appearance - In No Acute Distress] : no acute distress [Edema] : no peripheral edema [Respiration, Rhythm And Depth] : normal respiratory rhythm and effort [Exaggerated Use Of Accessory Muscles For Inspiration] : no accessory muscle use [Abdomen Soft] : soft [Abdomen Tenderness] : non-tender [Costovertebral Angle Tenderness] : no ~M costovertebral angle tenderness [Normal Station and Gait] : the gait and station were normal for the patient's age [Urinary Bladder Findings] : the bladder was normal on palpation [] : no rash [No Focal Deficits] : no focal deficits [Oriented To Time, Place, And Person] : oriented to person, place, and time [Affect] : the affect was normal [Mood] : the mood was normal [No Palpable Adenopathy] : no palpable adenopathy [de-identified] : elliott clear urine

## 2024-06-04 NOTE — END OF VISIT
[FreeTextEntry4] : This note was written by Wilda Jade on 05/31/2024 actively solely Kash Gutierrez M.D. I, Wilda Jade, am scribing for and in the presence of Kash Gutierrez M.D. in the following sections HISTORY OF PRESENT ILLNESS, PAST MEDICAL/FAMILY/SOCIAL HISTORY; REVIEW OF SYSTEMS; VITAL SIGNS; PHYSICAL EXAM; ASSESSMENT/PLAN.   All medical record entries made by this scribe at my, Kash Gutierrez M.D. direction and personally dictated by me on 05/31/2024. I personally performed the services described in the documentation, reviewed the documentation recorded by the scribe in my presence, and it accurately and completely records my words and actions.

## 2024-06-04 NOTE — HISTORY OF PRESENT ILLNESS
[FreeTextEntry1] : 05/31/2024 cc urinary retention  64 year old female presents with urinary retention. She currently has a catheter. Pt reports for years she has had frequency and urgency. She denies problem moving her bowels and states she moves her bowels every day. Pt reports she was discharged from hospital early 05/2024. She was originally in rehab due to mobility problems, got sick, went to the hospital, and now she is back in the rehab facility. Pt states the catheter was placed in the hospital. She was originally in the hospital for blood clots and her urinary retention was an incidental finding.   PMHx: Lupus

## 2024-06-04 NOTE — ASSESSMENT
[FreeTextEntry1] : 64 year old female with urinary retention. Reviewed labs and imaging from hospital.  Discussed Urodynamics to determine why she is not emptying her bladder. Procedure, benefits, and possible risks of Urodynamics reviewed. Pt understood and would like to proceed. Advised pt to bring list of current medications.  RTO in 1 week for Urodynamics.

## 2024-06-06 ENCOUNTER — APPOINTMENT (OUTPATIENT)
Dept: UROLOGY | Facility: CLINIC | Age: 64
End: 2024-06-06
Payer: MEDICARE

## 2024-06-06 VITALS
SYSTOLIC BLOOD PRESSURE: 157 MMHG | HEIGHT: 71 IN | DIASTOLIC BLOOD PRESSURE: 89 MMHG | BODY MASS INDEX: 29.4 KG/M2 | TEMPERATURE: 97.1 F | OXYGEN SATURATION: 98 % | HEART RATE: 112 BPM | WEIGHT: 210 LBS

## 2024-06-06 DIAGNOSIS — R33.9 RETENTION OF URINE, UNSPECIFIED: ICD-10-CM

## 2024-06-06 PROCEDURE — 51741 ELECTRO-UROFLOWMETRY FIRST: CPT | Mod: 26

## 2024-06-06 PROCEDURE — 51728 CYSTOMETROGRAM W/VP: CPT | Mod: TC

## 2024-06-06 PROCEDURE — 51784 ANAL/URINARY MUSCLE STUDY: CPT | Mod: TC

## 2024-06-06 PROCEDURE — 51797 INTRAABDOMINAL PRESSURE TEST: CPT | Mod: 26

## 2024-06-06 PROCEDURE — 51741 ELECTRO-UROFLOWMETRY FIRST: CPT | Mod: TC

## 2024-06-06 PROCEDURE — 51728 CYSTOMETROGRAM W/VP: CPT

## 2024-06-06 PROCEDURE — 51784 ANAL/URINARY MUSCLE STUDY: CPT | Mod: 26

## 2024-06-18 PROBLEM — R33.9 RETENTION, URINE: Status: ACTIVE | Noted: 2024-06-04

## 2024-06-19 ENCOUNTER — APPOINTMENT (OUTPATIENT)
Dept: UROLOGY | Facility: CLINIC | Age: 64
End: 2024-06-19

## 2024-06-19 VITALS
TEMPERATURE: 96.8 F | HEART RATE: 107 BPM | SYSTOLIC BLOOD PRESSURE: 143 MMHG | DIASTOLIC BLOOD PRESSURE: 82 MMHG | OXYGEN SATURATION: 98 %

## 2024-06-19 PROCEDURE — 99213 OFFICE O/P EST LOW 20 MIN: CPT

## 2024-07-18 NOTE — PROGRESS NOTE ADULT - SUBJECTIVE AND OBJECTIVE BOX
MARIO MALDONADO  MRN-[id number]    Interval History: The pt was seen and examined earlier, no distress, no new complains. The pt is afebrile, on RA, no new cbc, Cr increased 3.08.     PAST MEDICAL & SURGICAL HISTORY:  Lupus    Diabetes mellitus    Hyperlipidemia    Pericarditis    Obesity    HTN (hypertension)    Lupus    Diabetes    Hypertension     delivery delivered   section x 3    Skin tag  Skin tag removal, right shoulder    H/O:         ROS:    [ ] Unobtainable because:  [x ] All other systems negative    Constitutional: no fever, no chills  Head: no trauma  Eyes: no vision changes, no eye pain  ENT:  no sore throat, no rhinorrhea  Cardiovascular:  no chest pain, no palpitation  Respiratory:  no SOB, no cough  GI:  no abd pain, no vomiting, no diarrhea  urinary: no dysuria, no hematuria, no flank pain  musculoskeletal:  no joint pain, no joint swelling  skin:  no rash  neurology:  no headache, no seizure, no change in mental status  psych: no anxiety, no depression         Allergies  No Known Allergies        ANTIMICROBIALS:  hydroxychloroquine 200 two times a day  piperacillin/tazobactam IVPB.. 3.375 every 8 hours      OTHER MEDS:  acetaminophen   Tablet .. 650 milliGRAM(s) Oral every 6 hours PRN  dextrose 40% Gel 15 Gram(s) Oral once  dextrose 40% Gel 15 Gram(s) Oral once  dextrose 5%. 1000 milliLiter(s) IV Continuous <Continuous>  dextrose 5%. 1000 milliLiter(s) IV Continuous <Continuous>  dextrose 5%. 1000 milliLiter(s) IV Continuous <Continuous>  dextrose 50% Injectable 25 Gram(s) IV Push once  dextrose 50% Injectable 12.5 Gram(s) IV Push once  dextrose 50% Injectable 25 Gram(s) IV Push once  glucagon  Injectable 1 milliGRAM(s) IntraMuscular once  heparin   Injectable 7500 Unit(s) SubCutaneous every 12 hours  insulin glargine Injectable (LANTUS) 20 Unit(s) SubCutaneous at bedtime  insulin lispro (ADMELOG) corrective regimen sliding scale   SubCutaneous three times a day before meals  insulin lispro (ADMELOG) corrective regimen sliding scale   SubCutaneous at bedtime  insulin lispro Injectable (ADMELOG) 6 Unit(s) SubCutaneous three times a day before meals  losartan 50 milliGRAM(s) Oral daily  melatonin 3 milliGRAM(s) Oral at bedtime PRN  oxycodone    5 mG/acetaminophen 325 mG 1 Tablet(s) Oral every 6 hours PRN  predniSONE   Tablet 5 milliGRAM(s) Oral daily  saccharomyces boulardii 250 milliGRAM(s) Oral two times a day  sodium chloride 0.9%. 1000 milliLiter(s) IV Continuous <Continuous>      Vital Signs Last 24 Hrs  T(C): 36.9 (20 Oct 2021 12:20), Max: 36.9 (19 Oct 2021 23:25)  T(F): 98.5 (20 Oct 2021 12:20), Max: 98.5 (20 Oct 2021 12:20)  HR: 79 (20 Oct 2021 12:20) (73 - 89)  BP: 143/75 (20 Oct 2021 12:20) (107/66 - 143/75)  BP(mean): --  RR: 17 (20 Oct 2021 12:20) (16 - 18)  SpO2: 98% (20 Oct 2021 12:20) (98% - 99%)    Physical Exam:  Constitutional: non-toxic, no distress, obese   HEAD/EYES: anicteric, no conjunctival injection  ENT:  supple, no thrush  Cardiovascular:   normal S1, S2, no murmur, no edema  Respiratory:  clear BS bilaterally, no wheezes, no rales  GI:  soft, non-tender, normal bowel sounds  :  no elliott, no CVA tenderness  Musculoskeletal:  no synovitis, normal ROM, foot wrapped and dressed s/p Partial amputation of third ray of left foot by open approach 10/18  Neurologic: awake and alert, normal strength, no focal findings  Skin:  no rash, no erythema, no phlebitis, PIV ok  Heme/Onc: no lymphadenopathy   Psychiatric:  awake, alert, appropriate mood    WBC Count: 6.60 K/uL (10-19 @ 07:29)  WBC Count: 6.55 K/uL (10-18 @ 08:06)  WBC Count: 5.79 K/uL (10-15 @ 06:54)  WBC Count: 6.43 K/uL (10-14 @ 11:09)                            10.0   6.60  )-----------( 225      ( 19 Oct 2021 07:29 )             31.2       10-20    139  |  108  |  23  ----------------------------<  214<H>  4.4   |  26  |  3.07<H>    Ca    8.8      20 Oct 2021 10:18            Creatinine Trend: 3.07<--, 1.89<--, 1.17<--, 1.01<--, 1.05<--      MICROBIOLOGY:  Vancomycin Level, Random: 12.9 ug/mL (10-20-21 @ 12:39)  v  .Tissue  10-19-21   No growth  --    No polymorphonuclear leukocytes seen per low power field  No organisms seen per oil power field      .Tissue  10-19-21   Rare Streptococcus agalactiae (Group B) isolated  Group B streptococci are susceptible to ampicillin,  penicillin and cefazolin, but may be resistant to  erythromycin and clindamycin.  Recommendations for intrapartum prophylaxis for Group B  streptococci are penicillin or ampicillin.  Rare Morganella morganii  --    No polymorphonuclear leukocytes seen per low power field  No organisms seen per oil power field      .Abscess left foot  10-15-21   Moderate Escherichia coli  Numerous Morganella morganii  Few Streptococcus agalactiae (Group B) isolated  Group B streptococci are susceptible to ampicillin,  penicillin and cefazolin, but may be resistant to  erythromycin and clindamycin.  Recommendations for intrapartum prophylaxis for Group B  streptococci are penicillin or ampicillin.  Rare Enterococcus faecalis  Moderate Bacteroides fragilis "Susceptibilities not performed"  --  Escherichia coli  Morganella morganii  Enterococcus faecalis      Clean Catch Clean Catch (Midstream)  10-14-21   <10,000 CFU/mL Normal Urogenital Jeannine  --  --      .Blood Blood-Peripheral  10-14-21   No Growth Final  --  --      C-Reactive Protein, Serum: 6 (10-14)        RADIOLOGY:     No

## 2024-08-26 NOTE — PROGRESS NOTE ADULT - TIME BILLING
Continue Regimen: halobetasol propionate 0.05 % topical ointment - Apply to affected area twice daily\\n\\ntriamcinolone acetonide 0.1 % topical cream - Apply to affected areas twice daily until clear then prn Detail Level: Zone Plan: Advised patient that a moisturizer can be used in between The necessity of the time spent during the encounter on this date of service was due to:   - Ordering, reviewing, and interpreting labs, testing, and imaging.  - Independently obtaining a review of systems and performing a physical exam  - Reviewing prior hospitalization and where necessary, outpatient records.  - Reviewing consultant recommendations/communicating with consultants  - Counselling and educating patient and family regarding interpretation of aforementioned items and plan of care.    Time-based billing (NON-critical care). Total minutes spent:36

## 2024-09-02 NOTE — PATIENT PROFILE ADULT - PATIENT'S GENDER IDENTITY
[facility] PROFESSIONAL SERVS  [facility] MEDICINE  [address]  [address]  [facility] [address]  Dept: [phone]  Dept Fax: [phone]  Loc: [phone]    Jonas Mahajan is a 53 y.o. male who presents today for:  No chief complaint on file.      HPI:     HPI    Here for regular checkup.    Hypertension: Patient here for follow-up of elevated blood pressure. He is not exercising and is adherent to low salt diet.  Blood pressure is well controlled at home. Cardiac symptoms none. Patient denies chest pain, dyspnea, irregular heart beat and palpitations.  Cardiovascular risk factors: dyslipidemia, family history of premature cardiovascular disease, hypertension, male gender, obesity (BMI >= 30 kg/m2) and sedentary lifestyle. Use of agents associated with hypertension: none. History of target organ damage: none.    Hyperlipidemia: Patient presents with hyperlipidemia.  He was tested because hypertension and family history.  His last labs showed   Lab Results   Component Value Date    CHOL 111 08/31/2024    CHOL 156 07/31/2023    CHOL 156 07/12/2022     Lab Results   Component Value Date    TRIG 71 08/31/2024    TRIG 125 07/31/2023    TRIG 112 07/12/2022     Lab Results   Component Value Date    HDL 38 08/31/2024    HDL 43 07/31/2023    HDL 37 07/12/2022     No components found for: \"LDLCHOLESTEROL\", \"LDLCALC\"    No results found for: \"VLDL\"  No results found for: \"CHOLHDLRATIO\"  There is a family history of hyperlipidemia. There is a family history of early ischemia heart disease.    DD is stable.  He is holding a job without difficulty and his mother helps with other ADLs at home like laundry etc.      Esophageal spasm has been treated in the past with dilation but he is currently having no problems with swallowing.           Reviewed chart forpast medical history , surgical history , allergies, social history , family history and medications.    Health Maintenance   Topic Date  Female

## 2024-09-03 NOTE — ED ADULT TRIAGE NOTE - CADM TRG TX PRIOR TO ARRIVAL
unable to assess due to poor participation/comprehension
+generalized weakness
unable to assess due to poor participation/comprehension
none

## 2024-10-03 NOTE — OCCUPATIONAL THERAPY INITIAL EVALUATION ADULT - ASR WT BEARING STATUS EVAL
October 3, 2024      TO: Amie Santiago  [address] [facility] [address]         To Whom it may Concern,     Amie Santiago is a patient of Dr. Gamez at the [facility]. She was seen for an appt on 10/3 and is recommended to take the following medication.     - Decrease oral Olanzapine from 5mg to 2.5mg.   - Continue Olanzapine (2.5-5 mg) by mouth daily as needed (for breakthrough hallucinations and anxiety associated with hallucinations.)  -We will start the monthly long acting injectables. We will send this to [facility]. Injectable schedule is as follows;              -Day 1: 234mg Invega Sustenna Injection (Loading dose 1)              -Day 8: 156mg Invega Sustenna Injection (Loading dose 2)   -28 days later and every 28 days after than, you should receive the 156mg Invega           Sustenna Injection  -When you get the first injection, please stop taking the oral Invega.      Sincerely,    *Electronically signed by Dr. Gamez on 10/4 at 8:34 am     Ariadna Gamez MD    Left LE

## 2024-10-07 NOTE — ED ADULT NURSE NOTE - PAIN RATING/NUMBER SCALE (0-10): REST
Subjective   Reason for Visit: Louise Santiago is an 80 y.o. female here for a Medicare Wellness visit.   Patient being seen for chief complaint being addressed we also needed to review patient's past medical history due to his complex medical problems we went over his significant other medical issues individually reviewed his medications and did an overview of his past labs.  And medications  Discussed depression screen with patient for 5 min   Patient with a history of hypertension reviewed hypertension medications follow-up blood work.  Also has a history of osteoporosis will continue on Fosamax  Past Medical, Surgical, and Family History reviewed and updated in chart.    Reviewed all medications by prescribing practitioner or clinical pharmacist (such as prescriptions, OTCs, herbal therapies and supplements) and documented in the medical record.    HPI    Patient Care Team:  Arturo Titus DO as PCP - General (Family Medicine)  Art[facility] Titus DO [facility]ributed [facility]  Carmen Mays MD as Consulting Physician (Cardiology)     Review of Systems   Constitutional: Negative.    HENT: Negative.     Respiratory: Negative.     Cardiovascular: Negative.    Gastrointestinal: Negative.    Genitourinary: Negative.    Musculoskeletal: Negative.    Neurological: Negative.    Hematological: Negative.    Psychiatric/Behavioral: Negative.         Objective   Vitals:  /88   Pulse 81   Ht 1.524 m (5')   Wt 48.9 kg (107 lb 12.8 oz)   SpO2 95%   BMI 21.05 kg/m²       Physical Exam  Constitutional:       Appearance: Normal appearance.   HENT:      Head: Normocephalic and atraumatic.      Mouth/Throat:      Mouth: Mucous membranes are moist.   Eyes:      Extraocular Movements: Extraocular movements intact.      Pupils: Pupils are equal, round, and reactive to light.   Cardiovascular:      Rate and Rhythm: Normal rate and regular rhythm.   Abdominal:      General: Abdomen is flat.   Musculoskeletal:          General: Normal range of motion.      Cervical back: Normal range of motion.   Skin:     General: Skin is warm and dry.   Neurological:      Mental Status: She is alert.         Assessment & Plan  Routine general medical examination at health care facility    Orders:    1 Year Follow Up In Primary Care - Wellness Exam; Future    Encounter for screening mammogram for malignant neoplasm of breast    Orders:    BI mammo bilateral screening tomosynthesis; Future    Benign essential hypertension         Osteoporosis, unspecified osteoporosis type, unspecified pathological fracture presence                Advance Directives Discussion  16 - 20 minutes were spent discussing Advanced Care Planning (including a Living Will, Medical Power Of , as well as specific end of life choices and/or directives). The details of that discussion were documented in Advanced Directives Discussion section of the medical record.       0

## 2024-10-11 ENCOUNTER — INPATIENT (INPATIENT)
Facility: HOSPITAL | Age: 64
LOS: 14 days | Discharge: HOME | End: 2024-10-26
Attending: INTERNAL MEDICINE | Admitting: INTERNAL MEDICINE
Payer: MEDICARE

## 2024-10-11 VITALS
RESPIRATION RATE: 20 BRPM | WEIGHT: 229.94 LBS | SYSTOLIC BLOOD PRESSURE: 123 MMHG | OXYGEN SATURATION: 99 % | HEIGHT: 71 IN | DIASTOLIC BLOOD PRESSURE: 76 MMHG | HEART RATE: 85 BPM

## 2024-10-11 DIAGNOSIS — Z98.891 HISTORY OF UTERINE SCAR FROM PREVIOUS SURGERY: Chronic | ICD-10-CM
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
## 2024-10-11 DIAGNOSIS — L91.8 OTHER HYPERTROPHIC DISORDERS OF THE SKIN: Chronic | ICD-10-CM
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
## 2024-10-11 PROCEDURE — 99285 EMERGENCY DEPT VISIT HI MDM: CPT

## 2024-10-12 LAB
ALBUMIN SERPL ELPH-MCNC: 2.4 G/DL — LOW (ref 3.3–5)
ALBUMIN SERPL ELPH-MCNC: 2.8 G/DL — LOW (ref 3.3–5)
ALP SERPL-CCNC: 70 U/L — SIGNIFICANT CHANGE UP (ref 40–120)
ALP SERPL-CCNC: 82 U/L — SIGNIFICANT CHANGE UP (ref 40–120)
ALT FLD-CCNC: 21 U/L — SIGNIFICANT CHANGE UP (ref 12–78)
ALT FLD-CCNC: 26 U/L — SIGNIFICANT CHANGE UP (ref 12–78)
ANION GAP SERPL CALC-SCNC: 10 MMOL/L — SIGNIFICANT CHANGE UP (ref 5–17)
ANION GAP SERPL CALC-SCNC: 7 MMOL/L — SIGNIFICANT CHANGE UP (ref 5–17)
ANISOCYTOSIS BLD QL: SLIGHT — SIGNIFICANT CHANGE UP
APPEARANCE UR: ABNORMAL
AST SERPL-CCNC: 24 U/L — SIGNIFICANT CHANGE UP (ref 15–37)
AST SERPL-CCNC: 37 U/L — SIGNIFICANT CHANGE UP (ref 15–37)
BACTERIA # UR AUTO: ABNORMAL /HPF
BASOPHILS # BLD AUTO: 0 K/UL — SIGNIFICANT CHANGE UP (ref 0–0.2)
BASOPHILS # BLD AUTO: 0.04 K/UL — SIGNIFICANT CHANGE UP (ref 0–0.2)
BASOPHILS NFR BLD AUTO: 0 % — SIGNIFICANT CHANGE UP (ref 0–2)
BASOPHILS NFR BLD AUTO: 0.2 % — SIGNIFICANT CHANGE UP (ref 0–2)
BILIRUB SERPL-MCNC: 0.7 MG/DL — SIGNIFICANT CHANGE UP (ref 0.2–1.2)
BILIRUB SERPL-MCNC: 0.8 MG/DL — SIGNIFICANT CHANGE UP (ref 0.2–1.2)
BILIRUB UR-MCNC: ABNORMAL
BUN SERPL-MCNC: 30 MG/DL — HIGH (ref 7–23)
BUN SERPL-MCNC: 32 MG/DL — HIGH (ref 7–23)
BURR CELLS BLD QL SMEAR: PRESENT — SIGNIFICANT CHANGE UP
CALCIUM SERPL-MCNC: 8.1 MG/DL — LOW (ref 8.5–10.1)
CALCIUM SERPL-MCNC: 9.2 MG/DL — SIGNIFICANT CHANGE UP (ref 8.5–10.1)
CHLORIDE SERPL-SCNC: 110 MMOL/L — HIGH (ref 96–108)
CHLORIDE SERPL-SCNC: 112 MMOL/L — HIGH (ref 96–108)
CO2 SERPL-SCNC: 17 MMOL/L — LOW (ref 22–31)
CO2 SERPL-SCNC: 24 MMOL/L — SIGNIFICANT CHANGE UP (ref 22–31)
COLOR SPEC: ABNORMAL
CREAT SERPL-MCNC: 1.94 MG/DL — HIGH (ref 0.5–1.3)
CREAT SERPL-MCNC: 2.19 MG/DL — HIGH (ref 0.5–1.3)
DIFF PNL FLD: ABNORMAL
EGFR: 25 ML/MIN/1.73M2 — LOW
EGFR: 25 ML/MIN/1.73M2 — LOW
EGFR: 29 ML/MIN/1.73M2 — LOW
EGFR: 29 ML/MIN/1.73M2 — LOW
EOSINOPHIL # BLD AUTO: 0 K/UL — SIGNIFICANT CHANGE UP (ref 0–0.5)
EOSINOPHIL # BLD AUTO: 0.03 K/UL — SIGNIFICANT CHANGE UP (ref 0–0.5)
EOSINOPHIL NFR BLD AUTO: 0 % — SIGNIFICANT CHANGE UP (ref 0–6)
EOSINOPHIL NFR BLD AUTO: 0.2 % — SIGNIFICANT CHANGE UP (ref 0–6)
EPI CELLS # UR: PRESENT
GLUCOSE BLDC GLUCOMTR-MCNC: 191 MG/DL — HIGH (ref 70–99)
GLUCOSE BLDC GLUCOMTR-MCNC: 305 MG/DL — HIGH (ref 70–99)
GLUCOSE SERPL-MCNC: 131 MG/DL — HIGH (ref 70–99)
GLUCOSE SERPL-MCNC: 187 MG/DL — HIGH (ref 70–99)
GLUCOSE UR QL: NEGATIVE MG/DL — SIGNIFICANT CHANGE UP
HCT VFR BLD CALC: 29.8 % — LOW (ref 34.5–45)
HCT VFR BLD CALC: 32.2 % — LOW (ref 34.5–45)
HGB BLD-MCNC: 10.1 G/DL — LOW (ref 11.5–15.5)
HGB BLD-MCNC: 9.4 G/DL — LOW (ref 11.5–15.5)
IMM GRANULOCYTES NFR BLD AUTO: 0.9 % — SIGNIFICANT CHANGE UP (ref 0–0.9)
KETONES UR-MCNC: NEGATIVE MG/DL — SIGNIFICANT CHANGE UP
LACTATE SERPL-SCNC: 2.6 MMOL/L — HIGH (ref 0.7–2)
LACTATE SERPL-SCNC: 3.8 MMOL/L — HIGH (ref 0.7–2)
LACTATE SERPL-SCNC: 4 MMOL/L — CRITICAL HIGH (ref 0.7–2)
LACTATE SERPL-SCNC: 4.2 MMOL/L — CRITICAL HIGH (ref 0.7–2)
LEUKOCYTE ESTERASE UR-ACNC: ABNORMAL
LIDOCAIN IGE QN: 61 U/L — SIGNIFICANT CHANGE UP (ref 13–75)
LYMPHOCYTES # BLD AUTO: 1.18 K/UL — SIGNIFICANT CHANGE UP (ref 1–3.3)
LYMPHOCYTES # BLD AUTO: 1.46 K/UL — SIGNIFICANT CHANGE UP (ref 1–3.3)
LYMPHOCYTES # BLD AUTO: 3 % — LOW (ref 13–44)
LYMPHOCYTES # BLD AUTO: 7.9 % — LOW (ref 13–44)
MANUAL SMEAR VERIFICATION: SIGNIFICANT CHANGE UP
MCHC RBC-ENTMCNC: 25.8 PG — LOW (ref 27–34)
MCHC RBC-ENTMCNC: 26.2 PG — LOW (ref 27–34)
MCHC RBC-ENTMCNC: 31.4 G/DL — LOW (ref 32–36)
MCHC RBC-ENTMCNC: 31.5 G/DL — LOW (ref 32–36)
MCV RBC AUTO: 82.4 FL — SIGNIFICANT CHANGE UP (ref 80–100)
MCV RBC AUTO: 83 FL — SIGNIFICANT CHANGE UP (ref 80–100)
MONOCYTES # BLD AUTO: 1.45 K/UL — HIGH (ref 0–0.9)
MONOCYTES # BLD AUTO: 2.36 K/UL — HIGH (ref 0–0.9)
MONOCYTES NFR BLD AUTO: 6 % — SIGNIFICANT CHANGE UP (ref 2–14)
MONOCYTES NFR BLD AUTO: 7.8 % — SIGNIFICANT CHANGE UP (ref 2–14)
NEUTROPHILS # BLD AUTO: 15.38 K/UL — HIGH (ref 1.8–7.4)
NEUTROPHILS # BLD AUTO: 35.8 K/UL — HIGH (ref 1.8–7.4)
NEUTROPHILS NFR BLD AUTO: 83 % — HIGH (ref 43–77)
NEUTROPHILS NFR BLD AUTO: 84 % — HIGH (ref 43–77)
NEUTS BAND # BLD: 7 % — SIGNIFICANT CHANGE UP (ref 0–8)
NEUTS BAND NFR BLD: 7 % — SIGNIFICANT CHANGE UP (ref 0–8)
NITRITE UR-MCNC: POSITIVE
NRBC # BLD: 0 /100 WBCS — SIGNIFICANT CHANGE UP (ref 0–0)
NRBC # BLD: 0 /100 WBCS — SIGNIFICANT CHANGE UP (ref 0–0)
NRBC # BLD: SIGNIFICANT CHANGE UP /100 WBCS (ref 0–0)
NRBC BLD-RTO: 0 /100 WBCS — SIGNIFICANT CHANGE UP (ref 0–0)
NRBC BLD-RTO: 0 /100 WBCS — SIGNIFICANT CHANGE UP (ref 0–0)
NRBC BLD-RTO: SIGNIFICANT CHANGE UP /100 WBCS (ref 0–0)
OVALOCYTES BLD QL SMEAR: SLIGHT — SIGNIFICANT CHANGE UP
PH UR: 7.5 — SIGNIFICANT CHANGE UP (ref 5–8)
PLAT MORPH BLD: NORMAL — SIGNIFICANT CHANGE UP
PLATELET # BLD AUTO: 245 K/UL — SIGNIFICANT CHANGE UP (ref 150–400)
PLATELET # BLD AUTO: 351 K/UL — SIGNIFICANT CHANGE UP (ref 150–400)
POIKILOCYTOSIS BLD QL AUTO: SLIGHT — SIGNIFICANT CHANGE UP
POLYCHROMASIA BLD QL SMEAR: SLIGHT — SIGNIFICANT CHANGE UP
POTASSIUM SERPL-MCNC: 4.6 MMOL/L — SIGNIFICANT CHANGE UP (ref 3.5–5.3)
POTASSIUM SERPL-MCNC: 5 MMOL/L — SIGNIFICANT CHANGE UP (ref 3.5–5.3)
POTASSIUM SERPL-SCNC: 4.6 MMOL/L — SIGNIFICANT CHANGE UP (ref 3.5–5.3)
POTASSIUM SERPL-SCNC: 5 MMOL/L — SIGNIFICANT CHANGE UP (ref 3.5–5.3)
PROT SERPL-MCNC: 6.7 GM/DL — SIGNIFICANT CHANGE UP (ref 6–8.3)
PROT SERPL-MCNC: 7.6 GM/DL — SIGNIFICANT CHANGE UP (ref 6–8.3)
PROT UR-MCNC: 300 MG/DL
RBC # BLD: 3.59 M/UL — LOW (ref 3.8–5.2)
RBC # BLD: 3.91 M/UL — SIGNIFICANT CHANGE UP (ref 3.8–5.2)
RBC # FLD: 17.6 % — HIGH (ref 10.3–14.5)
RBC # FLD: 17.7 % — HIGH (ref 10.3–14.5)
RBC BLD AUTO: ABNORMAL
RBC CASTS # UR COMP ASSIST: ABNORMAL /HPF
SODIUM SERPL-SCNC: 137 MMOL/L — SIGNIFICANT CHANGE UP (ref 135–145)
SODIUM SERPL-SCNC: 143 MMOL/L — SIGNIFICANT CHANGE UP (ref 135–145)
SP GR SPEC: 1.02 — SIGNIFICANT CHANGE UP (ref 1–1.03)
UROBILINOGEN FLD QL: 0.2 MG/DL — SIGNIFICANT CHANGE UP (ref 0.2–1)
WBC # BLD: 18.52 K/UL — HIGH (ref 3.8–10.5)
WBC # BLD: 39.34 K/UL — HIGH (ref 3.8–10.5)
WBC # FLD AUTO: 18.52 K/UL — HIGH (ref 3.8–10.5)
WBC # FLD AUTO: 39.34 K/UL — HIGH (ref 3.8–10.5)
WBC UR QL: 30 /HPF — HIGH (ref 0–5)

## 2024-10-12 PROCEDURE — 99223 1ST HOSP IP/OBS HIGH 75: CPT

## 2024-10-12 PROCEDURE — 74174 CTA ABD&PLVS W/CONTRAST: CPT | Mod: 26,MC

## 2024-10-12 PROCEDURE — 74176 CT ABD & PELVIS W/O CONTRAST: CPT | Mod: 26,MC

## 2024-10-12 RX ORDER — CEFTRIAXONE 500 MG/1
1000 INJECTION, POWDER, FOR SOLUTION INTRAMUSCULAR; INTRAVENOUS EVERY 24 HOURS
Refills: 0 | Status: DISCONTINUED | OUTPATIENT
Start: 2024-10-12 | End: 2024-10-13

## 2024-10-12 RX ORDER — PREDNISONE 20 MG/1
20 TABLET ORAL DAILY
Refills: 0 | Status: DISCONTINUED | OUTPATIENT
Start: 2024-10-12 | End: 2024-10-14

## 2024-10-12 RX ORDER — INSULIN LISPRO 100 U/ML
6 INJECTION, SOLUTION INTRAVENOUS; SUBCUTANEOUS
Refills: 0 | Status: DISCONTINUED | OUTPATIENT
Start: 2024-10-12 | End: 2024-10-14

## 2024-10-12 RX ORDER — DEXTROSE 50 % IN WATER 50 %
25 SYRINGE (ML) INTRAVENOUS ONCE
Refills: 0 | Status: DISCONTINUED | OUTPATIENT
Start: 2024-10-12 | End: 2024-10-14

## 2024-10-12 RX ORDER — GLUCAGON 3 MG/1
1 POWDER NASAL ONCE
Refills: 0 | Status: DISCONTINUED | OUTPATIENT
Start: 2024-10-12 | End: 2024-10-14

## 2024-10-12 RX ORDER — INSULIN GLARGINE-YFGN 100 [IU]/ML
20 INJECTION, SOLUTION SUBCUTANEOUS AT BEDTIME
Refills: 0 | Status: DISCONTINUED | OUTPATIENT
Start: 2024-10-12 | End: 2024-10-14

## 2024-10-12 RX ORDER — SODIUM CHLORIDE 9 G/1000ML
1000 INJECTION, SOLUTION INTRAVENOUS
Refills: 0 | Status: DISCONTINUED | OUTPATIENT
Start: 2024-10-12 | End: 2024-10-14

## 2024-10-12 RX ORDER — CALCIUM CARBONATE/VITAMIN D3 500MG-5MCG
1 TABLET ORAL
Refills: 0 | Status: DISCONTINUED | OUTPATIENT
Start: 2024-10-12 | End: 2024-10-14

## 2024-10-12 RX ORDER — ACETAMINOPHEN 500 MG/5ML
1000 LIQUID (ML) ORAL ONCE
Refills: 0 | Status: COMPLETED | OUTPATIENT
Start: 2024-10-12 | End: 2024-10-12

## 2024-10-12 RX ORDER — DEXTROSE 50 % IN WATER 50 %
15 SYRINGE (ML) INTRAVENOUS ONCE
Refills: 0 | Status: DISCONTINUED | OUTPATIENT
Start: 2024-10-12 | End: 2024-10-14

## 2024-10-12 RX ORDER — TAMSULOSIN HYDROCHLORIDE 0.4 MG/1
0.4 CAPSULE ORAL AT BEDTIME
Refills: 0 | Status: DISCONTINUED | OUTPATIENT
Start: 2024-10-12 | End: 2024-10-14

## 2024-10-12 RX ORDER — MYCOPHENOLATE MOFETIL 500 MG/1
1500 TABLET, FILM COATED ORAL
Refills: 0 | Status: DISCONTINUED | OUTPATIENT
Start: 2024-10-12 | End: 2024-10-12

## 2024-10-12 RX ORDER — ONDANSETRON HCL/PF 4 MG/2 ML
4 VIAL (ML) INJECTION ONCE
Refills: 0 | Status: COMPLETED | OUTPATIENT
Start: 2024-10-12 | End: 2024-10-12

## 2024-10-12 RX ORDER — INSULIN LISPRO 100 U/ML
INJECTION, SOLUTION INTRAVENOUS; SUBCUTANEOUS AT BEDTIME
Refills: 0 | Status: DISCONTINUED | OUTPATIENT
Start: 2024-10-12 | End: 2024-10-14

## 2024-10-12 RX ORDER — HYDROXYCHLOROQUINE SULFATE 200 MG/1
200 TABLET, FILM COATED ORAL
Refills: 0 | Status: DISCONTINUED | OUTPATIENT
Start: 2024-10-12 | End: 2024-10-14

## 2024-10-12 RX ORDER — CEFTRIAXONE 500 MG/1
1000 INJECTION, POWDER, FOR SOLUTION INTRAMUSCULAR; INTRAVENOUS ONCE
Refills: 0 | Status: COMPLETED | OUTPATIENT
Start: 2024-10-12 | End: 2024-10-12

## 2024-10-12 RX ORDER — INSULIN LISPRO 100 U/ML
INJECTION, SOLUTION INTRAVENOUS; SUBCUTANEOUS
Refills: 0 | Status: DISCONTINUED | OUTPATIENT
Start: 2024-10-12 | End: 2024-10-14

## 2024-10-12 RX ORDER — INFLUENZA A VIRUS A/IDAHO/07/2018 (H1N1) ANTIGEN (MDCK CELL DERIVED, PROPIOLACTONE INACTIVATED, INFLUENZA A VIRUS A/INDIANA/08/2018 (H3N2) ANTIGEN (MDCK CELL DERIVED, PROPIOLACTONE INACTIVATED), INFLUENZA B VIRUS B/SINGAPORE/INFTT-16-0610/2016 ANTIGEN (MDCK CELL DERIVED, PROPIOLACTONE INACTIVATED), INFLUENZA B VIRUS B/IOWA/06/2017 ANTIGEN (MDCK CELL DERIVED, PROPIOLACTONE INACTIVATED) 15; 15; 15; 15 UG/.5ML; UG/.5ML; UG/.5ML; UG/.5ML
0.5 INJECTION, SUSPENSION INTRAMUSCULAR ONCE
Refills: 0 | Status: COMPLETED | OUTPATIENT
Start: 2024-10-12 | End: 2024-10-26

## 2024-10-12 RX ORDER — DEXTROSE 50 % IN WATER 50 %
12.5 SYRINGE (ML) INTRAVENOUS ONCE
Refills: 0 | Status: DISCONTINUED | OUTPATIENT
Start: 2024-10-12 | End: 2024-10-14

## 2024-10-12 RX ORDER — APIXABAN 2.5 MG/1
5 TABLET, FILM COATED ORAL EVERY 12 HOURS
Refills: 0 | Status: DISCONTINUED | OUTPATIENT
Start: 2024-10-12 | End: 2024-10-14

## 2024-10-12 RX ADMIN — INSULIN LISPRO 6 UNIT(S): 100 INJECTION, SOLUTION INTRAVENOUS; SUBCUTANEOUS at 17:08

## 2024-10-12 RX ADMIN — INSULIN LISPRO 2: 100 INJECTION, SOLUTION INTRAVENOUS; SUBCUTANEOUS at 17:23

## 2024-10-12 RX ADMIN — PREDNISONE 20 MILLIGRAM(S): 20 TABLET ORAL at 17:52

## 2024-10-12 RX ADMIN — Medication 1500 MILLILITER(S): at 21:34

## 2024-10-12 RX ADMIN — Medication 400 MILLIGRAM(S): at 18:31

## 2024-10-12 RX ADMIN — Medication 4 MILLIGRAM(S): at 02:57

## 2024-10-12 RX ADMIN — Medication 1000 MILLIGRAM(S): at 19:27

## 2024-10-12 RX ADMIN — Medication 400 MILLIGRAM(S): at 08:31

## 2024-10-12 RX ADMIN — TAMSULOSIN HYDROCHLORIDE 0.4 MILLIGRAM(S): 0.4 CAPSULE ORAL at 21:18

## 2024-10-12 RX ADMIN — Medication 4 MILLIGRAM(S): at 12:17

## 2024-10-12 RX ADMIN — Medication 100 MILLILITER(S): at 21:18

## 2024-10-12 RX ADMIN — Medication 1 TABLET(S): at 17:51

## 2024-10-12 RX ADMIN — APIXABAN 5 MILLIGRAM(S): 2.5 TABLET, FILM COATED ORAL at 17:52

## 2024-10-12 RX ADMIN — Medication 1000 MILLILITER(S): at 08:28

## 2024-10-12 RX ADMIN — Medication 1000 MILLIGRAM(S): at 09:08

## 2024-10-12 RX ADMIN — Medication 100 MILLILITER(S): at 16:22

## 2024-10-12 RX ADMIN — Medication 4 MILLIGRAM(S): at 08:31

## 2024-10-12 RX ADMIN — CEFTRIAXONE 100 MILLIGRAM(S): 500 INJECTION, POWDER, FOR SOLUTION INTRAMUSCULAR; INTRAVENOUS at 09:11

## 2024-10-12 RX ADMIN — INSULIN LISPRO 4: 100 INJECTION, SOLUTION INTRAVENOUS; SUBCUTANEOUS at 21:19

## 2024-10-12 RX ADMIN — Medication 1000 MILLILITER(S): at 02:58

## 2024-10-12 RX ADMIN — Medication 4 MILLIGRAM(S): at 11:39

## 2024-10-12 RX ADMIN — INSULIN GLARGINE-YFGN 20 UNIT(S): 100 INJECTION, SOLUTION SUBCUTANEOUS at 21:19

## 2024-10-12 RX ADMIN — HYDROXYCHLOROQUINE SULFATE 200 MILLIGRAM(S): 200 TABLET, FILM COATED ORAL at 17:51

## 2024-10-12 RX ADMIN — CEFTRIAXONE 1000 MILLIGRAM(S): 500 INJECTION, POWDER, FOR SOLUTION INTRAMUSCULAR; INTRAVENOUS at 11:00

## 2024-10-12 RX ADMIN — Medication 500 MILLIGRAM(S): at 17:52

## 2024-10-12 RX ADMIN — Medication 1000 MILLILITER(S): at 11:55

## 2024-10-12 RX ADMIN — Medication 1000 MILLILITER(S): at 09:09

## 2024-10-13 LAB
A1C WITH ESTIMATED AVERAGE GLUCOSE RESULT: 8.3 % — HIGH (ref 4–5.6)
ANION GAP SERPL CALC-SCNC: 8 MMOL/L — SIGNIFICANT CHANGE UP (ref 5–17)
BUN SERPL-MCNC: 31 MG/DL — HIGH (ref 7–23)
CALCIUM SERPL-MCNC: 8.4 MG/DL — LOW (ref 8.5–10.1)
CHLORIDE SERPL-SCNC: 112 MMOL/L — HIGH (ref 96–108)
CO2 SERPL-SCNC: 18 MMOL/L — LOW (ref 22–31)
CREAT SERPL-MCNC: 2 MG/DL — HIGH (ref 0.5–1.3)
CULTURE RESULTS: SIGNIFICANT CHANGE UP
EGFR: 28 ML/MIN/1.73M2 — LOW
EGFR: 28 ML/MIN/1.73M2 — LOW
ESTIMATED AVERAGE GLUCOSE: 192 MG/DL — HIGH (ref 68–114)
GLUCOSE BLDC GLUCOMTR-MCNC: 173 MG/DL — HIGH (ref 70–99)
GLUCOSE BLDC GLUCOMTR-MCNC: 222 MG/DL — HIGH (ref 70–99)
GLUCOSE BLDC GLUCOMTR-MCNC: 246 MG/DL — HIGH (ref 70–99)
GLUCOSE BLDC GLUCOMTR-MCNC: 284 MG/DL — HIGH (ref 70–99)
GLUCOSE SERPL-MCNC: 186 MG/DL — HIGH (ref 70–99)
HCT VFR BLD CALC: 26.9 % — LOW (ref 34.5–45)
HGB BLD-MCNC: 8.5 G/DL — LOW (ref 11.5–15.5)
LACTATE SERPL-SCNC: 3.5 MMOL/L — HIGH (ref 0.7–2)
MCHC RBC-ENTMCNC: 26.1 PG — LOW (ref 27–34)
MCHC RBC-ENTMCNC: 31.6 G/DL — LOW (ref 32–36)
MCV RBC AUTO: 82.5 FL — SIGNIFICANT CHANGE UP (ref 80–100)
NRBC # BLD: 0 /100 WBCS — SIGNIFICANT CHANGE UP (ref 0–0)
NRBC BLD-RTO: 0 /100 WBCS — SIGNIFICANT CHANGE UP (ref 0–0)
PLATELET # BLD AUTO: 208 K/UL — SIGNIFICANT CHANGE UP (ref 150–400)
POTASSIUM SERPL-MCNC: 5.2 MMOL/L — SIGNIFICANT CHANGE UP (ref 3.5–5.3)
POTASSIUM SERPL-SCNC: 5.2 MMOL/L — SIGNIFICANT CHANGE UP (ref 3.5–5.3)
RBC # BLD: 3.26 M/UL — LOW (ref 3.8–5.2)
RBC # FLD: 17.4 % — HIGH (ref 10.3–14.5)
SODIUM SERPL-SCNC: 138 MMOL/L — SIGNIFICANT CHANGE UP (ref 135–145)
SPECIMEN SOURCE: SIGNIFICANT CHANGE UP
WBC # BLD: 31.65 K/UL — HIGH (ref 3.8–10.5)
WBC # FLD AUTO: 31.65 K/UL — HIGH (ref 3.8–10.5)

## 2024-10-13 PROCEDURE — 99223 1ST HOSP IP/OBS HIGH 75: CPT

## 2024-10-13 PROCEDURE — 99222 1ST HOSP IP/OBS MODERATE 55: CPT

## 2024-10-13 PROCEDURE — 99232 SBSQ HOSP IP/OBS MODERATE 35: CPT

## 2024-10-13 PROCEDURE — 70450 CT HEAD/BRAIN W/O DYE: CPT | Mod: 26

## 2024-10-13 RX ORDER — CEFTRIAXONE 500 MG/1
2000 INJECTION, POWDER, FOR SOLUTION INTRAMUSCULAR; INTRAVENOUS EVERY 24 HOURS
Refills: 0 | Status: DISCONTINUED | OUTPATIENT
Start: 2024-10-14 | End: 2024-10-13

## 2024-10-13 RX ORDER — CEFTRIAXONE 500 MG/1
1000 INJECTION, POWDER, FOR SOLUTION INTRAMUSCULAR; INTRAVENOUS ONCE
Refills: 0 | Status: COMPLETED | OUTPATIENT
Start: 2024-10-13 | End: 2024-10-13

## 2024-10-13 RX ORDER — MEROPENEM 1 G/30ML
1000 INJECTION INTRAVENOUS EVERY 12 HOURS
Refills: 0 | Status: DISCONTINUED | OUTPATIENT
Start: 2024-10-13 | End: 2024-10-14

## 2024-10-13 RX ADMIN — INSULIN LISPRO 6: 100 INJECTION, SOLUTION INTRAVENOUS; SUBCUTANEOUS at 12:03

## 2024-10-13 RX ADMIN — Medication 1 LOZENGE: at 21:11

## 2024-10-13 RX ADMIN — INSULIN LISPRO 4: 100 INJECTION, SOLUTION INTRAVENOUS; SUBCUTANEOUS at 09:17

## 2024-10-13 RX ADMIN — INSULIN LISPRO 4: 100 INJECTION, SOLUTION INTRAVENOUS; SUBCUTANEOUS at 18:10

## 2024-10-13 RX ADMIN — Medication 40 MILLIGRAM(S): at 05:49

## 2024-10-13 RX ADMIN — Medication 1 TABLET(S): at 21:11

## 2024-10-13 RX ADMIN — INSULIN LISPRO 0: 100 INJECTION, SOLUTION INTRAVENOUS; SUBCUTANEOUS at 22:17

## 2024-10-13 RX ADMIN — INSULIN GLARGINE-YFGN 20 UNIT(S): 100 INJECTION, SOLUTION SUBCUTANEOUS at 21:12

## 2024-10-13 RX ADMIN — PREDNISONE 20 MILLIGRAM(S): 20 TABLET ORAL at 05:48

## 2024-10-13 RX ADMIN — CEFTRIAXONE 100 MILLIGRAM(S): 500 INJECTION, POWDER, FOR SOLUTION INTRAMUSCULAR; INTRAVENOUS at 09:44

## 2024-10-13 RX ADMIN — Medication 100 MILLILITER(S): at 10:29

## 2024-10-13 RX ADMIN — INSULIN LISPRO 6 UNIT(S): 100 INJECTION, SOLUTION INTRAVENOUS; SUBCUTANEOUS at 09:13

## 2024-10-13 RX ADMIN — HYDROXYCHLOROQUINE SULFATE 200 MILLIGRAM(S): 200 TABLET, FILM COATED ORAL at 05:49

## 2024-10-13 RX ADMIN — CEFTRIAXONE 100 MILLIGRAM(S): 500 INJECTION, POWDER, FOR SOLUTION INTRAMUSCULAR; INTRAVENOUS at 13:05

## 2024-10-13 RX ADMIN — APIXABAN 5 MILLIGRAM(S): 2.5 TABLET, FILM COATED ORAL at 18:11

## 2024-10-13 RX ADMIN — Medication 500 MILLIGRAM(S): at 12:04

## 2024-10-13 RX ADMIN — Medication 100 MILLILITER(S): at 13:42

## 2024-10-13 RX ADMIN — APIXABAN 5 MILLIGRAM(S): 2.5 TABLET, FILM COATED ORAL at 05:49

## 2024-10-13 RX ADMIN — MEROPENEM 100 MILLIGRAM(S): 1 INJECTION INTRAVENOUS at 18:11

## 2024-10-13 RX ADMIN — HYDROXYCHLOROQUINE SULFATE 200 MILLIGRAM(S): 200 TABLET, FILM COATED ORAL at 21:12

## 2024-10-13 RX ADMIN — Medication 1 TABLET(S): at 05:49

## 2024-10-13 RX ADMIN — TAMSULOSIN HYDROCHLORIDE 0.4 MILLIGRAM(S): 0.4 CAPSULE ORAL at 22:18

## 2024-10-13 RX ADMIN — INSULIN LISPRO 6 UNIT(S): 100 INJECTION, SOLUTION INTRAVENOUS; SUBCUTANEOUS at 18:10

## 2024-10-13 RX ADMIN — INSULIN LISPRO 6 UNIT(S): 100 INJECTION, SOLUTION INTRAVENOUS; SUBCUTANEOUS at 12:03

## 2024-10-13 RX ADMIN — Medication 1500 MILLILITER(S): at 22:26

## 2024-10-14 LAB
ALBUMIN SERPL ELPH-MCNC: 2.1 G/DL — LOW (ref 3.3–5)
ALP SERPL-CCNC: 57 U/L — SIGNIFICANT CHANGE UP (ref 40–120)
ALT FLD-CCNC: 17 U/L — SIGNIFICANT CHANGE UP (ref 12–78)
ANION GAP SERPL CALC-SCNC: 10 MMOL/L — SIGNIFICANT CHANGE UP (ref 5–17)
APPEARANCE UR: CLEAR — SIGNIFICANT CHANGE UP
APTT BLD: 28.8 SEC — SIGNIFICANT CHANGE UP (ref 24.5–35.6)
AST SERPL-CCNC: 33 U/L — SIGNIFICANT CHANGE UP (ref 15–37)
BACTERIA # UR AUTO: ABNORMAL /HPF
BASE EXCESS BLDA CALC-SCNC: -7.7 MMOL/L — LOW (ref -2–3)
BASOPHILS # BLD AUTO: 0.03 K/UL — SIGNIFICANT CHANGE UP (ref 0–0.2)
BASOPHILS NFR BLD AUTO: 0.1 % — SIGNIFICANT CHANGE UP (ref 0–2)
BILIRUB SERPL-MCNC: 0.6 MG/DL — SIGNIFICANT CHANGE UP (ref 0.2–1.2)
BILIRUB UR-MCNC: NEGATIVE — SIGNIFICANT CHANGE UP
BLD GP AB SCN SERPL QL: SIGNIFICANT CHANGE UP
BLOOD GAS COMMENTS ARTERIAL: SIGNIFICANT CHANGE UP
BUN SERPL-MCNC: 24 MG/DL — HIGH (ref 7–23)
C3 SERPL-MCNC: 90 MG/DL — SIGNIFICANT CHANGE UP (ref 81–157)
C4 SERPL-MCNC: 22 MG/DL — SIGNIFICANT CHANGE UP (ref 13–39)
CALCIUM SERPL-MCNC: 8.3 MG/DL — LOW (ref 8.5–10.1)
CHLORIDE SERPL-SCNC: 112 MMOL/L — HIGH (ref 96–108)
CO2 BLDA-SCNC: 18 MMOL/L — LOW (ref 19–24)
CO2 SERPL-SCNC: 18 MMOL/L — LOW (ref 22–31)
COLOR SPEC: YELLOW — SIGNIFICANT CHANGE UP
CREAT SERPL-MCNC: 1.75 MG/DL — HIGH (ref 0.5–1.3)
DIFF PNL FLD: ABNORMAL
EGFR: 33 ML/MIN/1.73M2 — LOW
EGFR: 33 ML/MIN/1.73M2 — LOW
EOSINOPHIL # BLD AUTO: 0 K/UL — SIGNIFICANT CHANGE UP (ref 0–0.5)
EOSINOPHIL NFR BLD AUTO: 0 % — SIGNIFICANT CHANGE UP (ref 0–6)
GAS PNL BLDA: SIGNIFICANT CHANGE UP
GLUCOSE BLDC GLUCOMTR-MCNC: 106 MG/DL — HIGH (ref 70–99)
GLUCOSE BLDC GLUCOMTR-MCNC: 320 MG/DL — HIGH (ref 70–99)
GLUCOSE BLDC GLUCOMTR-MCNC: 355 MG/DL — HIGH (ref 70–99)
GLUCOSE BLDC GLUCOMTR-MCNC: 359 MG/DL — HIGH (ref 70–99)
GLUCOSE BLDC GLUCOMTR-MCNC: 387 MG/DL — HIGH (ref 70–99)
GLUCOSE SERPL-MCNC: 298 MG/DL — HIGH (ref 70–99)
GLUCOSE UR QL: NEGATIVE MG/DL — SIGNIFICANT CHANGE UP
HCO3 BLDA-SCNC: 18 MMOL/L — LOW (ref 21–28)
HCT VFR BLD CALC: 26.7 % — LOW (ref 34.5–45)
HGB BLD-MCNC: 8.5 G/DL — LOW (ref 11.5–15.5)
HOROWITZ INDEX BLDA+IHG-RTO: 40 — SIGNIFICANT CHANGE UP
IMM GRANULOCYTES NFR BLD AUTO: 0.7 % — SIGNIFICANT CHANGE UP (ref 0–0.9)
INR BLD: 1.28 RATIO — HIGH (ref 0.85–1.16)
KETONES UR-MCNC: ABNORMAL MG/DL
LACTATE SERPL-SCNC: 2.5 MMOL/L — HIGH (ref 0.7–2)
LEUKOCYTE ESTERASE UR-ACNC: ABNORMAL
LYMPHOCYTES # BLD AUTO: 0.52 K/UL — LOW (ref 1–3.3)
LYMPHOCYTES # BLD AUTO: 2 % — LOW (ref 13–44)
MAGNESIUM SERPL-MCNC: 1.7 MG/DL — SIGNIFICANT CHANGE UP (ref 1.6–2.6)
MCHC RBC-ENTMCNC: 26.2 PG — LOW (ref 27–34)
MCHC RBC-ENTMCNC: 31.8 G/DL — LOW (ref 32–36)
MCV RBC AUTO: 82.4 FL — SIGNIFICANT CHANGE UP (ref 80–100)
MONOCYTES # BLD AUTO: 0.56 K/UL — SIGNIFICANT CHANGE UP (ref 0–0.9)
MONOCYTES NFR BLD AUTO: 2.1 % — SIGNIFICANT CHANGE UP (ref 2–14)
NEUTROPHILS # BLD AUTO: 25.09 K/UL — HIGH (ref 1.8–7.4)
NEUTROPHILS NFR BLD AUTO: 95.1 % — HIGH (ref 43–77)
NITRITE UR-MCNC: NEGATIVE — SIGNIFICANT CHANGE UP
NRBC # BLD: 0 /100 WBCS — SIGNIFICANT CHANGE UP (ref 0–0)
NRBC BLD-RTO: 0 /100 WBCS — SIGNIFICANT CHANGE UP (ref 0–0)
PCO2 BLDA: 34 MMHG — SIGNIFICANT CHANGE UP (ref 32–46)
PH BLDA: 7.32 — LOW (ref 7.35–7.45)
PH UR: 5.5 — SIGNIFICANT CHANGE UP (ref 5–8)
PHOSPHATE SERPL-MCNC: 3.7 MG/DL — SIGNIFICANT CHANGE UP (ref 2.5–4.5)
PLATELET # BLD AUTO: 205 K/UL — SIGNIFICANT CHANGE UP (ref 150–400)
PO2 BLDA: 162 MMHG — HIGH (ref 83–108)
POTASSIUM SERPL-MCNC: 3.9 MMOL/L — SIGNIFICANT CHANGE UP (ref 3.5–5.3)
POTASSIUM SERPL-SCNC: 3.9 MMOL/L — SIGNIFICANT CHANGE UP (ref 3.5–5.3)
PROT SERPL-MCNC: 6 GM/DL — SIGNIFICANT CHANGE UP (ref 6–8.3)
PROT UR-MCNC: 30 MG/DL
PROTHROM AB SERPL-ACNC: 14.8 SEC — HIGH (ref 9.9–13.4)
RBC # BLD: 3.24 M/UL — LOW (ref 3.8–5.2)
RBC # FLD: 17.2 % — HIGH (ref 10.3–14.5)
RBC CASTS # UR COMP ASSIST: 3 /HPF — SIGNIFICANT CHANGE UP (ref 0–4)
SAO2 % BLDA: 99.7 % — HIGH (ref 94–98)
SODIUM SERPL-SCNC: 140 MMOL/L — SIGNIFICANT CHANGE UP (ref 135–145)
SP GR SPEC: 1.01 — SIGNIFICANT CHANGE UP (ref 1–1.03)
UROBILINOGEN FLD QL: 0.2 MG/DL — SIGNIFICANT CHANGE UP (ref 0.2–1)
WBC # BLD: 26.39 K/UL — HIGH (ref 3.8–10.5)
WBC # FLD AUTO: 26.39 K/UL — HIGH (ref 3.8–10.5)
WBC UR QL: 15 /HPF — HIGH (ref 0–5)

## 2024-10-14 PROCEDURE — 99233 SBSQ HOSP IP/OBS HIGH 50: CPT

## 2024-10-14 PROCEDURE — 99291 CRITICAL CARE FIRST HOUR: CPT

## 2024-10-14 PROCEDURE — 99223 1ST HOSP IP/OBS HIGH 75: CPT

## 2024-10-14 PROCEDURE — 71045 X-RAY EXAM CHEST 1 VIEW: CPT | Mod: 26,76

## 2024-10-14 PROCEDURE — 71045 X-RAY EXAM CHEST 1 VIEW: CPT | Mod: 26,77

## 2024-10-14 RX ORDER — HYDROXYCHLOROQUINE SULFATE 200 MG/1
200 TABLET, FILM COATED ORAL
Refills: 0 | Status: DISCONTINUED | OUTPATIENT
Start: 2024-10-14 | End: 2024-10-26

## 2024-10-14 RX ORDER — INSULIN LISPRO 100 U/ML
INJECTION, SOLUTION INTRAVENOUS; SUBCUTANEOUS EVERY 6 HOURS
Refills: 0 | Status: DISCONTINUED | OUTPATIENT
Start: 2024-10-14 | End: 2024-10-15

## 2024-10-14 RX ORDER — AMLODIPINE BESYLATE 10 MG/1
10 TABLET ORAL DAILY
Refills: 0 | Status: DISCONTINUED | OUTPATIENT
Start: 2024-10-14 | End: 2024-10-26

## 2024-10-14 RX ORDER — CEFTRIAXONE 500 MG/1
1000 INJECTION, POWDER, FOR SOLUTION INTRAMUSCULAR; INTRAVENOUS EVERY 24 HOURS
Refills: 0 | Status: COMPLETED | OUTPATIENT
Start: 2024-10-14 | End: 2024-10-20

## 2024-10-14 RX ORDER — DEXAMETHASONE 0.5 MG/1
5 TABLET ORAL ONCE
Refills: 0 | Status: COMPLETED | OUTPATIENT
Start: 2024-10-14 | End: 2024-10-14

## 2024-10-14 RX ORDER — INSULIN LISPRO 100 U/ML
INJECTION, SOLUTION INTRAVENOUS; SUBCUTANEOUS EVERY 6 HOURS
Refills: 0 | Status: DISCONTINUED | OUTPATIENT
Start: 2024-10-14 | End: 2024-10-14

## 2024-10-14 RX ORDER — DIPHENHYDRAMINE HCL 12.5MG/5ML
50 ELIXIR ORAL EVERY 8 HOURS
Refills: 0 | Status: COMPLETED | OUTPATIENT
Start: 2024-10-14 | End: 2024-10-15

## 2024-10-14 RX ORDER — DEXAMETHASONE 0.5 MG/1
6 TABLET ORAL EVERY 6 HOURS
Refills: 0 | Status: DISCONTINUED | OUTPATIENT
Start: 2024-10-14 | End: 2024-10-14

## 2024-10-14 RX ORDER — MAGNESIUM SULFATE 500 MG/ML
2 SYRINGE (ML) INJECTION ONCE
Refills: 0 | Status: COMPLETED | OUTPATIENT
Start: 2024-10-14 | End: 2024-10-14

## 2024-10-14 RX ORDER — PROPOFOL 10 MG/ML
5 INJECTION, EMULSION INTRAVENOUS
Qty: 1000 | Refills: 0 | Status: DISCONTINUED | OUTPATIENT
Start: 2024-10-14 | End: 2024-10-16

## 2024-10-14 RX ORDER — METHYLPREDNISOLONE ACETATE 80 MG/ML
125 INJECTION, SUSPENSION INTRA-ARTICULAR; INTRALESIONAL; INTRAMUSCULAR; SOFT TISSUE ONCE
Refills: 0 | Status: DISCONTINUED | OUTPATIENT
Start: 2024-10-14 | End: 2024-10-14

## 2024-10-14 RX ORDER — DEXAMETHASONE 0.5 MG/1
6 TABLET ORAL EVERY 6 HOURS
Refills: 0 | Status: DISCONTINUED | OUTPATIENT
Start: 2024-10-14 | End: 2024-10-16

## 2024-10-14 RX ORDER — FENTANYL CITRATE-0.9 % NACL/PF 100MCG/2ML
0.5 SYRINGE (ML) INTRAVENOUS
Qty: 2500 | Refills: 0 | Status: DISCONTINUED | OUTPATIENT
Start: 2024-10-14 | End: 2024-10-15

## 2024-10-14 RX ORDER — DIPHENHYDRAMINE HCL 12.5MG/5ML
50 ELIXIR ORAL EVERY 8 HOURS
Refills: 0 | Status: DISCONTINUED | OUTPATIENT
Start: 2024-10-14 | End: 2024-10-14

## 2024-10-14 RX ORDER — DEXAMETHASONE 0.5 MG/1
5 TABLET ORAL ONCE
Refills: 0 | Status: DISCONTINUED | OUTPATIENT
Start: 2024-10-14 | End: 2024-10-14

## 2024-10-14 RX ORDER — DIPHENHYDRAMINE HCL 12.5MG/5ML
25 ELIXIR ORAL ONCE
Refills: 0 | Status: COMPLETED | OUTPATIENT
Start: 2024-10-14 | End: 2024-10-14

## 2024-10-14 RX ORDER — APIXABAN 2.5 MG/1
5 TABLET, FILM COATED ORAL EVERY 12 HOURS
Refills: 0 | Status: DISCONTINUED | OUTPATIENT
Start: 2024-10-14 | End: 2024-10-26

## 2024-10-14 RX ADMIN — PROPOFOL 3.34 MICROGRAM(S)/KG/MIN: 10 INJECTION, EMULSION INTRAVENOUS at 19:26

## 2024-10-14 RX ADMIN — PROPOFOL 3.34 MICROGRAM(S)/KG/MIN: 10 INJECTION, EMULSION INTRAVENOUS at 05:15

## 2024-10-14 RX ADMIN — Medication 50 MILLIGRAM(S): at 05:14

## 2024-10-14 RX ADMIN — DEXAMETHASONE 6 MILLIGRAM(S): 0.5 TABLET ORAL at 11:18

## 2024-10-14 RX ADMIN — DEXAMETHASONE 5 MILLIGRAM(S): 0.5 TABLET ORAL at 01:12

## 2024-10-14 RX ADMIN — AMLODIPINE BESYLATE 10 MILLIGRAM(S): 10 TABLET ORAL at 18:30

## 2024-10-14 RX ADMIN — INSULIN LISPRO 5: 100 INJECTION, SOLUTION INTRAVENOUS; SUBCUTANEOUS at 11:17

## 2024-10-14 RX ADMIN — APIXABAN 5 MILLIGRAM(S): 2.5 TABLET, FILM COATED ORAL at 06:00

## 2024-10-14 RX ADMIN — PROPOFOL 3.34 MICROGRAM(S)/KG/MIN: 10 INJECTION, EMULSION INTRAVENOUS at 14:17

## 2024-10-14 RX ADMIN — DEXAMETHASONE 6 MILLIGRAM(S): 0.5 TABLET ORAL at 17:17

## 2024-10-14 RX ADMIN — HYDROXYCHLOROQUINE SULFATE 200 MILLIGRAM(S): 200 TABLET, FILM COATED ORAL at 18:31

## 2024-10-14 RX ADMIN — Medication 15 MILLILITER(S): at 05:15

## 2024-10-14 RX ADMIN — PROPOFOL 3.34 MICROGRAM(S)/KG/MIN: 10 INJECTION, EMULSION INTRAVENOUS at 07:49

## 2024-10-14 RX ADMIN — INSULIN LISPRO 4: 100 INJECTION, SOLUTION INTRAVENOUS; SUBCUTANEOUS at 17:18

## 2024-10-14 RX ADMIN — PROPOFOL 3.34 MICROGRAM(S)/KG/MIN: 10 INJECTION, EMULSION INTRAVENOUS at 10:57

## 2024-10-14 RX ADMIN — Medication 50 MILLIGRAM(S): at 21:44

## 2024-10-14 RX ADMIN — Medication 1 APPLICATION(S): at 05:12

## 2024-10-14 RX ADMIN — PROPOFOL 3.34 MICROGRAM(S)/KG/MIN: 10 INJECTION, EMULSION INTRAVENOUS at 17:19

## 2024-10-14 RX ADMIN — Medication 20 MILLIGRAM(S): at 05:15

## 2024-10-14 RX ADMIN — PROPOFOL 3.34 MICROGRAM(S)/KG/MIN: 10 INJECTION, EMULSION INTRAVENOUS at 22:56

## 2024-10-14 RX ADMIN — Medication 5.56 MICROGRAM(S)/KG/HR: at 07:49

## 2024-10-14 RX ADMIN — Medication 20 MILLIGRAM(S): at 01:13

## 2024-10-14 RX ADMIN — Medication 20 MILLIGRAM(S): at 17:18

## 2024-10-14 RX ADMIN — DEXAMETHASONE 5 MILLIGRAM(S): 0.5 TABLET ORAL at 02:11

## 2024-10-14 RX ADMIN — Medication 25 GRAM(S): at 06:55

## 2024-10-14 RX ADMIN — INSULIN LISPRO 5: 100 INJECTION, SOLUTION INTRAVENOUS; SUBCUTANEOUS at 05:13

## 2024-10-14 RX ADMIN — Medication 25 MILLIGRAM(S): at 01:12

## 2024-10-14 RX ADMIN — CEFTRIAXONE 100 MILLIGRAM(S): 500 INJECTION, POWDER, FOR SOLUTION INTRAMUSCULAR; INTRAVENOUS at 14:06

## 2024-10-14 RX ADMIN — DEXAMETHASONE 6 MILLIGRAM(S): 0.5 TABLET ORAL at 05:14

## 2024-10-14 RX ADMIN — Medication 0.5 MILLIGRAM(S): at 01:38

## 2024-10-14 RX ADMIN — Medication 0.5 MILLIGRAM(S): at 01:12

## 2024-10-14 RX ADMIN — Medication 5.56 MICROGRAM(S)/KG/HR: at 05:15

## 2024-10-14 RX ADMIN — Medication 50 MILLIGRAM(S): at 14:06

## 2024-10-14 RX ADMIN — Medication 15 MILLILITER(S): at 17:17

## 2024-10-14 RX ADMIN — INSULIN LISPRO 5: 100 INJECTION, SOLUTION INTRAVENOUS; SUBCUTANEOUS at 23:12

## 2024-10-14 RX ADMIN — DEXAMETHASONE 6 MILLIGRAM(S): 0.5 TABLET ORAL at 23:11

## 2024-10-15 LAB
ALBUMIN SERPL ELPH-MCNC: 2 G/DL — LOW (ref 3.3–5)
ALP SERPL-CCNC: 58 U/L — SIGNIFICANT CHANGE UP (ref 40–120)
ALT FLD-CCNC: 17 U/L — SIGNIFICANT CHANGE UP (ref 12–78)
ANION GAP SERPL CALC-SCNC: 7 MMOL/L — SIGNIFICANT CHANGE UP (ref 5–17)
AST SERPL-CCNC: 36 U/L — SIGNIFICANT CHANGE UP (ref 15–37)
BASOPHILS # BLD AUTO: 0.01 K/UL — SIGNIFICANT CHANGE UP (ref 0–0.2)
BASOPHILS NFR BLD AUTO: 0.1 % — SIGNIFICANT CHANGE UP (ref 0–2)
BILIRUB SERPL-MCNC: 0.4 MG/DL — SIGNIFICANT CHANGE UP (ref 0.2–1.2)
BUN SERPL-MCNC: 34 MG/DL — HIGH (ref 7–23)
CALCIUM SERPL-MCNC: 9.1 MG/DL — SIGNIFICANT CHANGE UP (ref 8.5–10.1)
CHLORIDE SERPL-SCNC: 114 MMOL/L — HIGH (ref 96–108)
CO2 SERPL-SCNC: 18 MMOL/L — LOW (ref 22–31)
CREAT SERPL-MCNC: 1.69 MG/DL — HIGH (ref 0.5–1.3)
DSDNA AB SER-ACNC: 6 IU/ML — HIGH
EGFR: 34 ML/MIN/1.73M2 — LOW
EGFR: 34 ML/MIN/1.73M2 — LOW
EOSINOPHIL # BLD AUTO: 0 K/UL — SIGNIFICANT CHANGE UP (ref 0–0.5)
EOSINOPHIL NFR BLD AUTO: 0 % — SIGNIFICANT CHANGE UP (ref 0–6)
GLUCOSE BLDC GLUCOMTR-MCNC: 368 MG/DL — HIGH (ref 70–99)
GLUCOSE BLDC GLUCOMTR-MCNC: 391 MG/DL — HIGH (ref 70–99)
GLUCOSE BLDC GLUCOMTR-MCNC: 395 MG/DL — HIGH (ref 70–99)
GLUCOSE BLDC GLUCOMTR-MCNC: 403 MG/DL — HIGH (ref 70–99)
GLUCOSE BLDC GLUCOMTR-MCNC: 405 MG/DL — HIGH (ref 70–99)
GLUCOSE SERPL-MCNC: 345 MG/DL — HIGH (ref 70–99)
HCT VFR BLD CALC: 28.7 % — LOW (ref 34.5–45)
HGB BLD-MCNC: 9.2 G/DL — LOW (ref 11.5–15.5)
IMM GRANULOCYTES NFR BLD AUTO: 0.5 % — SIGNIFICANT CHANGE UP (ref 0–0.9)
LYMPHOCYTES # BLD AUTO: 0.42 K/UL — LOW (ref 1–3.3)
LYMPHOCYTES # BLD AUTO: 3.3 % — LOW (ref 13–44)
MAGNESIUM SERPL-MCNC: 2.5 MG/DL — SIGNIFICANT CHANGE UP (ref 1.6–2.6)
MCHC RBC-ENTMCNC: 25.9 PG — LOW (ref 27–34)
MCHC RBC-ENTMCNC: 32.1 G/DL — SIGNIFICANT CHANGE UP (ref 32–36)
MCV RBC AUTO: 80.8 FL — SIGNIFICANT CHANGE UP (ref 80–100)
MONOCYTES # BLD AUTO: 0.31 K/UL — SIGNIFICANT CHANGE UP (ref 0–0.9)
MONOCYTES NFR BLD AUTO: 2.5 % — SIGNIFICANT CHANGE UP (ref 2–14)
NEUTROPHILS # BLD AUTO: 11.75 K/UL — HIGH (ref 1.8–7.4)
NEUTROPHILS NFR BLD AUTO: 93.6 % — HIGH (ref 43–77)
NRBC # BLD: 0 /100 WBCS — SIGNIFICANT CHANGE UP (ref 0–0)
NRBC BLD-RTO: 0 /100 WBCS — SIGNIFICANT CHANGE UP (ref 0–0)
PHOSPHATE SERPL-MCNC: 4.5 MG/DL — SIGNIFICANT CHANGE UP (ref 2.5–4.5)
PLATELET # BLD AUTO: 197 K/UL — SIGNIFICANT CHANGE UP (ref 150–400)
POTASSIUM SERPL-MCNC: 5.2 MMOL/L — SIGNIFICANT CHANGE UP (ref 3.5–5.3)
POTASSIUM SERPL-SCNC: 5.2 MMOL/L — SIGNIFICANT CHANGE UP (ref 3.5–5.3)
PROT SERPL-MCNC: 6.6 GM/DL — SIGNIFICANT CHANGE UP (ref 6–8.3)
RBC # BLD: 3.55 M/UL — LOW (ref 3.8–5.2)
RBC # FLD: 17.3 % — HIGH (ref 10.3–14.5)
SODIUM SERPL-SCNC: 139 MMOL/L — SIGNIFICANT CHANGE UP (ref 135–145)
WBC # BLD: 12.55 K/UL — HIGH (ref 3.8–10.5)
WBC # FLD AUTO: 12.55 K/UL — HIGH (ref 3.8–10.5)

## 2024-10-15 PROCEDURE — 99232 SBSQ HOSP IP/OBS MODERATE 35: CPT

## 2024-10-15 PROCEDURE — 99233 SBSQ HOSP IP/OBS HIGH 50: CPT

## 2024-10-15 PROCEDURE — 99291 CRITICAL CARE FIRST HOUR: CPT

## 2024-10-15 RX ORDER — INSULIN GLARGINE-YFGN 100 [IU]/ML
20 INJECTION, SOLUTION SUBCUTANEOUS AT BEDTIME
Refills: 0 | Status: DISCONTINUED | OUTPATIENT
Start: 2024-10-15 | End: 2024-10-17

## 2024-10-15 RX ORDER — INSULIN LISPRO 100 U/ML
INJECTION, SOLUTION INTRAVENOUS; SUBCUTANEOUS EVERY 6 HOURS
Refills: 0 | Status: DISCONTINUED | OUTPATIENT
Start: 2024-10-15 | End: 2024-10-15

## 2024-10-15 RX ORDER — INSULIN GLARGINE-YFGN 100 [IU]/ML
15 INJECTION, SOLUTION SUBCUTANEOUS AT BEDTIME
Refills: 0 | Status: DISCONTINUED | OUTPATIENT
Start: 2024-10-15 | End: 2024-10-15

## 2024-10-15 RX ORDER — INSULIN LISPRO 100 U/ML
INJECTION, SOLUTION INTRAVENOUS; SUBCUTANEOUS EVERY 4 HOURS
Refills: 0 | Status: DISCONTINUED | OUTPATIENT
Start: 2024-10-15 | End: 2024-10-17

## 2024-10-15 RX ORDER — INSULIN GLARGINE-YFGN 100 [IU]/ML
15 INJECTION, SOLUTION SUBCUTANEOUS ONCE
Refills: 0 | Status: COMPLETED | OUTPATIENT
Start: 2024-10-15 | End: 2024-10-15

## 2024-10-15 RX ADMIN — INSULIN GLARGINE-YFGN 20 UNIT(S): 100 INJECTION, SOLUTION SUBCUTANEOUS at 23:02

## 2024-10-15 RX ADMIN — PROPOFOL 3.34 MICROGRAM(S)/KG/MIN: 10 INJECTION, EMULSION INTRAVENOUS at 20:02

## 2024-10-15 RX ADMIN — INSULIN GLARGINE-YFGN 15 UNIT(S): 100 INJECTION, SOLUTION SUBCUTANEOUS at 05:36

## 2024-10-15 RX ADMIN — DEXAMETHASONE 6 MILLIGRAM(S): 0.5 TABLET ORAL at 11:22

## 2024-10-15 RX ADMIN — Medication 20 MILLIGRAM(S): at 17:51

## 2024-10-15 RX ADMIN — HYDROXYCHLOROQUINE SULFATE 200 MILLIGRAM(S): 200 TABLET, FILM COATED ORAL at 06:01

## 2024-10-15 RX ADMIN — Medication 15 MILLILITER(S): at 06:02

## 2024-10-15 RX ADMIN — PROPOFOL 3.34 MICROGRAM(S)/KG/MIN: 10 INJECTION, EMULSION INTRAVENOUS at 15:57

## 2024-10-15 RX ADMIN — Medication 50 MILLIGRAM(S): at 06:02

## 2024-10-15 RX ADMIN — HYDROXYCHLOROQUINE SULFATE 200 MILLIGRAM(S): 200 TABLET, FILM COATED ORAL at 17:50

## 2024-10-15 RX ADMIN — PROPOFOL 3.34 MICROGRAM(S)/KG/MIN: 10 INJECTION, EMULSION INTRAVENOUS at 06:02

## 2024-10-15 RX ADMIN — DEXAMETHASONE 6 MILLIGRAM(S): 0.5 TABLET ORAL at 23:04

## 2024-10-15 RX ADMIN — Medication 1 APPLICATION(S): at 06:01

## 2024-10-15 RX ADMIN — Medication 20 MILLIGRAM(S): at 06:05

## 2024-10-15 RX ADMIN — Medication 50 MILLIGRAM(S): at 21:06

## 2024-10-15 RX ADMIN — APIXABAN 5 MILLIGRAM(S): 2.5 TABLET, FILM COATED ORAL at 17:48

## 2024-10-15 RX ADMIN — DEXAMETHASONE 6 MILLIGRAM(S): 0.5 TABLET ORAL at 17:49

## 2024-10-15 RX ADMIN — DEXAMETHASONE 6 MILLIGRAM(S): 0.5 TABLET ORAL at 06:02

## 2024-10-15 RX ADMIN — Medication 50 MILLIGRAM(S): at 13:26

## 2024-10-15 RX ADMIN — INSULIN LISPRO 12: 100 INJECTION, SOLUTION INTRAVENOUS; SUBCUTANEOUS at 21:05

## 2024-10-15 RX ADMIN — PROPOFOL 3.34 MICROGRAM(S)/KG/MIN: 10 INJECTION, EMULSION INTRAVENOUS at 09:33

## 2024-10-15 RX ADMIN — INSULIN LISPRO 10: 100 INJECTION, SOLUTION INTRAVENOUS; SUBCUTANEOUS at 06:04

## 2024-10-15 RX ADMIN — INSULIN LISPRO 12: 100 INJECTION, SOLUTION INTRAVENOUS; SUBCUTANEOUS at 11:23

## 2024-10-15 RX ADMIN — APIXABAN 5 MILLIGRAM(S): 2.5 TABLET, FILM COATED ORAL at 06:02

## 2024-10-15 RX ADMIN — AMLODIPINE BESYLATE 10 MILLIGRAM(S): 10 TABLET ORAL at 06:02

## 2024-10-15 RX ADMIN — CEFTRIAXONE 100 MILLIGRAM(S): 500 INJECTION, POWDER, FOR SOLUTION INTRAMUSCULAR; INTRAVENOUS at 13:26

## 2024-10-15 RX ADMIN — PROPOFOL 3.34 MICROGRAM(S)/KG/MIN: 10 INJECTION, EMULSION INTRAVENOUS at 02:28

## 2024-10-15 RX ADMIN — PROPOFOL 3.34 MICROGRAM(S)/KG/MIN: 10 INJECTION, EMULSION INTRAVENOUS at 19:46

## 2024-10-15 RX ADMIN — Medication 15 MILLILITER(S): at 17:51

## 2024-10-15 RX ADMIN — INSULIN LISPRO 10: 100 INJECTION, SOLUTION INTRAVENOUS; SUBCUTANEOUS at 17:51

## 2024-10-16 LAB
ALBUMIN SERPL ELPH-MCNC: 2.1 G/DL — LOW (ref 3.3–5)
ALP SERPL-CCNC: 58 U/L — SIGNIFICANT CHANGE UP (ref 40–120)
ALT FLD-CCNC: 15 U/L — SIGNIFICANT CHANGE UP (ref 12–78)
ANION GAP SERPL CALC-SCNC: 9 MMOL/L — SIGNIFICANT CHANGE UP (ref 5–17)
AST SERPL-CCNC: 11 U/L — LOW (ref 15–37)
BILIRUB SERPL-MCNC: 0.2 MG/DL — SIGNIFICANT CHANGE UP (ref 0.2–1.2)
BUN SERPL-MCNC: 44 MG/DL — HIGH (ref 7–23)
C1INH FUNCTIONAL FLD-MCNC: >110 — SIGNIFICANT CHANGE UP
C1INH FUNCTIONAL/C1INH TOTAL MFR SERPL: 41 MG/DL — HIGH (ref 21–39)
CALCIUM SERPL-MCNC: 9.4 MG/DL — SIGNIFICANT CHANGE UP (ref 8.5–10.1)
CHLORIDE SERPL-SCNC: 114 MMOL/L — HIGH (ref 96–108)
CO2 SERPL-SCNC: 17 MMOL/L — LOW (ref 22–31)
CREAT SERPL-MCNC: 1.79 MG/DL — HIGH (ref 0.5–1.3)
CULTURE RESULTS: NO GROWTH — SIGNIFICANT CHANGE UP
EGFR: 32 ML/MIN/1.73M2 — LOW
EGFR: 32 ML/MIN/1.73M2 — LOW
GLUCOSE BLDC GLUCOMTR-MCNC: 277 MG/DL — HIGH (ref 70–99)
GLUCOSE BLDC GLUCOMTR-MCNC: 295 MG/DL — HIGH (ref 70–99)
GLUCOSE BLDC GLUCOMTR-MCNC: 306 MG/DL — HIGH (ref 70–99)
GLUCOSE BLDC GLUCOMTR-MCNC: 313 MG/DL — HIGH (ref 70–99)
GLUCOSE BLDC GLUCOMTR-MCNC: 366 MG/DL — HIGH (ref 70–99)
GLUCOSE BLDC GLUCOMTR-MCNC: 391 MG/DL — HIGH (ref 70–99)
GLUCOSE SERPL-MCNC: 339 MG/DL — HIGH (ref 70–99)
HCT VFR BLD CALC: 28.4 % — LOW (ref 34.5–45)
HGB BLD-MCNC: 9.1 G/DL — LOW (ref 11.5–15.5)
MAGNESIUM SERPL-MCNC: 2.6 MG/DL — SIGNIFICANT CHANGE UP (ref 1.6–2.6)
MCHC RBC-ENTMCNC: 25.9 PG — LOW (ref 27–34)
MCHC RBC-ENTMCNC: 32 G/DL — SIGNIFICANT CHANGE UP (ref 32–36)
MCV RBC AUTO: 80.9 FL — SIGNIFICANT CHANGE UP (ref 80–100)
NRBC # BLD: 0 /100 WBCS — SIGNIFICANT CHANGE UP (ref 0–0)
NRBC BLD-RTO: 0 /100 WBCS — SIGNIFICANT CHANGE UP (ref 0–0)
PHOSPHATE SERPL-MCNC: 4.4 MG/DL — SIGNIFICANT CHANGE UP (ref 2.5–4.5)
PLATELET # BLD AUTO: 244 K/UL — SIGNIFICANT CHANGE UP (ref 150–400)
POTASSIUM SERPL-MCNC: 4.3 MMOL/L — SIGNIFICANT CHANGE UP (ref 3.5–5.3)
POTASSIUM SERPL-SCNC: 4.3 MMOL/L — SIGNIFICANT CHANGE UP (ref 3.5–5.3)
PROT SERPL-MCNC: 6.6 GM/DL — SIGNIFICANT CHANGE UP (ref 6–8.3)
RBC # BLD: 3.51 M/UL — LOW (ref 3.8–5.2)
RBC # FLD: 17.2 % — HIGH (ref 10.3–14.5)
SODIUM SERPL-SCNC: 140 MMOL/L — SIGNIFICANT CHANGE UP (ref 135–145)
SPECIMEN SOURCE: SIGNIFICANT CHANGE UP
WBC # BLD: 10.28 K/UL — SIGNIFICANT CHANGE UP (ref 3.8–10.5)
WBC # FLD AUTO: 10.28 K/UL — SIGNIFICANT CHANGE UP (ref 3.8–10.5)

## 2024-10-16 PROCEDURE — 99232 SBSQ HOSP IP/OBS MODERATE 35: CPT

## 2024-10-16 PROCEDURE — 99291 CRITICAL CARE FIRST HOUR: CPT

## 2024-10-16 RX ORDER — SODIUM BICARBONATE 1 MEQ/ML
0.09 SYRINGE (ML) INTRAVENOUS
Qty: 150 | Refills: 0 | Status: COMPLETED | OUTPATIENT
Start: 2024-10-16 | End: 2024-10-17

## 2024-10-16 RX ORDER — INSULIN GLARGINE-YFGN 100 [IU]/ML
20 INJECTION, SOLUTION SUBCUTANEOUS EVERY MORNING
Refills: 0 | Status: DISCONTINUED | OUTPATIENT
Start: 2024-10-16 | End: 2024-10-16

## 2024-10-16 RX ORDER — SODIUM CHLORIDE 9 G/1000ML
1000 INJECTION, SOLUTION INTRAVENOUS
Refills: 0 | Status: DISCONTINUED | OUTPATIENT
Start: 2024-10-16 | End: 2024-10-16

## 2024-10-16 RX ORDER — DEXAMETHASONE 0.5 MG/1
6 TABLET ORAL EVERY 12 HOURS
Refills: 0 | Status: ACTIVE | OUTPATIENT
Start: 2024-10-16 | End: 2025-09-14

## 2024-10-16 RX ORDER — INSULIN GLARGINE-YFGN 100 [IU]/ML
15 INJECTION, SOLUTION SUBCUTANEOUS EVERY MORNING
Refills: 0 | Status: DISCONTINUED | OUTPATIENT
Start: 2024-10-16 | End: 2024-10-17

## 2024-10-16 RX ADMIN — DEXAMETHASONE 6 MILLIGRAM(S): 0.5 TABLET ORAL at 05:33

## 2024-10-16 RX ADMIN — INSULIN GLARGINE-YFGN 20 UNIT(S): 100 INJECTION, SOLUTION SUBCUTANEOUS at 21:45

## 2024-10-16 RX ADMIN — Medication 40 MILLIGRAM(S): at 14:45

## 2024-10-16 RX ADMIN — INSULIN LISPRO 8: 100 INJECTION, SOLUTION INTRAVENOUS; SUBCUTANEOUS at 17:47

## 2024-10-16 RX ADMIN — APIXABAN 5 MILLIGRAM(S): 2.5 TABLET, FILM COATED ORAL at 17:48

## 2024-10-16 RX ADMIN — CEFTRIAXONE 100 MILLIGRAM(S): 500 INJECTION, POWDER, FOR SOLUTION INTRAMUSCULAR; INTRAVENOUS at 14:46

## 2024-10-16 RX ADMIN — INSULIN LISPRO 6: 100 INJECTION, SOLUTION INTRAVENOUS; SUBCUTANEOUS at 14:45

## 2024-10-16 RX ADMIN — HYDROXYCHLOROQUINE SULFATE 200 MILLIGRAM(S): 200 TABLET, FILM COATED ORAL at 17:48

## 2024-10-16 RX ADMIN — INSULIN LISPRO 8: 100 INJECTION, SOLUTION INTRAVENOUS; SUBCUTANEOUS at 21:39

## 2024-10-16 RX ADMIN — DEXAMETHASONE 6 MILLIGRAM(S): 0.5 TABLET ORAL at 17:48

## 2024-10-16 RX ADMIN — APIXABAN 5 MILLIGRAM(S): 2.5 TABLET, FILM COATED ORAL at 05:33

## 2024-10-16 RX ADMIN — AMLODIPINE BESYLATE 10 MILLIGRAM(S): 10 TABLET ORAL at 05:33

## 2024-10-16 RX ADMIN — INSULIN GLARGINE-YFGN 15 UNIT(S): 100 INJECTION, SOLUTION SUBCUTANEOUS at 08:17

## 2024-10-16 RX ADMIN — INSULIN LISPRO 6: 100 INJECTION, SOLUTION INTRAVENOUS; SUBCUTANEOUS at 10:42

## 2024-10-16 RX ADMIN — PROPOFOL 3.34 MICROGRAM(S)/KG/MIN: 10 INJECTION, EMULSION INTRAVENOUS at 00:44

## 2024-10-16 RX ADMIN — HYDROXYCHLOROQUINE SULFATE 200 MILLIGRAM(S): 200 TABLET, FILM COATED ORAL at 05:41

## 2024-10-16 RX ADMIN — PROPOFOL 3.34 MICROGRAM(S)/KG/MIN: 10 INJECTION, EMULSION INTRAVENOUS at 06:55

## 2024-10-16 RX ADMIN — Medication 1 APPLICATION(S): at 05:33

## 2024-10-16 RX ADMIN — Medication 70 MEQ/KG/HR: at 11:30

## 2024-10-16 RX ADMIN — Medication 15 MILLILITER(S): at 05:32

## 2024-10-16 RX ADMIN — INSULIN LISPRO 10: 100 INJECTION, SOLUTION INTRAVENOUS; SUBCUTANEOUS at 01:22

## 2024-10-16 RX ADMIN — Medication 5 UNIT(S): at 05:35

## 2024-10-16 RX ADMIN — Medication 20 MILLIGRAM(S): at 05:39

## 2024-10-16 RX ADMIN — INSULIN LISPRO 10: 100 INJECTION, SOLUTION INTRAVENOUS; SUBCUTANEOUS at 05:42

## 2024-10-17 LAB
ALBUMIN SERPL ELPH-MCNC: 2.1 G/DL — LOW (ref 3.3–5)
ALP SERPL-CCNC: 59 U/L — SIGNIFICANT CHANGE UP (ref 40–120)
ALT FLD-CCNC: 14 U/L — SIGNIFICANT CHANGE UP (ref 12–78)
ANION GAP SERPL CALC-SCNC: 6 MMOL/L — SIGNIFICANT CHANGE UP (ref 5–17)
AST SERPL-CCNC: 5 U/L — LOW (ref 15–37)
BASOPHILS # BLD AUTO: 0.01 K/UL — SIGNIFICANT CHANGE UP (ref 0–0.2)
BASOPHILS NFR BLD AUTO: 0.1 % — SIGNIFICANT CHANGE UP (ref 0–2)
BILIRUB SERPL-MCNC: 0.2 MG/DL — SIGNIFICANT CHANGE UP (ref 0.2–1.2)
BUN SERPL-MCNC: 50 MG/DL — HIGH (ref 7–23)
CALCIUM SERPL-MCNC: 9.1 MG/DL — SIGNIFICANT CHANGE UP (ref 8.5–10.1)
CHLORIDE SERPL-SCNC: 114 MMOL/L — HIGH (ref 96–108)
CO2 SERPL-SCNC: 26 MMOL/L — SIGNIFICANT CHANGE UP (ref 22–31)
CREAT SERPL-MCNC: 1.39 MG/DL — HIGH (ref 0.5–1.3)
CULTURE RESULTS: SIGNIFICANT CHANGE UP
CULTURE RESULTS: SIGNIFICANT CHANGE UP
EGFR: 43 ML/MIN/1.73M2 — LOW
EGFR: 43 ML/MIN/1.73M2 — LOW
EOSINOPHIL # BLD AUTO: 0 K/UL — SIGNIFICANT CHANGE UP (ref 0–0.5)
EOSINOPHIL NFR BLD AUTO: 0 % — SIGNIFICANT CHANGE UP (ref 0–6)
GLUCOSE BLDC GLUCOMTR-MCNC: 209 MG/DL — HIGH (ref 70–99)
GLUCOSE BLDC GLUCOMTR-MCNC: 221 MG/DL — HIGH (ref 70–99)
GLUCOSE BLDC GLUCOMTR-MCNC: 266 MG/DL — HIGH (ref 70–99)
GLUCOSE BLDC GLUCOMTR-MCNC: 291 MG/DL — HIGH (ref 70–99)
GLUCOSE BLDC GLUCOMTR-MCNC: 308 MG/DL — HIGH (ref 70–99)
GLUCOSE BLDC GLUCOMTR-MCNC: 324 MG/DL — HIGH (ref 70–99)
GLUCOSE SERPL-MCNC: 275 MG/DL — HIGH (ref 70–99)
HCT VFR BLD CALC: 31.6 % — LOW (ref 34.5–45)
HGB BLD-MCNC: 10.2 G/DL — LOW (ref 11.5–15.5)
IMM GRANULOCYTES NFR BLD AUTO: 0.6 % — SIGNIFICANT CHANGE UP (ref 0–0.9)
LYMPHOCYTES # BLD AUTO: 0.51 K/UL — LOW (ref 1–3.3)
LYMPHOCYTES # BLD AUTO: 4.3 % — LOW (ref 13–44)
MAGNESIUM SERPL-MCNC: 2.4 MG/DL — SIGNIFICANT CHANGE UP (ref 1.6–2.6)
MCHC RBC-ENTMCNC: 25.2 PG — LOW (ref 27–34)
MCHC RBC-ENTMCNC: 32.3 G/DL — SIGNIFICANT CHANGE UP (ref 32–36)
MCV RBC AUTO: 78.2 FL — LOW (ref 80–100)
MONOCYTES # BLD AUTO: 0.67 K/UL — SIGNIFICANT CHANGE UP (ref 0–0.9)
MONOCYTES NFR BLD AUTO: 5.7 % — SIGNIFICANT CHANGE UP (ref 2–14)
NEUTROPHILS # BLD AUTO: 10.53 K/UL — HIGH (ref 1.8–7.4)
NEUTROPHILS NFR BLD AUTO: 89.3 % — HIGH (ref 43–77)
NRBC # BLD: 0 /100 WBCS — SIGNIFICANT CHANGE UP (ref 0–0)
NRBC BLD-RTO: 0 /100 WBCS — SIGNIFICANT CHANGE UP (ref 0–0)
PHOSPHATE SERPL-MCNC: 4 MG/DL — SIGNIFICANT CHANGE UP (ref 2.5–4.5)
PLATELET # BLD AUTO: 268 K/UL — SIGNIFICANT CHANGE UP (ref 150–400)
POTASSIUM SERPL-MCNC: 3.9 MMOL/L — SIGNIFICANT CHANGE UP (ref 3.5–5.3)
POTASSIUM SERPL-SCNC: 3.9 MMOL/L — SIGNIFICANT CHANGE UP (ref 3.5–5.3)
PROT SERPL-MCNC: 6.6 GM/DL — SIGNIFICANT CHANGE UP (ref 6–8.3)
RBC # BLD: 4.04 M/UL — SIGNIFICANT CHANGE UP (ref 3.8–5.2)
RBC # FLD: 17 % — HIGH (ref 10.3–14.5)
SODIUM SERPL-SCNC: 146 MMOL/L — HIGH (ref 135–145)
SPECIMEN SOURCE: SIGNIFICANT CHANGE UP
SPECIMEN SOURCE: SIGNIFICANT CHANGE UP
WBC # BLD: 11.79 K/UL — HIGH (ref 3.8–10.5)
WBC # FLD AUTO: 11.79 K/UL — HIGH (ref 3.8–10.5)

## 2024-10-17 PROCEDURE — 76775 US EXAM ABDO BACK WALL LIM: CPT | Mod: 26

## 2024-10-17 PROCEDURE — 99291 CRITICAL CARE FIRST HOUR: CPT

## 2024-10-17 PROCEDURE — 99232 SBSQ HOSP IP/OBS MODERATE 35: CPT

## 2024-10-17 RX ORDER — INSULIN GLARGINE-YFGN 100 [IU]/ML
20 INJECTION, SOLUTION SUBCUTANEOUS
Refills: 0 | Status: DISCONTINUED | OUTPATIENT
Start: 2024-10-17 | End: 2024-10-20

## 2024-10-17 RX ORDER — INSULIN LISPRO 100 U/ML
INJECTION, SOLUTION INTRAVENOUS; SUBCUTANEOUS
Refills: 0 | Status: DISCONTINUED | OUTPATIENT
Start: 2024-10-17 | End: 2024-10-26

## 2024-10-17 RX ORDER — DEXAMETHASONE 0.5 MG/1
4 TABLET ORAL DAILY
Refills: 0 | Status: DISCONTINUED | OUTPATIENT
Start: 2024-10-18 | End: 2024-10-18

## 2024-10-17 RX ADMIN — HYDROXYCHLOROQUINE SULFATE 200 MILLIGRAM(S): 200 TABLET, FILM COATED ORAL at 17:20

## 2024-10-17 RX ADMIN — INSULIN LISPRO 8: 100 INJECTION, SOLUTION INTRAVENOUS; SUBCUTANEOUS at 21:33

## 2024-10-17 RX ADMIN — DEXAMETHASONE 6 MILLIGRAM(S): 0.5 TABLET ORAL at 05:19

## 2024-10-17 RX ADMIN — AMLODIPINE BESYLATE 10 MILLIGRAM(S): 10 TABLET ORAL at 05:20

## 2024-10-17 RX ADMIN — INSULIN LISPRO 6: 100 INJECTION, SOLUTION INTRAVENOUS; SUBCUTANEOUS at 05:18

## 2024-10-17 RX ADMIN — INSULIN LISPRO 8: 100 INJECTION, SOLUTION INTRAVENOUS; SUBCUTANEOUS at 01:14

## 2024-10-17 RX ADMIN — APIXABAN 5 MILLIGRAM(S): 2.5 TABLET, FILM COATED ORAL at 05:19

## 2024-10-17 RX ADMIN — CEFTRIAXONE 100 MILLIGRAM(S): 500 INJECTION, POWDER, FOR SOLUTION INTRAMUSCULAR; INTRAVENOUS at 13:34

## 2024-10-17 RX ADMIN — HYDROXYCHLOROQUINE SULFATE 200 MILLIGRAM(S): 200 TABLET, FILM COATED ORAL at 05:20

## 2024-10-17 RX ADMIN — Medication 1 APPLICATION(S): at 05:19

## 2024-10-17 RX ADMIN — INSULIN GLARGINE-YFGN 20 UNIT(S): 100 INJECTION, SOLUTION SUBCUTANEOUS at 08:54

## 2024-10-17 RX ADMIN — INSULIN GLARGINE-YFGN 20 UNIT(S): 100 INJECTION, SOLUTION SUBCUTANEOUS at 21:34

## 2024-10-17 RX ADMIN — Medication 70 MEQ/KG/HR: at 05:21

## 2024-10-17 RX ADMIN — INSULIN LISPRO 4: 100 INJECTION, SOLUTION INTRAVENOUS; SUBCUTANEOUS at 10:21

## 2024-10-17 RX ADMIN — APIXABAN 5 MILLIGRAM(S): 2.5 TABLET, FILM COATED ORAL at 17:20

## 2024-10-17 RX ADMIN — INSULIN LISPRO 6: 100 INJECTION, SOLUTION INTRAVENOUS; SUBCUTANEOUS at 17:19

## 2024-10-18 LAB
ALBUMIN SERPL ELPH-MCNC: 2.2 G/DL — LOW (ref 3.3–5)
ALP SERPL-CCNC: 54 U/L — SIGNIFICANT CHANGE UP (ref 40–120)
ALT FLD-CCNC: 13 U/L — SIGNIFICANT CHANGE UP (ref 12–78)
ANION GAP SERPL CALC-SCNC: 5 MMOL/L — SIGNIFICANT CHANGE UP (ref 5–17)
AST SERPL-CCNC: 5 U/L — LOW (ref 15–37)
BASOPHILS # BLD AUTO: 0.03 K/UL — SIGNIFICANT CHANGE UP (ref 0–0.2)
BASOPHILS NFR BLD AUTO: 0.2 % — SIGNIFICANT CHANGE UP (ref 0–2)
BILIRUB SERPL-MCNC: 0.5 MG/DL — SIGNIFICANT CHANGE UP (ref 0.2–1.2)
BUN SERPL-MCNC: 58 MG/DL — HIGH (ref 7–23)
CALCIUM SERPL-MCNC: 9 MG/DL — SIGNIFICANT CHANGE UP (ref 8.5–10.1)
CHLORIDE SERPL-SCNC: 113 MMOL/L — HIGH (ref 96–108)
CO2 SERPL-SCNC: 27 MMOL/L — SIGNIFICANT CHANGE UP (ref 22–31)
CREAT SERPL-MCNC: 1.33 MG/DL — HIGH (ref 0.5–1.3)
EGFR: 45 ML/MIN/1.73M2 — LOW
EGFR: 45 ML/MIN/1.73M2 — LOW
EOSINOPHIL # BLD AUTO: 0 K/UL — SIGNIFICANT CHANGE UP (ref 0–0.5)
EOSINOPHIL NFR BLD AUTO: 0 % — SIGNIFICANT CHANGE UP (ref 0–6)
GLUCOSE BLDC GLUCOMTR-MCNC: 185 MG/DL — HIGH (ref 70–99)
GLUCOSE BLDC GLUCOMTR-MCNC: 315 MG/DL — HIGH (ref 70–99)
GLUCOSE BLDC GLUCOMTR-MCNC: 384 MG/DL — HIGH (ref 70–99)
GLUCOSE BLDC GLUCOMTR-MCNC: 386 MG/DL — HIGH (ref 70–99)
GLUCOSE SERPL-MCNC: 191 MG/DL — HIGH (ref 70–99)
HCT VFR BLD CALC: 35 % — SIGNIFICANT CHANGE UP (ref 34.5–45)
HGB BLD-MCNC: 11.1 G/DL — LOW (ref 11.5–15.5)
IMM GRANULOCYTES NFR BLD AUTO: 1.1 % — HIGH (ref 0–0.9)
LYMPHOCYTES # BLD AUTO: 1.18 K/UL — SIGNIFICANT CHANGE UP (ref 1–3.3)
LYMPHOCYTES # BLD AUTO: 7.5 % — LOW (ref 13–44)
MAGNESIUM SERPL-MCNC: 2.3 MG/DL — SIGNIFICANT CHANGE UP (ref 1.6–2.6)
MCHC RBC-ENTMCNC: 25.5 PG — LOW (ref 27–34)
MCHC RBC-ENTMCNC: 31.7 G/DL — LOW (ref 32–36)
MCV RBC AUTO: 80.5 FL — SIGNIFICANT CHANGE UP (ref 80–100)
MONOCYTES # BLD AUTO: 1.56 K/UL — HIGH (ref 0–0.9)
MONOCYTES NFR BLD AUTO: 9.9 % — SIGNIFICANT CHANGE UP (ref 2–14)
NEUTROPHILS # BLD AUTO: 12.75 K/UL — HIGH (ref 1.8–7.4)
NEUTROPHILS NFR BLD AUTO: 81.3 % — HIGH (ref 43–77)
NRBC # BLD: 0 /100 WBCS — SIGNIFICANT CHANGE UP (ref 0–0)
NRBC BLD-RTO: 0 /100 WBCS — SIGNIFICANT CHANGE UP (ref 0–0)
PHOSPHATE SERPL-MCNC: 3.7 MG/DL — SIGNIFICANT CHANGE UP (ref 2.5–4.5)
PLATELET # BLD AUTO: 241 K/UL — SIGNIFICANT CHANGE UP (ref 150–400)
POTASSIUM SERPL-MCNC: 3.8 MMOL/L — SIGNIFICANT CHANGE UP (ref 3.5–5.3)
POTASSIUM SERPL-SCNC: 3.8 MMOL/L — SIGNIFICANT CHANGE UP (ref 3.5–5.3)
PROT SERPL-MCNC: 6.1 GM/DL — SIGNIFICANT CHANGE UP (ref 6–8.3)
RBC # BLD: 4.35 M/UL — SIGNIFICANT CHANGE UP (ref 3.8–5.2)
RBC # FLD: 17.2 % — HIGH (ref 10.3–14.5)
SODIUM SERPL-SCNC: 145 MMOL/L — SIGNIFICANT CHANGE UP (ref 135–145)
WBC # BLD: 15.69 K/UL — HIGH (ref 3.8–10.5)
WBC # FLD AUTO: 15.69 K/UL — HIGH (ref 3.8–10.5)

## 2024-10-18 PROCEDURE — 99233 SBSQ HOSP IP/OBS HIGH 50: CPT

## 2024-10-18 PROCEDURE — 99232 SBSQ HOSP IP/OBS MODERATE 35: CPT

## 2024-10-18 RX ORDER — PREDNISONE 20 MG/1
20 TABLET ORAL DAILY
Refills: 0 | Status: DISCONTINUED | OUTPATIENT
Start: 2024-10-19 | End: 2024-10-26

## 2024-10-18 RX ADMIN — INSULIN LISPRO 8: 100 INJECTION, SOLUTION INTRAVENOUS; SUBCUTANEOUS at 11:50

## 2024-10-18 RX ADMIN — DEXAMETHASONE 4 MILLIGRAM(S): 0.5 TABLET ORAL at 07:08

## 2024-10-18 RX ADMIN — HYDROXYCHLOROQUINE SULFATE 200 MILLIGRAM(S): 200 TABLET, FILM COATED ORAL at 05:24

## 2024-10-18 RX ADMIN — CEFTRIAXONE 100 MILLIGRAM(S): 500 INJECTION, POWDER, FOR SOLUTION INTRAMUSCULAR; INTRAVENOUS at 16:20

## 2024-10-18 RX ADMIN — INSULIN GLARGINE-YFGN 20 UNIT(S): 100 INJECTION, SOLUTION SUBCUTANEOUS at 09:01

## 2024-10-18 RX ADMIN — Medication 1 APPLICATION(S): at 05:25

## 2024-10-18 RX ADMIN — INSULIN GLARGINE-YFGN 20 UNIT(S): 100 INJECTION, SOLUTION SUBCUTANEOUS at 21:26

## 2024-10-18 RX ADMIN — INSULIN LISPRO 10: 100 INJECTION, SOLUTION INTRAVENOUS; SUBCUTANEOUS at 16:52

## 2024-10-18 RX ADMIN — APIXABAN 5 MILLIGRAM(S): 2.5 TABLET, FILM COATED ORAL at 05:24

## 2024-10-18 RX ADMIN — AMLODIPINE BESYLATE 10 MILLIGRAM(S): 10 TABLET ORAL at 05:24

## 2024-10-18 RX ADMIN — APIXABAN 5 MILLIGRAM(S): 2.5 TABLET, FILM COATED ORAL at 17:50

## 2024-10-18 RX ADMIN — Medication 40 MILLIGRAM(S): at 11:52

## 2024-10-18 RX ADMIN — HYDROXYCHLOROQUINE SULFATE 200 MILLIGRAM(S): 200 TABLET, FILM COATED ORAL at 17:50

## 2024-10-18 RX ADMIN — INSULIN LISPRO 10: 100 INJECTION, SOLUTION INTRAVENOUS; SUBCUTANEOUS at 21:25

## 2024-10-18 RX ADMIN — INSULIN LISPRO 2: 100 INJECTION, SOLUTION INTRAVENOUS; SUBCUTANEOUS at 09:01

## 2024-10-19 DIAGNOSIS — E11.65 TYPE 2 DIABETES MELLITUS WITH HYPERGLYCEMIA: ICD-10-CM

## 2024-10-19 LAB
GLUCOSE BLDC GLUCOMTR-MCNC: 156 MG/DL — HIGH (ref 70–99)
GLUCOSE BLDC GLUCOMTR-MCNC: 161 MG/DL — HIGH (ref 70–99)
GLUCOSE BLDC GLUCOMTR-MCNC: 304 MG/DL — HIGH (ref 70–99)
GLUCOSE BLDC GLUCOMTR-MCNC: 316 MG/DL — HIGH (ref 70–99)

## 2024-10-19 PROCEDURE — 99232 SBSQ HOSP IP/OBS MODERATE 35: CPT

## 2024-10-19 RX ORDER — INSULIN LISPRO 100 U/ML
5 INJECTION, SOLUTION INTRAVENOUS; SUBCUTANEOUS
Refills: 0 | Status: DISCONTINUED | OUTPATIENT
Start: 2024-10-19 | End: 2024-10-20

## 2024-10-19 RX ADMIN — INSULIN GLARGINE-YFGN 20 UNIT(S): 100 INJECTION, SOLUTION SUBCUTANEOUS at 07:59

## 2024-10-19 RX ADMIN — INSULIN LISPRO 2: 100 INJECTION, SOLUTION INTRAVENOUS; SUBCUTANEOUS at 21:57

## 2024-10-19 RX ADMIN — CEFTRIAXONE 100 MILLIGRAM(S): 500 INJECTION, POWDER, FOR SOLUTION INTRAMUSCULAR; INTRAVENOUS at 13:44

## 2024-10-19 RX ADMIN — INSULIN LISPRO 5 UNIT(S): 100 INJECTION, SOLUTION INTRAVENOUS; SUBCUTANEOUS at 11:28

## 2024-10-19 RX ADMIN — APIXABAN 5 MILLIGRAM(S): 2.5 TABLET, FILM COATED ORAL at 05:31

## 2024-10-19 RX ADMIN — HYDROXYCHLOROQUINE SULFATE 200 MILLIGRAM(S): 200 TABLET, FILM COATED ORAL at 05:32

## 2024-10-19 RX ADMIN — APIXABAN 5 MILLIGRAM(S): 2.5 TABLET, FILM COATED ORAL at 17:06

## 2024-10-19 RX ADMIN — INSULIN GLARGINE-YFGN 20 UNIT(S): 100 INJECTION, SOLUTION SUBCUTANEOUS at 21:56

## 2024-10-19 RX ADMIN — HYDROXYCHLOROQUINE SULFATE 200 MILLIGRAM(S): 200 TABLET, FILM COATED ORAL at 17:06

## 2024-10-19 RX ADMIN — Medication 40 MILLIGRAM(S): at 07:59

## 2024-10-19 RX ADMIN — INSULIN LISPRO 2: 100 INJECTION, SOLUTION INTRAVENOUS; SUBCUTANEOUS at 07:58

## 2024-10-19 RX ADMIN — INSULIN LISPRO 5 UNIT(S): 100 INJECTION, SOLUTION INTRAVENOUS; SUBCUTANEOUS at 17:07

## 2024-10-19 RX ADMIN — INSULIN LISPRO 8: 100 INJECTION, SOLUTION INTRAVENOUS; SUBCUTANEOUS at 17:07

## 2024-10-19 RX ADMIN — PREDNISONE 20 MILLIGRAM(S): 20 TABLET ORAL at 05:31

## 2024-10-19 RX ADMIN — Medication 1 APPLICATION(S): at 05:31

## 2024-10-19 RX ADMIN — INSULIN LISPRO 8: 100 INJECTION, SOLUTION INTRAVENOUS; SUBCUTANEOUS at 11:27

## 2024-10-19 RX ADMIN — AMLODIPINE BESYLATE 10 MILLIGRAM(S): 10 TABLET ORAL at 05:32

## 2024-10-20 LAB
GLUCOSE BLDC GLUCOMTR-MCNC: 120 MG/DL — HIGH (ref 70–99)
GLUCOSE BLDC GLUCOMTR-MCNC: 139 MG/DL — HIGH (ref 70–99)
GLUCOSE BLDC GLUCOMTR-MCNC: 205 MG/DL — HIGH (ref 70–99)
GLUCOSE BLDC GLUCOMTR-MCNC: 245 MG/DL — HIGH (ref 70–99)
GLUCOSE BLDC GLUCOMTR-MCNC: 48 MG/DL — CRITICAL LOW (ref 70–99)
GLUCOSE BLDC GLUCOMTR-MCNC: 61 MG/DL — LOW (ref 70–99)
GLUCOSE BLDC GLUCOMTR-MCNC: 62 MG/DL — LOW (ref 70–99)

## 2024-10-20 PROCEDURE — 99232 SBSQ HOSP IP/OBS MODERATE 35: CPT

## 2024-10-20 RX ORDER — DEXTROSE 50 % IN WATER 50 %
12.5 SYRINGE (ML) INTRAVENOUS ONCE
Refills: 0 | Status: DISCONTINUED | OUTPATIENT
Start: 2024-10-20 | End: 2024-10-26

## 2024-10-20 RX ORDER — SODIUM CHLORIDE 9 G/1000ML
1000 INJECTION, SOLUTION INTRAVENOUS
Refills: 0 | Status: DISCONTINUED | OUTPATIENT
Start: 2024-10-20 | End: 2024-10-26

## 2024-10-20 RX ORDER — INSULIN GLARGINE-YFGN 100 [IU]/ML
15 INJECTION, SOLUTION SUBCUTANEOUS
Refills: 0 | Status: DISCONTINUED | OUTPATIENT
Start: 2024-10-20 | End: 2024-10-26

## 2024-10-20 RX ORDER — DEXTROSE 50 % IN WATER 50 %
25 SYRINGE (ML) INTRAVENOUS ONCE
Refills: 0 | Status: DISCONTINUED | OUTPATIENT
Start: 2024-10-20 | End: 2024-10-26

## 2024-10-20 RX ORDER — GLUCAGON 3 MG/1
1 POWDER NASAL ONCE
Refills: 0 | Status: DISCONTINUED | OUTPATIENT
Start: 2024-10-20 | End: 2024-10-26

## 2024-10-20 RX ORDER — DEXTROSE 50 % IN WATER 50 %
15 SYRINGE (ML) INTRAVENOUS ONCE
Refills: 0 | Status: DISCONTINUED | OUTPATIENT
Start: 2024-10-20 | End: 2024-10-26

## 2024-10-20 RX ORDER — INSULIN LISPRO 100 U/ML
5 INJECTION, SOLUTION INTRAVENOUS; SUBCUTANEOUS
Refills: 0 | Status: DISCONTINUED | OUTPATIENT
Start: 2024-10-20 | End: 2024-10-26

## 2024-10-20 RX ORDER — DEXTROSE 50 % IN WATER 50 %
15 SYRINGE (ML) INTRAVENOUS ONCE
Refills: 0 | Status: COMPLETED | OUTPATIENT
Start: 2024-10-20 | End: 2024-10-20

## 2024-10-20 RX ADMIN — Medication 1 APPLICATION(S): at 05:21

## 2024-10-20 RX ADMIN — AMLODIPINE BESYLATE 10 MILLIGRAM(S): 10 TABLET ORAL at 05:20

## 2024-10-20 RX ADMIN — APIXABAN 5 MILLIGRAM(S): 2.5 TABLET, FILM COATED ORAL at 05:20

## 2024-10-20 RX ADMIN — APIXABAN 5 MILLIGRAM(S): 2.5 TABLET, FILM COATED ORAL at 17:07

## 2024-10-20 RX ADMIN — INSULIN LISPRO 5 UNIT(S): 100 INJECTION, SOLUTION INTRAVENOUS; SUBCUTANEOUS at 17:08

## 2024-10-20 RX ADMIN — Medication 15 GRAM(S): at 08:25

## 2024-10-20 RX ADMIN — CEFTRIAXONE 100 MILLIGRAM(S): 500 INJECTION, POWDER, FOR SOLUTION INTRAMUSCULAR; INTRAVENOUS at 14:47

## 2024-10-20 RX ADMIN — INSULIN GLARGINE-YFGN 15 UNIT(S): 100 INJECTION, SOLUTION SUBCUTANEOUS at 21:45

## 2024-10-20 RX ADMIN — INSULIN LISPRO 5 UNIT(S): 100 INJECTION, SOLUTION INTRAVENOUS; SUBCUTANEOUS at 11:56

## 2024-10-20 RX ADMIN — HYDROXYCHLOROQUINE SULFATE 200 MILLIGRAM(S): 200 TABLET, FILM COATED ORAL at 05:20

## 2024-10-20 RX ADMIN — HYDROXYCHLOROQUINE SULFATE 200 MILLIGRAM(S): 200 TABLET, FILM COATED ORAL at 17:06

## 2024-10-20 RX ADMIN — Medication 40 MILLIGRAM(S): at 08:25

## 2024-10-20 RX ADMIN — INSULIN LISPRO 4: 100 INJECTION, SOLUTION INTRAVENOUS; SUBCUTANEOUS at 11:56

## 2024-10-20 RX ADMIN — INSULIN LISPRO 4: 100 INJECTION, SOLUTION INTRAVENOUS; SUBCUTANEOUS at 17:07

## 2024-10-20 RX ADMIN — PREDNISONE 20 MILLIGRAM(S): 20 TABLET ORAL at 05:20

## 2024-10-21 LAB
ANION GAP SERPL CALC-SCNC: 5 MMOL/L — SIGNIFICANT CHANGE UP (ref 5–17)
BUN SERPL-MCNC: 33 MG/DL — HIGH (ref 7–23)
CALCIUM SERPL-MCNC: 8.2 MG/DL — LOW (ref 8.5–10.1)
CHLORIDE SERPL-SCNC: 111 MMOL/L — HIGH (ref 96–108)
CO2 SERPL-SCNC: 27 MMOL/L — SIGNIFICANT CHANGE UP (ref 22–31)
CREAT SERPL-MCNC: 1.22 MG/DL — SIGNIFICANT CHANGE UP (ref 0.5–1.3)
EGFR: 50 ML/MIN/1.73M2 — LOW
EGFR: 50 ML/MIN/1.73M2 — LOW
GLUCOSE BLDC GLUCOMTR-MCNC: 105 MG/DL — HIGH (ref 70–99)
GLUCOSE BLDC GLUCOMTR-MCNC: 256 MG/DL — HIGH (ref 70–99)
GLUCOSE BLDC GLUCOMTR-MCNC: 292 MG/DL — HIGH (ref 70–99)
GLUCOSE BLDC GLUCOMTR-MCNC: 73 MG/DL — SIGNIFICANT CHANGE UP (ref 70–99)
GLUCOSE SERPL-MCNC: 114 MG/DL — HIGH (ref 70–99)
HCT VFR BLD CALC: 30.8 % — LOW (ref 34.5–45)
HGB BLD-MCNC: 9.8 G/DL — LOW (ref 11.5–15.5)
MAGNESIUM SERPL-MCNC: 1.5 MG/DL — LOW (ref 1.6–2.6)
MCHC RBC-ENTMCNC: 26.2 PG — LOW (ref 27–34)
MCHC RBC-ENTMCNC: 31.8 G/DL — LOW (ref 32–36)
MCV RBC AUTO: 82.4 FL — SIGNIFICANT CHANGE UP (ref 80–100)
NRBC # BLD: 0 /100 WBCS — SIGNIFICANT CHANGE UP (ref 0–0)
NRBC BLD-RTO: 0 /100 WBCS — SIGNIFICANT CHANGE UP (ref 0–0)
PHOSPHATE SERPL-MCNC: 3.1 MG/DL — SIGNIFICANT CHANGE UP (ref 2.5–4.5)
PLATELET # BLD AUTO: 257 K/UL — SIGNIFICANT CHANGE UP (ref 150–400)
POTASSIUM SERPL-MCNC: 3.7 MMOL/L — SIGNIFICANT CHANGE UP (ref 3.5–5.3)
POTASSIUM SERPL-SCNC: 3.7 MMOL/L — SIGNIFICANT CHANGE UP (ref 3.5–5.3)
RBC # BLD: 3.74 M/UL — LOW (ref 3.8–5.2)
RBC # FLD: 17.4 % — HIGH (ref 10.3–14.5)
SODIUM SERPL-SCNC: 143 MMOL/L — SIGNIFICANT CHANGE UP (ref 135–145)
WBC # BLD: 12.22 K/UL — HIGH (ref 3.8–10.5)
WBC # FLD AUTO: 12.22 K/UL — HIGH (ref 3.8–10.5)

## 2024-10-21 PROCEDURE — 99232 SBSQ HOSP IP/OBS MODERATE 35: CPT

## 2024-10-21 PROCEDURE — 78708 K FLOW/FUNCT IMAGE W/DRUG: CPT | Mod: 26

## 2024-10-21 RX ORDER — MAGNESIUM SULFATE 500 MG/ML
1 SYRINGE (ML) INJECTION ONCE
Refills: 0 | Status: COMPLETED | OUTPATIENT
Start: 2024-10-21 | End: 2024-10-21

## 2024-10-21 RX ORDER — FUROSEMIDE 10 MG/ML
40 INJECTION INTRAMUSCULAR; INTRAVENOUS ONCE
Refills: 0 | Status: COMPLETED | OUTPATIENT
Start: 2024-10-21 | End: 2024-10-21

## 2024-10-21 RX ADMIN — INSULIN LISPRO 6: 100 INJECTION, SOLUTION INTRAVENOUS; SUBCUTANEOUS at 17:18

## 2024-10-21 RX ADMIN — INSULIN GLARGINE-YFGN 15 UNIT(S): 100 INJECTION, SOLUTION SUBCUTANEOUS at 21:24

## 2024-10-21 RX ADMIN — APIXABAN 5 MILLIGRAM(S): 2.5 TABLET, FILM COATED ORAL at 17:18

## 2024-10-21 RX ADMIN — AMLODIPINE BESYLATE 10 MILLIGRAM(S): 10 TABLET ORAL at 05:33

## 2024-10-21 RX ADMIN — Medication 1 APPLICATION(S): at 05:33

## 2024-10-21 RX ADMIN — INSULIN LISPRO 6: 100 INJECTION, SOLUTION INTRAVENOUS; SUBCUTANEOUS at 14:18

## 2024-10-21 RX ADMIN — FUROSEMIDE 40 MILLIGRAM(S): 10 INJECTION INTRAMUSCULAR; INTRAVENOUS at 12:59

## 2024-10-21 RX ADMIN — INSULIN GLARGINE-YFGN 15 UNIT(S): 100 INJECTION, SOLUTION SUBCUTANEOUS at 10:02

## 2024-10-21 RX ADMIN — APIXABAN 5 MILLIGRAM(S): 2.5 TABLET, FILM COATED ORAL at 05:32

## 2024-10-21 RX ADMIN — HYDROXYCHLOROQUINE SULFATE 200 MILLIGRAM(S): 200 TABLET, FILM COATED ORAL at 05:32

## 2024-10-21 RX ADMIN — Medication 40 MILLIGRAM(S): at 06:44

## 2024-10-21 RX ADMIN — Medication 1120 MILLILITER(S): at 11:46

## 2024-10-21 RX ADMIN — Medication 100 GRAM(S): at 17:18

## 2024-10-21 RX ADMIN — INSULIN LISPRO 5 UNIT(S): 100 INJECTION, SOLUTION INTRAVENOUS; SUBCUTANEOUS at 17:19

## 2024-10-21 RX ADMIN — INSULIN LISPRO 5 UNIT(S): 100 INJECTION, SOLUTION INTRAVENOUS; SUBCUTANEOUS at 14:19

## 2024-10-21 RX ADMIN — HYDROXYCHLOROQUINE SULFATE 200 MILLIGRAM(S): 200 TABLET, FILM COATED ORAL at 17:18

## 2024-10-21 RX ADMIN — PREDNISONE 20 MILLIGRAM(S): 20 TABLET ORAL at 05:29

## 2024-10-22 ENCOUNTER — TRANSCRIPTION ENCOUNTER (OUTPATIENT)
Age: 64
End: 2024-10-22

## 2024-10-22 LAB
ALBUMIN SERPL ELPH-MCNC: 2.4 G/DL — LOW (ref 3.3–5)
ALP SERPL-CCNC: 86 U/L — SIGNIFICANT CHANGE UP (ref 40–120)
ALT FLD-CCNC: 19 U/L — SIGNIFICANT CHANGE UP (ref 12–78)
ANION GAP SERPL CALC-SCNC: 5 MMOL/L — SIGNIFICANT CHANGE UP (ref 5–17)
AST SERPL-CCNC: 11 U/L — LOW (ref 15–37)
BILIRUB SERPL-MCNC: 0.4 MG/DL — SIGNIFICANT CHANGE UP (ref 0.2–1.2)
BUN SERPL-MCNC: 32 MG/DL — HIGH (ref 7–23)
C1Q AG SERPL RAJI CELL-ACNC: 15.7 MG/DL — SIGNIFICANT CHANGE UP (ref 10.3–20.5)
CALCIUM SERPL-MCNC: 8.5 MG/DL — SIGNIFICANT CHANGE UP (ref 8.5–10.1)
CHLORIDE SERPL-SCNC: 110 MMOL/L — HIGH (ref 96–108)
CO2 SERPL-SCNC: 25 MMOL/L — SIGNIFICANT CHANGE UP (ref 22–31)
CREAT SERPL-MCNC: 1.37 MG/DL — HIGH (ref 0.5–1.3)
EGFR: 44 ML/MIN/1.73M2 — LOW
EGFR: 44 ML/MIN/1.73M2 — LOW
GLUCOSE BLDC GLUCOMTR-MCNC: 122 MG/DL — HIGH (ref 70–99)
GLUCOSE BLDC GLUCOMTR-MCNC: 167 MG/DL — HIGH (ref 70–99)
GLUCOSE BLDC GLUCOMTR-MCNC: 197 MG/DL — HIGH (ref 70–99)
GLUCOSE BLDC GLUCOMTR-MCNC: 208 MG/DL — HIGH (ref 70–99)
GLUCOSE SERPL-MCNC: 187 MG/DL — HIGH (ref 70–99)
HCT VFR BLD CALC: 33.5 % — LOW (ref 34.5–45)
HGB BLD-MCNC: 10.7 G/DL — LOW (ref 11.5–15.5)
MAGNESIUM SERPL-MCNC: 1.7 MG/DL — SIGNIFICANT CHANGE UP (ref 1.6–2.6)
MCHC RBC-ENTMCNC: 25.8 PG — LOW (ref 27–34)
MCHC RBC-ENTMCNC: 31.9 G/DL — LOW (ref 32–36)
MCV RBC AUTO: 80.9 FL — SIGNIFICANT CHANGE UP (ref 80–100)
NRBC # BLD: 0 /100 WBCS — SIGNIFICANT CHANGE UP (ref 0–0)
NRBC BLD-RTO: 0 /100 WBCS — SIGNIFICANT CHANGE UP (ref 0–0)
PHOSPHATE SERPL-MCNC: 2.9 MG/DL — SIGNIFICANT CHANGE UP (ref 2.5–4.5)
PLATELET # BLD AUTO: 260 K/UL — SIGNIFICANT CHANGE UP (ref 150–400)
POTASSIUM SERPL-MCNC: 4.2 MMOL/L — SIGNIFICANT CHANGE UP (ref 3.5–5.3)
POTASSIUM SERPL-SCNC: 4.2 MMOL/L — SIGNIFICANT CHANGE UP (ref 3.5–5.3)
PROT SERPL-MCNC: 6 GM/DL — SIGNIFICANT CHANGE UP (ref 6–8.3)
RBC # BLD: 4.14 M/UL — SIGNIFICANT CHANGE UP (ref 3.8–5.2)
RBC # FLD: 17.6 % — HIGH (ref 10.3–14.5)
SODIUM SERPL-SCNC: 140 MMOL/L — SIGNIFICANT CHANGE UP (ref 135–145)
WBC # BLD: 19.34 K/UL — HIGH (ref 3.8–10.5)
WBC # FLD AUTO: 19.34 K/UL — HIGH (ref 3.8–10.5)

## 2024-10-22 PROCEDURE — 99233 SBSQ HOSP IP/OBS HIGH 50: CPT

## 2024-10-22 PROCEDURE — 51702 INSERT TEMP BLADDER CATH: CPT

## 2024-10-22 PROCEDURE — 99232 SBSQ HOSP IP/OBS MODERATE 35: CPT

## 2024-10-22 RX ADMIN — Medication 40 MILLIGRAM(S): at 08:00

## 2024-10-22 RX ADMIN — INSULIN GLARGINE-YFGN 15 UNIT(S): 100 INJECTION, SOLUTION SUBCUTANEOUS at 21:52

## 2024-10-22 RX ADMIN — HYDROXYCHLOROQUINE SULFATE 200 MILLIGRAM(S): 200 TABLET, FILM COATED ORAL at 17:16

## 2024-10-22 RX ADMIN — INSULIN LISPRO 5 UNIT(S): 100 INJECTION, SOLUTION INTRAVENOUS; SUBCUTANEOUS at 11:50

## 2024-10-22 RX ADMIN — INSULIN LISPRO 4: 100 INJECTION, SOLUTION INTRAVENOUS; SUBCUTANEOUS at 11:49

## 2024-10-22 RX ADMIN — PREDNISONE 20 MILLIGRAM(S): 20 TABLET ORAL at 05:27

## 2024-10-22 RX ADMIN — INSULIN LISPRO 2: 100 INJECTION, SOLUTION INTRAVENOUS; SUBCUTANEOUS at 08:01

## 2024-10-22 RX ADMIN — INSULIN LISPRO 5 UNIT(S): 100 INJECTION, SOLUTION INTRAVENOUS; SUBCUTANEOUS at 17:17

## 2024-10-22 RX ADMIN — Medication 1 APPLICATION(S): at 05:29

## 2024-10-22 RX ADMIN — APIXABAN 5 MILLIGRAM(S): 2.5 TABLET, FILM COATED ORAL at 17:16

## 2024-10-22 RX ADMIN — HYDROXYCHLOROQUINE SULFATE 200 MILLIGRAM(S): 200 TABLET, FILM COATED ORAL at 05:27

## 2024-10-22 RX ADMIN — INSULIN GLARGINE-YFGN 15 UNIT(S): 100 INJECTION, SOLUTION SUBCUTANEOUS at 08:17

## 2024-10-22 RX ADMIN — APIXABAN 5 MILLIGRAM(S): 2.5 TABLET, FILM COATED ORAL at 05:27

## 2024-10-22 RX ADMIN — INSULIN LISPRO 2: 100 INJECTION, SOLUTION INTRAVENOUS; SUBCUTANEOUS at 17:16

## 2024-10-22 RX ADMIN — AMLODIPINE BESYLATE 10 MILLIGRAM(S): 10 TABLET ORAL at 05:27

## 2024-10-22 RX ADMIN — INSULIN LISPRO 5 UNIT(S): 100 INJECTION, SOLUTION INTRAVENOUS; SUBCUTANEOUS at 08:01

## 2024-10-23 LAB
ALBUMIN SERPL ELPH-MCNC: 2.1 G/DL — LOW (ref 3.3–5)
ALBUMIN SERPL ELPH-MCNC: 2.2 G/DL — LOW (ref 3.3–5)
ALP SERPL-CCNC: 70 U/L — SIGNIFICANT CHANGE UP (ref 40–120)
ALP SERPL-CCNC: 70 U/L — SIGNIFICANT CHANGE UP (ref 40–120)
ALT FLD-CCNC: 15 U/L — SIGNIFICANT CHANGE UP (ref 12–78)
ALT FLD-CCNC: 15 U/L — SIGNIFICANT CHANGE UP (ref 12–78)
ANION GAP SERPL CALC-SCNC: 5 MMOL/L — SIGNIFICANT CHANGE UP (ref 5–17)
ANION GAP SERPL CALC-SCNC: 7 MMOL/L — SIGNIFICANT CHANGE UP (ref 5–17)
APTT BLD: 25.3 SEC — SIGNIFICANT CHANGE UP (ref 24.5–35.6)
AST SERPL-CCNC: 10 U/L — LOW (ref 15–37)
AST SERPL-CCNC: 15 U/L — SIGNIFICANT CHANGE UP (ref 15–37)
BASOPHILS # BLD AUTO: 0.01 K/UL — SIGNIFICANT CHANGE UP (ref 0–0.2)
BASOPHILS NFR BLD AUTO: 0.1 % — SIGNIFICANT CHANGE UP (ref 0–2)
BILIRUB SERPL-MCNC: 0.3 MG/DL — SIGNIFICANT CHANGE UP (ref 0.2–1.2)
BILIRUB SERPL-MCNC: 0.4 MG/DL — SIGNIFICANT CHANGE UP (ref 0.2–1.2)
BUN SERPL-MCNC: 30 MG/DL — HIGH (ref 7–23)
BUN SERPL-MCNC: 32 MG/DL — HIGH (ref 7–23)
CALCIUM SERPL-MCNC: 8.3 MG/DL — LOW (ref 8.5–10.1)
CALCIUM SERPL-MCNC: 8.4 MG/DL — LOW (ref 8.5–10.1)
CHLORIDE SERPL-SCNC: 110 MMOL/L — HIGH (ref 96–108)
CHLORIDE SERPL-SCNC: 111 MMOL/L — HIGH (ref 96–108)
CO2 SERPL-SCNC: 24 MMOL/L — SIGNIFICANT CHANGE UP (ref 22–31)
CO2 SERPL-SCNC: 24 MMOL/L — SIGNIFICANT CHANGE UP (ref 22–31)
CREAT SERPL-MCNC: 1.18 MG/DL — SIGNIFICANT CHANGE UP (ref 0.5–1.3)
CREAT SERPL-MCNC: 1.22 MG/DL — SIGNIFICANT CHANGE UP (ref 0.5–1.3)
EGFR: 50 ML/MIN/1.73M2 — LOW
EGFR: 50 ML/MIN/1.73M2 — LOW
EGFR: 52 ML/MIN/1.73M2 — LOW
EGFR: 52 ML/MIN/1.73M2 — LOW
EOSINOPHIL # BLD AUTO: 0.19 K/UL — SIGNIFICANT CHANGE UP (ref 0–0.5)
EOSINOPHIL NFR BLD AUTO: 1.5 % — SIGNIFICANT CHANGE UP (ref 0–6)
GLUCOSE BLDC GLUCOMTR-MCNC: 124 MG/DL — HIGH (ref 70–99)
GLUCOSE BLDC GLUCOMTR-MCNC: 167 MG/DL — HIGH (ref 70–99)
GLUCOSE BLDC GLUCOMTR-MCNC: 196 MG/DL — HIGH (ref 70–99)
GLUCOSE BLDC GLUCOMTR-MCNC: 301 MG/DL — HIGH (ref 70–99)
GLUCOSE SERPL-MCNC: 127 MG/DL — HIGH (ref 70–99)
GLUCOSE SERPL-MCNC: 142 MG/DL — HIGH (ref 70–99)
HCT VFR BLD CALC: 29 % — LOW (ref 34.5–45)
HCT VFR BLD CALC: 29.5 % — LOW (ref 34.5–45)
HGB BLD-MCNC: 9.3 G/DL — LOW (ref 11.5–15.5)
HGB BLD-MCNC: 9.4 G/DL — LOW (ref 11.5–15.5)
IMM GRANULOCYTES NFR BLD AUTO: 0.8 % — SIGNIFICANT CHANGE UP (ref 0–0.9)
INR BLD: 1.15 RATIO — SIGNIFICANT CHANGE UP (ref 0.85–1.16)
LYMPHOCYTES # BLD AUTO: 1.61 K/UL — SIGNIFICANT CHANGE UP (ref 1–3.3)
LYMPHOCYTES # BLD AUTO: 12.8 % — LOW (ref 13–44)
MAGNESIUM SERPL-MCNC: 1.6 MG/DL — SIGNIFICANT CHANGE UP (ref 1.6–2.6)
MCHC RBC-ENTMCNC: 26 PG — LOW (ref 27–34)
MCHC RBC-ENTMCNC: 26.1 PG — LOW (ref 27–34)
MCHC RBC-ENTMCNC: 31.9 G/DL — LOW (ref 32–36)
MCHC RBC-ENTMCNC: 32.1 G/DL — SIGNIFICANT CHANGE UP (ref 32–36)
MCV RBC AUTO: 81.5 FL — SIGNIFICANT CHANGE UP (ref 80–100)
MCV RBC AUTO: 81.7 FL — SIGNIFICANT CHANGE UP (ref 80–100)
MONOCYTES # BLD AUTO: 0.92 K/UL — HIGH (ref 0–0.9)
MONOCYTES NFR BLD AUTO: 7.3 % — SIGNIFICANT CHANGE UP (ref 2–14)
NEUTROPHILS # BLD AUTO: 9.72 K/UL — HIGH (ref 1.8–7.4)
NEUTROPHILS NFR BLD AUTO: 77.5 % — HIGH (ref 43–77)
NRBC # BLD: 0 /100 WBCS — SIGNIFICANT CHANGE UP (ref 0–0)
NRBC # BLD: 0 /100 WBCS — SIGNIFICANT CHANGE UP (ref 0–0)
NRBC BLD-RTO: 0 /100 WBCS — SIGNIFICANT CHANGE UP (ref 0–0)
NRBC BLD-RTO: 0 /100 WBCS — SIGNIFICANT CHANGE UP (ref 0–0)
PHOSPHATE SERPL-MCNC: 2.8 MG/DL — SIGNIFICANT CHANGE UP (ref 2.5–4.5)
PLATELET # BLD AUTO: 197 K/UL — SIGNIFICANT CHANGE UP (ref 150–400)
PLATELET # BLD AUTO: 232 K/UL — SIGNIFICANT CHANGE UP (ref 150–400)
POTASSIUM SERPL-MCNC: 3.6 MMOL/L — SIGNIFICANT CHANGE UP (ref 3.5–5.3)
POTASSIUM SERPL-MCNC: 3.9 MMOL/L — SIGNIFICANT CHANGE UP (ref 3.5–5.3)
POTASSIUM SERPL-SCNC: 3.6 MMOL/L — SIGNIFICANT CHANGE UP (ref 3.5–5.3)
POTASSIUM SERPL-SCNC: 3.9 MMOL/L — SIGNIFICANT CHANGE UP (ref 3.5–5.3)
PROT SERPL-MCNC: 5.4 GM/DL — LOW (ref 6–8.3)
PROT SERPL-MCNC: 5.4 GM/DL — LOW (ref 6–8.3)
PROTHROM AB SERPL-ACNC: 13 SEC — SIGNIFICANT CHANGE UP (ref 9.9–13.4)
RBC # BLD: 3.56 M/UL — LOW (ref 3.8–5.2)
RBC # BLD: 3.61 M/UL — LOW (ref 3.8–5.2)
RBC # FLD: 17.4 % — HIGH (ref 10.3–14.5)
RBC # FLD: 17.5 % — HIGH (ref 10.3–14.5)
SODIUM SERPL-SCNC: 139 MMOL/L — SIGNIFICANT CHANGE UP (ref 135–145)
SODIUM SERPL-SCNC: 142 MMOL/L — SIGNIFICANT CHANGE UP (ref 135–145)
WBC # BLD: 12.55 K/UL — HIGH (ref 3.8–10.5)
WBC # BLD: 13.62 K/UL — HIGH (ref 3.8–10.5)
WBC # FLD AUTO: 12.55 K/UL — HIGH (ref 3.8–10.5)
WBC # FLD AUTO: 13.62 K/UL — HIGH (ref 3.8–10.5)

## 2024-10-23 PROCEDURE — 99232 SBSQ HOSP IP/OBS MODERATE 35: CPT

## 2024-10-23 RX ADMIN — APIXABAN 5 MILLIGRAM(S): 2.5 TABLET, FILM COATED ORAL at 05:42

## 2024-10-23 RX ADMIN — INSULIN GLARGINE-YFGN 15 UNIT(S): 100 INJECTION, SOLUTION SUBCUTANEOUS at 21:19

## 2024-10-23 RX ADMIN — Medication 1 APPLICATION(S): at 05:43

## 2024-10-23 RX ADMIN — INSULIN LISPRO 8: 100 INJECTION, SOLUTION INTRAVENOUS; SUBCUTANEOUS at 17:02

## 2024-10-23 RX ADMIN — APIXABAN 5 MILLIGRAM(S): 2.5 TABLET, FILM COATED ORAL at 17:08

## 2024-10-23 RX ADMIN — HYDROXYCHLOROQUINE SULFATE 200 MILLIGRAM(S): 200 TABLET, FILM COATED ORAL at 17:08

## 2024-10-23 RX ADMIN — AMLODIPINE BESYLATE 10 MILLIGRAM(S): 10 TABLET ORAL at 05:42

## 2024-10-23 RX ADMIN — INSULIN LISPRO 5 UNIT(S): 100 INJECTION, SOLUTION INTRAVENOUS; SUBCUTANEOUS at 17:04

## 2024-10-23 RX ADMIN — INSULIN LISPRO 2: 100 INJECTION, SOLUTION INTRAVENOUS; SUBCUTANEOUS at 11:47

## 2024-10-23 RX ADMIN — HYDROXYCHLOROQUINE SULFATE 200 MILLIGRAM(S): 200 TABLET, FILM COATED ORAL at 05:43

## 2024-10-23 RX ADMIN — PREDNISONE 20 MILLIGRAM(S): 20 TABLET ORAL at 05:43

## 2024-10-23 RX ADMIN — INSULIN LISPRO 2: 100 INJECTION, SOLUTION INTRAVENOUS; SUBCUTANEOUS at 08:17

## 2024-10-23 RX ADMIN — INSULIN GLARGINE-YFGN 15 UNIT(S): 100 INJECTION, SOLUTION SUBCUTANEOUS at 08:17

## 2024-10-24 LAB
ANION GAP SERPL CALC-SCNC: 5 MMOL/L — SIGNIFICANT CHANGE UP (ref 5–17)
BUN SERPL-MCNC: 29 MG/DL — HIGH (ref 7–23)
CALCIUM SERPL-MCNC: 8.5 MG/DL — SIGNIFICANT CHANGE UP (ref 8.5–10.1)
CHLORIDE SERPL-SCNC: 111 MMOL/L — HIGH (ref 96–108)
CO2 SERPL-SCNC: 26 MMOL/L — SIGNIFICANT CHANGE UP (ref 22–31)
CREAT SERPL-MCNC: 1.19 MG/DL — SIGNIFICANT CHANGE UP (ref 0.5–1.3)
EGFR: 52 ML/MIN/1.73M2 — LOW
EGFR: 52 ML/MIN/1.73M2 — LOW
GLUCOSE BLDC GLUCOMTR-MCNC: 141 MG/DL — HIGH (ref 70–99)
GLUCOSE BLDC GLUCOMTR-MCNC: 170 MG/DL — HIGH (ref 70–99)
GLUCOSE BLDC GLUCOMTR-MCNC: 286 MG/DL — HIGH (ref 70–99)
GLUCOSE BLDC GLUCOMTR-MCNC: 300 MG/DL — HIGH (ref 70–99)
GLUCOSE SERPL-MCNC: 123 MG/DL — HIGH (ref 70–99)
HCT VFR BLD CALC: 29.5 % — LOW (ref 34.5–45)
HGB BLD-MCNC: 9.4 G/DL — LOW (ref 11.5–15.5)
MCHC RBC-ENTMCNC: 26.5 PG — LOW (ref 27–34)
MCHC RBC-ENTMCNC: 31.9 G/DL — LOW (ref 32–36)
MCV RBC AUTO: 83.1 FL — SIGNIFICANT CHANGE UP (ref 80–100)
NRBC # BLD: 0 /100 WBCS — SIGNIFICANT CHANGE UP (ref 0–0)
NRBC BLD-RTO: 0 /100 WBCS — SIGNIFICANT CHANGE UP (ref 0–0)
PLATELET # BLD AUTO: 244 K/UL — SIGNIFICANT CHANGE UP (ref 150–400)
POTASSIUM SERPL-MCNC: 3.8 MMOL/L — SIGNIFICANT CHANGE UP (ref 3.5–5.3)
POTASSIUM SERPL-SCNC: 3.8 MMOL/L — SIGNIFICANT CHANGE UP (ref 3.5–5.3)
RBC # BLD: 3.55 M/UL — LOW (ref 3.8–5.2)
RBC # FLD: 17.4 % — HIGH (ref 10.3–14.5)
SODIUM SERPL-SCNC: 142 MMOL/L — SIGNIFICANT CHANGE UP (ref 135–145)
WBC # BLD: 14.96 K/UL — HIGH (ref 3.8–10.5)
WBC # FLD AUTO: 14.96 K/UL — HIGH (ref 3.8–10.5)

## 2024-10-24 PROCEDURE — 99232 SBSQ HOSP IP/OBS MODERATE 35: CPT

## 2024-10-24 RX ORDER — NEOMYCIN/BACITRACIN/POLYMYXINB 3.5-400-5K
1 OINTMENT (GRAM) TOPICAL
Refills: 0 | Status: DISCONTINUED | OUTPATIENT
Start: 2024-10-24 | End: 2024-10-24

## 2024-10-24 RX ORDER — BACITRACIN ZINC AND POLYMYXIN B SULFATE 500; 10000 [USP'U]/G; [USP'U]/G
1 OINTMENT TOPICAL
Refills: 0 | Status: DISCONTINUED | OUTPATIENT
Start: 2024-10-24 | End: 2024-10-26

## 2024-10-24 RX ADMIN — AMLODIPINE BESYLATE 10 MILLIGRAM(S): 10 TABLET ORAL at 05:34

## 2024-10-24 RX ADMIN — HYDROXYCHLOROQUINE SULFATE 200 MILLIGRAM(S): 200 TABLET, FILM COATED ORAL at 05:34

## 2024-10-24 RX ADMIN — INSULIN LISPRO 6: 100 INJECTION, SOLUTION INTRAVENOUS; SUBCUTANEOUS at 16:16

## 2024-10-24 RX ADMIN — Medication 1 APPLICATION(S): at 05:34

## 2024-10-24 RX ADMIN — Medication 40 MILLIGRAM(S): at 08:13

## 2024-10-24 RX ADMIN — INSULIN LISPRO 5 UNIT(S): 100 INJECTION, SOLUTION INTRAVENOUS; SUBCUTANEOUS at 11:25

## 2024-10-24 RX ADMIN — INSULIN GLARGINE-YFGN 15 UNIT(S): 100 INJECTION, SOLUTION SUBCUTANEOUS at 21:38

## 2024-10-24 RX ADMIN — INSULIN LISPRO 6: 100 INJECTION, SOLUTION INTRAVENOUS; SUBCUTANEOUS at 11:24

## 2024-10-24 RX ADMIN — APIXABAN 5 MILLIGRAM(S): 2.5 TABLET, FILM COATED ORAL at 17:31

## 2024-10-24 RX ADMIN — INSULIN LISPRO 5 UNIT(S): 100 INJECTION, SOLUTION INTRAVENOUS; SUBCUTANEOUS at 07:58

## 2024-10-24 RX ADMIN — APIXABAN 5 MILLIGRAM(S): 2.5 TABLET, FILM COATED ORAL at 05:34

## 2024-10-24 RX ADMIN — PREDNISONE 20 MILLIGRAM(S): 20 TABLET ORAL at 05:34

## 2024-10-24 RX ADMIN — INSULIN LISPRO 5 UNIT(S): 100 INJECTION, SOLUTION INTRAVENOUS; SUBCUTANEOUS at 16:17

## 2024-10-24 RX ADMIN — HYDROXYCHLOROQUINE SULFATE 200 MILLIGRAM(S): 200 TABLET, FILM COATED ORAL at 17:32

## 2024-10-24 RX ADMIN — INSULIN GLARGINE-YFGN 15 UNIT(S): 100 INJECTION, SOLUTION SUBCUTANEOUS at 08:10

## 2024-10-24 RX ADMIN — INSULIN LISPRO 2: 100 INJECTION, SOLUTION INTRAVENOUS; SUBCUTANEOUS at 07:57

## 2024-10-24 RX ADMIN — BACITRACIN ZINC AND POLYMYXIN B SULFATE 1 APPLICATION(S): 500; 10000 OINTMENT TOPICAL at 18:21

## 2024-10-25 LAB
ANION GAP SERPL CALC-SCNC: 7 MMOL/L — SIGNIFICANT CHANGE UP (ref 5–17)
BUN SERPL-MCNC: 26 MG/DL — HIGH (ref 7–23)
CALCIUM SERPL-MCNC: 8.6 MG/DL — SIGNIFICANT CHANGE UP (ref 8.5–10.1)
CHLORIDE SERPL-SCNC: 107 MMOL/L — SIGNIFICANT CHANGE UP (ref 96–108)
CO2 SERPL-SCNC: 23 MMOL/L — SIGNIFICANT CHANGE UP (ref 22–31)
CREAT SERPL-MCNC: 1.42 MG/DL — HIGH (ref 0.5–1.3)
EGFR: 42 ML/MIN/1.73M2 — LOW
EGFR: 42 ML/MIN/1.73M2 — LOW
GLUCOSE BLDC GLUCOMTR-MCNC: 118 MG/DL — HIGH (ref 70–99)
GLUCOSE BLDC GLUCOMTR-MCNC: 121 MG/DL — HIGH (ref 70–99)
GLUCOSE BLDC GLUCOMTR-MCNC: 316 MG/DL — HIGH (ref 70–99)
GLUCOSE BLDC GLUCOMTR-MCNC: 352 MG/DL — HIGH (ref 70–99)
GLUCOSE SERPL-MCNC: 341 MG/DL — HIGH (ref 70–99)
HCT VFR BLD CALC: 33 % — LOW (ref 34.5–45)
HGB BLD-MCNC: 10.4 G/DL — LOW (ref 11.5–15.5)
MCHC RBC-ENTMCNC: 25.9 PG — LOW (ref 27–34)
MCHC RBC-ENTMCNC: 31.5 G/DL — LOW (ref 32–36)
MCV RBC AUTO: 82.3 FL — SIGNIFICANT CHANGE UP (ref 80–100)
NRBC # BLD: 0 /100 WBCS — SIGNIFICANT CHANGE UP (ref 0–0)
NRBC BLD-RTO: 0 /100 WBCS — SIGNIFICANT CHANGE UP (ref 0–0)
PLATELET # BLD AUTO: 268 K/UL — SIGNIFICANT CHANGE UP (ref 150–400)
POTASSIUM SERPL-MCNC: 4.1 MMOL/L — SIGNIFICANT CHANGE UP (ref 3.5–5.3)
POTASSIUM SERPL-SCNC: 4.1 MMOL/L — SIGNIFICANT CHANGE UP (ref 3.5–5.3)
PROCALCITONIN SERPL-MCNC: 0.11 NG/ML — HIGH (ref 0.02–0.1)
RBC # BLD: 4.01 M/UL — SIGNIFICANT CHANGE UP (ref 3.8–5.2)
RBC # FLD: 17.5 % — HIGH (ref 10.3–14.5)
SODIUM SERPL-SCNC: 137 MMOL/L — SIGNIFICANT CHANGE UP (ref 135–145)
WBC # BLD: 14.45 K/UL — HIGH (ref 3.8–10.5)
WBC # FLD AUTO: 14.45 K/UL — HIGH (ref 3.8–10.5)

## 2024-10-25 PROCEDURE — 99232 SBSQ HOSP IP/OBS MODERATE 35: CPT

## 2024-10-25 RX ADMIN — BACITRACIN ZINC AND POLYMYXIN B SULFATE 1 APPLICATION(S): 500; 10000 OINTMENT TOPICAL at 06:55

## 2024-10-25 RX ADMIN — INSULIN LISPRO 5 UNIT(S): 100 INJECTION, SOLUTION INTRAVENOUS; SUBCUTANEOUS at 11:32

## 2024-10-25 RX ADMIN — INSULIN LISPRO 8: 100 INJECTION, SOLUTION INTRAVENOUS; SUBCUTANEOUS at 11:31

## 2024-10-25 RX ADMIN — INSULIN LISPRO 5 UNIT(S): 100 INJECTION, SOLUTION INTRAVENOUS; SUBCUTANEOUS at 15:58

## 2024-10-25 RX ADMIN — HYDROXYCHLOROQUINE SULFATE 200 MILLIGRAM(S): 200 TABLET, FILM COATED ORAL at 05:07

## 2024-10-25 RX ADMIN — BACITRACIN ZINC AND POLYMYXIN B SULFATE 1 APPLICATION(S): 500; 10000 OINTMENT TOPICAL at 17:19

## 2024-10-25 RX ADMIN — AMLODIPINE BESYLATE 10 MILLIGRAM(S): 10 TABLET ORAL at 05:08

## 2024-10-25 RX ADMIN — HYDROXYCHLOROQUINE SULFATE 200 MILLIGRAM(S): 200 TABLET, FILM COATED ORAL at 17:19

## 2024-10-25 RX ADMIN — APIXABAN 5 MILLIGRAM(S): 2.5 TABLET, FILM COATED ORAL at 05:08

## 2024-10-25 RX ADMIN — Medication 1 APPLICATION(S): at 06:56

## 2024-10-25 RX ADMIN — INSULIN GLARGINE-YFGN 15 UNIT(S): 100 INJECTION, SOLUTION SUBCUTANEOUS at 21:16

## 2024-10-25 RX ADMIN — INSULIN GLARGINE-YFGN 15 UNIT(S): 100 INJECTION, SOLUTION SUBCUTANEOUS at 08:04

## 2024-10-25 RX ADMIN — PREDNISONE 20 MILLIGRAM(S): 20 TABLET ORAL at 05:07

## 2024-10-25 RX ADMIN — APIXABAN 5 MILLIGRAM(S): 2.5 TABLET, FILM COATED ORAL at 17:18

## 2024-10-25 RX ADMIN — Medication 40 MILLIGRAM(S): at 06:54

## 2024-10-25 RX ADMIN — INSULIN LISPRO 10: 100 INJECTION, SOLUTION INTRAVENOUS; SUBCUTANEOUS at 15:58

## 2024-10-26 ENCOUNTER — TRANSCRIPTION ENCOUNTER (OUTPATIENT)
Age: 64
End: 2024-10-26

## 2024-10-26 VITALS
HEART RATE: 83 BPM | DIASTOLIC BLOOD PRESSURE: 79 MMHG | TEMPERATURE: 98 F | SYSTOLIC BLOOD PRESSURE: 124 MMHG | RESPIRATION RATE: 17 BRPM | OXYGEN SATURATION: 100 %

## 2024-10-26 LAB
ANION GAP SERPL CALC-SCNC: 7 MMOL/L — SIGNIFICANT CHANGE UP (ref 5–17)
BUN SERPL-MCNC: 29 MG/DL — HIGH (ref 7–23)
CALCIUM SERPL-MCNC: 7.9 MG/DL — LOW (ref 8.5–10.1)
CHLORIDE SERPL-SCNC: 113 MMOL/L — HIGH (ref 96–108)
CO2 SERPL-SCNC: 20 MMOL/L — LOW (ref 22–31)
CREAT SERPL-MCNC: 1.11 MG/DL — SIGNIFICANT CHANGE UP (ref 0.5–1.3)
EGFR: 56 ML/MIN/1.73M2 — LOW
EGFR: 56 ML/MIN/1.73M2 — LOW
GLUCOSE BLDC GLUCOMTR-MCNC: 149 MG/DL — HIGH (ref 70–99)
GLUCOSE BLDC GLUCOMTR-MCNC: 304 MG/DL — HIGH (ref 70–99)
GLUCOSE SERPL-MCNC: 122 MG/DL — HIGH (ref 70–99)
HCT VFR BLD CALC: 28 % — LOW (ref 34.5–45)
HGB BLD-MCNC: 9 G/DL — LOW (ref 11.5–15.5)
MCHC RBC-ENTMCNC: 26.4 PG — LOW (ref 27–34)
MCHC RBC-ENTMCNC: 32.1 G/DL — SIGNIFICANT CHANGE UP (ref 32–36)
MCV RBC AUTO: 82.1 FL — SIGNIFICANT CHANGE UP (ref 80–100)
NRBC # BLD: 0 /100 WBCS — SIGNIFICANT CHANGE UP (ref 0–0)
NRBC BLD-RTO: 0 /100 WBCS — SIGNIFICANT CHANGE UP (ref 0–0)
PLATELET # BLD AUTO: 212 K/UL — SIGNIFICANT CHANGE UP (ref 150–400)
POTASSIUM SERPL-MCNC: 3.7 MMOL/L — SIGNIFICANT CHANGE UP (ref 3.5–5.3)
POTASSIUM SERPL-SCNC: 3.7 MMOL/L — SIGNIFICANT CHANGE UP (ref 3.5–5.3)
PROCALCITONIN SERPL-MCNC: 0.08 NG/ML — SIGNIFICANT CHANGE UP (ref 0.02–0.1)
RBC # BLD: 3.41 M/UL — LOW (ref 3.8–5.2)
RBC # FLD: 17.7 % — HIGH (ref 10.3–14.5)
SODIUM SERPL-SCNC: 140 MMOL/L — SIGNIFICANT CHANGE UP (ref 135–145)
WBC # BLD: 9.86 K/UL — SIGNIFICANT CHANGE UP (ref 3.8–10.5)
WBC # FLD AUTO: 9.86 K/UL — SIGNIFICANT CHANGE UP (ref 3.8–10.5)

## 2024-10-26 PROCEDURE — 99239 HOSP IP/OBS DSCHRG MGMT >30: CPT

## 2024-10-26 RX ORDER — INSULIN GLARGINE-YFGN 100 [IU]/ML
30 INJECTION, SOLUTION SUBCUTANEOUS
Qty: 3 | Refills: 0
Start: 2024-10-26

## 2024-10-26 RX ORDER — TAMSULOSIN HYDROCHLORIDE 0.4 MG/1
1 CAPSULE ORAL
Qty: 30 | Refills: 0
Start: 2024-10-26 | End: 2024-11-24

## 2024-10-26 RX ORDER — INSULIN LISPRO 100 U/ML
1 INJECTION, SOLUTION INTRAVENOUS; SUBCUTANEOUS
Qty: 3 | Refills: 0
Start: 2024-10-26

## 2024-10-26 RX ORDER — AMLODIPINE BESYLATE 10 MG/1
1 TABLET ORAL
Qty: 30 | Refills: 0
Start: 2024-10-26

## 2024-10-26 RX ADMIN — Medication 40 MILLIGRAM(S): at 08:20

## 2024-10-26 RX ADMIN — INSULIN LISPRO 5 UNIT(S): 100 INJECTION, SOLUTION INTRAVENOUS; SUBCUTANEOUS at 08:21

## 2024-10-26 RX ADMIN — INFLUENZA A VIRUS A/IDAHO/07/2018 (H1N1) ANTIGEN (MDCK CELL DERIVED, PROPIOLACTONE INACTIVATED, INFLUENZA A VIRUS A/INDIANA/08/2018 (H3N2) ANTIGEN (MDCK CELL DERIVED, PROPIOLACTONE INACTIVATED), INFLUENZA B VIRUS B/SINGAPORE/INFTT-16-0610/2016 ANTIGEN (MDCK CELL DERIVED, PROPIOLACTONE INACTIVATED), INFLUENZA B VIRUS B/IOWA/06/2017 ANTIGEN (MDCK CELL DERIVED, PROPIOLACTONE INACTIVATED) 0.5 MILLILITER(S): 15; 15; 15; 15 INJECTION, SUSPENSION INTRAMUSCULAR at 14:11

## 2024-10-26 RX ADMIN — HYDROXYCHLOROQUINE SULFATE 200 MILLIGRAM(S): 200 TABLET, FILM COATED ORAL at 05:19

## 2024-10-26 RX ADMIN — BACITRACIN ZINC AND POLYMYXIN B SULFATE 1 APPLICATION(S): 500; 10000 OINTMENT TOPICAL at 05:20

## 2024-10-26 RX ADMIN — INSULIN GLARGINE-YFGN 15 UNIT(S): 100 INJECTION, SOLUTION SUBCUTANEOUS at 08:21

## 2024-10-26 RX ADMIN — Medication 1 APPLICATION(S): at 05:20

## 2024-10-26 RX ADMIN — PREDNISONE 20 MILLIGRAM(S): 20 TABLET ORAL at 05:19

## 2024-10-26 RX ADMIN — INSULIN LISPRO 5 UNIT(S): 100 INJECTION, SOLUTION INTRAVENOUS; SUBCUTANEOUS at 11:44

## 2024-10-26 RX ADMIN — INSULIN LISPRO 8: 100 INJECTION, SOLUTION INTRAVENOUS; SUBCUTANEOUS at 11:44

## 2024-10-26 RX ADMIN — AMLODIPINE BESYLATE 10 MILLIGRAM(S): 10 TABLET ORAL at 05:19

## 2024-10-26 RX ADMIN — APIXABAN 5 MILLIGRAM(S): 2.5 TABLET, FILM COATED ORAL at 05:20

## 2024-10-29 DIAGNOSIS — E11.65 TYPE 2 DIABETES MELLITUS WITH HYPERGLYCEMIA: ICD-10-CM

## 2024-10-29 DIAGNOSIS — G93.41 METABOLIC ENCEPHALOPATHY: ICD-10-CM

## 2024-10-29 DIAGNOSIS — T78.3XXA ANGIONEUROTIC EDEMA, INITIAL ENCOUNTER: ICD-10-CM

## 2024-10-29 DIAGNOSIS — R33.9 RETENTION OF URINE, UNSPECIFIED: ICD-10-CM

## 2024-10-29 DIAGNOSIS — D63.1 ANEMIA IN CHRONIC KIDNEY DISEASE: ICD-10-CM

## 2024-10-29 DIAGNOSIS — N17.0 ACUTE KIDNEY FAILURE WITH TUBULAR NECROSIS: ICD-10-CM

## 2024-10-29 DIAGNOSIS — K21.9 GASTRO-ESOPHAGEAL REFLUX DISEASE WITHOUT ESOPHAGITIS: ICD-10-CM

## 2024-10-29 DIAGNOSIS — N13.6 PYONEPHROSIS: ICD-10-CM

## 2024-10-29 DIAGNOSIS — E11.22 TYPE 2 DIABETES MELLITUS WITH DIABETIC CHRONIC KIDNEY DISEASE: ICD-10-CM

## 2024-10-29 DIAGNOSIS — Z79.4 LONG TERM (CURRENT) USE OF INSULIN: ICD-10-CM

## 2024-10-29 DIAGNOSIS — A41.9 SEPSIS, UNSPECIFIED ORGANISM: ICD-10-CM

## 2024-10-29 DIAGNOSIS — M32.10 SYSTEMIC LUPUS ERYTHEMATOSUS, ORGAN OR SYSTEM INVOLVEMENT UNSPECIFIED: ICD-10-CM

## 2024-10-29 DIAGNOSIS — R65.20 SEVERE SEPSIS WITHOUT SEPTIC SHOCK: ICD-10-CM

## 2024-10-29 DIAGNOSIS — Z79.891 LONG TERM (CURRENT) USE OF OPIATE ANALGESIC: ICD-10-CM

## 2024-10-29 DIAGNOSIS — Z79.52 LONG TERM (CURRENT) USE OF SYSTEMIC STEROIDS: ICD-10-CM

## 2024-10-29 DIAGNOSIS — N10 ACUTE PYELONEPHRITIS: ICD-10-CM

## 2024-10-29 DIAGNOSIS — I12.9 HYPERTENSIVE CHRONIC KIDNEY DISEASE WITH STAGE 1 THROUGH STAGE 4 CHRONIC KIDNEY DISEASE, OR UNSPECIFIED CHRONIC KIDNEY DISEASE: ICD-10-CM

## 2024-10-29 DIAGNOSIS — J96.01 ACUTE RESPIRATORY FAILURE WITH HYPOXIA: ICD-10-CM

## 2024-10-29 DIAGNOSIS — Z79.01 LONG TERM (CURRENT) USE OF ANTICOAGULANTS: ICD-10-CM

## 2024-10-29 DIAGNOSIS — N28.89 OTHER SPECIFIED DISORDERS OF KIDNEY AND URETER: ICD-10-CM

## 2024-10-29 DIAGNOSIS — Z88.1 ALLERGY STATUS TO OTHER ANTIBIOTIC AGENTS: ICD-10-CM

## 2024-10-29 DIAGNOSIS — N18.30 CHRONIC KIDNEY DISEASE, STAGE 3 UNSPECIFIED: ICD-10-CM
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
## 2024-10-30 ENCOUNTER — NON-APPOINTMENT (OUTPATIENT)
Age: 64
End: 2024-10-30

## 2024-10-30 ENCOUNTER — APPOINTMENT (OUTPATIENT)
Dept: UROLOGY | Facility: CLINIC | Age: 64
End: 2024-10-30
Payer: MEDICARE

## 2024-10-30 VITALS
DIASTOLIC BLOOD PRESSURE: 75 MMHG | BODY MASS INDEX: 32.2 KG/M2 | OXYGEN SATURATION: 99 % | SYSTOLIC BLOOD PRESSURE: 119 MMHG | WEIGHT: 230 LBS | TEMPERATURE: 97 F | HEIGHT: 71 IN | HEART RATE: 107 BPM

## 2024-10-30 DIAGNOSIS — N28.89 OTHER SPECIFIED DISORDERS OF KIDNEY AND URETER: ICD-10-CM

## 2024-10-30 DIAGNOSIS — M32.9 SYSTEMIC LUPUS ERYTHEMATOSUS, UNSPECIFIED: ICD-10-CM

## 2024-10-30 DIAGNOSIS — Z86.39 PERSONAL HISTORY OF OTHER ENDOCRINE, NUTRITIONAL AND METABOLIC DISEASE: ICD-10-CM

## 2024-10-30 DIAGNOSIS — R33.9 RETENTION OF URINE, UNSPECIFIED: ICD-10-CM

## 2024-10-30 DIAGNOSIS — Z86.79 PERSONAL HISTORY OF OTHER DISEASES OF THE CIRCULATORY SYSTEM: ICD-10-CM

## 2024-10-30 DIAGNOSIS — Z86.718 PERSONAL HISTORY OF OTHER VENOUS THROMBOSIS AND EMBOLISM: ICD-10-CM

## 2024-10-30 DIAGNOSIS — N13.30 UNSPECIFIED HYDRONEPHROSIS: ICD-10-CM

## 2024-10-30 PROCEDURE — 99214 OFFICE O/P EST MOD 30 MIN: CPT

## 2024-10-30 RX ORDER — DOCUSATE SODIUM 50 MG AND SENNOSIDES 8.6 MG 8.6; 5 MG/1; MG/1
TABLET, FILM COATED ORAL
Refills: 0 | Status: ACTIVE | COMMUNITY

## 2024-10-30 RX ORDER — HYDROXYCHLOROQUINE SULFATE 200 MG/1
200 TABLET, FILM COATED ORAL
Refills: 0 | Status: ACTIVE | COMMUNITY

## 2024-10-30 RX ORDER — AMLODIPINE BESYLATE 10 MG/1
10 TABLET ORAL
Refills: 0 | Status: DISCONTINUED | COMMUNITY
End: 2024-10-30

## 2024-10-30 RX ORDER — INSULIN LISPRO 200 [IU]/ML
INJECTION, SOLUTION SUBCUTANEOUS
Refills: 0 | Status: ACTIVE | COMMUNITY

## 2024-10-30 RX ORDER — PANTOPRAZOLE SODIUM 40 MG/1
40 GRANULE, DELAYED RELEASE ORAL
Refills: 0 | Status: ACTIVE | COMMUNITY

## 2024-10-30 RX ORDER — TAMSULOSIN HYDROCHLORIDE 0.4 MG/1
0.4 CAPSULE ORAL
Refills: 0 | Status: ACTIVE | COMMUNITY

## 2024-10-30 RX ORDER — INSULIN GLARGINE 100 [IU]/ML
100 INJECTION, SOLUTION SUBCUTANEOUS
Refills: 0 | Status: ACTIVE | COMMUNITY

## 2024-10-30 RX ORDER — CALCIUM CITRATE/VITAMIN D3 315MG-6.25
TABLET ORAL
Refills: 0 | Status: ACTIVE | COMMUNITY

## 2024-10-30 RX ORDER — MULTIVIT-MIN/FOLIC/VIT K/LYCOP 400-300MCG
TABLET ORAL
Refills: 0 | Status: ACTIVE | COMMUNITY

## 2024-10-30 RX ORDER — APIXABAN 5 MG/1
5 TABLET, FILM COATED ORAL
Refills: 0 | Status: ACTIVE | COMMUNITY

## 2024-10-30 RX ORDER — PREDNISOLONE SODIUM PHOSPHATE 20 MG/5ML
20 SOLUTION ORAL
Refills: 0 | Status: ACTIVE | COMMUNITY

## 2024-11-15 ENCOUNTER — APPOINTMENT (OUTPATIENT)
Dept: UROLOGY | Facility: CLINIC | Age: 64
End: 2024-11-15

## 2024-11-15 ENCOUNTER — APPOINTMENT (OUTPATIENT)
Dept: RHEUMATOLOGY | Facility: CLINIC | Age: 64
End: 2024-11-15

## 2024-11-15 VITALS
SYSTOLIC BLOOD PRESSURE: 134 MMHG | DIASTOLIC BLOOD PRESSURE: 75 MMHG | TEMPERATURE: 97.8 F | HEART RATE: 137 BPM | OXYGEN SATURATION: 100 %

## 2024-11-15 DIAGNOSIS — R33.9 RETENTION OF URINE, UNSPECIFIED: ICD-10-CM

## 2024-11-15 PROCEDURE — 51702 INSERT TEMP BLADDER CATH: CPT

## 2024-12-22 ENCOUNTER — EMERGENCY (EMERGENCY)
Facility: HOSPITAL | Age: 64
LOS: 0 days | Discharge: HOME | End: 2024-12-22
Attending: STUDENT IN AN ORGANIZED HEALTH CARE EDUCATION/TRAINING PROGRAM
Payer: MEDICARE

## 2024-12-22 VITALS
SYSTOLIC BLOOD PRESSURE: 128 MMHG | DIASTOLIC BLOOD PRESSURE: 78 MMHG | OXYGEN SATURATION: 99 % | TEMPERATURE: 98 F | WEIGHT: 229.94 LBS | HEART RATE: 107 BPM | HEIGHT: 71 IN | RESPIRATION RATE: 18 BRPM

## 2024-12-22 DIAGNOSIS — Z88.8 ALLERGY STATUS TO OTHER DRUGS, MEDICAMENTS AND BIOLOGICAL SUBSTANCES: ICD-10-CM

## 2024-12-22 DIAGNOSIS — N39.0 URINARY TRACT INFECTION, SITE NOT SPECIFIED: ICD-10-CM

## 2024-12-22 DIAGNOSIS — E11.9 TYPE 2 DIABETES MELLITUS WITHOUT COMPLICATIONS: ICD-10-CM

## 2024-12-22 DIAGNOSIS — I10 ESSENTIAL (PRIMARY) HYPERTENSION: ICD-10-CM

## 2024-12-22 DIAGNOSIS — Z96.0 PRESENCE OF UROGENITAL IMPLANTS: ICD-10-CM

## 2024-12-22 DIAGNOSIS — Z98.891 HISTORY OF UTERINE SCAR FROM PREVIOUS SURGERY: Chronic | ICD-10-CM

## 2024-12-22 DIAGNOSIS — L91.8 OTHER HYPERTROPHIC DISORDERS OF THE SKIN: Chronic | ICD-10-CM

## 2024-12-22 DIAGNOSIS — Z88.2 ALLERGY STATUS TO SULFONAMIDES: ICD-10-CM

## 2024-12-22 DIAGNOSIS — M32.9 SYSTEMIC LUPUS ERYTHEMATOSUS, UNSPECIFIED: ICD-10-CM

## 2024-12-22 LAB
ALBUMIN SERPL ELPH-MCNC: 3 G/DL — LOW (ref 3.3–5)
ALP SERPL-CCNC: 92 U/L — SIGNIFICANT CHANGE UP (ref 40–120)
ALT FLD-CCNC: 23 U/L — SIGNIFICANT CHANGE UP (ref 12–78)
ANION GAP SERPL CALC-SCNC: 7 MMOL/L — SIGNIFICANT CHANGE UP (ref 5–17)
APPEARANCE UR: ABNORMAL
AST SERPL-CCNC: 12 U/L — LOW (ref 15–37)
BACTERIA # UR AUTO: ABNORMAL /HPF
BASOPHILS # BLD AUTO: 0.04 K/UL — SIGNIFICANT CHANGE UP (ref 0–0.2)
BASOPHILS NFR BLD AUTO: 0.3 % — SIGNIFICANT CHANGE UP (ref 0–2)
BILIRUB SERPL-MCNC: 0.2 MG/DL — SIGNIFICANT CHANGE UP (ref 0.2–1.2)
BILIRUB UR-MCNC: NEGATIVE — SIGNIFICANT CHANGE UP
BUN SERPL-MCNC: 27 MG/DL — HIGH (ref 7–23)
CALCIUM SERPL-MCNC: 8.9 MG/DL — SIGNIFICANT CHANGE UP (ref 8.5–10.1)
CHLORIDE SERPL-SCNC: 109 MMOL/L — HIGH (ref 96–108)
CO2 SERPL-SCNC: 27 MMOL/L — SIGNIFICANT CHANGE UP (ref 22–31)
COLOR SPEC: YELLOW — SIGNIFICANT CHANGE UP
COMMENT - URINE: SIGNIFICANT CHANGE UP
CREAT SERPL-MCNC: 1.4 MG/DL — HIGH (ref 0.5–1.3)
DIFF PNL FLD: ABNORMAL
EGFR: 43 ML/MIN/1.73M2 — LOW
EOSINOPHIL # BLD AUTO: 0.09 K/UL — SIGNIFICANT CHANGE UP (ref 0–0.5)
EOSINOPHIL NFR BLD AUTO: 0.8 % — SIGNIFICANT CHANGE UP (ref 0–6)
EPI CELLS # UR: PRESENT
GLUCOSE SERPL-MCNC: 83 MG/DL — SIGNIFICANT CHANGE UP (ref 70–99)
GLUCOSE UR QL: NEGATIVE MG/DL — SIGNIFICANT CHANGE UP
HCT VFR BLD CALC: 34.6 % — SIGNIFICANT CHANGE UP (ref 34.5–45)
HGB BLD-MCNC: 11.1 G/DL — LOW (ref 11.5–15.5)
IMM GRANULOCYTES NFR BLD AUTO: 0.5 % — SIGNIFICANT CHANGE UP (ref 0–0.9)
KETONES UR-MCNC: NEGATIVE MG/DL — SIGNIFICANT CHANGE UP
LEUKOCYTE ESTERASE UR-ACNC: ABNORMAL
LYMPHOCYTES # BLD AUTO: 1.82 K/UL — SIGNIFICANT CHANGE UP (ref 1–3.3)
LYMPHOCYTES # BLD AUTO: 15.8 % — SIGNIFICANT CHANGE UP (ref 13–44)
MCHC RBC-ENTMCNC: 26.1 PG — LOW (ref 27–34)
MCHC RBC-ENTMCNC: 32.1 G/DL — SIGNIFICANT CHANGE UP (ref 32–36)
MCV RBC AUTO: 81.4 FL — SIGNIFICANT CHANGE UP (ref 80–100)
MONOCYTES # BLD AUTO: 0.7 K/UL — SIGNIFICANT CHANGE UP (ref 0–0.9)
MONOCYTES NFR BLD AUTO: 6.1 % — SIGNIFICANT CHANGE UP (ref 2–14)
NEUTROPHILS # BLD AUTO: 8.79 K/UL — HIGH (ref 1.8–7.4)
NEUTROPHILS NFR BLD AUTO: 76.5 % — SIGNIFICANT CHANGE UP (ref 43–77)
NITRITE UR-MCNC: NEGATIVE — SIGNIFICANT CHANGE UP
NRBC # BLD: 0 /100 WBCS — SIGNIFICANT CHANGE UP (ref 0–0)
PH UR: 6 — SIGNIFICANT CHANGE UP (ref 5–8)
PLATELET # BLD AUTO: 247 K/UL — SIGNIFICANT CHANGE UP (ref 150–400)
POTASSIUM SERPL-MCNC: 3.4 MMOL/L — LOW (ref 3.5–5.3)
POTASSIUM SERPL-SCNC: 3.4 MMOL/L — LOW (ref 3.5–5.3)
PROT SERPL-MCNC: 6.9 GM/DL — SIGNIFICANT CHANGE UP (ref 6–8.3)
PROT UR-MCNC: 100 MG/DL
RBC # BLD: 4.25 M/UL — SIGNIFICANT CHANGE UP (ref 3.8–5.2)
RBC # FLD: 15 % — HIGH (ref 10.3–14.5)
RBC CASTS # UR COMP ASSIST: 5 /HPF — HIGH (ref 0–4)
SODIUM SERPL-SCNC: 143 MMOL/L — SIGNIFICANT CHANGE UP (ref 135–145)
SP GR SPEC: 1.02 — SIGNIFICANT CHANGE UP (ref 1–1.03)
UROBILINOGEN FLD QL: 0.2 MG/DL — SIGNIFICANT CHANGE UP (ref 0.2–1)
WBC # BLD: 11.5 K/UL — HIGH (ref 3.8–10.5)
WBC # FLD AUTO: 11.5 K/UL — HIGH (ref 3.8–10.5)
WBC UR QL: ABNORMAL /HPF (ref 0–5)

## 2024-12-22 PROCEDURE — 99284 EMERGENCY DEPT VISIT MOD MDM: CPT

## 2024-12-22 RX ORDER — NITROFURANTOIN 100 MG/1
100 CAPSULE ORAL ONCE
Refills: 0 | Status: COMPLETED | OUTPATIENT
Start: 2024-12-22 | End: 2024-12-22

## 2024-12-22 RX ORDER — NITROFURANTOIN 100 MG/1
1 CAPSULE ORAL
Qty: 9 | Refills: 0
Start: 2024-12-22 | End: 2024-12-26

## 2024-12-22 RX ADMIN — NITROFURANTOIN 100 MILLIGRAM(S): 100 CAPSULE ORAL at 19:22

## 2024-12-22 NOTE — ED PROVIDER NOTE - PATIENT PORTAL LINK FT
You can access the FollowMyHealth Patient Portal offered by [facility] by registering at the following website: [email or web address]. By joining [employer]’s FollowMyHealth portal, you will also be able to view your health information using other applications (apps) compatible with our system.

## 2024-12-22 NOTE — ED ADULT NURSE NOTE - OBJECTIVE STATEMENT
pt presents to the ED c/o pain with urination since yesterday.  pt states elliott catheter changed a month ago for urinary retention and have appt with dr. lucas but can't  wait for appt. due to pain. denies hematuria or fever. history of htn, dm, lupus. FS was low, Dr. tineo made aware and given juice and cracker.

## 2024-12-22 NOTE — ED ADULT TRIAGE NOTE - NS ED TRIAGE CLINICAL UPGRADE PROVIDER FT
pt fs 60. orange juice given . dr reese made aware pt fs 60. orange juice given . dr dejesus made aware

## 2024-12-22 NOTE — ED ADULT TRIAGE NOTE - CHIEF COMPLAINT QUOTE
pt c/o pain with urination since yesterday.  pt noted with  [place]. catheter placed  2 month ago for urinary retention. denies hematuria or fever. history of htn and dm

## 2024-12-22 NOTE — ED ADULT NURSE NOTE - DOES PATIENT HAVE ADVANCE DIRECTIVE
Good nutrition is important when healing from an illness, injury, or surgery. Follow any nutrition recommendations given to you during your hospital stay. If you were given an oral nutrition supplement while in the hospital, continue to take this supplement at home. You can take it with meals, in-between meals, and/or before bedtime. These supplements can be purchased at most local grocery stores, pharmacies, and chain [employer]stores. If you have any questions about your diet or nutrition, call the hospital and ask for the dietitian.       DIET AS TOLERATED No

## 2024-12-22 NOTE — ED ADULT NURSE NOTE - SUICIDE SCREENING QUESTION 3
Pt presents with mid low abdominal pain, belly appears distended.  Pt reports the pain started after 5pm yesterday, he states right after he ate.  Pt states the pain kept him awake, pt took pepto and tums with no results   No

## 2024-12-22 NOTE — ED PROVIDER NOTE - CLINICAL SUMMARY MEDICAL DECISION MAKING FREE TEXT BOX
65 y/o F with PMH HTN, DM, SLE, urinary retention, here today with catheter complications.   Vitals stable.  Patient is well appearing in NAD.  Will replace elliott at bedside.  Will send labs/urine. Blood sugar low but patient did not eat, now eating at bedside.

## 2024-12-22 NOTE — ED PROVIDER NOTE - OBJECTIVE STATEMENT
63 y/o F with PMH HTN, DM, SLE, urinary retention, here today with catheter complications. Patient last had her elliott changed 1 month ago and is scheduled to have it changed in early January. States she is having leaking around the elliott and discomfort since yesterday. She denies blood in the urine. No fever, chills, N/V,  Elliott was placed initially s/p episode of pyelo and metabolic encephalopathy in October.

## 2024-12-22 NOTE — ED PROVIDER NOTE - PHYSICAL EXAMINATION
GENERAL: Awake, alert, NAD  HEENT: NC/AT, moist mucous membranes  LUNGS: CTAB, no wheezes or crackles   CARDIAC: RRR, no m/r/g  ABDOMEN: Soft, non tender, non distended, no rebound, no guarding  : elliott draining clear urine at bedside  BACK: No midline spinal tenderness, no CVA tenderness  EXT: No edema, no deformities.  NEURO: A&Ox3. Moving all extremities.  SKIN: Warm and dry. No rash.  PSYCH: Normal affect.

## 2024-12-24 LAB
CULTURE RESULTS: SIGNIFICANT CHANGE UP
SPECIMEN SOURCE: SIGNIFICANT CHANGE UP

## 2024-12-26 ENCOUNTER — APPOINTMENT (OUTPATIENT)
Dept: UROLOGY | Facility: CLINIC | Age: 64
End: 2024-12-26

## 2024-12-31 ENCOUNTER — APPOINTMENT (OUTPATIENT)
Dept: UROLOGY | Facility: CLINIC | Age: 64
End: 2024-12-31
Payer: MEDICARE

## 2024-12-31 ENCOUNTER — APPOINTMENT (OUTPATIENT)
Dept: UROLOGY | Facility: CLINIC | Age: 64
End: 2024-12-31

## 2024-12-31 VITALS
TEMPERATURE: 94.2 F | SYSTOLIC BLOOD PRESSURE: 131 MMHG | DIASTOLIC BLOOD PRESSURE: 81 MMHG | BODY MASS INDEX: 34.02 KG/M2 | HEIGHT: 71 IN | OXYGEN SATURATION: 99 % | RESPIRATION RATE: 16 BRPM | HEART RATE: 92 BPM | WEIGHT: 243 LBS

## 2024-12-31 DIAGNOSIS — N28.89 OTHER SPECIFIED DISORDERS OF KIDNEY AND URETER: ICD-10-CM

## 2024-12-31 DIAGNOSIS — R33.9 RETENTION OF URINE, UNSPECIFIED: ICD-10-CM

## 2024-12-31 PROCEDURE — 99215 OFFICE O/P EST HI 40 MIN: CPT | Mod: 25

## 2024-12-31 PROCEDURE — 51710 CHANGE OF BLADDER TUBE: CPT

## 2024-12-31 RX ORDER — SULFAMETHOXAZOLE AND TRIMETHOPRIM 800; 160 MG/1; MG/1
800-160 TABLET ORAL DAILY
Qty: 1 | Refills: 0 | Status: COMPLETED | COMMUNITY
Start: 2024-12-31 | End: 2024-12-31

## 2024-12-31 RX ORDER — SULFAMETHOXAZOLE AND TRIMETHOPRIM 800; 160 MG/1; MG/1
800-160 TABLET ORAL DAILY
Refills: 0 | Status: COMPLETED | OUTPATIENT
Start: 2024-12-31

## 2024-12-31 RX ADMIN — SULFAMETHOXAZOLE AND TRIMETHOPRIM 1 MG: 800; 160 TABLET ORAL at 00:00

## 2025-01-10 NOTE — OCCUPATIONAL THERAPY INITIAL EVALUATION ADULT - LIVES WITH, PROFILE
[de-identified] : Mrs. Felton is a 74 year old female with R/R CLL (del 17p), with multiple prior therapies, most recently on Ibrutinib since 8/2014, who presents for routine follow-up. \par  \par  Disease: CLL, Dx 2000 \par  Tumor/ Prognostic Markers: mutated\par  CD38-, ZAP70-\par  del 13q, TP53 del.\par  +TP53 mutation, - NOTCH1, SF3B1. \par  \par  Current Treatment Status:. Current Therapy: Ibrutinib (8/2014)\par  11/2000-3/2001: Fludarabine x 4\par  6/01-9/01: FR x 3: sec inc WBC and splenomegaly-> PA\par  7/09-9/09: FR x 3 due to inc WBC, massive splenomegaly and anemia-> PA\par  2011: FR w minimal response\par  2012: FCR x 2-> minimal response. Rpt cytogenetic now w del 17p\par  5/12-7/13: [id number]: R+/-idela->SD\par  8/13-2/14: [id number]: Idelalisib-> PA complicated by colitis and pneumonitis\par  8/14-present: Ibrutinib ->PA. \par  \par  \par  \par  \par  \par   \par   [de-identified] : 1/3/24 Pt presents today for follow up. Currently patient feel very well and has no complaints. Pt denies fever, chills or drenching night sweats. Good appetite. weight is stable. No chest pain or shortness of breath. No palpitations. No bleeding/bruising. Energy stable. No recent infections. She continues tolerate ibrutinib 140 mg 2 tabs qd. She reports no new enlarged/bothersome lymph nodes. No abd pain.   June 05, 2025 - Patient seen in a 1 month follow up visit for Leukocytopenia & Neutropenia. She remains asymptomatic, currently on Ibrutinib 140 mg PO qd.  She has mild intermittent bruising self-limiting resolves on its own.  Denies febrile illness, hospitalization, interval change in medical status, bleeding, weight loss, bone pain, swollen glands. She reports she performs self exams and monitors closely for any new findings, of lymph nodes / spleen enlargement.  7/17/24: Pt returns for follow up. She is feeling well. Offers no complaints. Remains on Ibrutinib 140mg daily. Pt denies fever, chills or drenching night sweats. Good appetite. weight is stable. No chest pain or shortness of breath. No palpitations. No bleeding/bruising. Energy stable. No recent infections.  10/9/24: Pt returns for follow up. She is feeling well. Offers no complaints. Remains on Ibrutinib 140mg daily. Pt denies fever, chills or drenching night sweats. Good appetite. weight is stable. No chest pain or shortness of breath. No palpitations. No bleeding/bruising. Energy stable. No recent infections.  1/8/25: Pt returns for follow up. She is feeling well. Offers no complaints. Remains on Ibrutinib 140mg daily. Pt denies fever, chills or drenching night sweats. Good appetite. weight is stable. No chest pain or shortness of breath. No palpitations. No bleeding/bruising. Energy stable. No recent infections.  children

## 2025-01-17 ENCOUNTER — APPOINTMENT (OUTPATIENT)
Dept: UROLOGY | Facility: CLINIC | Age: 65
End: 2025-01-17

## 2025-01-17 ENCOUNTER — NON-APPOINTMENT (OUTPATIENT)
Age: 65
End: 2025-01-17

## 2025-01-17 VITALS
OXYGEN SATURATION: 97 % | TEMPERATURE: 97.9 F | DIASTOLIC BLOOD PRESSURE: 76 MMHG | SYSTOLIC BLOOD PRESSURE: 140 MMHG | HEART RATE: 122 BPM

## 2025-01-17 DIAGNOSIS — N28.89 OTHER SPECIFIED DISORDERS OF KIDNEY AND URETER: ICD-10-CM

## 2025-01-17 DIAGNOSIS — R33.9 RETENTION OF URINE, UNSPECIFIED: ICD-10-CM

## 2025-01-17 PROCEDURE — 51710 CHANGE OF BLADDER TUBE: CPT

## 2025-02-10 NOTE — ED ADULT NURSE NOTE - CARDIO ASSESSMENT
[facility] provides services at a reduced cost to those who are determined to be eligible through [facility]’s financial assistance program. Information regarding [facility]’s financial assistance program can be found by going to [email or web address] or by calling [phone]. WDL

## 2025-02-12 NOTE — PROGRESS NOTE ADULT - ASSESSMENT
DISPLAY PLAN FREE TEXT 61 years old female with h/o HTN, DM2, SLE, obesity (BMI 39.2) present to ED with complain of worsening infection in her left 3rd toe, was admitted for osteomyelitis for the left third toe.

## 2025-02-14 ENCOUNTER — APPOINTMENT (OUTPATIENT)
Dept: UROLOGY | Facility: CLINIC | Age: 65
End: 2025-02-14
Payer: MEDICARE

## 2025-02-14 ENCOUNTER — APPOINTMENT (OUTPATIENT)
Dept: UROLOGY | Facility: CLINIC | Age: 65
End: 2025-02-14

## 2025-02-14 VITALS — DIASTOLIC BLOOD PRESSURE: 69 MMHG | OXYGEN SATURATION: 100 % | SYSTOLIC BLOOD PRESSURE: 110 MMHG | HEART RATE: 112 BPM

## 2025-02-14 DIAGNOSIS — R33.9 RETENTION OF URINE, UNSPECIFIED: ICD-10-CM

## 2025-02-14 DIAGNOSIS — N28.89 OTHER SPECIFIED DISORDERS OF KIDNEY AND URETER: ICD-10-CM

## 2025-02-14 PROCEDURE — 76775 US EXAM ABDO BACK WALL LIM: CPT

## 2025-02-14 PROCEDURE — 51710 CHANGE OF BLADDER TUBE: CPT

## 2025-02-14 PROCEDURE — 99215 OFFICE O/P EST HI 40 MIN: CPT | Mod: 25

## 2025-03-11 ENCOUNTER — APPOINTMENT (OUTPATIENT)
Dept: UROLOGY | Facility: CLINIC | Age: 65
End: 2025-03-11
Payer: MEDICARE

## 2025-03-11 VITALS
OXYGEN SATURATION: 98 % | SYSTOLIC BLOOD PRESSURE: 118 MMHG | TEMPERATURE: 97.1 F | DIASTOLIC BLOOD PRESSURE: 73 MMHG | HEART RATE: 118 BPM

## 2025-03-11 DIAGNOSIS — R33.9 RETENTION OF URINE, UNSPECIFIED: ICD-10-CM

## 2025-03-11 PROCEDURE — 51710 CHANGE OF BLADDER TUBE: CPT

## 2025-03-11 PROCEDURE — 99214 OFFICE O/P EST MOD 30 MIN: CPT | Mod: 25

## 2025-03-13 ENCOUNTER — APPOINTMENT (OUTPATIENT)
Dept: ENDOCRINOLOGY | Facility: CLINIC | Age: 65
End: 2025-03-13
Payer: MEDICARE

## 2025-03-13 VITALS
RESPIRATION RATE: 18 BRPM | WEIGHT: 247 LBS | DIASTOLIC BLOOD PRESSURE: 71 MMHG | HEIGHT: 71 IN | OXYGEN SATURATION: 97 % | BODY MASS INDEX: 34.58 KG/M2 | SYSTOLIC BLOOD PRESSURE: 127 MMHG | HEART RATE: 122 BPM | TEMPERATURE: 98.3 F

## 2025-03-13 DIAGNOSIS — E11.8 TYPE 2 DIABETES MELLITUS WITH UNSPECIFIED COMPLICATIONS: ICD-10-CM

## 2025-03-13 LAB
25(OH)D3 SERPL-MCNC: 42.2 NG/ML
ALBUMIN SERPL ELPH-MCNC: 3.6 G/DL
ALP BLD-CCNC: 102 U/L
ALT SERPL-CCNC: 19 U/L
ANION GAP SERPL CALC-SCNC: 13 MMOL/L
AST SERPL-CCNC: 26 U/L
BASOPHILS # BLD AUTO: 0.04 K/UL
BASOPHILS NFR BLD AUTO: 0.5 %
BILIRUB SERPL-MCNC: 0.5 MG/DL
BUN SERPL-MCNC: 22 MG/DL
CALCIUM SERPL-MCNC: 9 MG/DL
CHLORIDE SERPL-SCNC: 105 MMOL/L
CHOLEST SERPL-MCNC: 156 MG/DL
CO2 SERPL-SCNC: 25 MMOL/L
CREAT SERPL-MCNC: 1.44 MG/DL
EGFRCR SERPLBLD CKD-EPI 2021: 41 ML/MIN/1.73M2
EOSINOPHIL # BLD AUTO: 0.11 K/UL
EOSINOPHIL NFR BLD AUTO: 1.4 %
ESTIMATED AVERAGE GLUCOSE: 171 MG/DL
FOLATE SERPL-MCNC: >20 NG/ML
GLUCOSE BLDC GLUCOMTR-MCNC: 135
GLUCOSE SERPL-MCNC: 185 MG/DL
HBA1C MFR BLD HPLC: 7.6 %
HCT VFR BLD CALC: 33.9 %
HDLC SERPL-MCNC: 42 MG/DL
HGB BLD-MCNC: 10.6 G/DL
IMM GRANULOCYTES NFR BLD AUTO: 0.4 %
LDLC SERPL CALC-MCNC: 91 MG/DL
LYMPHOCYTES # BLD AUTO: 1.91 K/UL
LYMPHOCYTES NFR BLD AUTO: 23.5 %
MAN DIFF?: NORMAL
MCHC RBC-ENTMCNC: 26 PG
MCHC RBC-ENTMCNC: 31.3 G/DL
MCV RBC AUTO: 83.1 FL
MONOCYTES # BLD AUTO: 0.42 K/UL
MONOCYTES NFR BLD AUTO: 5.2 %
NEUTROPHILS # BLD AUTO: 5.63 K/UL
NEUTROPHILS NFR BLD AUTO: 69 %
NONHDLC SERPL-MCNC: 114 MG/DL
PLATELET # BLD AUTO: 244 K/UL
POTASSIUM SERPL-SCNC: 3.9 MMOL/L
PROT SERPL-MCNC: 6.8 G/DL
RBC # BLD: 4.08 M/UL
RBC # FLD: 14.9 %
SODIUM SERPL-SCNC: 143 MMOL/L
T4 FREE SERPL-MCNC: 1.2 NG/DL
TRIGL SERPL-MCNC: 131 MG/DL
TSH SERPL-ACNC: 4.23 UIU/ML
VIT B12 SERPL-MCNC: 662 PG/ML
WBC # FLD AUTO: 8.14 K/UL

## 2025-03-13 PROCEDURE — 99204 OFFICE O/P NEW MOD 45 MIN: CPT | Mod: 25

## 2025-03-13 PROCEDURE — 95251 CONT GLUC MNTR ANALYSIS I&R: CPT

## 2025-03-13 PROCEDURE — 82962 GLUCOSE BLOOD TEST: CPT

## 2025-03-21 ENCOUNTER — APPOINTMENT (OUTPATIENT)
Dept: UROLOGY | Facility: CLINIC | Age: 65
End: 2025-03-21

## 2025-03-27 ENCOUNTER — APPOINTMENT (OUTPATIENT)
Dept: ENDOCRINOLOGY | Facility: CLINIC | Age: 65
End: 2025-03-27
Payer: MEDICARE

## 2025-03-27 PROCEDURE — G0108 DIAB MANAGE TRN  PER INDIV: CPT

## 2025-04-09 ENCOUNTER — APPOINTMENT (OUTPATIENT)
Dept: UROLOGY | Facility: CLINIC | Age: 65
End: 2025-04-09
Payer: MEDICARE

## 2025-04-09 VITALS
TEMPERATURE: 97.1 F | WEIGHT: 247 LBS | BODY MASS INDEX: 34.45 KG/M2 | HEART RATE: 114 BPM | OXYGEN SATURATION: 100 % | DIASTOLIC BLOOD PRESSURE: 73 MMHG | SYSTOLIC BLOOD PRESSURE: 140 MMHG

## 2025-04-09 DIAGNOSIS — R33.9 RETENTION OF URINE, UNSPECIFIED: ICD-10-CM

## 2025-04-09 DIAGNOSIS — N31.2 FLACCID NEUROPATHIC BLADDER, NOT ELSEWHERE CLASSIFIED: ICD-10-CM

## 2025-04-09 PROCEDURE — 51102 DRAIN BL W/CATH INSERTION: CPT

## 2025-04-10 ENCOUNTER — APPOINTMENT (OUTPATIENT)
Dept: ENDOCRINOLOGY | Facility: CLINIC | Age: 65
End: 2025-04-10
Payer: MEDICARE

## 2025-04-10 VITALS
HEIGHT: 71 IN | BODY MASS INDEX: 35.56 KG/M2 | RESPIRATION RATE: 18 BRPM | SYSTOLIC BLOOD PRESSURE: 137 MMHG | TEMPERATURE: 98.2 F | DIASTOLIC BLOOD PRESSURE: 72 MMHG | OXYGEN SATURATION: 98 % | HEART RATE: 104 BPM | WEIGHT: 254 LBS

## 2025-04-10 DIAGNOSIS — E11.8 TYPE 2 DIABETES MELLITUS WITH UNSPECIFIED COMPLICATIONS: ICD-10-CM

## 2025-04-10 LAB — GLUCOSE BLDC GLUCOMTR-MCNC: 116

## 2025-04-10 PROCEDURE — 82962 GLUCOSE BLOOD TEST: CPT

## 2025-04-10 PROCEDURE — 99214 OFFICE O/P EST MOD 30 MIN: CPT | Mod: 25

## 2025-04-10 RX ORDER — INSULIN ASPART 100 [IU]/ML
100 INJECTION, SOLUTION INTRAVENOUS; SUBCUTANEOUS
Qty: 4 | Refills: 3 | Status: ACTIVE | COMMUNITY
Start: 2025-04-10 | End: 1900-01-01

## 2025-04-10 RX ORDER — PEN NEEDLE, DIABETIC 32GX 5/32"
32G X 4 MM NEEDLE, DISPOSABLE MISCELLANEOUS
Qty: 400 | Refills: 3 | Status: ACTIVE | COMMUNITY
Start: 2025-04-10 | End: 1900-01-01

## 2025-04-10 RX ORDER — INSULIN DEGLUDEC INJECTION 200 U/ML
200 INJECTION, SOLUTION SUBCUTANEOUS
Qty: 3 | Refills: 3 | Status: ACTIVE | COMMUNITY
Start: 2025-04-10 | End: 1900-01-01

## 2025-05-07 ENCOUNTER — APPOINTMENT (OUTPATIENT)
Dept: UROLOGY | Facility: CLINIC | Age: 65
End: 2025-05-07

## 2025-05-07 VITALS
HEART RATE: 95 BPM | DIASTOLIC BLOOD PRESSURE: 81 MMHG | WEIGHT: 259 LBS | OXYGEN SATURATION: 99 % | BODY MASS INDEX: 36.26 KG/M2 | SYSTOLIC BLOOD PRESSURE: 145 MMHG | HEIGHT: 71 IN | TEMPERATURE: 98 F

## 2025-05-07 DIAGNOSIS — R33.9 RETENTION OF URINE, UNSPECIFIED: ICD-10-CM

## 2025-05-07 DIAGNOSIS — N31.2 FLACCID NEUROPATHIC BLADDER, NOT ELSEWHERE CLASSIFIED: ICD-10-CM

## 2025-05-07 PROCEDURE — 51710 CHANGE OF BLADDER TUBE: CPT

## 2025-05-15 ENCOUNTER — APPOINTMENT (OUTPATIENT)
Dept: ENDOCRINOLOGY | Facility: CLINIC | Age: 65
End: 2025-05-15
Payer: MEDICARE

## 2025-05-15 VITALS
BODY MASS INDEX: 36.12 KG/M2 | SYSTOLIC BLOOD PRESSURE: 121 MMHG | OXYGEN SATURATION: 97 % | RESPIRATION RATE: 18 BRPM | HEIGHT: 71 IN | WEIGHT: 258 LBS | TEMPERATURE: 97.9 F | HEART RATE: 118 BPM | DIASTOLIC BLOOD PRESSURE: 62 MMHG

## 2025-05-15 DIAGNOSIS — E11.8 TYPE 2 DIABETES MELLITUS WITH UNSPECIFIED COMPLICATIONS: ICD-10-CM

## 2025-05-15 LAB — GLUCOSE BLDC GLUCOMTR-MCNC: 309

## 2025-05-15 PROCEDURE — 95251 CONT GLUC MNTR ANALYSIS I&R: CPT

## 2025-05-15 PROCEDURE — 82962 GLUCOSE BLOOD TEST: CPT

## 2025-05-15 PROCEDURE — 99214 OFFICE O/P EST MOD 30 MIN: CPT | Mod: 25

## 2025-05-15 NOTE — ED PROVIDER NOTE - PRO INTERPRETER NEED 2
Future Appointments:  Future Appointments   Date Time Provider Department Center   6/17/2025 10:30 AM Catalina iNna MD [facility] DEP   7/7/2025 10:20 AM Lit Oconnor MD [id number] AMB   9/15/2025  9:50 AM Catalina Nina MD [facility] DEP   2/2/2026 10:00 AM Silva Polo, APRN - NP [facility] BS [facility]        Last Appointment With Me:  2/10/2025     Requested Prescriptions     Pending Prescriptions Disp Refills    furosemide (LASIX) 20 MG tablet [Pharmacy Med Name: Furosemide Oral Tablet 20 MG] 90 tablet 3     Sig: TAKE 1 TABLET EVERY DAY      English

## 2025-06-05 ENCOUNTER — APPOINTMENT (OUTPATIENT)
Dept: UROLOGY | Facility: CLINIC | Age: 65
End: 2025-06-05
Payer: MEDICARE

## 2025-06-05 VITALS
OXYGEN SATURATION: 99 % | TEMPERATURE: 98.5 F | BODY MASS INDEX: 35.98 KG/M2 | WEIGHT: 258 LBS | DIASTOLIC BLOOD PRESSURE: 78 MMHG | HEART RATE: 114 BPM | SYSTOLIC BLOOD PRESSURE: 133 MMHG

## 2025-06-05 DIAGNOSIS — R33.9 RETENTION OF URINE, UNSPECIFIED: ICD-10-CM

## 2025-06-05 DIAGNOSIS — N31.2 FLACCID NEUROPATHIC BLADDER, NOT ELSEWHERE CLASSIFIED: ICD-10-CM

## 2025-06-05 PROCEDURE — 51710 CHANGE OF BLADDER TUBE: CPT

## 2025-06-25 NOTE — ED ADULT TRIAGE NOTE - TEMPERATURE IN FAHRENHEIT (DEGREES F)
This patient was seen at the request of the attending psychiatrist, Arlette Bermeo MD for history and physical medical consultation clearance.  SOURCE OF INFORMATION:  I based this report upon my review of written and electronic medical record, met with interdisciplinary team, and upon my interview and assessment of the patient's clinical condition and patient interview and presentation       History and Physical    Patient: Chencho Gerardo  Date of Service: 2025    :     2001  PCP: Pcp, Verify       Subjective:     Chief Complaint: Psychosis.     HPI: Pt is a 24 year old male with hx of psychotic disorder, schizophrenia, and bipolar disorder seeking [facility] at the recommendation of his psychiatrist and care coordinator to address AVH and depression. He endorses auditory hallucinations and reports voices talking anything to him. Patient endorses symptoms of Depression:  depressed mood, poor concentration,hopelessness, helplessness, and depressed because of not doing things. no Suicidal ideations/plans.  no  Homicidal ideations.  Psychosis:  ++ auditory hallucinations, no visual hallucinations and paranoia.  He denies doing any drugs, alcohol. He report smoking 1 pack per day of cigarettes.    Patients Medical Complaints include:  None     Past Medical History:   Diagnosis Date    No known problems        Past Surgical History:   Procedure Laterality Date    No past surgeries          Prior to Admission medications    Medication Sig Start Date End Date Taking? Authorizing Provider   QUEtiapine (SEROquel) 50 MG tablet Take 1 tablet in the morning and 3 tablets at night 25  Yes Arlette Wood MD   LORazepam (ATIVAN) 1 MG tablet Take 1 tablet by mouth 2 times daily as needed for Anxiety. 25   Arlette Wood MD   paliperidone (INVEGA) 3 MG 24 hr tablet Take 1 tablet by mouth nightly. 25   Arlette Wood MD   QUEtiapine (SEROquel) 50 MG tablet Take 1 tablet by mouth in the  morning and 1 tablet in the evening. 5/27/25 6/24/25  Andrei Sarkar MD   benztropine (COGENTIN) 1 MG tablet Take 1 mg by mouth in the morning and 1 mg in the evening.    Provider, Outside   divalproex (DEPAKOTE ER) 500 MG 24 hr ER tablet Take 2,000 mg by mouth nightly.    Provider, Outside   haloperidol decanoate (HALDOL DECANOATE) 100 MG/ML injectable solution Inject 250 mg into the muscle every 28 days.    Provider, Outside   traZODone (DESYREL) 50 MG tablet TAKE ONE AND ONE-HALF TABLET BY MOUTH AT BEDTIME 5/14/25   Provider, Outside   divalproex (DEPAKOTE ER) 250 MG 24 hr ER tablet Take 250 mg by mouth daily. 5/14/25 6/24/25  Provider, Outside   paliperidone (INVEGA) 3 MG 24 hr tablet Take 6 mg by mouth at bedtime. 4/9/25 6/24/25  Provider, Outside           ALLERGIES:   Allergen Reactions    Seafood   (Food) ANAPHYLAXIS       No family history on file.     Social History     Tobacco Use    Smoking status: Every Day     Current packs/day: 0.50     Average packs/day: 0.5 packs/day for 3.1 years (1.5 ttl pk-yrs)     Types: Cigarettes     Start date: 5/29/2022    Smokeless tobacco: Never   Vaping Use    Vaping status: Former   Substance Use Topics    Alcohol use: Not Currently    Drug use: Not Currently       Review of Systems  CONSTITUTIONAL: Denies unintentional weight change, fatigue, fever, problematic headaches.  EYES:  Denies visual blurring, double vision.  ENT: Denies chronic sinus problems,dysphagia. Denies nasal problems .  CV:  Denies chest discomfort with exertion, SOB, palpitations.  RESPIRATORY: Denies  SOB. Denies cough .  GI:  Denies abdominal pain, frequent nausea, vomiting, diarrhea, constipation, hematemesis, hematochezia, melena.  : Denies frequency, dysuria.  MSK: Denies joint pains, Denies muscle aches .  NEURO:  Denies muscle weakness or paralysis, numbness or tingling, seizures.  PSYCH: See HPI above.  ENDOCRINE:  Denies polyphagia, polydipsia, polyuria.  HEME/LYMPH:  Denies abnormal  bruising or bleeding, lumps or bumps.  ALLERGY/IMMUN: Denies chronic sinus problems, chronic nasal problems.    All other systems reviewed and negative.      Objective:     Visit Vitals  /72 (Patient Position: Standing)   Pulse (!) 106   Resp 14   SpO2 99%       General Appearance:  Alert, cooperative, no distress, appears stated age.  Head:  Normocephalic, non tender and atraumatic  Eyes: PERRL, no icterus, EOM's intact.  Ears:  Hearing appears intact. Bilateral normal appearing tympanic membranes;  Nose:  Normal appearing nasal mucosa; no nasal congestion   Throat:  Normal oropharynx;    Neck: Supple, symmetrical, trachea midline, no adenopathy. Thyroid: no enlargement/tenderness/nodules.  Back: ROM normal, no CVA tenderness. No spine tenderness;   Lungs: Clear to auscultation bilaterally, respirations unlabored.  Heart: Regular rate and rhythm, S1 and S2 normal, no murmur, rub or gallop.  Abdomen: Soft, non-tender, bowel sounds active, no masses, no Hepatosplenomegaly.  Extremities: Extremities normal, atraumatic, no cyanosis. No stiffness, swelling.   Skin: Skin color, texture, turgor normal,   Lymph nodes: Cervical nodes and axillary lymph nodes normal.  Genitorectal: Deferred.  Neurologic:   Alert and oriented x3.  No gross deficit of sensory or cranial nerves II through XII:  CRANIAL NERVE:   II: Acuity and visual fields are normal.  III, IV, VI:  External ocular movements are normal, and there is no presence of any nystagmus or ptosis.  Pupils are of equal size, regularity, and react equally to the light and accommodation.  No evidence of divergent or convergent strabismus or diplopia when looking down or to either side.  V:   Facial sensation is intact and equal.  There is no deviation of the jaw or weakness of masseter or temporal muscles.  VII: Facial expression, nasolabial folds, forehead wrinkle are normal.  VIII: Hearing is intact.  There is no evidence of nystagmus and cerebellar function is  intact.  IX:  Normal gag reflex.  X:   No evidence of deviation of the soft palate or laryngeal paralysis.    XI:  Shrug of shoulders is equal and strong.  XII: No deviation of the protruded tongue.  No evidence of atrophy or fine fibrillation.  Finger-nose test is normal.   Rhomberg sign is negative.  Motor Strength 5/5 in all extremities. Sensory intact.   Cerebellar intact; Mental status normal.  Gait and station normal. Tremors none       Data Review:      CBC  No results found    CMP  No results found    Lipid Panel  No results found    Urinalysis  No results found    Other  No results found     Assessment:     1. Other schizophrenia (CMD) [F20.89]          Plan:   Vitals:stable   Labs: pending     Patient is cleared for treatment here, and advised to follow up with primary care provider for medical concerns.    Thank you, Arlette Bermeo MD, for your kind referral.       Justice Bray MD  6/24/2025     98

## 2025-07-01 ENCOUNTER — APPOINTMENT (OUTPATIENT)
Dept: UROLOGY | Facility: CLINIC | Age: 65
End: 2025-07-01
Payer: MEDICARE

## 2025-07-01 VITALS
WEIGHT: 258 LBS | SYSTOLIC BLOOD PRESSURE: 122 MMHG | HEART RATE: 116 BPM | OXYGEN SATURATION: 99 % | DIASTOLIC BLOOD PRESSURE: 86 MMHG | BODY MASS INDEX: 35.98 KG/M2 | TEMPERATURE: 98.7 F

## 2025-07-01 PROCEDURE — 51710 CHANGE OF BLADDER TUBE: CPT

## 2025-07-01 PROCEDURE — 99214 OFFICE O/P EST MOD 30 MIN: CPT | Mod: 25

## 2025-07-03 ENCOUNTER — APPOINTMENT (OUTPATIENT)
Dept: ENDOCRINOLOGY | Facility: CLINIC | Age: 65
End: 2025-07-03
Payer: MEDICARE

## 2025-07-03 VITALS
HEART RATE: 114 BPM | SYSTOLIC BLOOD PRESSURE: 110 MMHG | BODY MASS INDEX: 36.4 KG/M2 | DIASTOLIC BLOOD PRESSURE: 72 MMHG | OXYGEN SATURATION: 99 % | TEMPERATURE: 98.3 F | RESPIRATION RATE: 18 BRPM | HEIGHT: 71 IN | WEIGHT: 260 LBS

## 2025-07-03 LAB — GLUCOSE BLDC GLUCOMTR-MCNC: 198

## 2025-07-03 PROCEDURE — 95251 CONT GLUC MNTR ANALYSIS I&R: CPT

## 2025-07-03 PROCEDURE — 82962 GLUCOSE BLOOD TEST: CPT

## 2025-07-03 PROCEDURE — 99214 OFFICE O/P EST MOD 30 MIN: CPT | Mod: 25

## 2025-07-18 ENCOUNTER — INPATIENT (INPATIENT)
Facility: HOSPITAL | Age: 65
LOS: 3 days | Discharge: HOME | End: 2025-07-22
Attending: HOSPITALIST | Admitting: HOSPITALIST
Payer: MEDICARE

## 2025-07-18 VITALS
DIASTOLIC BLOOD PRESSURE: 65 MMHG | SYSTOLIC BLOOD PRESSURE: 97 MMHG | HEART RATE: 125 BPM | WEIGHT: 250 LBS | OXYGEN SATURATION: 98 % | HEIGHT: 71 IN | TEMPERATURE: 100 F | RESPIRATION RATE: 24 BRPM

## 2025-07-18 DIAGNOSIS — Z98.891 HISTORY OF UTERINE SCAR FROM PREVIOUS SURGERY: Chronic | ICD-10-CM

## 2025-07-18 DIAGNOSIS — R19.7 DIARRHEA, UNSPECIFIED: ICD-10-CM

## 2025-07-18 DIAGNOSIS — N17.9 ACUTE KIDNEY FAILURE, UNSPECIFIED: ICD-10-CM

## 2025-07-18 DIAGNOSIS — I10 ESSENTIAL (PRIMARY) HYPERTENSION: ICD-10-CM

## 2025-07-18 DIAGNOSIS — E78.5 HYPERLIPIDEMIA, UNSPECIFIED: ICD-10-CM

## 2025-07-18 DIAGNOSIS — L91.8 OTHER HYPERTROPHIC DISORDERS OF THE SKIN: Chronic | ICD-10-CM

## 2025-07-18 DIAGNOSIS — M32.9 SYSTEMIC LUPUS ERYTHEMATOSUS, UNSPECIFIED: ICD-10-CM

## 2025-07-18 DIAGNOSIS — E11.8 TYPE 2 DIABETES MELLITUS WITH UNSPECIFIED COMPLICATIONS: ICD-10-CM

## 2025-07-18 DIAGNOSIS — Z86.718 PERSONAL HISTORY OF OTHER VENOUS THROMBOSIS AND EMBOLISM: ICD-10-CM

## 2025-07-18 LAB
ALBUMIN SERPL ELPH-MCNC: 2.3 G/DL — LOW (ref 3.3–5)
ALP SERPL-CCNC: 62 U/L — SIGNIFICANT CHANGE UP (ref 40–120)
ALT FLD-CCNC: 14 U/L — SIGNIFICANT CHANGE UP (ref 12–78)
ANION GAP SERPL CALC-SCNC: 11 MMOL/L — SIGNIFICANT CHANGE UP (ref 5–17)
APPEARANCE UR: ABNORMAL
AST SERPL-CCNC: 17 U/L — SIGNIFICANT CHANGE UP (ref 15–37)
BASOPHILS # BLD AUTO: 0.03 K/UL — SIGNIFICANT CHANGE UP (ref 0–0.2)
BASOPHILS NFR BLD AUTO: 0.3 % — SIGNIFICANT CHANGE UP (ref 0–2)
BILIRUB SERPL-MCNC: 0.8 MG/DL — SIGNIFICANT CHANGE UP (ref 0.2–1.2)
BILIRUB UR-MCNC: ABNORMAL
BUN SERPL-MCNC: 28 MG/DL — HIGH (ref 7–23)
CALCIUM SERPL-MCNC: 8.7 MG/DL — SIGNIFICANT CHANGE UP (ref 8.5–10.1)
CHLORIDE SERPL-SCNC: 105 MMOL/L — SIGNIFICANT CHANGE UP (ref 96–108)
CO2 SERPL-SCNC: 19 MMOL/L — LOW (ref 22–31)
COLOR SPEC: SIGNIFICANT CHANGE UP
CREAT SERPL-MCNC: 2.61 MG/DL — HIGH (ref 0.5–1.3)
DIFF PNL FLD: ABNORMAL
EGFR: 20 ML/MIN/1.73M2 — LOW
EGFR: 20 ML/MIN/1.73M2 — LOW
EOSINOPHIL # BLD AUTO: 0.14 K/UL — SIGNIFICANT CHANGE UP (ref 0–0.5)
EOSINOPHIL NFR BLD AUTO: 1.4 % — SIGNIFICANT CHANGE UP (ref 0–6)
FLUAV AG NPH QL: SIGNIFICANT CHANGE UP
FLUBV AG NPH QL: SIGNIFICANT CHANGE UP
GLUCOSE BLDC GLUCOMTR-MCNC: 357 MG/DL — HIGH (ref 70–99)
GLUCOSE BLDC GLUCOMTR-MCNC: 402 MG/DL — HIGH (ref 70–99)
GLUCOSE BLDC GLUCOMTR-MCNC: 443 MG/DL — HIGH (ref 70–99)
GLUCOSE BLDC GLUCOMTR-MCNC: 448 MG/DL — HIGH (ref 70–99)
GLUCOSE BLDC GLUCOMTR-MCNC: 476 MG/DL — CRITICAL HIGH (ref 70–99)
GLUCOSE SERPL-MCNC: 191 MG/DL — HIGH (ref 70–99)
GLUCOSE UR QL: NEGATIVE MG/DL — SIGNIFICANT CHANGE UP
HCT VFR BLD CALC: 30.2 % — LOW (ref 34.5–45)
HGB BLD-MCNC: 10.1 G/DL — LOW (ref 11.5–15.5)
IMM GRANULOCYTES NFR BLD AUTO: 0.6 % — SIGNIFICANT CHANGE UP (ref 0–0.9)
KETONES UR QL: ABNORMAL MG/DL
LEUKOCYTE ESTERASE UR-ACNC: ABNORMAL
LIDOCAIN IGE QN: 52 U/L — SIGNIFICANT CHANGE UP (ref 13–75)
LYMPHOCYTES # BLD AUTO: 1.81 K/UL — SIGNIFICANT CHANGE UP (ref 1–3.3)
LYMPHOCYTES # BLD AUTO: 17.7 % — SIGNIFICANT CHANGE UP (ref 13–44)
MAGNESIUM SERPL-MCNC: 1.7 MG/DL — SIGNIFICANT CHANGE UP (ref 1.6–2.6)
MCHC RBC-ENTMCNC: 27.3 PG — SIGNIFICANT CHANGE UP (ref 27–34)
MCHC RBC-ENTMCNC: 33.4 G/DL — SIGNIFICANT CHANGE UP (ref 32–36)
MCV RBC AUTO: 81.6 FL — SIGNIFICANT CHANGE UP (ref 80–100)
MONOCYTES # BLD AUTO: 0.69 K/UL — SIGNIFICANT CHANGE UP (ref 0–0.9)
MONOCYTES NFR BLD AUTO: 6.8 % — SIGNIFICANT CHANGE UP (ref 2–14)
NEUTROPHILS # BLD AUTO: 7.49 K/UL — HIGH (ref 1.8–7.4)
NEUTROPHILS NFR BLD AUTO: 73.2 % — SIGNIFICANT CHANGE UP (ref 43–77)
NITRITE UR-MCNC: NEGATIVE — SIGNIFICANT CHANGE UP
NRBC BLD AUTO-RTO: 0 /100 WBCS — SIGNIFICANT CHANGE UP (ref 0–0)
PH UR: 5.5 — SIGNIFICANT CHANGE UP (ref 5–8)
PHOSPHATE SERPL-MCNC: 4 MG/DL — SIGNIFICANT CHANGE UP (ref 2.5–4.5)
PLATELET # BLD AUTO: 255 K/UL — SIGNIFICANT CHANGE UP (ref 150–400)
POTASSIUM SERPL-MCNC: 4 MMOL/L — SIGNIFICANT CHANGE UP (ref 3.5–5.3)
POTASSIUM SERPL-SCNC: 4 MMOL/L — SIGNIFICANT CHANGE UP (ref 3.5–5.3)
PROT SERPL-MCNC: 7.7 GM/DL — SIGNIFICANT CHANGE UP (ref 6–8.3)
PROT UR-MCNC: 300 MG/DL
RAPID RVP RESULT: SIGNIFICANT CHANGE UP
RBC # BLD: 3.7 M/UL — LOW (ref 3.8–5.2)
RBC # FLD: 13.3 % — SIGNIFICANT CHANGE UP (ref 10.3–14.5)
RSV RNA NPH QL NAA+NON-PROBE: SIGNIFICANT CHANGE UP
SARS-COV-2 RNA SPEC QL NAA+PROBE: SIGNIFICANT CHANGE UP
SARS-COV-2 RNA SPEC QL NAA+PROBE: SIGNIFICANT CHANGE UP
SODIUM SERPL-SCNC: 135 MMOL/L — SIGNIFICANT CHANGE UP (ref 135–145)
SOURCE RESPIRATORY: SIGNIFICANT CHANGE UP
SP GR SPEC: 1.02 — SIGNIFICANT CHANGE UP (ref 1–1.03)
TROPONIN I, HIGH SENSITIVITY RESULT: 12.4 NG/L — SIGNIFICANT CHANGE UP
UROBILINOGEN FLD QL: 1 MG/DL — SIGNIFICANT CHANGE UP (ref 0.2–1)
WBC # BLD: 10.22 K/UL — SIGNIFICANT CHANGE UP (ref 3.8–10.5)
WBC # FLD AUTO: 10.22 K/UL — SIGNIFICANT CHANGE UP (ref 3.8–10.5)

## 2025-07-18 PROCEDURE — 99285 EMERGENCY DEPT VISIT HI MDM: CPT

## 2025-07-18 PROCEDURE — 93010 ELECTROCARDIOGRAM REPORT: CPT

## 2025-07-18 PROCEDURE — 74176 CT ABD & PELVIS W/O CONTRAST: CPT | Mod: 26

## 2025-07-18 PROCEDURE — 71045 X-RAY EXAM CHEST 1 VIEW: CPT | Mod: 26

## 2025-07-18 PROCEDURE — 99222 1ST HOSP IP/OBS MODERATE 55: CPT

## 2025-07-18 RX ORDER — METOPROLOL SUCCINATE 50 MG/1
1 TABLET, EXTENDED RELEASE ORAL
Refills: 0 | DISCHARGE

## 2025-07-18 RX ORDER — ANIFROLUMAB 300 MG/2ML
300 INJECTION, SOLUTION INTRAVENOUS
Refills: 0 | DISCHARGE

## 2025-07-18 RX ORDER — DEXTROSE 50 % IN WATER 50 %
15 SYRINGE (ML) INTRAVENOUS ONCE
Refills: 0 | Status: DISCONTINUED | OUTPATIENT
Start: 2025-07-18 | End: 2025-07-19

## 2025-07-18 RX ORDER — GLUCAGON 3 MG/1
1 POWDER NASAL ONCE
Refills: 0 | Status: DISCONTINUED | OUTPATIENT
Start: 2025-07-18 | End: 2025-07-22

## 2025-07-18 RX ORDER — INSULIN LISPRO 100 U/ML
INJECTION, SOLUTION INTRAVENOUS; SUBCUTANEOUS AT BEDTIME
Refills: 0 | Status: DISCONTINUED | OUTPATIENT
Start: 2025-07-18 | End: 2025-07-19

## 2025-07-18 RX ORDER — ONDANSETRON HCL/PF 4 MG/2 ML
4 VIAL (ML) INJECTION EVERY 8 HOURS
Refills: 0 | Status: DISCONTINUED | OUTPATIENT
Start: 2025-07-18 | End: 2025-07-19

## 2025-07-18 RX ORDER — ATORVASTATIN CALCIUM 80 MG/1
1 TABLET, FILM COATED ORAL
Refills: 0 | DISCHARGE

## 2025-07-18 RX ORDER — INSULIN GLARGINE-YFGN 100 [IU]/ML
30 INJECTION, SOLUTION SUBCUTANEOUS AT BEDTIME
Refills: 0 | Status: DISCONTINUED | OUTPATIENT
Start: 2025-07-18 | End: 2025-07-19

## 2025-07-18 RX ORDER — MELATONIN 5 MG
3 TABLET ORAL AT BEDTIME
Refills: 0 | Status: DISCONTINUED | OUTPATIENT
Start: 2025-07-18 | End: 2025-07-22

## 2025-07-18 RX ORDER — DEXTROSE 50 % IN WATER 50 %
25 SYRINGE (ML) INTRAVENOUS ONCE
Refills: 0 | Status: DISCONTINUED | OUTPATIENT
Start: 2025-07-18 | End: 2025-07-22

## 2025-07-18 RX ORDER — DEXTROSE 50 % IN WATER 50 %
25 SYRINGE (ML) INTRAVENOUS ONCE
Refills: 0 | Status: DISCONTINUED | OUTPATIENT
Start: 2025-07-18 | End: 2025-07-19

## 2025-07-18 RX ORDER — SODIUM CHLORIDE 9 G/1000ML
1000 INJECTION, SOLUTION INTRAVENOUS
Refills: 0 | Status: DISCONTINUED | OUTPATIENT
Start: 2025-07-18 | End: 2025-07-19

## 2025-07-18 RX ORDER — METOPROLOL SUCCINATE 50 MG/1
50 TABLET, EXTENDED RELEASE ORAL DAILY
Refills: 0 | Status: DISCONTINUED | OUTPATIENT
Start: 2025-07-18 | End: 2025-07-19

## 2025-07-18 RX ORDER — INSULIN DEGLUDEC 100 U/ML
40 INJECTION, SOLUTION SUBCUTANEOUS
Refills: 0 | DISCHARGE

## 2025-07-18 RX ORDER — ATORVASTATIN CALCIUM 80 MG/1
40 TABLET, FILM COATED ORAL AT BEDTIME
Refills: 0 | Status: DISCONTINUED | OUTPATIENT
Start: 2025-07-18 | End: 2025-07-22

## 2025-07-18 RX ORDER — PREDNISONE 20 MG/1
5 TABLET ORAL DAILY
Refills: 0 | Status: DISCONTINUED | OUTPATIENT
Start: 2025-07-18 | End: 2025-07-22

## 2025-07-18 RX ORDER — METHYLPREDNISOLONE ACETATE 80 MG/ML
125 INJECTION, SUSPENSION INTRA-ARTICULAR; INTRALESIONAL; INTRAMUSCULAR; SOFT TISSUE ONCE
Refills: 0 | Status: COMPLETED | OUTPATIENT
Start: 2025-07-18 | End: 2025-07-18

## 2025-07-18 RX ORDER — HYDROXYCHLOROQUINE SULFATE 200 MG/1
200 TABLET, FILM COATED ORAL
Refills: 0 | Status: DISCONTINUED | OUTPATIENT
Start: 2025-07-18 | End: 2025-07-22

## 2025-07-18 RX ORDER — INSULIN LISPRO 100 U/ML
INJECTION, SOLUTION INTRAVENOUS; SUBCUTANEOUS
Refills: 0 | Status: DISCONTINUED | OUTPATIENT
Start: 2025-07-18 | End: 2025-07-19

## 2025-07-18 RX ORDER — ACETAMINOPHEN 500 MG/5ML
650 LIQUID (ML) ORAL EVERY 6 HOURS
Refills: 0 | Status: DISCONTINUED | OUTPATIENT
Start: 2025-07-18 | End: 2025-07-22

## 2025-07-18 RX ORDER — CEFTRIAXONE 500 MG/1
1000 INJECTION, POWDER, FOR SOLUTION INTRAMUSCULAR; INTRAVENOUS EVERY 24 HOURS
Refills: 0 | Status: DISCONTINUED | OUTPATIENT
Start: 2025-07-18 | End: 2025-07-20

## 2025-07-18 RX ORDER — SODIUM CHLORIDE 9 G/1000ML
1000 INJECTION, SOLUTION INTRAVENOUS
Refills: 0 | Status: DISCONTINUED | OUTPATIENT
Start: 2025-07-18 | End: 2025-07-22

## 2025-07-18 RX ORDER — APIXABAN 5 MG/1
5 TABLET, FILM COATED ORAL
Refills: 0 | Status: DISCONTINUED | OUTPATIENT
Start: 2025-07-18 | End: 2025-07-22

## 2025-07-18 RX ORDER — DEXTROSE 50 % IN WATER 50 %
12.5 SYRINGE (ML) INTRAVENOUS ONCE
Refills: 0 | Status: DISCONTINUED | OUTPATIENT
Start: 2025-07-18 | End: 2025-07-19

## 2025-07-18 RX ORDER — PREDNISONE 20 MG/1
1 TABLET ORAL
Refills: 0 | DISCHARGE

## 2025-07-18 RX ADMIN — INSULIN LISPRO 5: 100 INJECTION, SOLUTION INTRAVENOUS; SUBCUTANEOUS at 17:29

## 2025-07-18 RX ADMIN — CEFTRIAXONE 100 MILLIGRAM(S): 500 INJECTION, POWDER, FOR SOLUTION INTRAMUSCULAR; INTRAVENOUS at 13:10

## 2025-07-18 RX ADMIN — INSULIN LISPRO 4: 100 INJECTION, SOLUTION INTRAVENOUS; SUBCUTANEOUS at 22:12

## 2025-07-18 RX ADMIN — INSULIN GLARGINE-YFGN 30 UNIT(S): 100 INJECTION, SOLUTION SUBCUTANEOUS at 22:13

## 2025-07-18 RX ADMIN — HYDROXYCHLOROQUINE SULFATE 200 MILLIGRAM(S): 200 TABLET, FILM COATED ORAL at 19:35

## 2025-07-18 RX ADMIN — APIXABAN 5 MILLIGRAM(S): 5 TABLET, FILM COATED ORAL at 19:35

## 2025-07-18 RX ADMIN — Medication 1000 MILLILITER(S): at 09:59

## 2025-07-18 RX ADMIN — METHYLPREDNISOLONE ACETATE 125 MILLIGRAM(S): 80 INJECTION, SUSPENSION INTRA-ARTICULAR; INTRALESIONAL; INTRAMUSCULAR; SOFT TISSUE at 10:00

## 2025-07-18 RX ADMIN — ATORVASTATIN CALCIUM 40 MILLIGRAM(S): 80 TABLET, FILM COATED ORAL at 22:13

## 2025-07-19 LAB
A1C WITH ESTIMATED AVERAGE GLUCOSE RESULT: 9.6 % — HIGH (ref 4–5.6)
ALBUMIN SERPL ELPH-MCNC: 2 G/DL — LOW (ref 3.3–5)
ALP SERPL-CCNC: 63 U/L — SIGNIFICANT CHANGE UP (ref 40–120)
ALT FLD-CCNC: 13 U/L — SIGNIFICANT CHANGE UP (ref 12–78)
ANION GAP SERPL CALC-SCNC: 10 MMOL/L — SIGNIFICANT CHANGE UP (ref 5–17)
ANION GAP SERPL CALC-SCNC: 9 MMOL/L — SIGNIFICANT CHANGE UP (ref 5–17)
AST SERPL-CCNC: 8 U/L — LOW (ref 15–37)
B-OH-BUTYR SERPL-SCNC: 0.3 MMOL/L — SIGNIFICANT CHANGE UP
BILIRUB SERPL-MCNC: 0.3 MG/DL — SIGNIFICANT CHANGE UP (ref 0.2–1.2)
BUN SERPL-MCNC: 41 MG/DL — HIGH (ref 7–23)
BUN SERPL-MCNC: 44 MG/DL — HIGH (ref 7–23)
C DIFF GDH STL QL: SIGNIFICANT CHANGE UP
C DIFF GDH STL QL: SIGNIFICANT CHANGE UP
CALCIUM SERPL-MCNC: 7.8 MG/DL — LOW (ref 8.5–10.1)
CALCIUM SERPL-MCNC: 8.4 MG/DL — LOW (ref 8.5–10.1)
CHLORIDE SERPL-SCNC: 106 MMOL/L — SIGNIFICANT CHANGE UP (ref 96–108)
CHLORIDE SERPL-SCNC: 109 MMOL/L — HIGH (ref 96–108)
CO2 SERPL-SCNC: 19 MMOL/L — LOW (ref 22–31)
CO2 SERPL-SCNC: 20 MMOL/L — LOW (ref 22–31)
CREAT ?TM UR-MCNC: 168 MG/DL — SIGNIFICANT CHANGE UP
CREAT SERPL-MCNC: 1.74 MG/DL — HIGH (ref 0.5–1.3)
CREAT SERPL-MCNC: 2.19 MG/DL — HIGH (ref 0.5–1.3)
EGFR: 25 ML/MIN/1.73M2 — LOW
EGFR: 25 ML/MIN/1.73M2 — LOW
EGFR: 33 ML/MIN/1.73M2 — LOW
EGFR: 33 ML/MIN/1.73M2 — LOW
ESTIMATED AVERAGE GLUCOSE: 229 MG/DL — HIGH (ref 68–114)
GI PCR PANEL: DETECTED
GLUCOSE BLDC GLUCOMTR-MCNC: 231 MG/DL — HIGH (ref 70–99)
GLUCOSE BLDC GLUCOMTR-MCNC: 341 MG/DL — HIGH (ref 70–99)
GLUCOSE BLDC GLUCOMTR-MCNC: 346 MG/DL — HIGH (ref 70–99)
GLUCOSE BLDC GLUCOMTR-MCNC: 388 MG/DL — HIGH (ref 70–99)
GLUCOSE BLDC GLUCOMTR-MCNC: 423 MG/DL — HIGH (ref 70–99)
GLUCOSE BLDC GLUCOMTR-MCNC: 427 MG/DL — HIGH (ref 70–99)
GLUCOSE BLDC GLUCOMTR-MCNC: 437 MG/DL — HIGH (ref 70–99)
GLUCOSE BLDC GLUCOMTR-MCNC: 445 MG/DL — HIGH (ref 70–99)
GLUCOSE BLDC GLUCOMTR-MCNC: 449 MG/DL — HIGH (ref 70–99)
GLUCOSE BLDC GLUCOMTR-MCNC: 450 MG/DL — CRITICAL HIGH (ref 70–99)
GLUCOSE BLDC GLUCOMTR-MCNC: 454 MG/DL — CRITICAL HIGH (ref 70–99)
GLUCOSE SERPL-MCNC: 327 MG/DL — HIGH (ref 70–99)
GLUCOSE SERPL-MCNC: 429 MG/DL — HIGH (ref 70–99)
HCT VFR BLD CALC: 27.4 % — LOW (ref 34.5–45)
HCT VFR BLD CALC: 28 % — LOW (ref 34.5–45)
HGB BLD-MCNC: 8.9 G/DL — LOW (ref 11.5–15.5)
HGB BLD-MCNC: 9.3 G/DL — LOW (ref 11.5–15.5)
MAGNESIUM SERPL-MCNC: 2.1 MG/DL — SIGNIFICANT CHANGE UP (ref 1.6–2.6)
MCHC RBC-ENTMCNC: 26.7 PG — LOW (ref 27–34)
MCHC RBC-ENTMCNC: 27.4 PG — SIGNIFICANT CHANGE UP (ref 27–34)
MCHC RBC-ENTMCNC: 32.5 G/DL — SIGNIFICANT CHANGE UP (ref 32–36)
MCHC RBC-ENTMCNC: 33.2 G/DL — SIGNIFICANT CHANGE UP (ref 32–36)
MCV RBC AUTO: 82.3 FL — SIGNIFICANT CHANGE UP (ref 80–100)
MCV RBC AUTO: 82.6 FL — SIGNIFICANT CHANGE UP (ref 80–100)
NRBC BLD AUTO-RTO: 0 /100 WBCS — SIGNIFICANT CHANGE UP (ref 0–0)
NRBC BLD AUTO-RTO: 0 /100 WBCS — SIGNIFICANT CHANGE UP (ref 0–0)
PHOSPHATE SERPL-MCNC: 3.1 MG/DL — SIGNIFICANT CHANGE UP (ref 2.5–4.5)
PLATELET # BLD AUTO: 224 K/UL — SIGNIFICANT CHANGE UP (ref 150–400)
PLATELET # BLD AUTO: 225 K/UL — SIGNIFICANT CHANGE UP (ref 150–400)
POTASSIUM SERPL-MCNC: 4.1 MMOL/L — SIGNIFICANT CHANGE UP (ref 3.5–5.3)
POTASSIUM SERPL-MCNC: 4.3 MMOL/L — SIGNIFICANT CHANGE UP (ref 3.5–5.3)
POTASSIUM SERPL-SCNC: 4.1 MMOL/L — SIGNIFICANT CHANGE UP (ref 3.5–5.3)
POTASSIUM SERPL-SCNC: 4.3 MMOL/L — SIGNIFICANT CHANGE UP (ref 3.5–5.3)
PROT ?TM UR-MCNC: 77 MG/DL — HIGH (ref 0–12)
PROT SERPL-MCNC: 6.9 GM/DL — SIGNIFICANT CHANGE UP (ref 6–8.3)
PROT/CREAT UR-RTO: 0.5 RATIO — HIGH (ref 0–0.2)
RBC # BLD: 3.33 M/UL — LOW (ref 3.8–5.2)
RBC # BLD: 3.39 M/UL — LOW (ref 3.8–5.2)
RBC # FLD: 13.2 % — SIGNIFICANT CHANGE UP (ref 10.3–14.5)
RBC # FLD: 13.3 % — SIGNIFICANT CHANGE UP (ref 10.3–14.5)
S PYO DNA THROAT QL NAA+PROBE: SIGNIFICANT CHANGE UP
SAPOVIRUS (GENOGROUPS I, II, IV, AND V): DETECTED
SODIUM SERPL-SCNC: 136 MMOL/L — SIGNIFICANT CHANGE UP (ref 135–145)
SODIUM SERPL-SCNC: 137 MMOL/L — SIGNIFICANT CHANGE UP (ref 135–145)
WBC # BLD: 10.13 K/UL — SIGNIFICANT CHANGE UP (ref 3.8–10.5)
WBC # BLD: 13.75 K/UL — HIGH (ref 3.8–10.5)
WBC # FLD AUTO: 10.13 K/UL — SIGNIFICANT CHANGE UP (ref 3.8–10.5)
WBC # FLD AUTO: 13.75 K/UL — HIGH (ref 3.8–10.5)

## 2025-07-19 PROCEDURE — 99232 SBSQ HOSP IP/OBS MODERATE 35: CPT

## 2025-07-19 RX ORDER — INSULIN GLARGINE-YFGN 100 [IU]/ML
34 INJECTION, SOLUTION SUBCUTANEOUS AT BEDTIME
Refills: 0 | Status: DISCONTINUED | OUTPATIENT
Start: 2025-07-19 | End: 2025-07-19

## 2025-07-19 RX ORDER — INSULIN LISPRO 100 U/ML
8 INJECTION, SOLUTION INTRAVENOUS; SUBCUTANEOUS ONCE
Refills: 0 | Status: COMPLETED | OUTPATIENT
Start: 2025-07-19 | End: 2025-07-19

## 2025-07-19 RX ORDER — INSULIN GLARGINE-YFGN 100 [IU]/ML
8 INJECTION, SOLUTION SUBCUTANEOUS EVERY MORNING
Refills: 0 | Status: DISCONTINUED | OUTPATIENT
Start: 2025-07-19 | End: 2025-07-19

## 2025-07-19 RX ORDER — INSULIN LISPRO 100 U/ML
6 INJECTION, SOLUTION INTRAVENOUS; SUBCUTANEOUS
Refills: 0 | Status: DISCONTINUED | OUTPATIENT
Start: 2025-07-19 | End: 2025-07-21

## 2025-07-19 RX ORDER — DEXTROSE 50 % IN WATER 50 %
15 SYRINGE (ML) INTRAVENOUS ONCE
Refills: 0 | Status: DISCONTINUED | OUTPATIENT
Start: 2025-07-19 | End: 2025-07-21

## 2025-07-19 RX ORDER — INSULIN LISPRO 100 U/ML
6 INJECTION, SOLUTION INTRAVENOUS; SUBCUTANEOUS ONCE
Refills: 0 | Status: COMPLETED | OUTPATIENT
Start: 2025-07-19 | End: 2025-07-19

## 2025-07-19 RX ORDER — INSULIN LISPRO 100 U/ML
INJECTION, SOLUTION INTRAVENOUS; SUBCUTANEOUS
Refills: 0 | Status: DISCONTINUED | OUTPATIENT
Start: 2025-07-19 | End: 2025-07-21

## 2025-07-19 RX ORDER — INSULIN GLARGINE-YFGN 100 [IU]/ML
40 INJECTION, SOLUTION SUBCUTANEOUS AT BEDTIME
Refills: 0 | Status: DISCONTINUED | OUTPATIENT
Start: 2025-07-19 | End: 2025-07-21

## 2025-07-19 RX ADMIN — HYDROXYCHLOROQUINE SULFATE 200 MILLIGRAM(S): 200 TABLET, FILM COATED ORAL at 05:29

## 2025-07-19 RX ADMIN — Medication 5 UNIT(S): at 03:16

## 2025-07-19 RX ADMIN — ATORVASTATIN CALCIUM 40 MILLIGRAM(S): 80 TABLET, FILM COATED ORAL at 22:03

## 2025-07-19 RX ADMIN — APIXABAN 5 MILLIGRAM(S): 5 TABLET, FILM COATED ORAL at 17:05

## 2025-07-19 RX ADMIN — INSULIN LISPRO 4: 100 INJECTION, SOLUTION INTRAVENOUS; SUBCUTANEOUS at 08:15

## 2025-07-19 RX ADMIN — Medication 6 UNIT(S): at 12:00

## 2025-07-19 RX ADMIN — Medication 500 MILLILITER(S): at 03:18

## 2025-07-19 RX ADMIN — HYDROXYCHLOROQUINE SULFATE 200 MILLIGRAM(S): 200 TABLET, FILM COATED ORAL at 17:05

## 2025-07-19 RX ADMIN — INSULIN LISPRO 6 UNIT(S): 100 INJECTION, SOLUTION INTRAVENOUS; SUBCUTANEOUS at 00:37

## 2025-07-19 RX ADMIN — Medication 1000 MILLILITER(S): at 15:50

## 2025-07-19 RX ADMIN — INSULIN LISPRO 8 UNIT(S): 100 INJECTION, SOLUTION INTRAVENOUS; SUBCUTANEOUS at 13:29

## 2025-07-19 RX ADMIN — INSULIN GLARGINE-YFGN 40 UNIT(S): 100 INJECTION, SOLUTION SUBCUTANEOUS at 22:04

## 2025-07-19 RX ADMIN — PREDNISONE 5 MILLIGRAM(S): 20 TABLET ORAL at 05:29

## 2025-07-19 RX ADMIN — APIXABAN 5 MILLIGRAM(S): 5 TABLET, FILM COATED ORAL at 05:30

## 2025-07-19 RX ADMIN — INSULIN LISPRO 6: 100 INJECTION, SOLUTION INTRAVENOUS; SUBCUTANEOUS at 11:49

## 2025-07-19 RX ADMIN — INSULIN LISPRO 4: 100 INJECTION, SOLUTION INTRAVENOUS; SUBCUTANEOUS at 16:35

## 2025-07-19 RX ADMIN — INSULIN LISPRO 6 UNIT(S): 100 INJECTION, SOLUTION INTRAVENOUS; SUBCUTANEOUS at 16:35

## 2025-07-19 RX ADMIN — Medication 70 MILLILITER(S): at 22:03

## 2025-07-19 RX ADMIN — CEFTRIAXONE 100 MILLIGRAM(S): 500 INJECTION, POWDER, FOR SOLUTION INTRAMUSCULAR; INTRAVENOUS at 11:49

## 2025-07-19 RX ADMIN — Medication 70 MILLILITER(S): at 05:29

## 2025-07-19 RX ADMIN — INSULIN LISPRO 6 UNIT(S): 100 INJECTION, SOLUTION INTRAVENOUS; SUBCUTANEOUS at 11:50

## 2025-07-20 LAB
A1C WITH ESTIMATED AVERAGE GLUCOSE RESULT: 9.6 % — HIGH (ref 4–5.6)
ANION GAP SERPL CALC-SCNC: 10 MMOL/L — SIGNIFICANT CHANGE UP (ref 5–17)
BUN SERPL-MCNC: 53 MG/DL — HIGH (ref 7–23)
CALCIUM SERPL-MCNC: 8 MG/DL — LOW (ref 8.5–10.1)
CHLORIDE SERPL-SCNC: 113 MMOL/L — HIGH (ref 96–108)
CO2 SERPL-SCNC: 17 MMOL/L — LOW (ref 22–31)
CREAT SERPL-MCNC: 1.57 MG/DL — HIGH (ref 0.5–1.3)
DSDNA AB SER-ACNC: 14 IU/ML — HIGH
EGFR: 37 ML/MIN/1.73M2 — LOW
EGFR: 37 ML/MIN/1.73M2 — LOW
ESTIMATED AVERAGE GLUCOSE: 229 MG/DL — HIGH (ref 68–114)
GLUCOSE BLDC GLUCOMTR-MCNC: 162 MG/DL — HIGH (ref 70–99)
GLUCOSE BLDC GLUCOMTR-MCNC: 249 MG/DL — HIGH (ref 70–99)
GLUCOSE BLDC GLUCOMTR-MCNC: 252 MG/DL — HIGH (ref 70–99)
GLUCOSE BLDC GLUCOMTR-MCNC: 263 MG/DL — HIGH (ref 70–99)
GLUCOSE SERPL-MCNC: 219 MG/DL — HIGH (ref 70–99)
HCT VFR BLD CALC: 31 % — LOW (ref 34.5–45)
HGB BLD-MCNC: 10.1 G/DL — LOW (ref 11.5–15.5)
MCHC RBC-ENTMCNC: 27 PG — SIGNIFICANT CHANGE UP (ref 27–34)
MCHC RBC-ENTMCNC: 32.6 G/DL — SIGNIFICANT CHANGE UP (ref 32–36)
MCV RBC AUTO: 82.9 FL — SIGNIFICANT CHANGE UP (ref 80–100)
NRBC BLD AUTO-RTO: 0 /100 WBCS — SIGNIFICANT CHANGE UP (ref 0–0)
PLATELET # BLD AUTO: 280 K/UL — SIGNIFICANT CHANGE UP (ref 150–400)
POTASSIUM SERPL-MCNC: 4.6 MMOL/L — SIGNIFICANT CHANGE UP (ref 3.5–5.3)
POTASSIUM SERPL-SCNC: 4.6 MMOL/L — SIGNIFICANT CHANGE UP (ref 3.5–5.3)
RBC # BLD: 3.74 M/UL — LOW (ref 3.8–5.2)
RBC # FLD: 13.4 % — SIGNIFICANT CHANGE UP (ref 10.3–14.5)
SODIUM SERPL-SCNC: 140 MMOL/L — SIGNIFICANT CHANGE UP (ref 135–145)
WBC # BLD: 9.5 K/UL — SIGNIFICANT CHANGE UP (ref 3.8–10.5)
WBC # FLD AUTO: 9.5 K/UL — SIGNIFICANT CHANGE UP (ref 3.8–10.5)

## 2025-07-20 PROCEDURE — 99232 SBSQ HOSP IP/OBS MODERATE 35: CPT

## 2025-07-20 PROCEDURE — 99223 1ST HOSP IP/OBS HIGH 75: CPT

## 2025-07-20 PROCEDURE — G0545: CPT

## 2025-07-20 RX ORDER — VANCOMYCIN HCL IN 5 % DEXTROSE 1.5G/250ML
250 PLASTIC BAG, INJECTION (ML) INTRAVENOUS EVERY 8 HOURS
Refills: 0 | Status: DISCONTINUED | OUTPATIENT
Start: 2025-07-20 | End: 2025-07-21

## 2025-07-20 RX ADMIN — Medication 650 MILLIGRAM(S): at 22:05

## 2025-07-20 RX ADMIN — Medication 70 MILLILITER(S): at 13:40

## 2025-07-20 RX ADMIN — HYDROXYCHLOROQUINE SULFATE 200 MILLIGRAM(S): 200 TABLET, FILM COATED ORAL at 06:31

## 2025-07-20 RX ADMIN — INSULIN LISPRO 3: 100 INJECTION, SOLUTION INTRAVENOUS; SUBCUTANEOUS at 08:29

## 2025-07-20 RX ADMIN — APIXABAN 5 MILLIGRAM(S): 5 TABLET, FILM COATED ORAL at 06:31

## 2025-07-20 RX ADMIN — Medication 650 MILLIGRAM(S): at 22:36

## 2025-07-20 RX ADMIN — INSULIN LISPRO 6 UNIT(S): 100 INJECTION, SOLUTION INTRAVENOUS; SUBCUTANEOUS at 11:45

## 2025-07-20 RX ADMIN — APIXABAN 5 MILLIGRAM(S): 5 TABLET, FILM COATED ORAL at 17:06

## 2025-07-20 RX ADMIN — PREDNISONE 5 MILLIGRAM(S): 20 TABLET ORAL at 06:31

## 2025-07-20 RX ADMIN — HYDROXYCHLOROQUINE SULFATE 200 MILLIGRAM(S): 200 TABLET, FILM COATED ORAL at 17:06

## 2025-07-20 RX ADMIN — INSULIN LISPRO 6 UNIT(S): 100 INJECTION, SOLUTION INTRAVENOUS; SUBCUTANEOUS at 17:06

## 2025-07-20 RX ADMIN — INSULIN LISPRO 2: 100 INJECTION, SOLUTION INTRAVENOUS; SUBCUTANEOUS at 17:05

## 2025-07-20 RX ADMIN — Medication 250 MILLIGRAM(S): at 21:58

## 2025-07-20 RX ADMIN — INSULIN LISPRO 3: 100 INJECTION, SOLUTION INTRAVENOUS; SUBCUTANEOUS at 11:45

## 2025-07-20 RX ADMIN — INSULIN GLARGINE-YFGN 40 UNIT(S): 100 INJECTION, SOLUTION SUBCUTANEOUS at 21:58

## 2025-07-20 RX ADMIN — INSULIN LISPRO 6 UNIT(S): 100 INJECTION, SOLUTION INTRAVENOUS; SUBCUTANEOUS at 08:30

## 2025-07-20 RX ADMIN — Medication 250 MILLIGRAM(S): at 13:40

## 2025-07-20 RX ADMIN — ATORVASTATIN CALCIUM 40 MILLIGRAM(S): 80 TABLET, FILM COATED ORAL at 21:58

## 2025-07-21 ENCOUNTER — TRANSCRIPTION ENCOUNTER (OUTPATIENT)
Age: 65
End: 2025-07-21

## 2025-07-21 LAB
CULTURE RESULTS: ABNORMAL
CULTURE RESULTS: SIGNIFICANT CHANGE UP
GLUCOSE BLDC GLUCOMTR-MCNC: 126 MG/DL — HIGH (ref 70–99)
GLUCOSE BLDC GLUCOMTR-MCNC: 127 MG/DL — HIGH (ref 70–99)
GLUCOSE BLDC GLUCOMTR-MCNC: 180 MG/DL — HIGH (ref 70–99)
GLUCOSE BLDC GLUCOMTR-MCNC: 71 MG/DL — SIGNIFICANT CHANGE UP (ref 70–99)
SPECIMEN SOURCE: SIGNIFICANT CHANGE UP

## 2025-07-21 PROCEDURE — G0545: CPT

## 2025-07-21 PROCEDURE — 99238 HOSP IP/OBS DSCHRG MGMT 30/<: CPT

## 2025-07-21 PROCEDURE — 36000 PLACE NEEDLE IN VEIN: CPT | Mod: 77

## 2025-07-21 PROCEDURE — 36000 PLACE NEEDLE IN VEIN: CPT

## 2025-07-21 PROCEDURE — 36415 COLL VENOUS BLD VENIPUNCTURE: CPT

## 2025-07-21 PROCEDURE — 76937 US GUIDE VASCULAR ACCESS: CPT | Mod: 26

## 2025-07-21 PROCEDURE — 99232 SBSQ HOSP IP/OBS MODERATE 35: CPT

## 2025-07-21 RX ORDER — INSULIN LISPRO 100 U/ML
4 INJECTION, SOLUTION INTRAVENOUS; SUBCUTANEOUS
Refills: 0 | Status: DISCONTINUED | OUTPATIENT
Start: 2025-07-21 | End: 2025-07-22

## 2025-07-21 RX ORDER — VANCOMYCIN HCL IN 5 % DEXTROSE 1.5G/250ML
125 PLASTIC BAG, INJECTION (ML) INTRAVENOUS EVERY 6 HOURS
Refills: 0 | Status: DISCONTINUED | OUTPATIENT
Start: 2025-07-21 | End: 2025-07-22

## 2025-07-21 RX ORDER — DEXTROSE 50 % IN WATER 50 %
15 SYRINGE (ML) INTRAVENOUS ONCE
Refills: 0 | Status: DISCONTINUED | OUTPATIENT
Start: 2025-07-21 | End: 2025-07-22

## 2025-07-21 RX ORDER — INSULIN LISPRO 100 U/ML
INJECTION, SOLUTION INTRAVENOUS; SUBCUTANEOUS
Refills: 0 | Status: DISCONTINUED | OUTPATIENT
Start: 2025-07-21 | End: 2025-07-22

## 2025-07-21 RX ORDER — INSULIN GLARGINE-YFGN 100 [IU]/ML
16 INJECTION, SOLUTION SUBCUTANEOUS ONCE
Refills: 0 | Status: COMPLETED | OUTPATIENT
Start: 2025-07-21 | End: 2025-07-21

## 2025-07-21 RX ORDER — INSULIN GLARGINE-YFGN 100 [IU]/ML
38 INJECTION, SOLUTION SUBCUTANEOUS AT BEDTIME
Refills: 0 | Status: DISCONTINUED | OUTPATIENT
Start: 2025-07-21 | End: 2025-07-22

## 2025-07-21 RX ADMIN — HYDROXYCHLOROQUINE SULFATE 200 MILLIGRAM(S): 200 TABLET, FILM COATED ORAL at 05:16

## 2025-07-21 RX ADMIN — Medication 125 MILLIGRAM(S): at 11:56

## 2025-07-21 RX ADMIN — HYDROXYCHLOROQUINE SULFATE 200 MILLIGRAM(S): 200 TABLET, FILM COATED ORAL at 17:02

## 2025-07-21 RX ADMIN — PREDNISONE 5 MILLIGRAM(S): 20 TABLET ORAL at 05:16

## 2025-07-21 RX ADMIN — INSULIN LISPRO 4 UNIT(S): 100 INJECTION, SOLUTION INTRAVENOUS; SUBCUTANEOUS at 17:01

## 2025-07-21 RX ADMIN — ATORVASTATIN CALCIUM 40 MILLIGRAM(S): 80 TABLET, FILM COATED ORAL at 21:49

## 2025-07-21 RX ADMIN — APIXABAN 5 MILLIGRAM(S): 5 TABLET, FILM COATED ORAL at 05:16

## 2025-07-21 RX ADMIN — Medication 70 MILLILITER(S): at 05:21

## 2025-07-21 RX ADMIN — Medication 125 MILLIGRAM(S): at 17:04

## 2025-07-21 RX ADMIN — APIXABAN 5 MILLIGRAM(S): 5 TABLET, FILM COATED ORAL at 17:02

## 2025-07-21 RX ADMIN — INSULIN LISPRO 1: 100 INJECTION, SOLUTION INTRAVENOUS; SUBCUTANEOUS at 17:01

## 2025-07-21 RX ADMIN — INSULIN LISPRO 4 UNIT(S): 100 INJECTION, SOLUTION INTRAVENOUS; SUBCUTANEOUS at 11:56

## 2025-07-21 RX ADMIN — Medication 250 MILLIGRAM(S): at 05:16

## 2025-07-21 RX ADMIN — INSULIN GLARGINE-YFGN 16 UNIT(S): 100 INJECTION, SOLUTION SUBCUTANEOUS at 21:49

## 2025-07-22 ENCOUNTER — TRANSCRIPTION ENCOUNTER (OUTPATIENT)
Age: 65
End: 2025-07-22

## 2025-07-22 VITALS
HEART RATE: 84 BPM | DIASTOLIC BLOOD PRESSURE: 81 MMHG | OXYGEN SATURATION: 99 % | TEMPERATURE: 98 F | RESPIRATION RATE: 16 BRPM | SYSTOLIC BLOOD PRESSURE: 128 MMHG

## 2025-07-22 LAB
GLUCOSE BLDC GLUCOMTR-MCNC: 102 MG/DL — HIGH (ref 70–99)
GLUCOSE BLDC GLUCOMTR-MCNC: 186 MG/DL — HIGH (ref 70–99)
GLUCOSE BLDC GLUCOMTR-MCNC: 86 MG/DL — SIGNIFICANT CHANGE UP (ref 70–99)

## 2025-07-22 PROCEDURE — 99239 HOSP IP/OBS DSCHRG MGMT >30: CPT

## 2025-07-22 RX ORDER — LOSARTAN POTASSIUM 100 MG/1
1 TABLET, FILM COATED ORAL
Refills: 0 | DISCHARGE

## 2025-07-22 RX ORDER — VANCOMYCIN HCL IN 5 % DEXTROSE 1.5G/250ML
5 PLASTIC BAG, INJECTION (ML) INTRAVENOUS
Qty: 1 | Refills: 0
Start: 2025-07-22 | End: 2025-07-31

## 2025-07-22 RX ADMIN — INSULIN LISPRO 1: 100 INJECTION, SOLUTION INTRAVENOUS; SUBCUTANEOUS at 11:32

## 2025-07-22 RX ADMIN — Medication 125 MILLIGRAM(S): at 00:28

## 2025-07-22 RX ADMIN — Medication 125 MILLIGRAM(S): at 05:26

## 2025-07-22 RX ADMIN — PREDNISONE 5 MILLIGRAM(S): 20 TABLET ORAL at 05:26

## 2025-07-22 RX ADMIN — Medication 70 MILLILITER(S): at 04:27

## 2025-07-22 RX ADMIN — INSULIN LISPRO 4 UNIT(S): 100 INJECTION, SOLUTION INTRAVENOUS; SUBCUTANEOUS at 11:32

## 2025-07-22 RX ADMIN — Medication 125 MILLIGRAM(S): at 11:31

## 2025-07-22 RX ADMIN — HYDROXYCHLOROQUINE SULFATE 200 MILLIGRAM(S): 200 TABLET, FILM COATED ORAL at 05:26

## 2025-07-22 RX ADMIN — APIXABAN 5 MILLIGRAM(S): 5 TABLET, FILM COATED ORAL at 05:26

## 2025-07-24 ENCOUNTER — NON-APPOINTMENT (OUTPATIENT)
Age: 65
End: 2025-07-24

## 2025-07-24 LAB
CULTURE RESULTS: SIGNIFICANT CHANGE UP
SPECIMEN SOURCE: SIGNIFICANT CHANGE UP

## 2025-07-25 ENCOUNTER — APPOINTMENT (OUTPATIENT)
Dept: RHEUMATOLOGY | Facility: CLINIC | Age: 65
End: 2025-07-25
Payer: MEDICARE

## 2025-07-25 VITALS
SYSTOLIC BLOOD PRESSURE: 122 MMHG | OXYGEN SATURATION: 96 % | HEIGHT: 71 IN | HEART RATE: 99 BPM | DIASTOLIC BLOOD PRESSURE: 62 MMHG | WEIGHT: 256 LBS | BODY MASS INDEX: 35.84 KG/M2

## 2025-07-25 DIAGNOSIS — Z79.899 OTHER LONG TERM (CURRENT) DRUG THERAPY: ICD-10-CM

## 2025-07-25 DIAGNOSIS — M79.604 PAIN IN RIGHT LEG: ICD-10-CM

## 2025-07-25 DIAGNOSIS — Z82.69 FAMILY HISTORY OF OTHER DISEASES OF THE MUSCULOSKELETAL SYSTEM AND CONNECTIVE TISSUE: ICD-10-CM

## 2025-07-25 DIAGNOSIS — M54.50 LOW BACK PAIN, UNSPECIFIED: ICD-10-CM

## 2025-07-25 DIAGNOSIS — M79.605 PAIN IN RIGHT LEG: ICD-10-CM

## 2025-07-25 LAB
ALBUMIN SERPL ELPH-MCNC: 3 G/DL
ALP BLD-CCNC: 72 U/L
ALT SERPL-CCNC: 18 U/L
ANION GAP SERPL CALC-SCNC: 14 MMOL/L
APPEARANCE: CLEAR
AST SERPL-CCNC: 18 U/L
BACTERIA: NEGATIVE /HPF
BASOPHILS # BLD AUTO: 0.04 K/UL
BASOPHILS NFR BLD AUTO: 0.4 %
BILIRUB SERPL-MCNC: 0.4 MG/DL
BILIRUBIN URINE: NEGATIVE
BLOOD URINE: NEGATIVE
BUN SERPL-MCNC: 13 MG/DL
CALCIUM SERPL-MCNC: 8.8 MG/DL
CAST: 2 /LPF
CHLORIDE SERPL-SCNC: 109 MMOL/L
CO2 SERPL-SCNC: 18 MMOL/L
COLOR: YELLOW
CREAT SERPL-MCNC: 1.36 MG/DL
CRP SERPL-MCNC: 13 MG/L
EGFRCR SERPLBLD CKD-EPI 2021: 44 ML/MIN/1.73M2
EOSINOPHIL # BLD AUTO: 0.07 K/UL
EOSINOPHIL NFR BLD AUTO: 0.7 %
EPITHELIAL CELLS: 0 /HPF
ERYTHROCYTE [SEDIMENTATION RATE] IN BLOOD BY WESTERGREN METHOD: 26 MM/HR
GLUCOSE QUALITATIVE U: NEGATIVE MG/DL
GLUCOSE SERPL-MCNC: 204 MG/DL
HCT VFR BLD CALC: 29.9 %
HGB BLD-MCNC: 9.1 G/DL
IMM GRANULOCYTES NFR BLD AUTO: 4.1 %
KETONES URINE: NEGATIVE MG/DL
LEUKOCYTE ESTERASE URINE: NEGATIVE
LYMPHOCYTES # BLD AUTO: 0.99 K/UL
LYMPHOCYTES NFR BLD AUTO: 10.4 %
MAN DIFF?: NORMAL
MCHC RBC-ENTMCNC: 26 PG
MCHC RBC-ENTMCNC: 30.4 G/DL
MCV RBC AUTO: 85.4 FL
MICROSCOPIC-UA: NORMAL
MONOCYTES # BLD AUTO: 0.32 K/UL
MONOCYTES NFR BLD AUTO: 3.4 %
NEUTROPHILS # BLD AUTO: 7.73 K/UL
NEUTROPHILS NFR BLD AUTO: 81 %
NITRITE URINE: NEGATIVE
PH URINE: 5.5
PLATELET # BLD AUTO: 338 K/UL
POTASSIUM SERPL-SCNC: 3.8 MMOL/L
PROT SERPL-MCNC: 5.9 G/DL
PROTEIN URINE: 30 MG/DL
RBC # BLD: 3.5 M/UL
RBC # FLD: 14.5 %
RED BLOOD CELLS URINE: 1 /HPF
SODIUM SERPL-SCNC: 141 MMOL/L
SPECIFIC GRAVITY URINE: 1.01
THYROGLOB AB SERPL-ACNC: 16.2 IU/ML
THYROPEROXIDASE AB SERPL IA-ACNC: 10.7 IU/ML
UROBILINOGEN URINE: 0.2 MG/DL
WBC # FLD AUTO: 9.54 K/UL
WHITE BLOOD CELLS URINE: 4 /HPF

## 2025-07-25 PROCEDURE — 99215 OFFICE O/P EST HI 40 MIN: CPT | Mod: 25

## 2025-07-25 PROCEDURE — G2212 PROLONG OUTPT/OFFICE VIS: CPT

## 2025-07-28 LAB
C3 SERPL-MCNC: 89 MG/DL
C4 SERPL-MCNC: 21 MG/DL
CREAT SPEC-SCNC: 146 MG/DL
CREAT/PROT UR: 0.3 RATIO
DSDNA AB SER-ACNC: 10 IU/ML
ENA RNP AB SER-ACNC: 5.6 AL
ENA SM AB SER-ACNC: >8 AL
ENA SS-A AB SER-ACNC: 0.6 AL
ENA SS-B AB SER-ACNC: <0.2 AL
HBV CORE IGG+IGM SER QL: NONREACTIVE
HBV SURFACE AB SER QL: NONREACTIVE
HBV SURFACE AG SER QL: NONREACTIVE
HCV AB SER QL: NONREACTIVE
HCV S/CO RATIO: 0.16 S/CO
PROT UR-MCNC: 46 MG/DL

## 2025-07-29 LAB
M TB IFN-G BLD-IMP: NEGATIVE
QUANTIFERON TB PLUS MITOGEN MINUS NIL: 0.6 IU/ML
QUANTIFERON TB PLUS NIL: 0.1 IU/ML
QUANTIFERON TB PLUS TB1 MINUS NIL: 0 IU/ML
QUANTIFERON TB PLUS TB2 MINUS NIL: 0 IU/ML

## 2025-08-06 DIAGNOSIS — A04.72 ENTEROCOLITIS DUE TO CLOSTRIDIUM DIFFICILE, NOT SPECIFIED AS RECURRENT: ICD-10-CM

## 2025-08-06 DIAGNOSIS — E11.65 TYPE 2 DIABETES MELLITUS WITH HYPERGLYCEMIA: ICD-10-CM

## 2025-08-06 DIAGNOSIS — T83.510A INFECTION AND INFLAMMATORY REACTION DUE TO CYSTOSTOMY CATHETER, INITIAL ENCOUNTER: ICD-10-CM

## 2025-08-06 DIAGNOSIS — Z86.718 PERSONAL HISTORY OF OTHER VENOUS THROMBOSIS AND EMBOLISM: ICD-10-CM

## 2025-08-06 DIAGNOSIS — R19.7 DIARRHEA, UNSPECIFIED: ICD-10-CM

## 2025-08-06 DIAGNOSIS — M32.9 SYSTEMIC LUPUS ERYTHEMATOSUS, UNSPECIFIED: ICD-10-CM

## 2025-08-06 DIAGNOSIS — N17.9 ACUTE KIDNEY FAILURE, UNSPECIFIED: ICD-10-CM

## 2025-08-06 DIAGNOSIS — N18.30 CHRONIC KIDNEY DISEASE, STAGE 3 UNSPECIFIED: ICD-10-CM

## 2025-08-06 DIAGNOSIS — Z88.1 ALLERGY STATUS TO OTHER ANTIBIOTIC AGENTS: ICD-10-CM

## 2025-08-06 DIAGNOSIS — N39.0 URINARY TRACT INFECTION, SITE NOT SPECIFIED: ICD-10-CM

## 2025-08-06 DIAGNOSIS — E78.5 HYPERLIPIDEMIA, UNSPECIFIED: ICD-10-CM

## 2025-08-06 DIAGNOSIS — Z79.01 LONG TERM (CURRENT) USE OF ANTICOAGULANTS: ICD-10-CM

## 2025-08-06 DIAGNOSIS — Z79.52 LONG TERM (CURRENT) USE OF SYSTEMIC STEROIDS: ICD-10-CM

## 2025-08-06 DIAGNOSIS — E86.0 DEHYDRATION: ICD-10-CM

## 2025-08-06 DIAGNOSIS — E11.22 TYPE 2 DIABETES MELLITUS WITH DIABETIC CHRONIC KIDNEY DISEASE: ICD-10-CM

## 2025-08-06 DIAGNOSIS — Z88.8 ALLERGY STATUS TO OTHER DRUGS, MEDICAMENTS AND BIOLOGICAL SUBSTANCES: ICD-10-CM

## 2025-08-06 DIAGNOSIS — Z96.0 PRESENCE OF UROGENITAL IMPLANTS: ICD-10-CM

## 2025-08-06 DIAGNOSIS — I12.9 HYPERTENSIVE CHRONIC KIDNEY DISEASE WITH STAGE 1 THROUGH STAGE 4 CHRONIC KIDNEY DISEASE, OR UNSPECIFIED CHRONIC KIDNEY DISEASE: ICD-10-CM

## 2025-08-06 DIAGNOSIS — R91.1 SOLITARY PULMONARY NODULE: ICD-10-CM

## 2025-08-06 DIAGNOSIS — D64.9 ANEMIA, UNSPECIFIED: ICD-10-CM

## 2025-08-06 DIAGNOSIS — Z79.4 LONG TERM (CURRENT) USE OF INSULIN: ICD-10-CM

## 2025-08-12 ENCOUNTER — APPOINTMENT (OUTPATIENT)
Dept: UROLOGY | Facility: CLINIC | Age: 65
End: 2025-08-12
Payer: MEDICARE

## 2025-08-12 VITALS
OXYGEN SATURATION: 99 % | SYSTOLIC BLOOD PRESSURE: 141 MMHG | HEART RATE: 87 BPM | BODY MASS INDEX: 35.71 KG/M2 | WEIGHT: 256 LBS | TEMPERATURE: 98 F | DIASTOLIC BLOOD PRESSURE: 81 MMHG

## 2025-08-12 PROCEDURE — 51710 CHANGE OF BLADDER TUBE: CPT

## 2025-08-15 ENCOUNTER — APPOINTMENT (OUTPATIENT)
Dept: INFECTIOUS DISEASE | Facility: CLINIC | Age: 65
End: 2025-08-15
Payer: MEDICARE

## 2025-08-15 VITALS
BODY MASS INDEX: 36.4 KG/M2 | SYSTOLIC BLOOD PRESSURE: 157 MMHG | WEIGHT: 260 LBS | TEMPERATURE: 98.3 F | OXYGEN SATURATION: 96 % | DIASTOLIC BLOOD PRESSURE: 85 MMHG | HEART RATE: 122 BPM | HEIGHT: 71 IN

## 2025-08-15 DIAGNOSIS — Z79.60 LONG TERM (CURRENT) USE OF UNSPECIFIED IMMUNOMODULATORS AND IMMUNOSUPPRESSANTS: ICD-10-CM

## 2025-08-15 DIAGNOSIS — A04.72 ENTEROCOLITIS DUE TO CLOSTRIDIUM DIFFICILE, NOT SPECIFIED AS RECURRENT: ICD-10-CM

## 2025-08-15 DIAGNOSIS — R33.9 RETENTION OF URINE, UNSPECIFIED: ICD-10-CM

## 2025-08-15 DIAGNOSIS — M32.9 SYSTEMIC LUPUS ERYTHEMATOSUS, UNSPECIFIED: ICD-10-CM

## 2025-08-15 PROCEDURE — 99214 OFFICE O/P EST MOD 30 MIN: CPT

## 2025-08-28 ENCOUNTER — APPOINTMENT (OUTPATIENT)
Dept: ENDOCRINOLOGY | Facility: CLINIC | Age: 65
End: 2025-08-28
Payer: MEDICARE

## 2025-08-28 VITALS
TEMPERATURE: 97.9 F | OXYGEN SATURATION: 100 % | DIASTOLIC BLOOD PRESSURE: 72 MMHG | SYSTOLIC BLOOD PRESSURE: 124 MMHG | RESPIRATION RATE: 18 BRPM | HEART RATE: 99 BPM | BODY MASS INDEX: 36.4 KG/M2 | HEIGHT: 71 IN | WEIGHT: 260 LBS

## 2025-08-28 DIAGNOSIS — E11.8 TYPE 2 DIABETES MELLITUS WITH UNSPECIFIED COMPLICATIONS: ICD-10-CM

## 2025-08-28 PROCEDURE — 95251 CONT GLUC MNTR ANALYSIS I&R: CPT

## 2025-08-28 PROCEDURE — 99214 OFFICE O/P EST MOD 30 MIN: CPT | Mod: 25

## 2025-08-28 PROCEDURE — 82962 GLUCOSE BLOOD TEST: CPT

## 2025-09-02 LAB — GLUCOSE BLDC GLUCOMTR-MCNC: 175

## 2025-09-04 ENCOUNTER — EMERGENCY (EMERGENCY)
Facility: HOSPITAL | Age: 65
LOS: 0 days | Discharge: HOME | End: 2025-09-04
Attending: STUDENT IN AN ORGANIZED HEALTH CARE EDUCATION/TRAINING PROGRAM

## 2025-09-04 VITALS
SYSTOLIC BLOOD PRESSURE: 119 MMHG | RESPIRATION RATE: 17 BRPM | WEIGHT: 259.93 LBS | OXYGEN SATURATION: 97 % | TEMPERATURE: 98 F | HEIGHT: 71 IN | DIASTOLIC BLOOD PRESSURE: 80 MMHG | HEART RATE: 108 BPM

## 2025-09-04 DIAGNOSIS — Z79.01 LONG TERM (CURRENT) USE OF ANTICOAGULANTS: ICD-10-CM

## 2025-09-04 DIAGNOSIS — M32.9 SYSTEMIC LUPUS ERYTHEMATOSUS, UNSPECIFIED: ICD-10-CM

## 2025-09-04 DIAGNOSIS — Z98.891 HISTORY OF UTERINE SCAR FROM PREVIOUS SURGERY: Chronic | ICD-10-CM

## 2025-09-04 DIAGNOSIS — L91.8 OTHER HYPERTROPHIC DISORDERS OF THE SKIN: Chronic | ICD-10-CM

## 2025-09-04 DIAGNOSIS — Z88.8 ALLERGY STATUS TO OTHER DRUGS, MEDICAMENTS AND BIOLOGICAL SUBSTANCES: ICD-10-CM

## 2025-09-04 DIAGNOSIS — R21 RASH AND OTHER NONSPECIFIC SKIN ERUPTION: ICD-10-CM

## 2025-09-04 DIAGNOSIS — Z86.718 PERSONAL HISTORY OF OTHER VENOUS THROMBOSIS AND EMBOLISM: ICD-10-CM

## 2025-09-04 LAB
ALBUMIN SERPL ELPH-MCNC: 2.9 G/DL — LOW (ref 3.3–5)
ALP SERPL-CCNC: 73 U/L — SIGNIFICANT CHANGE UP (ref 40–120)
ALT FLD-CCNC: 14 U/L — SIGNIFICANT CHANGE UP (ref 12–78)
ANION GAP SERPL CALC-SCNC: 7 MMOL/L — SIGNIFICANT CHANGE UP (ref 5–17)
AST SERPL-CCNC: 11 U/L — LOW (ref 15–37)
BASOPHILS # BLD AUTO: 0.03 K/UL — SIGNIFICANT CHANGE UP (ref 0–0.2)
BASOPHILS NFR BLD AUTO: 0.3 % — SIGNIFICANT CHANGE UP (ref 0–2)
BILIRUB SERPL-MCNC: 0.5 MG/DL — SIGNIFICANT CHANGE UP (ref 0.2–1.2)
BUN SERPL-MCNC: 24 MG/DL — HIGH (ref 7–23)
CALCIUM SERPL-MCNC: 8.9 MG/DL — SIGNIFICANT CHANGE UP (ref 8.5–10.1)
CHLORIDE SERPL-SCNC: 104 MMOL/L — SIGNIFICANT CHANGE UP (ref 96–108)
CO2 SERPL-SCNC: 28 MMOL/L — SIGNIFICANT CHANGE UP (ref 22–31)
CREAT SERPL-MCNC: 1.72 MG/DL — HIGH (ref 0.5–1.3)
EGFR: 33 ML/MIN/1.73M2 — LOW
EGFR: 33 ML/MIN/1.73M2 — LOW
EOSINOPHIL # BLD AUTO: 0.15 K/UL — SIGNIFICANT CHANGE UP (ref 0–0.5)
EOSINOPHIL NFR BLD AUTO: 1.7 % — SIGNIFICANT CHANGE UP (ref 0–6)
GLUCOSE SERPL-MCNC: 179 MG/DL — HIGH (ref 70–99)
HCT VFR BLD CALC: 33.6 % — LOW (ref 34.5–45)
HGB BLD-MCNC: 10.9 G/DL — LOW (ref 11.5–15.5)
IMM GRANULOCYTES NFR BLD AUTO: 0.3 % — SIGNIFICANT CHANGE UP (ref 0–0.9)
LYMPHOCYTES # BLD AUTO: 3.24 K/UL — SIGNIFICANT CHANGE UP (ref 1–3.3)
LYMPHOCYTES # BLD AUTO: 36.8 % — SIGNIFICANT CHANGE UP (ref 13–44)
MCHC RBC-ENTMCNC: 26.5 PG — LOW (ref 27–34)
MCHC RBC-ENTMCNC: 32.4 G/DL — SIGNIFICANT CHANGE UP (ref 32–36)
MCV RBC AUTO: 81.8 FL — SIGNIFICANT CHANGE UP (ref 80–100)
MONOCYTES # BLD AUTO: 0.39 K/UL — SIGNIFICANT CHANGE UP (ref 0–0.9)
MONOCYTES NFR BLD AUTO: 4.4 % — SIGNIFICANT CHANGE UP (ref 2–14)
NEUTROPHILS # BLD AUTO: 4.96 K/UL — SIGNIFICANT CHANGE UP (ref 1.8–7.4)
NEUTROPHILS NFR BLD AUTO: 56.5 % — SIGNIFICANT CHANGE UP (ref 43–77)
NRBC BLD AUTO-RTO: 0 /100 WBCS — SIGNIFICANT CHANGE UP (ref 0–0)
PLATELET # BLD AUTO: 220 K/UL — SIGNIFICANT CHANGE UP (ref 150–400)
POTASSIUM SERPL-MCNC: 3.9 MMOL/L — SIGNIFICANT CHANGE UP (ref 3.5–5.3)
POTASSIUM SERPL-SCNC: 3.9 MMOL/L — SIGNIFICANT CHANGE UP (ref 3.5–5.3)
PROT SERPL-MCNC: 7.3 GM/DL — SIGNIFICANT CHANGE UP (ref 6–8.3)
RBC # BLD: 4.11 M/UL — SIGNIFICANT CHANGE UP (ref 3.8–5.2)
RBC # FLD: 14.7 % — HIGH (ref 10.3–14.5)
SODIUM SERPL-SCNC: 139 MMOL/L — SIGNIFICANT CHANGE UP (ref 135–145)
WBC # BLD: 8.8 K/UL — SIGNIFICANT CHANGE UP (ref 3.8–10.5)
WBC # FLD AUTO: 8.8 K/UL — SIGNIFICANT CHANGE UP (ref 3.8–10.5)

## 2025-09-04 PROCEDURE — 99284 EMERGENCY DEPT VISIT MOD MDM: CPT | Mod: 25

## 2025-09-04 RX ORDER — ACETAMINOPHEN 500 MG/5ML
1000 LIQUID (ML) ORAL ONCE
Refills: 0 | Status: COMPLETED | OUTPATIENT
Start: 2025-09-04 | End: 2025-09-04

## 2025-09-04 RX ADMIN — Medication 400 MILLIGRAM(S): at 08:14

## 2025-09-04 RX ADMIN — Medication 1000 MILLIGRAM(S): at 09:50

## 2025-09-16 ENCOUNTER — APPOINTMENT (OUTPATIENT)
Dept: UROLOGY | Facility: CLINIC | Age: 65
End: 2025-09-16
Payer: MEDICARE

## 2025-09-16 VITALS — SYSTOLIC BLOOD PRESSURE: 155 MMHG | HEART RATE: 119 BPM | DIASTOLIC BLOOD PRESSURE: 78 MMHG | OXYGEN SATURATION: 99 %

## 2025-09-16 DIAGNOSIS — N31.2 FLACCID NEUROPATHIC BLADDER, NOT ELSEWHERE CLASSIFIED: ICD-10-CM

## 2025-09-16 DIAGNOSIS — B02.9 ZOSTER W/OUT COMPLICATIONS: ICD-10-CM

## 2025-09-16 DIAGNOSIS — R33.9 RETENTION OF URINE, UNSPECIFIED: ICD-10-CM

## 2025-09-16 PROCEDURE — 51710 CHANGE OF BLADDER TUBE: CPT

## 2025-09-16 RX ORDER — VALACYCLOVIR 500 MG/1
TABLET, FILM COATED ORAL
Refills: 0 | Status: ACTIVE | COMMUNITY

## (undated) DEVICE — Device

## (undated) DEVICE — FOLEY HOLDER STATLOCK 2 WAY ADULT

## (undated) DEVICE — GLV 7.5 PROTEXIS (WHITE)

## (undated) DEVICE — TUBING LEVEL ONE NORMOFLO SET

## (undated) DEVICE — VENODYNE/SCD SLEEVE CALF MEDIUM

## (undated) DEVICE — PACK CYSTO

## (undated) DEVICE — SYR LUER LOK 20CC

## (undated) DEVICE — TUBING SUCTION NONCONDUCTIVE 6MM X 12FT

## (undated) DEVICE — SYR LUER LOK 10CC

## (undated) DEVICE — ELCTR PLASMA LOOP MEDIUM ANGLED 24FR 12-30 DEG

## (undated) DEVICE — DRAINAGE BAG URINARY 4L